# Patient Record
Sex: FEMALE | Race: WHITE | NOT HISPANIC OR LATINO | Employment: OTHER | URBAN - METROPOLITAN AREA
[De-identification: names, ages, dates, MRNs, and addresses within clinical notes are randomized per-mention and may not be internally consistent; named-entity substitution may affect disease eponyms.]

---

## 2017-09-28 ENCOUNTER — TELEPHONE (OUTPATIENT)
Dept: CARDIOLOGY | Facility: CLINIC | Age: 57
End: 2017-09-28

## 2017-11-02 ENCOUNTER — OFFICE VISIT (OUTPATIENT)
Dept: CARDIOLOGY | Facility: CLINIC | Age: 57
End: 2017-11-02

## 2017-11-02 VITALS
WEIGHT: 293 LBS | HEART RATE: 58 BPM | BODY MASS INDEX: 44.41 KG/M2 | DIASTOLIC BLOOD PRESSURE: 60 MMHG | SYSTOLIC BLOOD PRESSURE: 120 MMHG | HEIGHT: 68 IN

## 2017-11-02 DIAGNOSIS — I48.0 PAF (PAROXYSMAL ATRIAL FIBRILLATION) (HCC): Primary | ICD-10-CM

## 2017-11-02 PROCEDURE — 93000 ELECTROCARDIOGRAM COMPLETE: CPT | Performed by: INTERNAL MEDICINE

## 2017-11-02 PROCEDURE — 99214 OFFICE O/P EST MOD 30 MIN: CPT | Performed by: INTERNAL MEDICINE

## 2017-11-02 RX ORDER — ONDANSETRON 4 MG/1
4 TABLET, FILM COATED ORAL EVERY 6 HOURS PRN
COMMUNITY

## 2017-11-02 NOTE — PROGRESS NOTES
Subjective:     Encounter Date:11/02/2017      Patient ID: Jenelle Kasper is a 57 y.o. female.    Chief Complaint:  Shortness of Breath   This is a chronic problem. The current episode started more than 1 year ago. The problem has been unchanged. Associated symptoms include abdominal pain.   Atrial Fibrillation   Presents for follow-up visit. Symptoms include shortness of breath. The symptoms have been stable. Past medical history includes atrial fibrillation.       57-year-old female with a history of atrial fibrillation.  Patient presents today for a one-year reevaluation.  She was placed on Xarelto a year ago for a chads-vasc score of 3.  She was feeling really bad in August she went to her physician and was found to have a hemoglobin of 6.  Due to a disconnect this was found out by the patient and week later she went to emergency room and by that time her hemoglobin was down to 5.  This was back on or about September 21.  Patient was discontinued off the Xarelto and has been worked up by GI.  Her upper and lower scopes have been negative she's been unable to swallow the camera pill.  Her last hemoglobin was reportedly approximately 8.6 and she continues to have black tarry stools.  She was seen today for further evaluation    Review of Systems   Constitution: Positive for malaise/fatigue.   Respiratory: Positive for shortness of breath.    Gastrointestinal: Positive for abdominal pain, change in bowel habit, diarrhea and melena.   All other systems reviewed and are negative.        ECG 12 Lead  Date/Time: 11/2/2017 2:15 PM  Performed by: EFREN HARRIS  Authorized by: EFREN HARRIS   Comparison: compared with previous ECG from 8/31/2016  Similar to previous ECG  Rhythm: sinus rhythm  Clinical impression: normal ECG               Objective:     Physical Exam   Constitutional: She is oriented to person, place, and time. She appears well-developed.   HENT:   Head: Normocephalic.   Eyes: Conjunctivae  are normal.   Neck: Normal range of motion.   Cardiovascular: Normal rate and regular rhythm.    Murmur heard.   Harsh midsystolic murmur is present with a grade of 1/6  at the upper right sternal border  Pulmonary/Chest: Breath sounds normal.   Abdominal: Soft. Bowel sounds are normal.   obese   Musculoskeletal: Normal range of motion. She exhibits no edema.   Neurological: She is alert and oriented to person, place, and time.   Skin: Skin is warm and dry.   Psychiatric: She has a normal mood and affect. Her behavior is normal.   Vitals reviewed.      Lab Review:       Assessment:          Diagnosis Plan   1. PAF (paroxysmal atrial fibrillation)  ECG 12 Lead          Plan:       1.  Paroxysmal atrial fibrillation.  Clinically he's been doing well with no further symptoms.  Unfortunately now she has a GI bleed which required to come off anticoagulation.  I did explain the risk benefit ratio in medicine.  At this point clearly the risk of anticoagulation far outweighs the benefits.  At this point I would leave her off her anticoagulation.  I explained that she could have a stroke the incidence was very low at this point we did not have a choice.  She understood that and that any agent will promote bleeding especially since she has been off it for a month and continues to bleed.  2.  GI bleed  3.  Mild aortic stenosis.  4.  Obesity  5.  Follow-up in 6 months.        Atrial Fibrillation and Atrial Flutter  Assessment  • The patient's CHADS2-VASc score is 3  • A [id number]-[id number] score of 2 or more is considered a high risk for a thromboembolic event    Plan  • Attempt to maintain sinus rhythm  • Continue beta blocker for rhythm control

## 2018-05-03 ENCOUNTER — OFFICE VISIT (OUTPATIENT)
Dept: CARDIOLOGY | Facility: CLINIC | Age: 58
End: 2018-05-03

## 2018-05-03 VITALS
HEIGHT: 68 IN | HEART RATE: 86 BPM | DIASTOLIC BLOOD PRESSURE: 80 MMHG | WEIGHT: 293 LBS | BODY MASS INDEX: 44.41 KG/M2 | SYSTOLIC BLOOD PRESSURE: 138 MMHG

## 2018-05-03 DIAGNOSIS — I48.0 PAF (PAROXYSMAL ATRIAL FIBRILLATION) (HCC): ICD-10-CM

## 2018-05-03 DIAGNOSIS — T07.XXXA WOUNDS, MULTIPLE: Primary | ICD-10-CM

## 2018-05-03 PROCEDURE — 99213 OFFICE O/P EST LOW 20 MIN: CPT | Performed by: INTERNAL MEDICINE

## 2018-05-03 PROCEDURE — 93000 ELECTROCARDIOGRAM COMPLETE: CPT | Performed by: INTERNAL MEDICINE

## 2018-05-03 NOTE — PROGRESS NOTES
Subjective:     Encounter Date:11/02/2017      Patient ID: Jenelle Kasper is a 58 y.o. female.    Chief Complaint:  Atrial Fibrillation   Presents for follow-up visit. Symptoms include shortness of breath. The symptoms have been stable. Past medical history includes atrial fibrillation.   Shortness of Breath   This is a chronic problem. The current episode started more than 1 year ago. The problem has been unchanged.       57-year-old female with a history of atrial fibrillation. Patient resents today for reevaluation.  Unfortunately she says she doesn't feel well.  She knows that she is not active she also fighting weight which also says her legs hurt.  She's been having some wounds that she's been trying to heal for the past year but not succeeding very well.  From a heart standpoint she says not much change unfortunately she was struggling with other health issues that complicate her ability to do any type of activity.      Review of Systems   Constitution: Positive for malaise/fatigue.   Respiratory: Positive for shortness of breath.    Musculoskeletal: Positive for joint pain.   Gastrointestinal: Positive for melena.   All other systems reviewed and are negative.        ECG 12 Lead  Date/Time: 5/3/2018 4:15 PM  Performed by: EFREN HARRIS  Authorized by: EFREN HARRIS   Comparison: compared with previous ECG from 11/2/2017  Similar to previous ECG  Rhythm: sinus rhythm  Other findings: PRWP  Clinical impression: abnormal ECG               Objective:     Physical Exam   Constitutional: She is oriented to person, place, and time. She appears well-developed.   HENT:   Head: Normocephalic.   Eyes: Conjunctivae are normal.   Neck: Normal range of motion.   Cardiovascular: Normal rate and regular rhythm.    Murmur heard.   Harsh midsystolic murmur is present with a grade of 1/6  at the upper right sternal border  Pulmonary/Chest: Breath sounds normal.   Abdominal: Soft. Bowel sounds are normal.    obese   Musculoskeletal: Normal range of motion. She exhibits no edema.   Neurological: She is alert and oriented to person, place, and time.   Skin: Skin is warm and dry.   Psychiatric: She has a normal mood and affect. Her behavior is normal.   Vitals reviewed.      Lab Review:       Assessment:          Diagnosis Plan   1. Wounds, multiple  Ambulatory Referral to Wound Clinic   2. PAF (paroxysmal atrial fibrillation)            Plan:       1.  Paroxysmal atrial fibrillation.   2.  GI bleed   Patient continues to pina this.  Source is undetermined.  Unfortunate she's had no symptoms of atrial fibrillation.  At this point even aspirin has caused to have significant GI bleeding requiring transfusion.  3.  Mild aortic stenosis.  4.  Obesity  5.  Patient does say she has some chronic wounds.  She says it's been going on for about a year and she just can't get them healed up.  She has seen some physicians for this but has not improved.  I am and refer her to wound clinic to see if they can help.    6. Follow-up in 6 months.        Atrial Fibrillation and Atrial Flutter  Assessment  • The patient has paroxysmal atrial fibrillation  • The patient's CHADS2-VASc score is 3  • A [id number] score of 2 or more is considered a high risk for a thromboembolic event  • Warfarin not prescribed for medical reasons  • Rivaroxaban not prescribed for medical reasons  • Aspirin not prescribed for medical reasons    Plan  • Attempt to maintain sinus rhythm  • Continue beta blocker for rhythm control

## 2018-05-14 ENCOUNTER — OFFICE VISIT (OUTPATIENT)
Dept: WOUND CARE | Facility: HOSPITAL | Age: 58
End: 2018-05-14
Attending: SURGERY

## 2018-05-14 PROCEDURE — G0463 HOSPITAL OUTPT CLINIC VISIT: HCPCS

## 2018-05-16 ENCOUNTER — OFFICE VISIT (OUTPATIENT)
Dept: WOUND CARE | Facility: HOSPITAL | Age: 58
End: 2018-05-16
Attending: SURGERY

## 2018-05-21 ENCOUNTER — OFFICE VISIT (OUTPATIENT)
Dept: WOUND CARE | Facility: HOSPITAL | Age: 58
End: 2018-05-21
Attending: SURGERY

## 2018-05-25 ENCOUNTER — OFFICE VISIT (OUTPATIENT)
Dept: WOUND CARE | Facility: HOSPITAL | Age: 58
End: 2018-05-25
Attending: SURGERY

## 2018-05-31 ENCOUNTER — OFFICE VISIT (OUTPATIENT)
Dept: WOUND CARE | Facility: HOSPITAL | Age: 58
End: 2018-05-31
Attending: SURGERY

## 2018-06-04 ENCOUNTER — OFFICE VISIT (OUTPATIENT)
Dept: WOUND CARE | Facility: HOSPITAL | Age: 58
End: 2018-06-04
Attending: SURGERY

## 2018-06-07 ENCOUNTER — OFFICE VISIT (OUTPATIENT)
Dept: WOUND CARE | Facility: HOSPITAL | Age: 58
End: 2018-06-07
Attending: SURGERY

## 2018-06-07 PROCEDURE — G0463 HOSPITAL OUTPT CLINIC VISIT: HCPCS

## 2018-07-12 ENCOUNTER — OFFICE VISIT (OUTPATIENT)
Dept: WOUND CARE | Facility: HOSPITAL | Age: 58
End: 2018-07-12
Attending: SURGERY

## 2018-07-12 PROCEDURE — G0463 HOSPITAL OUTPT CLINIC VISIT: HCPCS

## 2018-07-26 ENCOUNTER — OFFICE VISIT (OUTPATIENT)
Dept: WOUND CARE | Facility: HOSPITAL | Age: 58
End: 2018-07-26
Attending: SURGERY

## 2018-08-02 ENCOUNTER — OFFICE VISIT (OUTPATIENT)
Dept: WOUND CARE | Facility: HOSPITAL | Age: 58
End: 2018-08-02
Attending: SURGERY

## 2018-08-16 ENCOUNTER — APPOINTMENT (OUTPATIENT)
Dept: WOUND CARE | Facility: HOSPITAL | Age: 58
End: 2018-08-16
Attending: SURGERY

## 2018-08-27 ENCOUNTER — OFFICE VISIT (OUTPATIENT)
Dept: WOUND CARE | Facility: HOSPITAL | Age: 58
End: 2018-08-27
Attending: SURGERY

## 2018-09-11 ENCOUNTER — OFFICE VISIT (OUTPATIENT)
Dept: CARDIOLOGY | Facility: CLINIC | Age: 58
End: 2018-09-11

## 2018-09-11 VITALS — DIASTOLIC BLOOD PRESSURE: 74 MMHG | HEIGHT: 67 IN | SYSTOLIC BLOOD PRESSURE: 138 MMHG | HEART RATE: 89 BPM

## 2018-09-11 DIAGNOSIS — I48.0 PAF (PAROXYSMAL ATRIAL FIBRILLATION) (HCC): Primary | ICD-10-CM

## 2018-09-11 DIAGNOSIS — Z87.19 HISTORY OF GI BLEED: ICD-10-CM

## 2018-09-11 DIAGNOSIS — E66.01 MORBID OBESITY WITH BMI OF 45.0-49.9, ADULT (HCC): ICD-10-CM

## 2018-09-11 PROCEDURE — 93000 ELECTROCARDIOGRAM COMPLETE: CPT | Performed by: NURSE PRACTITIONER

## 2018-09-11 PROCEDURE — 99214 OFFICE O/P EST MOD 30 MIN: CPT | Performed by: NURSE PRACTITIONER

## 2018-09-11 RX ORDER — DIGOXIN 250 MCG
250 TABLET ORAL
Qty: 30 TABLET | Refills: 11 | Status: SHIPPED | OUTPATIENT
Start: 2018-09-11 | End: 2019-08-31 | Stop reason: SDUPTHER

## 2018-09-11 RX ORDER — DIGOXIN 250 MCG
250 TABLET ORAL
COMMUNITY
End: 2018-09-11 | Stop reason: SDUPTHER

## 2018-09-11 RX ORDER — ATENOLOL 25 MG/1
TABLET ORAL
Start: 2018-09-11 | End: 2019-05-03 | Stop reason: SDUPTHER

## 2018-09-11 NOTE — PROGRESS NOTES
"Date of Office Visit: 2018  Encounter Provider: TRUDY Riley  Place of Service: [facility] CARDIOLOGY  Patient Name: Jenelle Kasper  :1960    Chief Complaint   Patient presents with   • Atrial Fibrillation   • Follow-up   :     HPI: Jenelle Kasper is a 58 y.o. female is a patient of Dr. Winter. I am seeing her today for the first time and have reviewed her record.     Her past medical history is significant of atrial fibrillation, sarcoidosis, diabetes mellitus, hypertension, hyperlipidemia, COPD, morbid obesity.    She has history of gastric bypass surgery.     In 2016 she complained of chest pain or breathing and had a PET myocardial perfusion study which revealed a medium to large size, moderately severe area of ischemia in the inferior wall.  She proceeded to have left heart catheterization which was performed on 2016 which showed normal coronary arteries and a right dominant fashion.  It was felt that the PET study was a false positive and treatment of atrial fibrillation was recommended.    At some point she had gastrointestinal bleeding and required transfusion.  She was followed by gastroenterology and reports having colonoscopy and ultimately a capsule study.  The capsule study was inconclusive as the capsule did not pass given her history of gastric bypass surgery and upon follow-up x-ray the capsule was no longer found.  She was ultimately diagnosed with \"micro-bleeding\".     She was admitted to the [facility] in 2018 with chest discomfort, shortness of breath, and was found to be in atrial fibrillation with rapid ventricular response.  Her medications were adjusted and she was started on digoxin and titrated up to 250 mcg.  The atenolol dosage was increased to 37.5 mg twice a day. Her losartan was stopped.  The watchman device was mentioned to her as an option to bypass long term anticoagulation  She also was diagnosed with C. " "Difficile and was treated with antibiotic for that.    Patient presents today for hospital follow-up.  She is tolerating addition of digoxin and remains in atrial fibrillation with rate control.  She continues to have occasional palpitations.  Her shortness of breath with exertion is overall unchanged but perhaps slightly worse over the past 3 months.  She has lower extremity edema which is unchanged and feels more fatigued due to continued diarrhea for approximately 5 weeks.  She denies chest pain, near syncope, syncope.  She reports that she had some blood in her stool while she was given Lovenox injections for DVT prophylaxis in the hospital.  She has not seen any more bleeding since she has been home.  She ambulates with a cane and has pretty extensive workup breathing with exertion.  Oxygen saturation was 98% in the office today after ambulating on room air. She reported \"fear of bleeding to death\" if she were to restart anticoagulation but that she is willing to try to prevent stroke.       Allergies   Allergen Reactions   • Codeine Sulfate Shortness Of Breath   • Contrast Dye Anaphylaxis   • Morphine Sulfate Hives and Shortness Of Breath   • Pradaxa [Dabigatran Etexilate Mesylate] GI Intolerance       Past Medical History:   Diagnosis Date   • Anxiety    • Arrhythmia    • Asthma    • Atrial fibrillation with RVR (CMS/[facility])    • C. difficile diarrhea    • COPD (chronic obstructive pulmonary disease) (CMS/[facility])    • Depression    • Diabetes mellitus (CMS/[facility])    • Diabetic peripheral neuropathy associated with type 2 diabetes mellitus (CMS/[facility])    • GERD (gastroesophageal reflux disease)    • Heart murmur    • History of fall     closed head injury after falling down steps; fx nose   • Hypercalcemia    • Hyperlipidemia    • Hypertension    • IBS (irritable bowel syndrome)    • Kidney stone    • Morbid obesity (CMS/[facility])    • PAF (paroxysmal atrial fibrillation) (CMS/[facility])    • PCOS (polycystic ovarian syndrome)  " "  • PONV (postoperative nausea and vomiting)    • Sarcoidosis    • Shortness of breath    • Thyroid cancer (CMS/HCC)     WITH RADIATION   • Wounds, multiple        Past Surgical History:   Procedure Laterality Date   • CARDIAC CATHETERIZATION N/A 7/27/2016    Procedure: Coronary angiography;  Surgeon: Chris Perez MD;  Location:  [facility] CATH INVASIVE LOCATION;  Service:    • CARDIAC CATHETERIZATION N/A 7/27/2016    Procedure: Left heart cath;  Surgeon: Chris Perez MD;  Location: [facility] CATH [facility];  Service:    • CARDIAC CATHETERIZATION N/A 7/27/2016    Procedure: Left ventriculography;  Surgeon: Chris Perez MD;  Location:  [facility] CATH INVASIVE LOCATION;  Service:    • CHOLECYSTECTOMY     • COLONOSCOPY     • GASTRIC BYPASS      gastric surgery for morbid obesity   • GASTROPLASTY  2008    GASTRIC REVISION FROM PREVIOUS GASTRIC BYPASS   • HERNIA REPAIR      h/o   • LUNG BIOPSY      to dx sarcoidosis   • LUNG SURGERY      Removal of granulomas   • OTHER SURGICAL HISTORY      Dilation and curettage of uterus   • TOTAL THYROIDECTOMY           Family and social history reviewed.     Review of Systems   Constitution: Positive for malaise/fatigue.   Cardiovascular: Positive for dyspnea on exertion, leg swelling and palpitations.   Musculoskeletal: Positive for joint pain.   Gastrointestinal: Positive for abdominal pain and diarrhea.     All other systems were reviewed and are negative          Objective:     Vitals:    09/11/18 1100   BP: 138/74   BP Location: Left arm   Patient Position: Sitting   Pulse: 89   Height: 170.2 cm (67\")     There is no height or weight on file to calculate BMI.    PHYSICAL EXAM:  Physical Exam   Constitutional: She is oriented to person, place, and time. She appears well-developed and well-nourished. No distress.   obese   HENT:   Head: Normocephalic.   Eyes: Conjunctivae are normal.   Neck: Normal range of motion. No JVD present.   Cardiovascular: Normal rate, regular " rhythm, normal heart sounds and intact distal pulses.    No murmur heard.  Pulses:       Carotid pulses are 2+ on the right side, and 2+ on the left side.       Radial pulses are 2+ on the right side, and 2+ on the left side.        Posterior tibial pulses are 2+ on the right side, and 2+ on the left side.   Pulmonary/Chest: Effort normal and breath sounds normal. No respiratory distress. She has no wheezes. She has no rhonchi. She has no rales. She exhibits no tenderness.   Abdominal: Soft. Bowel sounds are normal. She exhibits no distension.   Musculoskeletal: Normal range of motion. She exhibits edema.   Ambulating with cane   Neurological: She is alert and oriented to person, place, and time.   Skin: Skin is warm, dry and intact. No rash noted. She is not diaphoretic. There is erythema. No cyanosis.   LLE ace wrapped  RLE erythema   Psychiatric: She has a normal mood and affect. Her behavior is normal. Judgment and thought content normal.         ECG 12 Lead  Date/Time: 9/11/2018 11:18 AM  Performed by: RADHA ARREOLA  Authorized by: RADHA ARREOLA   Comparison: compared with previous ECG from 8/15/2018  Similar to previous ECG  Rhythm: atrial fibrillation  Rate: normal  BPM: 89  Clinical impression: abnormal ECG            Current Outpatient Prescriptions   Medication Sig Dispense Refill   • atenolol (TENORMIN) 25 MG tablet Taking 1.5 tablets every morning     • bisacodyl (DULCOLAX) 5 MG EC tablet Take 5 mg by mouth daily as needed for constipation (1-3 PILLS EVERY 6-12 HOURS PRN).     • cyclobenzaprine (FLEXERIL) 10 MG tablet Take 10 mg by mouth 3 (three) times a day as needed for muscle spasms.     • digoxin (LANOXIN) 250 MCG tablet Take 1 tablet by mouth Daily. 30 tablet 11   • Ergocalciferol (VITAMIN D2 PO) Take one capsule by mouth every Thursday and Sunday weekly     • fluticasone-salmeterol (ADVAIR DISKUS) 500-50 MCG/DOSE DISKUS Advair Diskus AEPB; Patient Sig: Advair Diskus AEPB ; 0; 05-Sep-2012; Active      • furosemide (LASIX) 20 MG tablet Take 20 mg by mouth 2 (two) times a day.     • gabapentin (NEURONTIN) 300 MG capsule Take 300 mg by mouth 3 (three) times a day. Take 3 tablets three times daily      • glipiZIDE (GLUCOTROL) 10 MG tablet Take 10 mg by mouth 2 (two) times a day before meals.     • hydrOXYzine (ATARAX) 25 MG tablet Take 25 mg by mouth every 8 (eight) hours as needed for itching.     • insulin NPH-insulin regular (Novolin 70/30) (70-30) 100 UNIT/ML injection Inject 60 Units under the skin into the appropriate area as directed 2 (Two) Times a Day Before Meals.     • levothyroxine (SYNTHROID) 200 MCG tablet Take  by mouth. Take 3 tablets by mouth every evening     • loratadine (CLARITIN) 10 MG tablet Take 1 tablet by mouth Daily.     • LORazepam (ATIVAN) 1 MG tablet Take 1 mg by mouth every 6 (six) hours as needed for anxiety.     • meclizine (ANTIVERT) 25 MG tablet Take 1 tablet by mouth Daily As Needed.     • meperidine (DEMEROL) 100 MG tablet Take 1 tablet by mouth 4 (Four) Times a Day As Needed.     • metFORMIN (GLUCOPHAGE) 500 MG tablet Take 1,000 mg by mouth 2 (two) times a day with meals.     • ondansetron (ZOFRAN) 8 MG tablet Take  by mouth Daily.     • potassium chloride (K-DUR) 10 MEQ CR tablet Take 1 tablet by mouth Daily.     • simvastatin (ZOCOR) 20 MG tablet Take 20 mg by mouth every night.     • vitamin D (ERGOCALCIFEROL) 27716 units capsule capsule Take 1 capsule by mouth 1 (One) Time Per Week.     • aspirin 81 MG tablet Take 1 tablet by mouth Daily. 30 tablet 11     No current facility-administered medications for this visit.      Assessment:       Diagnosis Plan   1. PAF (paroxysmal atrial fibrillation) (CMS/HCC)  ECG 12 Lead   2. Morbid obesity with BMI of 45.0-49.9, adult (CMS/HCC)     3. History of GI bleed          Orders Placed This Encounter   Procedures   • ECG 12 Lead     This order was created via procedure documentation         Plan:         1.Persistent  Atrial  fibrillation-  Atenolol 37.5 mg BID and digoxin 250 mcg daily for rate control. She has not been anticoagulated due to history of GI bleeding. She has agreed to try ASA 81 mg daily prior to attempting more aggressive systemic anticoagulation such as Eliquis. She has tried Pradaxa and Xarelto in the past. THe watchman device was discussed as an option however patient was not aware that she would need to tolerate systemic anticoagulation for that.  HAS-BLED score of 1 which is intermediate.   Atrial Fibrillation and Atrial Flutter  Assessment  • The patient has paroxysmal atrial fibrillation  • The patient's CHADS2-VASc score is 3  • A [id number]-KUVm score of 2 or more is considered a high risk for a thromboembolic event  • Warfarin not prescribed for medical reasons  • Rivaroxaban not prescribed for medical reasons  • Aspirin prescribed  • Would try Eliquis next if she tolerates ASA 81 mg without major bleeding.    Plan  • Continue in atrial fibrillation with rate control  • Continue beta blocker for rhythm control  • Continue digoxin for rate control      2. Hypertension- adequate control continue off losartan for now    3. Hyperlipidemia on simvastatin followed by PCP    4. Sarcoidosis    5. Diabetes on insulin    6. COPD    7. Morbid Obesity- history of gastric bypass    8. Hypothyroidism on replacement therapy    9. History of thyroid cancer status post radiation    10. History of GI bleed requiring transfusion. Followed by GI annually    11.  Recent C diff infection, completed antibiotic with continued report of odorous loose stool. She has an appointment with PCP in 2 weeks and plans to have repeat testing.     Plan of care reviewed with Dr. Winter  Follow up on 11/1/2018 with Dr. Winter    Patient was instructed to call the office if new symptoms develop or report to nearest ER if heart attack or stroke is suspected.        It has been a pleasure to participate in this patient's care.      Thank you,  Purnima  TRUDY Johnson      **Jose Antonio Disclaimer:**  Much of this encounter note is an electronic transcription/translation of spoken language to printed text. The electronic translation of spoken language may permit erroneous, or at times, nonsensical words or phrases to be inadvertently transcribed. Although I have reviewed the note for such errors, some may still exist.

## 2018-09-12 ENCOUNTER — OFFICE VISIT (OUTPATIENT)
Dept: WOUND CARE | Facility: HOSPITAL | Age: 58
End: 2018-09-12
Attending: SURGERY

## 2018-09-26 ENCOUNTER — OFFICE VISIT (OUTPATIENT)
Dept: WOUND CARE | Facility: HOSPITAL | Age: 58
End: 2018-09-26
Attending: SURGERY

## 2018-10-01 ENCOUNTER — OFFICE VISIT (OUTPATIENT)
Dept: WOUND CARE | Facility: HOSPITAL | Age: 58
End: 2018-10-01

## 2018-10-01 PROCEDURE — G0463 HOSPITAL OUTPT CLINIC VISIT: HCPCS

## 2018-10-10 ENCOUNTER — OFFICE VISIT (OUTPATIENT)
Dept: WOUND CARE | Facility: HOSPITAL | Age: 58
End: 2018-10-10
Attending: SURGERY

## 2018-10-10 PROCEDURE — G0463 HOSPITAL OUTPT CLINIC VISIT: HCPCS

## 2018-11-01 ENCOUNTER — OFFICE VISIT (OUTPATIENT)
Dept: WOUND CARE | Facility: HOSPITAL | Age: 58
End: 2018-11-01
Attending: SURGERY

## 2018-11-01 ENCOUNTER — LAB REQUISITION (OUTPATIENT)
Dept: LAB | Facility: HOSPITAL | Age: 58
End: 2018-11-01

## 2018-11-01 ENCOUNTER — OFFICE VISIT (OUTPATIENT)
Dept: CARDIOLOGY | Facility: CLINIC | Age: 58
End: 2018-11-01

## 2018-11-01 VITALS — SYSTOLIC BLOOD PRESSURE: 140 MMHG | DIASTOLIC BLOOD PRESSURE: 76 MMHG | HEART RATE: 54 BPM | HEIGHT: 67 IN

## 2018-11-01 DIAGNOSIS — E11.621 TYPE 2 DIABETES MELLITUS WITH FOOT ULCER (CODE) (HCC): ICD-10-CM

## 2018-11-01 DIAGNOSIS — I48.19 PERSISTENT ATRIAL FIBRILLATION (HCC): Primary | ICD-10-CM

## 2018-11-01 PROCEDURE — 87205 SMEAR GRAM STAIN: CPT | Performed by: SURGERY

## 2018-11-01 PROCEDURE — 93000 ELECTROCARDIOGRAM COMPLETE: CPT | Performed by: INTERNAL MEDICINE

## 2018-11-01 PROCEDURE — 87070 CULTURE OTHR SPECIMN AEROBIC: CPT | Performed by: SURGERY

## 2018-11-01 PROCEDURE — 87075 CULTR BACTERIA EXCEPT BLOOD: CPT | Performed by: SURGERY

## 2018-11-01 PROCEDURE — 99213 OFFICE O/P EST LOW 20 MIN: CPT | Performed by: INTERNAL MEDICINE

## 2018-11-01 NOTE — PROGRESS NOTES
Subjective:     Encounter Date:11/02/2017      Patient ID: Jenelle Kasper is a 58 y.o. female.    Chief Complaint:  Atrial Fibrillation   Presents for follow-up visit. Symptoms include shortness of breath. The symptoms have been stable. Past medical history includes atrial fibrillation.   Shortness of Breath   This is a chronic problem. The current episode started more than 1 year ago. The problem has been unchanged.       57-year-old female with a history of atrial fibrillation.  Subjective continues to be in atrial fibrillation.  She has tried exercise and build some of her stamina.  She does say when she exercises her heart rate does go up some.  I told this is normal and to continue to push herself.    Review of Systems   Constitution: Positive for malaise/fatigue.   Respiratory: Positive for shortness of breath.    Musculoskeletal: Positive for joint pain.   Gastrointestinal: Positive for melena.   All other systems reviewed and are negative.        ECG 12 Lead  Date/Time: 11/1/2018 12:08 PM  Performed by: EFREN HARRIS  Authorized by: EFREN HARRIS   Comparison: compared with previous ECG from 9/11/2018  Similar to previous ECG  Rhythm: atrial fibrillation  Clinical impression: abnormal ECG               Objective:     Physical Exam   Constitutional: She is oriented to person, place, and time. She appears well-developed.   HENT:   Head: Normocephalic.   Eyes: Conjunctivae are normal.   Neck: Normal range of motion.   Cardiovascular: Normal rate and regular rhythm.    Murmur heard.   Harsh midsystolic murmur is present with a grade of 1/6  at the upper right sternal border  Pulmonary/Chest: Breath sounds normal.   Abdominal: Soft. Bowel sounds are normal.   obese   Musculoskeletal: Normal range of motion. She exhibits no edema.   Neurological: She is alert and oriented to person, place, and time.   Skin: Skin is warm and dry.   Psychiatric: She has a normal mood and affect. Her behavior is  normal.   Vitals reviewed.      Lab Review:       Assessment:          Diagnosis Plan   1. Persistent atrial fibrillation (CMS/HCC)            Plan:       1.  Paroxysmal atrial fibrillation.  Patient remains in persistent atrial fibrillation.  Unfortunately due to problem #2 she remains chest on an aspirin.  2.  GI bleed   patient continues to slowly lose blood.  She is followed about every 3-4 months still having intermittent transfusions as well as iron infusions.  The bleeding is obviously not to the extent as it was on Xarelto but despite just being on aspirin she continues to lose a little bit of blood.  3.  Mild aortic stenosis.  4.  Obesity  5.  Patient does say she has some chronic wounds.  She says it's been going on for about a year and she just can't get them healed up.  She has seen some physicians for this but has not improved.  I am and refer her to wound clinic to see if they can help.    6. Follow-up in 6 months.        Atrial Fibrillation and Atrial Flutter  Assessment  • The patient has paroxysmal atrial fibrillation  • The patient's CHADS2-VASc score is 3  • A [id number] score of 2 or more is considered a high risk for a thromboembolic event  • Warfarin not prescribed for medical reasons  • Rivaroxaban not prescribed for medical reasons  • Aspirin not prescribed for medical reasons    Plan  • Continue in atrial fibrillation with rate control  • Continue aspirin for antithrombotic therapy, bleeding issues discussed  • Continue beta blocker for rhythm control

## 2018-11-03 LAB
BACTERIA SPEC AEROBE CULT: NORMAL
GRAM STN SPEC: NORMAL
GRAM STN SPEC: NORMAL

## 2018-11-06 LAB — BACTERIA SPEC ANAEROBE CULT: NORMAL

## 2018-11-09 ENCOUNTER — OFFICE VISIT (OUTPATIENT)
Dept: WOUND CARE | Facility: HOSPITAL | Age: 58
End: 2018-11-09
Attending: SURGERY

## 2018-11-30 ENCOUNTER — OFFICE VISIT (OUTPATIENT)
Dept: WOUND CARE | Facility: HOSPITAL | Age: 58
End: 2018-11-30
Attending: SURGERY

## 2018-11-30 PROCEDURE — G0463 HOSPITAL OUTPT CLINIC VISIT: HCPCS

## 2018-12-21 ENCOUNTER — OFFICE VISIT (OUTPATIENT)
Dept: WOUND CARE | Facility: HOSPITAL | Age: 58
End: 2018-12-21
Attending: SURGERY

## 2018-12-21 PROCEDURE — G0463 HOSPITAL OUTPT CLINIC VISIT: HCPCS

## 2019-05-03 RX ORDER — ATENOLOL 25 MG/1
TABLET ORAL
Qty: 90 TABLET | Refills: 3 | Status: SHIPPED | OUTPATIENT
Start: 2019-05-03 | End: 2019-12-18

## 2019-09-04 RX ORDER — DIGOXIN 250 UG/1
TABLET ORAL
Qty: 30 TABLET | Refills: 10 | Status: SHIPPED | OUTPATIENT
Start: 2019-09-04 | End: 2019-12-18

## 2019-09-09 RX ORDER — DIGOXIN 250 UG/1
TABLET ORAL
Qty: 30 TABLET | Refills: 10 | Status: SHIPPED | OUTPATIENT
Start: 2019-09-09 | End: 2020-09-22

## 2019-11-21 ENCOUNTER — OFFICE VISIT (OUTPATIENT)
Dept: CARDIOLOGY | Facility: CLINIC | Age: 59
End: 2019-11-21

## 2019-11-21 VITALS
BODY MASS INDEX: 44.1 KG/M2 | WEIGHT: 281 LBS | SYSTOLIC BLOOD PRESSURE: 160 MMHG | DIASTOLIC BLOOD PRESSURE: 90 MMHG | HEART RATE: 70 BPM | HEIGHT: 67 IN

## 2019-11-21 DIAGNOSIS — I48.19 PERSISTENT ATRIAL FIBRILLATION (HCC): Primary | ICD-10-CM

## 2019-11-21 PROBLEM — I48.0 PAF (PAROXYSMAL ATRIAL FIBRILLATION): Status: RESOLVED | Noted: 2018-05-03 | Resolved: 2019-11-21

## 2019-11-21 PROCEDURE — 93000 ELECTROCARDIOGRAM COMPLETE: CPT | Performed by: INTERNAL MEDICINE

## 2019-11-21 PROCEDURE — 99214 OFFICE O/P EST MOD 30 MIN: CPT | Performed by: INTERNAL MEDICINE

## 2019-11-21 NOTE — PROGRESS NOTES
Subjective:     Encounter Date:11/21/2019      Patient ID: Jenelle Kasper is a 59 y.o. female.    Chief Complaint:  Atrial Fibrillation   Presents for follow-up visit. The symptoms have been stable. Past medical history includes atrial fibrillation.       59-year-old female who presents today for evaluation.  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  Patient is to undergo a parathyroidectomy.  Patient has had chronic lower extremity edema with intermittent cellulitis.  I have referred her to lymphedema clinic but her insurance company will not cover any type of associated visits like that.  From a heart standpoint she is been doing relatively well.  She occasionally has a sharp pain on the left side of her chest.  About 3 years ago she underwent a nuclear perfusion study which was abnormal so she underwent a heart catheterization which showed no angiographic coronary artery disease.  She denies any types of cardiac type symptoms she was trying to exercise in the pool but with her cellulitis she was allowed to do that.      Review of Systems   Constitution: Positive for malaise/fatigue and weight loss.   Skin: Positive for color change, itching and rash.   Musculoskeletal: Positive for joint pain.   Psychiatric/Behavioral: Positive for depression. The patient is nervous/anxious.    All other systems reviewed and are negative.        ECG 12 Lead  Date/Time: 11/21/2019 4:57 PM  Performed by: Baldev Winter MD  Authorized by: Baldev Winter MD   Comparison: compared with previous ECG from 11/1/2018  Similar to previous ECG  Rhythm: atrial fibrillation    Clinical impression: abnormal EKG               Objective:     Physical Exam   Constitutional: She is oriented to person, place, and time. She appears well-developed.   HENT:   Head: Normocephalic.   Eyes: Conjunctivae are normal.   Neck: Normal range of motion.   Cardiovascular: Normal rate and normal heart sounds. An irregularly  irregular rhythm present.   Pulmonary/Chest: Breath sounds normal.   Abdominal: Soft. Bowel sounds are normal.   Musculoskeletal: Normal range of motion. She exhibits edema.   Neurological: She is alert and oriented to person, place, and time.   Skin: Skin is warm and dry. There is erythema.   Psychiatric: She has a normal mood and affect. Her behavior is normal.   Vitals reviewed.      Lab Review:       Assessment:          Diagnosis Plan   1. Persistent atrial fibrillation            Plan:       1.  Persistent atrial fibrillation.  Unfortunately we have tried several anticoagulants and she has had GI bleeds on all of them.  Therefore she is just on an aspirin.  She understands that this is suboptimal treatment but she has re-bled on all other anticoagulants without a source requiring multiple blood transfusions.  Heart rate remains controlled.  I told her hold her aspirin 1 week prior to surgery and resume it several days after surgery when the bleeding is under control.  2.  Upcoming surgery parathyroidectomy.  She had a stress test in the past that was a false positive she had a normal cardiac catheterization 3 years ago.  She denies any types of cardiac chest discomforts.  She remains at a low risk from that aspect so I would proceed as clinically indicated.  3.  Lower extremity edema.  She is also had recurrent cellulitis.  She truly would benefit from lymphedema clinic however her insurance does not cover it and she cannot afford it.  4.  Follow-up 1 year sooner if issues arise.

## 2019-12-09 ENCOUNTER — HOSPITAL ENCOUNTER (OUTPATIENT)
Dept: CARDIOLOGY | Facility: HOSPITAL | Age: 59
Discharge: HOME | End: 2019-12-09
Admitting: INTERNAL MEDICINE

## 2019-12-09 VITALS
HEART RATE: 74 BPM | BODY MASS INDEX: 42.38 KG/M2 | HEIGHT: 67 IN | WEIGHT: 270 LBS | DIASTOLIC BLOOD PRESSURE: 84 MMHG | RESPIRATION RATE: 18 BRPM | SYSTOLIC BLOOD PRESSURE: 168 MMHG | OXYGEN SATURATION: 97 %

## 2019-12-09 DIAGNOSIS — I10 ESSENTIAL HYPERTENSION: ICD-10-CM

## 2019-12-09 DIAGNOSIS — R06.02 SHORTNESS OF BREATH: ICD-10-CM

## 2019-12-09 PROCEDURE — 94760 N-INVAS EAR/PLS OXIMETRY 1: CPT

## 2019-12-09 PROCEDURE — 25010000002 HYDRALAZINE PER 20 MG: Performed by: INTERNAL MEDICINE

## 2019-12-09 PROCEDURE — 93010 ELECTROCARDIOGRAM REPORT: CPT | Performed by: INTERNAL MEDICINE

## 2019-12-09 PROCEDURE — 93005 ELECTROCARDIOGRAM TRACING: CPT | Performed by: INTERNAL MEDICINE

## 2019-12-09 PROCEDURE — 36415 COLL VENOUS BLD VENIPUNCTURE: CPT

## 2019-12-09 PROCEDURE — 99214 OFFICE O/P EST MOD 30 MIN: CPT | Performed by: INTERNAL MEDICINE

## 2019-12-09 PROCEDURE — 96374 THER/PROPH/DIAG INJ IV PUSH: CPT

## 2019-12-09 RX ORDER — HYDRALAZINE HYDROCHLORIDE 20 MG/ML
10 INJECTION INTRAMUSCULAR; INTRAVENOUS ONCE
Status: COMPLETED | OUTPATIENT
Start: 2019-12-09 | End: 2019-12-09

## 2019-12-09 RX ORDER — NITROGLYCERIN 0.4 MG/1
0.4 TABLET SUBLINGUAL
Status: DISCONTINUED | OUTPATIENT
Start: 2019-12-09 | End: 2020-04-22 | Stop reason: ALTCHOICE

## 2019-12-09 RX ORDER — SODIUM CHLORIDE 0.9 % (FLUSH) 0.9 %
10 SYRINGE (ML) INJECTION AS NEEDED
Status: DISCONTINUED | OUTPATIENT
Start: 2019-12-09 | End: 2022-02-06 | Stop reason: HOSPADM

## 2019-12-09 RX ADMIN — HYDRALAZINE HYDROCHLORIDE 10 MG: 20 INJECTION INTRAMUSCULAR; INTRAVENOUS at 10:19

## 2019-12-11 NOTE — PROGRESS NOTES
Subjective:     Encounter Date:12/09/2019      Patient ID: Jenelle Kasper is a 59 y.o. female.    Chief Complaint:  Hypertension   This is a recurrent problem. The problem is uncontrolled. Associated symptoms include blurred vision and headaches.       59-year-old female who presents to our cardiac evaluation center after having extreme hypertension.  Patient went for surgery today to have a parathyroidectomy and her pressure was noted in the 220/130 range.  She said she was sent to our cardiac evaluation center.  Here she actually felt pretty good.  She really did not have any specific complaints.    Review of Systems   Constitution: Positive for weight loss.   Eyes: Positive for blurred vision.   Respiratory: Positive for cough.    Skin: Positive for rash.   Musculoskeletal: Positive for joint pain and joint swelling.   Neurological: Positive for dizziness and headaches.         ECG 12 Lead  Date/Time: 12/9/2019 9:15 AM  Performed by: Baldev Winter MD  Authorized by: Baldev Winter MD   Comparison: compared with previous ECG from 11/21/2019  Similar to previous ECG  Rhythm: atrial fibrillation    Clinical impression: abnormal EKG               Objective:     Physical Exam   Constitutional: She is oriented to person, place, and time. She appears well-developed.   HENT:   Head: Normocephalic.   Eyes: Conjunctivae are normal.   Neck: Normal range of motion.   Cardiovascular: Normal rate, regular rhythm and normal heart sounds.   Pulmonary/Chest: Breath sounds normal.   Abdominal: Soft. Bowel sounds are normal.   Musculoskeletal: Normal range of motion. She exhibits no edema.   Neurological: She is alert and oriented to person, place, and time.   Skin: Skin is warm and dry.   Psychiatric: She has a normal mood and affect. Her behavior is normal.   Vitals reviewed.      Lab Review:       Assessment:          Diagnosis Plan   1. Shortness of breath  sodium chloride 0.9 % flush 10 mL    nitroglycerin  (NITROSTAT) SL tablet 0.4 mg    ECG 12 Lead   2. Essential hypertension  sodium chloride 0.9 % flush 10 mL    nitroglycerin (NITROSTAT) SL tablet 0.4 mg    ECG 12 Lead    hydrALAZINE (APRESOLINE) injection 10 mg          Plan:       1.  Hypertension blood pressure was better than it was at the surgical center however still remains significantly elevated.  I gave her 10 mg IV of hydralazine and she actually said she felt better after that as her blood pressure dropped.  She thinks maybe she was more symptomatic than she thought could not be specific she said she just felt better.  I therefore doubled her atenolol and have her follow-up with our nurse practitioner in 1 week to reassess.  2.  Chronic atrial fibrillation heart rate stable  3.  Parathyroidectomy.  Hopefully we can control her blood pressure so she can have her surgery.  Of note she did take her blood pressure medicine this morning which was not the etiology for wide spikes.  We also discussed salt intake.

## 2019-12-13 ENCOUNTER — TELEPHONE (OUTPATIENT)
Dept: CARDIOLOGY | Facility: CLINIC | Age: 59
End: 2019-12-13

## 2019-12-13 RX ORDER — LOSARTAN POTASSIUM 50 MG/1
50 TABLET ORAL DAILY
Qty: 30 TABLET | Refills: 2 | Status: SHIPPED | OUTPATIENT
Start: 2019-12-13 | End: 2019-12-31 | Stop reason: SDUPTHER

## 2019-12-13 NOTE — TELEPHONE ENCOUNTER
Patient called blood pressure has remained elevated ever since I saw her.  Reviewed she was changed because of rhythm issues losartan had worked in the past she had no side effects to it that she knew.  Letter that I sent her in losartan 50 mg a day told her contact me back next week if she is not better.

## 2019-12-13 NOTE — TELEPHONE ENCOUNTER
12/13/19  1:50 PM  Jenelle Kasper  1960  Home Phone [phone]   Mobile [phone]     Jenelle Garvin called back today. [place] [facility] [place]. She said since that visit, she has been taking Atenolol 50 mg BID. Her B/P is still elevated today at 210/128, HR 68 and she has a headache that she said she's had for two months.    She said she was on losartan and that was working better for her. She wanted to know why she can't be on that anymore.    She also takes furosemide 20 mg a day, but she ran our of that and needs a prescription. She is also almost out of her atenolol.    Should we reorder the furosemide? Does she need another B/P med? ER?    Thank you!    Adele Buchanan RN  Triage [facility]

## 2019-12-13 NOTE — TELEPHONE ENCOUNTER
Pt left msg saying she found paperwork from [facility] that says she had been on Losartan-HCTZ 100-25 Mg daily...prescibed by Dr. Olivares (PMD).  This was in 2015.    [phone] --> pt    Does this change anything regarding what you just sent in?  You sent in Losartan 50 MG day.    Thanks,  Kinjal

## 2019-12-18 ENCOUNTER — OFFICE VISIT (OUTPATIENT)
Dept: CARDIOLOGY | Facility: CLINIC | Age: 59
End: 2019-12-18

## 2019-12-18 VITALS
OXYGEN SATURATION: 98 % | HEART RATE: 90 BPM | HEIGHT: 67 IN | WEIGHT: 265 LBS | DIASTOLIC BLOOD PRESSURE: 96 MMHG | SYSTOLIC BLOOD PRESSURE: 208 MMHG | BODY MASS INDEX: 41.59 KG/M2

## 2019-12-18 DIAGNOSIS — I10 HYPERTENSION NOT AT GOAL: Primary | ICD-10-CM

## 2019-12-18 DIAGNOSIS — I48.19 PERSISTENT ATRIAL FIBRILLATION (HCC): ICD-10-CM

## 2019-12-18 DIAGNOSIS — Z91.14 NONCOMPLIANCE WITH MEDICATION TREATMENT DUE TO DIFFICULTY WITH DOSING: ICD-10-CM

## 2019-12-18 PROCEDURE — 99214 OFFICE O/P EST MOD 30 MIN: CPT | Performed by: NURSE PRACTITIONER

## 2019-12-18 RX ORDER — FUROSEMIDE 20 MG/1
20 TABLET ORAL DAILY
Qty: 30 TABLET | Refills: 11 | Status: ON HOLD | OUTPATIENT
Start: 2019-12-18 | End: 2020-02-26 | Stop reason: SDUPTHER

## 2019-12-18 RX ORDER — ATENOLOL 25 MG/1
75 TABLET ORAL 2 TIMES DAILY
Qty: 90 TABLET | Refills: 11 | Status: SHIPPED | OUTPATIENT
Start: 2019-12-18 | End: 2020-08-28 | Stop reason: SDUPTHER

## 2019-12-18 NOTE — PROGRESS NOTES
[facility] CARDIOLOGY  [address]  [facility] [address]  Phone: [phone]      Patient Name: Jenelle Kasper  :1960  Age: 59 y.o.  Primary Cardiologist: Baldev Winter MD  Encounter Provider:  TRUDY Robb      Chief Complaint:   Chief Complaint   Patient presents with   • Follow-up     1 week [facility]         HPI  Jenelle Kasper is a 59 y.o. female who presents today for [facility] reevaluation.     Pt has a  history significant for persistent atrial fibrillation, hypertension.    Patient was seen in the [facility] on 2019 by Dr. Winter.  At that time patient's blood pressure was extremely elevated.  Patient was given IV hydralazine and reports that she felt better with more controlled blood pressure readings.  Her atenolol was increased and she was recommended to follow-up with me.  Patient then called the office on 2019 and stated that her blood pressure was still markedly elevated.  She reported that she had been on losartan in the past and was wondering if she could restart this.  Losartan 50 mg was sent to her pharmacy.  Patient then called the office stating that her primary care physician had placed her on losartan hydrochlorothiazide 100/25.  Dr. Winter recommended staying with losartan 50 mg daily.    Patient reports today with her .  Upon review of her medications that she inadvertently stopped taking her atenolol when losartan was added.  Patient is very confused in regards to her medication.  She still has a bottle of losartan HCTZ 100/25 as well as losartan 50 mg tablets.  She is unsure which ones she should be taking and even though Dr. Winter recommended taking losartan 50 mg daily.  Patient currently complains of headaches associated to her elevated blood pressure readings.  Blood pressure readings at home have been averaging in the 190s-200s/90s-110s.  She brings with her her blood pressure cuff today.  Systolic blood  "pressure is consistent with manual readings but diastolic blood pressure is higher on her home cuff.  She currently denies any chest pain, shortness of breath.  Reports some generalized fatigue.      The following portions of the patient's history were reviewed and updated as appropriate: allergies, current medications, past family history, past medical history, past social history, past surgical history and problem list.      Review of Systems   Constitution: Positive for decreased appetite and malaise/fatigue.   HENT: Positive for ear pain and nosebleeds.    Eyes: Positive for pain.   Cardiovascular: Positive for claudication and leg swelling. Negative for chest pain.   Respiratory: Positive for shortness of breath.    Endocrine: Positive for polyuria.   Skin: Positive for color change, poor wound healing and rash.   Musculoskeletal: Positive for joint pain, joint swelling and myalgias.   Gastrointestinal: Positive for abdominal pain, diarrhea and nausea.   Neurological: Positive for excessive daytime sleepiness, dizziness, headaches, light-headedness, numbness, paresthesias and seizures.   Psychiatric/Behavioral: The patient is nervous/anxious.    All other systems reviewed and are negative.      OBJECTIVE:     Vitals:    12/18/19 1303   BP: (!) 208/96   BP Location: Right arm   Patient Position: Sitting   Pulse: 90   SpO2: 98%   Weight: 120 kg (265 lb)   Height: 170.2 cm (67\")     Body mass index is 41.5 kg/m².    Physical Exam   Constitutional: She is oriented to person, place, and time. Vital signs are normal. She appears well-developed and well-nourished. She is active.   Eyes: Conjunctivae are normal.   Neck: Carotid bruit is not present.   Cardiovascular: Normal rate, regular rhythm and normal heart sounds.   Pulmonary/Chest: Breath sounds normal.   Abdominal: Normal appearance.   Musculoskeletal:   No cyanosis, clubbing, edema  Normal ROM   Neurological: She is alert and oriented to person, place, and " time. GCS eye subscore is 4. GCS verbal subscore is 5. GCS motor subscore is 6.   Skin: Skin is warm, dry and intact.   Psychiatric: She has a normal mood and affect. Her speech is normal and behavior is normal. Judgment and thought content normal. Cognition and memory are normal.       Procedures    Cardiac Procedures:  1. Myocardial perfusion PET test 6/28/2016.  Medium to large sized, moderately severe area of ischemia located in the inferior wall.  EF 67%.  Impressions consistent with intermediate study.  2. Cardiac catheterization 7/27/2016.  Normal left heart catheterization.  Likely false positive stress test.        ASSESSMENT:      Diagnosis Plan   1. Hypertension not at goal  Basic Metabolic Panel    furosemide (LASIX) 20 MG tablet    atenolol (TENORMIN) 25 MG tablet    Ambulatory Referral to Home Health   2. Noncompliance with medication treatment due to difficulty with dosing           PLAN OF CARE:     1. Hypertension not at goal.  As noted above patient stopped her atenolol when losartan was added.  I have specifically reviewed her medications with her and her .  Patient should be taking atenolol 75 mg twice daily.  She should also be taking losartan 50 mg daily.  She also reveals that she should be taking Lasix, however she ran out of the prescription several months ago.  I have refilled her Lasix 20 mg tablets and hopefully this will assist in improving her blood pressure.  Patient will need a repeat BMP in 1 week to assess renal function with the addition of losartan as well as restarting her Lasix.  This has been ordered and she has been recommended to get this drawn in 1 week.  2. Medication noncompliance.  As noted above patient has been very confused in regards to her medication.  She inadvertently stopped the atenolol when losartan was added.  Patient has transportation difficulties.  I do think that she would benefit from a nurse coming to her home and reviewing her medications with her  to ensure that she is taking all medications properly.  I have also provided a printed medication list to the patient and her .  3. Persistent atrial fibrillation.  Rate currently controlled.  4. Follow-up with me in 1 month and Dr. Winter in 6 months.  Sooner for problems or complications.  Patient will continue to monitor blood pressure readings at home.             Discharge Medications           Accurate as of December 18, 2019  1:28 PM. If you have any questions, ask your nurse or doctor.               Continue These Medications      Instructions Start Date   ADVAIR DISKUS 500-50 MCG/DOSE DISKUS  Generic drug:  fluticasone-salmeterol   Advair Diskus AEPB; Patient Sig: Advair Diskus AEPB ; 0; 05-Sep-2012; Active      ANTIVERT 25 MG tablet  Generic drug:  meclizine   1 tablet, Oral, Daily PRN      aspirin 81 MG tablet   81 mg, Oral, Daily      bisacodyl 5 MG EC tablet  Commonly known as:  DULCOLAX   5 mg, Oral, Daily PRN      CLARITIN 10 MG tablet  Generic drug:  loratadine   1 tablet, Oral, Daily      cyclobenzaprine 10 MG tablet  Commonly known as:  FLEXERIL   10 mg, Oral, 3 Times Daily PRN      DIGOX 250 MCG tablet  Generic drug:  digoxin   TAKE ONE TABLET BY MOUTH DAILY      DIGOX 250 MCG tablet  Generic drug:  digoxin   TAKE ONE TABLET BY MOUTH DAILY      furosemide 20 MG tablet  Commonly known as:  LASIX   20 mg, Oral, 2 Times Daily      gabapentin 300 MG capsule  Commonly known as:  NEURONTIN   300 mg, Oral, 3 Times Daily, Take 3 tablets three times daily      glipizide 10 MG tablet  Commonly known as:  GLUCOTROL   10 mg, Oral, 2 Times Daily Before Meals      hydrOXYzine 25 MG tablet  Commonly known as:  ATARAX   25 mg, Oral, Every 8 Hours PRN      insulin NPH-insulin regular (70-30) 100 UNIT/ML injection  Commonly known as:  humuLIN 70/30,novoLIN 70/30   60 Units, Subcutaneous, 2 Times Daily Before Meals      LORazepam 1 MG tablet  Commonly known as:  ATIVAN   1 mg, Oral, Every 6 Hours PRN       losartan 50 MG tablet  Commonly known as:  COZAAR   50 mg, Oral, Daily      meperidine 100 MG tablet  Commonly known as:  DEMEROL   1 tablet, Oral, 4 Times Daily PRN      metFORMIN 500 MG tablet  Commonly known as:  GLUCOPHAGE   1,000 mg, Oral, 2 Times Daily With Meals      ondansetron 8 MG tablet  Commonly known as:  ZOFRAN   Oral, Daily      potassium chloride 10 MEQ CR tablet  Commonly known as:  K-DUR   1 tablet, Oral, Daily      simvastatin 20 MG tablet  Commonly known as:  ZOCOR   20 mg, Oral, Nightly      SYNTHROID 200 MCG tablet  Generic drug:  levothyroxine   Oral, Take 3 tablets by mouth every evening      VITAMIN D2 PO   Take one capsule by mouth every Thursday and Sunday weekly      vitamin D 1.25 MG (76863 UT) capsule capsule  Commonly known as:  ERGOCALCIFEROL   1 capsule, Oral, Weekly         Stop These Medications    atenolol 25 MG tablet  Commonly known as:  TENORMIN  Stopped by:  TRUDY Robb            Thank you for allowing me to participate in the care of your patient,         TRUDY Robb  [facility]  12/18/19  1:28 PM    **Jose Antonio Disclaimer:**  Much of this encounter note is an electronic transcription/translation of spoken language to printed text. The electronic translation of spoken language may permit erroneous, or at times, nonsensical words or phrases to be inadvertently transcribed. Although I have reviewed the note for such errors, some may still exist.

## 2019-12-20 ENCOUNTER — LAB (OUTPATIENT)
Dept: LAB | Facility: HOSPITAL | Age: 59
End: 2019-12-20

## 2019-12-20 DIAGNOSIS — I10 HYPERTENSION NOT AT GOAL: ICD-10-CM

## 2019-12-20 LAB
ANION GAP SERPL CALCULATED.3IONS-SCNC: 11.8 MMOL/L (ref 5–15)
BUN BLD-MCNC: 15 MG/DL (ref 6–20)
BUN/CREAT SERPL: 17.6 (ref 7–25)
CALCIUM SPEC-SCNC: 10.8 MG/DL (ref 8.6–10.5)
CHLORIDE SERPL-SCNC: 97 MMOL/L (ref 98–107)
CO2 SERPL-SCNC: 28.2 MMOL/L (ref 22–29)
CREAT BLD-MCNC: 0.85 MG/DL (ref 0.57–1)
GFR SERPL CREATININE-BSD FRML MDRD: 69 ML/MIN/1.73
GLUCOSE BLD-MCNC: 231 MG/DL (ref 65–99)
POTASSIUM BLD-SCNC: 4.3 MMOL/L (ref 3.5–5.2)
SODIUM BLD-SCNC: 137 MMOL/L (ref 136–145)

## 2019-12-20 PROCEDURE — 80048 BASIC METABOLIC PNL TOTAL CA: CPT

## 2019-12-20 PROCEDURE — 36415 COLL VENOUS BLD VENIPUNCTURE: CPT

## 2019-12-24 ENCOUNTER — TELEPHONE (OUTPATIENT)
Dept: CARDIOLOGY | Facility: CLINIC | Age: 59
End: 2019-12-24

## 2019-12-24 NOTE — PROGRESS NOTES
Can you please advise patient of normal lab results, potassium and renal function is normal.  Glucose is elevated and she should follow her family physician for this.

## 2019-12-24 NOTE — TELEPHONE ENCOUNTER
Called and left a VM. Will go over results when pt calls us back.    Adele Buchanan, RN  Triage [facility]

## 2019-12-24 NOTE — TELEPHONE ENCOUNTER
----- Message from TRUDY Robb sent at 12/24/2019  9:42 AM EST -----  Can you please advise patient of normal lab results, potassium and renal function is normal.  Glucose is elevated and she should follow her family physician for this.

## 2019-12-26 NOTE — TELEPHONE ENCOUNTER
Called and left a VM. Will go over results when pt calls us back.    Adele Buchanan, RN  Triage [facility]

## 2019-12-27 NOTE — TELEPHONE ENCOUNTER
Pt called back. Went over results. She verbalized understanding.    Thank you!    Adele Buchanan RN  Triage [facility]

## 2019-12-27 NOTE — TELEPHONE ENCOUNTER
Pt left msg returning your call.    Please call her @ [phone] or can leave a msg at [phone].    ThanksKinjal

## 2019-12-31 ENCOUNTER — TELEPHONE (OUTPATIENT)
Dept: CARDIOLOGY | Facility: CLINIC | Age: 59
End: 2019-12-31

## 2019-12-31 RX ORDER — LOSARTAN POTASSIUM 100 MG/1
100 TABLET ORAL DAILY
Qty: 30 TABLET | Refills: 3 | Status: SHIPPED | OUTPATIENT
Start: 2019-12-31 | End: 2020-05-04

## 2019-12-31 NOTE — TELEPHONE ENCOUNTER
12/31/19  11:00  Dr. Winter, please address the requests for the monitoring systems.  I will advise her of the dose increase/roz

## 2019-12-31 NOTE — TELEPHONE ENCOUNTER
12/31/19  Vmsg left 4:25 12/30  - vmsg from [facility], with Located within [facility].  States patient's bp today was 178/88 and HR 76 - currently taking atenolol 75bid and losartan 50mg qd.  [facility] also asked for an order for cardio-com or tele-house system to monitor patient.  Of note, patient has appt with Shelly on 1/6/20.      I spoke with [facility], explained to her that Dr. Winter and Shelly are out of the office until 1/6/19, at which time they can review and determine if this can be ordered.  [facility]'s ph [phone]/roz

## 2020-01-06 ENCOUNTER — OFFICE VISIT (OUTPATIENT)
Dept: CARDIOLOGY | Facility: CLINIC | Age: 60
End: 2020-01-06

## 2020-01-06 VITALS
HEIGHT: 67 IN | DIASTOLIC BLOOD PRESSURE: 86 MMHG | SYSTOLIC BLOOD PRESSURE: 158 MMHG | OXYGEN SATURATION: 97 % | BODY MASS INDEX: 40.81 KG/M2 | WEIGHT: 260 LBS | HEART RATE: 79 BPM

## 2020-01-06 DIAGNOSIS — R07.2 PRECORDIAL PAIN: Primary | ICD-10-CM

## 2020-01-06 DIAGNOSIS — E66.01 MORBID OBESITY WITH BMI OF 45.0-49.9, ADULT (HCC): ICD-10-CM

## 2020-01-06 DIAGNOSIS — I10 BENIGN ESSENTIAL HTN: ICD-10-CM

## 2020-01-06 DIAGNOSIS — I48.19 PERSISTENT ATRIAL FIBRILLATION (HCC): ICD-10-CM

## 2020-01-06 PROBLEM — E21.3 HYPERPARATHYROIDISM (HCC): Status: ACTIVE | Noted: 2019-12-31

## 2020-01-06 PROCEDURE — 99214 OFFICE O/P EST MOD 30 MIN: CPT | Performed by: NURSE PRACTITIONER

## 2020-01-06 NOTE — PROGRESS NOTES
[facility] CARDIOLOGY  [address]  [facility] [address]  Phone: [phone]      Patient Name: Jenelle Kasper  :1960  Age: 59 y.o.  Primary Cardiologist: Baldev Winter MD  Encounter Provider:  TRUDY Robb      Chief Complaint:   Chief Complaint   Patient presents with   • Follow-up     2 week          HPI  Jenelle Kasper is a 59 y.o. female who presents today for [facility] reevaluation.     Pt has a  history significant for persistent atrial fibrillation, hypertension.    Patient was seen in the [facility] on 2019 by Dr. Winter.  At that time patient's blood pressure was extremely elevated.  Patient was given IV hydralazine and reports that she felt better with more controlled blood pressure readings.  Her atenolol was increased and she was recommended to follow-up with me.  Patient then called the office on 2019 and stated that her blood pressure was still markedly elevated.  She reported that she had been on losartan in the past and was wondering if she could restart this.  Losartan 50 mg was sent to her pharmacy.  Patient then called the office stating that her primary care physician had placed her on losartan hydrochlorothiazide 100/25.  Dr. Winter recommended staying with losartan 50 mg daily.    Patient reports today and states that her blood pressure has been much improved.  She states that she feels better now that she has more control of her blood pressure.  She does continue to complain of intermittent episodes of chest pain in the mid back region.  She describes these episodes as a pressure with associated shortness of breath and nausea.  States that symptoms occur approximately daily.  She reports occasional episodes of lightheadedness as well as generalized fatigue.  Patient is sedentary and does not exercise and uses a cane to assist in ambulation.  Home health has requested a tele-house monitoring system to assist in  "monitoring of blood pressure and heart rate.    The following portions of the patient's history were reviewed and updated as appropriate: allergies, current medications, past family history, past medical history, past social history, past surgical history and problem list.      Review of Systems   Constitution: Positive for malaise/fatigue.   Cardiovascular: Positive for chest pain and dyspnea on exertion. Negative for leg swelling.   Respiratory: Positive for shortness of breath.    Musculoskeletal: Positive for joint pain.   Neurological: Positive for light-headedness.   All other systems reviewed and are negative.      OBJECTIVE:     Vitals:    01/06/20 1332   BP: 158/86   BP Location: Right arm   Patient Position: Sitting   Pulse: 79   SpO2: 97%   Weight: 118 kg (260 lb)   Height: 170.2 cm (67\")     Body mass index is 40.72 kg/m².    Physical Exam   Constitutional: She is oriented to person, place, and time. Vital signs are normal. She appears well-developed and well-nourished. She is active.   Eyes: Conjunctivae are normal.   Neck: Carotid bruit is not present.   Cardiovascular: Normal rate, regular rhythm and normal heart sounds.   Pulmonary/Chest: Breath sounds normal.   Abdominal: Normal appearance.   Musculoskeletal:   No cyanosis, clubbing, edema  Normal ROM   Neurological: She is alert and oriented to person, place, and time. GCS eye subscore is 4. GCS verbal subscore is 5. GCS motor subscore is 6.   Skin: Skin is warm, dry and intact.   Psychiatric: She has a normal mood and affect. Her speech is normal and behavior is normal. Judgment and thought content normal. Cognition and memory are normal.       Procedures    Cardiac Procedures:  1. Myocardial perfusion PET test 6/28/2016.  Medium to large sized, moderately severe area of ischemia located in the inferior wall.  EF 67%.  Impressions consistent with intermediate study.  2. Cardiac catheterization 7/27/2016.  Normal left heart catheterization.  Likely " false positive stress test.        ASSESSMENT:      Diagnosis Plan   1. Precordial pain  Adult Transthoracic Echo Complete W/ Cont if Necessary Per Protocol   2. Morbid obesity with BMI of 45.0-49.9, adult (CMS/[facility])     3. Benign essential HTN     4. Persistent atrial fibrillation           PLAN OF CARE:     1. Precordial chest pain.  Patient reports that she has daily intermittent episodes of chest pain that occur at rest.  She reports that pain is in the mid chest region and radiates to the back.  She reports that pain is described as a pressure.  She has associated shortness of breath and nausea.  Patient had cardiac catheterization which revealed normal angiographic in July 2016.  Will further evaluate with echocardiogram.  2. Morbid obesity.  3. Hypertension.  Patient's blood pressure is markedly improved compared to last visit now that she is on a more consistent regimen.  Blood pressure 158/86.  Home health has noted that patient's home blood pressure cuff is not accurate and they are requesting a telehealth monitoring system.  I do think that this is reasonable given patient's cardiac history.  4. Persistent atrial fibrillation.  Rate currently controlled.  5. Follow-up with Dr. Winter in 6 months.  Sooner for problems or complications.  Patient will continue to monitor blood pressure readings at home.             Discharge Medications           Accurate as of January 6, 2020  1:48 PM. If you have any questions, ask your nurse or doctor.               Continue These Medications      Instructions Start Date   ADVAIR DISKUS 500-50 MCG/DOSE DISKUS  Generic drug:  fluticasone-salmeterol   Advair Diskus AEPB; Patient Sig: Advair Diskus AEPB ; 0; 05-Sep-2012; Active      ANTIVERT 25 MG tablet  Generic drug:  meclizine   1 tablet, Oral, Daily PRN      aspirin 81 MG tablet   81 mg, Oral, Daily      atenolol 25 MG tablet  Commonly known as:  TENORMIN   75 mg, Oral, 2 Times Daily      bisacodyl 5 MG EC  tablet  Commonly known as:  DULCOLAX   5 mg, Oral, Daily PRN      CLARITIN 10 MG tablet  Generic drug:  loratadine   1 tablet, Oral, Daily      cyclobenzaprine 10 MG tablet  Commonly known as:  FLEXERIL   10 mg, Oral, 3 Times Daily PRN      DIGOX 250 MCG tablet  Generic drug:  digoxin   TAKE ONE TABLET BY MOUTH DAILY      furosemide 20 MG tablet  Commonly known as:  LASIX   20 mg, Oral, Daily      gabapentin 300 MG capsule  Commonly known as:  NEURONTIN   300 mg, Oral, 3 Times Daily, Take 3 tablets three times daily      glipizide 10 MG tablet  Commonly known as:  GLUCOTROL   10 mg, Oral, 2 Times Daily Before Meals      hydrOXYzine 25 MG tablet  Commonly known as:  ATARAX   25 mg, Oral, Every 8 Hours PRN      insulin NPH-insulin regular (70-30) 100 UNIT/ML injection  Commonly known as:  humuLIN 70/30,novoLIN 70/30   60 Units, Subcutaneous, 2 Times Daily Before Meals      LORazepam 1 MG tablet  Commonly known as:  ATIVAN   1 mg, Oral, Every 6 Hours PRN      losartan 100 MG tablet  Commonly known as:  COZAAR   100 mg, Oral, Daily      meperidine 100 MG tablet  Commonly known as:  DEMEROL   1 tablet, Oral, 4 Times Daily PRN      metFORMIN 500 MG tablet  Commonly known as:  GLUCOPHAGE   1,000 mg, Oral, 2 Times Daily With Meals      ondansetron 8 MG tablet  Commonly known as:  ZOFRAN   Oral, Daily      potassium chloride 10 MEQ CR tablet  Commonly known as:  K-DUR   1 tablet, Oral, Daily      simvastatin 20 MG tablet  Commonly known as:  ZOCOR   20 mg, Oral, Nightly      SYNTHROID 200 MCG tablet  Generic drug:  levothyroxine   Oral, Take 3 tablets by mouth every evening      VITAMIN D2 PO   Take one capsule by mouth every Thursday and Sunday weekly      vitamin D 1.25 MG (85037 UT) capsule capsule  Commonly known as:  ERGOCALCIFEROL   1 capsule, Oral, Weekly             Thank you for allowing me to participate in the care of your patient,         TRUDY Robb  [facility]  01/06/20  1:48  PM    **Jose Antonio Disclaimer:**  Much of this encounter note is an electronic transcription/translation of spoken language to printed text. The electronic translation of spoken language may permit erroneous, or at times, nonsensical words or phrases to be inadvertently transcribed. Although I have reviewed the note for such errors, some may still exist.

## 2020-01-09 ENCOUNTER — TELEPHONE (OUTPATIENT)
Dept: CARDIOLOGY | Facility: CLINIC | Age: 60
End: 2020-01-09

## 2020-01-09 NOTE — TELEPHONE ENCOUNTER
1/9/20  3:15 [facility] left - Claudia with [facility] left msg - first time to use the Tele-house monitoring system.  She reports bp of 187/97  And again 177/94.  Currently taking atenolol 75mg bid and losartan 100mg, which patient took at 10:00a.m,. With no symptoms or issues other than her back still hurts.  She has an echo scheduled for 1/15/20.  Claudia's ph [phone]/roz

## 2020-01-10 RX ORDER — AMLODIPINE BESYLATE 5 MG/1
5 TABLET ORAL DAILY
Qty: 30 TABLET | Refills: 11 | Status: ON HOLD | OUTPATIENT
Start: 2020-01-10 | End: 2020-02-26 | Stop reason: SDUPTHER

## 2020-01-10 NOTE — TELEPHONE ENCOUNTER
1/10/20  I spoke with Claudia and she confirmed that patient is taking the Furosemide 20 mg qd.  /roz

## 2020-01-10 NOTE — TELEPHONE ENCOUNTER
1/10/20  [place] [facility] informing her of the new Rx being sent in for her/roz

## 2020-01-15 ENCOUNTER — HOSPITAL ENCOUNTER (OUTPATIENT)
Dept: CARDIOLOGY | Facility: HOSPITAL | Age: 60
Discharge: HOME | End: 2020-01-15
Admitting: NURSE PRACTITIONER

## 2020-01-15 VITALS
DIASTOLIC BLOOD PRESSURE: 86 MMHG | HEIGHT: 67 IN | WEIGHT: 260 LBS | HEART RATE: 72 BPM | SYSTOLIC BLOOD PRESSURE: 158 MMHG | BODY MASS INDEX: 40.81 KG/M2

## 2020-01-15 DIAGNOSIS — R07.2 PRECORDIAL PAIN: ICD-10-CM

## 2020-01-15 LAB
AORTIC ARCH: 2.2 CM
AORTIC ROOT ANNULUS: 1.8 CM
ASCENDING AORTA: 3.6 CM
BH CV ECHO MEAS - ACS: 2.1 CM
BH CV ECHO MEAS - AO MAX PG (FULL): 3.6 MMHG
BH CV ECHO MEAS - AO MAX PG: 11.4 MMHG
BH CV ECHO MEAS - AO MEAN PG (FULL): 2.7 MMHG
BH CV ECHO MEAS - AO MEAN PG: 6.5 MMHG
BH CV ECHO MEAS - AO ROOT AREA (BSA CORRECTED): 1.5
BH CV ECHO MEAS - AO ROOT AREA: 8.8 CM^2
BH CV ECHO MEAS - AO ROOT DIAM: 3.3 CM
BH CV ECHO MEAS - AO V2 MAX: 168.9 CM/SEC
BH CV ECHO MEAS - AO V2 MEAN: 118.8 CM/SEC
BH CV ECHO MEAS - AO V2 VTI: 37.7 CM
BH CV ECHO MEAS - ASC AORTA: 3.6 CM
BH CV ECHO MEAS - AVA(I,A): 3 CM^2
BH CV ECHO MEAS - AVA(I,D): 3 CM^2
BH CV ECHO MEAS - AVA(V,A): 2.8 CM^2
BH CV ECHO MEAS - AVA(V,D): 2.8 CM^2
BH CV ECHO MEAS - BSA(HAYCOCK): 2.4 M^2
BH CV ECHO MEAS - BSA: 2.3 M^2
BH CV ECHO MEAS - BZI_BMI: 40.7 KILOGRAMS/M^2
BH CV ECHO MEAS - BZI_METRIC_HEIGHT: 170.2 CM
BH CV ECHO MEAS - BZI_METRIC_WEIGHT: 117.9 KG
BH CV ECHO MEAS - EDV(MOD-SP2): 56 ML
BH CV ECHO MEAS - EDV(MOD-SP4): 59 ML
BH CV ECHO MEAS - EDV(TEICH): 85.1 ML
BH CV ECHO MEAS - EF(CUBED): 92 %
BH CV ECHO MEAS - EF(MOD-BP): 68 %
BH CV ECHO MEAS - EF(MOD-SP2): 66.1 %
BH CV ECHO MEAS - EF(MOD-SP4): 69.5 %
BH CV ECHO MEAS - EF(TEICH): 87.4 %
BH CV ECHO MEAS - ESV(MOD-SP2): 19 ML
BH CV ECHO MEAS - ESV(MOD-SP4): 18 ML
BH CV ECHO MEAS - ESV(TEICH): 10.7 ML
BH CV ECHO MEAS - FS: 56.9 %
BH CV ECHO MEAS - IVS/LVPW: 1.1
BH CV ECHO MEAS - IVSD: 1.6 CM
BH CV ECHO MEAS - LAT PEAK E' VEL: 8 CM/SEC
BH CV ECHO MEAS - LV DIASTOLIC VOL/BSA (35-75): 26.1 ML/M^2
BH CV ECHO MEAS - LV MASS(C)D: 266.9 GRAMS
BH CV ECHO MEAS - LV MASS(C)DI: 118.1 GRAMS/M^2
BH CV ECHO MEAS - LV MAX PG: 7.8 MMHG
BH CV ECHO MEAS - LV MEAN PG: 3.8 MMHG
BH CV ECHO MEAS - LV SYSTOLIC VOL/BSA (12-30): 8 ML/M^2
BH CV ECHO MEAS - LV V1 MAX: 139.4 CM/SEC
BH CV ECHO MEAS - LV V1 MEAN: 88 CM/SEC
BH CV ECHO MEAS - LV V1 VTI: 33.2 CM
BH CV ECHO MEAS - LVIDD: 4.3 CM
BH CV ECHO MEAS - LVIDS: 1.9 CM
BH CV ECHO MEAS - LVLD AP2: 7.4 CM
BH CV ECHO MEAS - LVLD AP4: 7.4 CM
BH CV ECHO MEAS - LVLS AP2: 7.1 CM
BH CV ECHO MEAS - LVLS AP4: 7.3 CM
BH CV ECHO MEAS - LVOT AREA (M): 3.5 CM^2
BH CV ECHO MEAS - LVOT AREA: 3.4 CM^2
BH CV ECHO MEAS - LVOT DIAM: 2.1 CM
BH CV ECHO MEAS - LVPWD: 1.5 CM
BH CV ECHO MEAS - MED PEAK E' VEL: 8 CM/SEC
BH CV ECHO MEAS - MV DEC SLOPE: 649.4 CM/SEC^2
BH CV ECHO MEAS - MV DEC TIME: 0.18 SEC
BH CV ECHO MEAS - MV E MAX VEL: 131 CM/SEC
BH CV ECHO MEAS - MV MAX PG: 8.6 MMHG
BH CV ECHO MEAS - MV MEAN PG: 2.9 MMHG
BH CV ECHO MEAS - MV P1/2T MAX VEL: 119.1 CM/SEC
BH CV ECHO MEAS - MV P1/2T: 53.7 MSEC
BH CV ECHO MEAS - MV V2 MAX: 146.3 CM/SEC
BH CV ECHO MEAS - MV V2 MEAN: 72.9 CM/SEC
BH CV ECHO MEAS - MV V2 VTI: 20.8 CM
BH CV ECHO MEAS - MVA P1/2T LCG: 1.8 CM^2
BH CV ECHO MEAS - MVA(P1/2T): 4.1 CM^2
BH CV ECHO MEAS - MVA(VTI): 5.5 CM^2
BH CV ECHO MEAS - PA ACC TIME: 0.11 SEC
BH CV ECHO MEAS - PA MAX PG (FULL): 2.2 MMHG
BH CV ECHO MEAS - PA MAX PG: 3.6 MMHG
BH CV ECHO MEAS - PA PR(ACCEL): 31.5 MMHG
BH CV ECHO MEAS - PA V2 MAX: 95 CM/SEC
BH CV ECHO MEAS - PULM DIAS VEL: 67.6 CM/SEC
BH CV ECHO MEAS - PULM S/D: 0.68
BH CV ECHO MEAS - PULM SYS VEL: 45.7 CM/SEC
BH CV ECHO MEAS - PVA(V,A): 1.7 CM^2
BH CV ECHO MEAS - PVA(V,D): 1.7 CM^2
BH CV ECHO MEAS - QP/QS: 0.35
BH CV ECHO MEAS - RAP SYSTOLE: 15 MMHG
BH CV ECHO MEAS - RV MAX PG: 1.4 MMHG
BH CV ECHO MEAS - RV MEAN PG: 0.91 MMHG
BH CV ECHO MEAS - RV V1 MAX: 59.6 CM/SEC
BH CV ECHO MEAS - RV V1 MEAN: 45.8 CM/SEC
BH CV ECHO MEAS - RV V1 VTI: 14.8 CM
BH CV ECHO MEAS - RVOT AREA: 2.7 CM^2
BH CV ECHO MEAS - RVOT DIAM: 1.8 CM
BH CV ECHO MEAS - RVSP: 51 MMHG
BH CV ECHO MEAS - SI(AO): 146.1 ML/M^2
BH CV ECHO MEAS - SI(CUBED): 33.4 ML/M^2
BH CV ECHO MEAS - SI(LVOT): 50.5 ML/M^2
BH CV ECHO MEAS - SI(MOD-SP2): 16.4 ML/M^2
BH CV ECHO MEAS - SI(MOD-SP4): 18.1 ML/M^2
BH CV ECHO MEAS - SI(TEICH): 32.9 ML/M^2
BH CV ECHO MEAS - SUP REN AO DIAM: 1.5 CM
BH CV ECHO MEAS - SV(AO): 330.3 ML
BH CV ECHO MEAS - SV(CUBED): 75.5 ML
BH CV ECHO MEAS - SV(LVOT): 114.2 ML
BH CV ECHO MEAS - SV(MOD-SP2): 37 ML
BH CV ECHO MEAS - SV(MOD-SP4): 41 ML
BH CV ECHO MEAS - SV(RVOT): 39.6 ML
BH CV ECHO MEAS - SV(TEICH): 74.4 ML
BH CV ECHO MEAS - TAPSE (>1.6): 2.7 CM2
BH CV ECHO MEAS - TR MAX VEL: 312 CM/SEC
BH CV ECHO MEASUREMENTS AVERAGE E/E' RATIO: 16.38
BH CV XLRA - RV BASE: 3.5 CM
BH CV XLRA - RV LENGTH: 6.3 CM
BH CV XLRA - RV MID: 2.7 CM
BH CV XLRA - TDI S': 10 CM/SEC
LEFT ATRIUM VOLUME INDEX: 40 ML/M2
SINUS: 3.5 CM
STJ: 3 CM

## 2020-01-15 PROCEDURE — 93306 TTE W/DOPPLER COMPLETE: CPT | Performed by: INTERNAL MEDICINE

## 2020-01-15 PROCEDURE — 93356 MYOCRD STRAIN IMG SPCKL TRCK: CPT

## 2020-01-15 PROCEDURE — 93356 MYOCRD STRAIN IMG SPCKL TRCK: CPT | Performed by: INTERNAL MEDICINE

## 2020-01-15 PROCEDURE — 93306 TTE W/DOPPLER COMPLETE: CPT

## 2020-01-16 NOTE — PROGRESS NOTES
Dr. Winter, patient was recently seen by me for precordial chest pain as well as shortness of breath.  She had cardiac catheterization July 2016 which revealed normal coronary arteries.  Pulmonary hypertension appears to be a new finding on echocardiogram.  I called and spoke with patient and she is still having intermittent episodes of chest pain as well as increased shortness of breath.  Do you think she needs a right heart cath?    Dr. Steel, patient was made aware of findings of moderate pulmonary hypertension on echocardiogram.  She is having shortness of breath that she feels is worse.  Just wanted to let you know of these new findings.    TRUDY Coffey  [facility]

## 2020-01-20 ENCOUNTER — TELEPHONE (OUTPATIENT)
Dept: CARDIOLOGY | Facility: CLINIC | Age: 60
End: 2020-01-20

## 2020-01-20 DIAGNOSIS — R07.2 PRECORDIAL PAIN: ICD-10-CM

## 2020-01-20 DIAGNOSIS — R06.02 SHORTNESS OF BREATH: ICD-10-CM

## 2020-01-20 DIAGNOSIS — I27.20 PULMONARY HYPERTENSION (HCC): Primary | ICD-10-CM

## 2020-01-20 NOTE — TELEPHONE ENCOUNTER
The patient called and is asking for you to call her to discuss her care plan about her Blood Pressure.    Pt # [phone]    Thank you  Aruna

## 2020-01-20 NOTE — PROGRESS NOTES
Discussed with patient the need for right heart catheterization to evaluate pulmonary hypertension.  I advised the patient that if no evidence of pulmonary hypertension is found that she may also need a left diagnostic heart catheterization.  She states that the last time she had a left heart catheterization she did have problems with a radial approach.  We discussed the possibility of physician taking femoral approach, she was advised that this would be something the interventional list would discuss with her on date of procedure.  I have recommended the patient undergo right with possible left cardiac catheterization. I have explained the procedure to the patient and he verbalizes understanding. The risks of the procedure were explained to the patient including bleeding, hypotension, allergy/reaction to contrast dye, vascular site problems, kidney problems and 1 in a 1000 risk of heart attack, death, or stroke with procedure. Patient would like to proceed with procedure.

## 2020-01-24 PROBLEM — R06.02 SHORTNESS OF BREATH: Status: ACTIVE | Noted: 2020-01-24

## 2020-01-24 PROBLEM — I27.20 PULMONARY HYPERTENSION (HCC): Status: ACTIVE | Noted: 2020-01-24

## 2020-01-24 PROBLEM — R07.2 PRECORDIAL PAIN: Status: ACTIVE | Noted: 2020-01-24

## 2020-01-29 ENCOUNTER — LAB (OUTPATIENT)
Dept: LAB | Facility: HOSPITAL | Age: 60
End: 2020-01-29

## 2020-01-29 ENCOUNTER — TRANSCRIBE ORDERS (OUTPATIENT)
Dept: LAB | Facility: HOSPITAL | Age: 60
End: 2020-01-29

## 2020-01-29 DIAGNOSIS — Z13.6 SCREENING FOR ISCHEMIC HEART DISEASE: ICD-10-CM

## 2020-01-29 DIAGNOSIS — Z01.810 PRE-OPERATIVE CARDIOVASCULAR EXAMINATION: ICD-10-CM

## 2020-01-29 DIAGNOSIS — Z01.810 PRE-OPERATIVE CARDIOVASCULAR EXAMINATION: Primary | ICD-10-CM

## 2020-01-29 LAB
ANION GAP SERPL CALCULATED.3IONS-SCNC: 15.4 MMOL/L (ref 5–15)
BASOPHILS # BLD AUTO: 0.04 10*3/MM3 (ref 0–0.2)
BASOPHILS NFR BLD AUTO: 0.8 % (ref 0–1.5)
BUN BLD-MCNC: 17 MG/DL (ref 6–20)
BUN/CREAT SERPL: 21 (ref 7–25)
CALCIUM SPEC-SCNC: 11.1 MG/DL (ref 8.6–10.5)
CHLORIDE SERPL-SCNC: 92 MMOL/L (ref 98–107)
CO2 SERPL-SCNC: 25.6 MMOL/L (ref 22–29)
CREAT BLD-MCNC: 0.81 MG/DL (ref 0.57–1)
DEPRECATED RDW RBC AUTO: 41.1 FL (ref 37–54)
EOSINOPHIL # BLD AUTO: 0.19 10*3/MM3 (ref 0–0.4)
EOSINOPHIL NFR BLD AUTO: 3.6 % (ref 0.3–6.2)
ERYTHROCYTE [DISTWIDTH] IN BLOOD BY AUTOMATED COUNT: 13.3 % (ref 12.3–15.4)
GFR SERPL CREATININE-BSD FRML MDRD: 73 ML/MIN/1.73
GLUCOSE BLD-MCNC: 389 MG/DL (ref 65–99)
HCT VFR BLD AUTO: 40 % (ref 34–46.6)
HGB BLD-MCNC: 12.9 G/DL (ref 12–15.9)
IMM GRANULOCYTES # BLD AUTO: 0.02 10*3/MM3 (ref 0–0.05)
IMM GRANULOCYTES NFR BLD AUTO: 0.4 % (ref 0–0.5)
LYMPHOCYTES # BLD AUTO: 0.99 10*3/MM3 (ref 0.7–3.1)
LYMPHOCYTES NFR BLD AUTO: 19 % (ref 19.6–45.3)
MCH RBC QN AUTO: 27.5 PG (ref 26.6–33)
MCHC RBC AUTO-ENTMCNC: 32.3 G/DL (ref 31.5–35.7)
MCV RBC AUTO: 85.3 FL (ref 79–97)
MONOCYTES # BLD AUTO: 0.49 10*3/MM3 (ref 0.1–0.9)
MONOCYTES NFR BLD AUTO: 9.4 % (ref 5–12)
NEUTROPHILS # BLD AUTO: 3.49 10*3/MM3 (ref 1.7–7)
NEUTROPHILS NFR BLD AUTO: 66.8 % (ref 42.7–76)
NRBC BLD AUTO-RTO: 0 /100 WBC (ref 0–0.2)
PLATELET # BLD AUTO: 198 10*3/MM3 (ref 140–450)
PMV BLD AUTO: 10.6 FL (ref 6–12)
POTASSIUM BLD-SCNC: 4.6 MMOL/L (ref 3.5–5.2)
RBC # BLD AUTO: 4.69 10*6/MM3 (ref 3.77–5.28)
SODIUM BLD-SCNC: 133 MMOL/L (ref 136–145)
WBC NRBC COR # BLD: 5.22 10*3/MM3 (ref 3.4–10.8)

## 2020-01-29 PROCEDURE — 36415 COLL VENOUS BLD VENIPUNCTURE: CPT

## 2020-01-29 PROCEDURE — 85025 COMPLETE CBC W/AUTO DIFF WBC: CPT

## 2020-01-29 PROCEDURE — 80048 BASIC METABOLIC PNL TOTAL CA: CPT

## 2020-02-13 ENCOUNTER — TRANSCRIBE ORDERS (OUTPATIENT)
Dept: CARDIOLOGY | Facility: CLINIC | Age: 60
End: 2020-02-13

## 2020-02-13 DIAGNOSIS — Z01.810 PRE-OPERATIVE CARDIOVASCULAR EXAMINATION: Primary | ICD-10-CM

## 2020-02-13 DIAGNOSIS — Z13.6 SCREENING FOR ISCHEMIC HEART DISEASE: ICD-10-CM

## 2020-02-19 ENCOUNTER — LAB (OUTPATIENT)
Dept: LAB | Facility: HOSPITAL | Age: 60
End: 2020-02-19

## 2020-02-19 DIAGNOSIS — Z13.6 SCREENING FOR ISCHEMIC HEART DISEASE: ICD-10-CM

## 2020-02-19 DIAGNOSIS — Z01.810 PRE-OPERATIVE CARDIOVASCULAR EXAMINATION: ICD-10-CM

## 2020-02-19 LAB
ANION GAP SERPL CALCULATED.3IONS-SCNC: 11.9 MMOL/L (ref 5–15)
BASOPHILS # BLD AUTO: 0.04 10*3/MM3 (ref 0–0.2)
BASOPHILS NFR BLD AUTO: 0.7 % (ref 0–1.5)
BUN BLD-MCNC: 13 MG/DL (ref 6–20)
BUN/CREAT SERPL: 19.4 (ref 7–25)
CALCIUM SPEC-SCNC: 11 MG/DL (ref 8.6–10.5)
CHLORIDE SERPL-SCNC: 96 MMOL/L (ref 98–107)
CO2 SERPL-SCNC: 29.1 MMOL/L (ref 22–29)
CREAT BLD-MCNC: 0.67 MG/DL (ref 0.57–1)
DEPRECATED RDW RBC AUTO: 39.2 FL (ref 37–54)
EOSINOPHIL # BLD AUTO: 0.18 10*3/MM3 (ref 0–0.4)
EOSINOPHIL NFR BLD AUTO: 3.1 % (ref 0.3–6.2)
ERYTHROCYTE [DISTWIDTH] IN BLOOD BY AUTOMATED COUNT: 13.1 % (ref 12.3–15.4)
GFR SERPL CREATININE-BSD FRML MDRD: 90 ML/MIN/1.73
GLUCOSE BLD-MCNC: 270 MG/DL (ref 65–99)
HCT VFR BLD AUTO: 39 % (ref 34–46.6)
HGB BLD-MCNC: 12.5 G/DL (ref 12–15.9)
IMM GRANULOCYTES # BLD AUTO: 0.02 10*3/MM3 (ref 0–0.05)
IMM GRANULOCYTES NFR BLD AUTO: 0.3 % (ref 0–0.5)
LYMPHOCYTES # BLD AUTO: 0.97 10*3/MM3 (ref 0.7–3.1)
LYMPHOCYTES NFR BLD AUTO: 16.9 % (ref 19.6–45.3)
MCH RBC QN AUTO: 26.7 PG (ref 26.6–33)
MCHC RBC AUTO-ENTMCNC: 32.1 G/DL (ref 31.5–35.7)
MCV RBC AUTO: 83.3 FL (ref 79–97)
MONOCYTES # BLD AUTO: 0.51 10*3/MM3 (ref 0.1–0.9)
MONOCYTES NFR BLD AUTO: 8.9 % (ref 5–12)
NEUTROPHILS # BLD AUTO: 4.03 10*3/MM3 (ref 1.7–7)
NEUTROPHILS NFR BLD AUTO: 70.1 % (ref 42.7–76)
NRBC BLD AUTO-RTO: 0 /100 WBC (ref 0–0.2)
PLATELET # BLD AUTO: 222 10*3/MM3 (ref 140–450)
PMV BLD AUTO: 10.7 FL (ref 6–12)
POTASSIUM BLD-SCNC: 4.3 MMOL/L (ref 3.5–5.2)
RBC # BLD AUTO: 4.68 10*6/MM3 (ref 3.77–5.28)
SODIUM BLD-SCNC: 137 MMOL/L (ref 136–145)
WBC NRBC COR # BLD: 5.75 10*3/MM3 (ref 3.4–10.8)

## 2020-02-19 PROCEDURE — 36415 COLL VENOUS BLD VENIPUNCTURE: CPT

## 2020-02-19 PROCEDURE — 80048 BASIC METABOLIC PNL TOTAL CA: CPT

## 2020-02-19 PROCEDURE — 85025 COMPLETE CBC W/AUTO DIFF WBC: CPT

## 2020-02-20 ENCOUNTER — TELEPHONE (OUTPATIENT)
Dept: CARDIOLOGY | Facility: CLINIC | Age: 60
End: 2020-02-20

## 2020-02-20 NOTE — TELEPHONE ENCOUNTER
Claudia with home health left msg saying pt's BP has been better @ 140-150/70-80s most of the time.  Home health is discharging the pt.  Pt has a Rt heart cath on Wednesday.    Claudia # [phone]    Thanks,  Kinjal

## 2020-02-26 ENCOUNTER — HOSPITAL ENCOUNTER (OUTPATIENT)
Facility: HOSPITAL | Age: 60
Setting detail: HOSPITAL OUTPATIENT SURGERY
Discharge: HOME | End: 2020-02-26
Attending: INTERNAL MEDICINE | Admitting: INTERNAL MEDICINE

## 2020-02-26 VITALS
SYSTOLIC BLOOD PRESSURE: 152 MMHG | WEIGHT: 238.5 LBS | DIASTOLIC BLOOD PRESSURE: 92 MMHG | BODY MASS INDEX: 37.43 KG/M2 | RESPIRATION RATE: 16 BRPM | TEMPERATURE: 97.2 F | HEIGHT: 67 IN | HEART RATE: 78 BPM | OXYGEN SATURATION: 98 %

## 2020-02-26 DIAGNOSIS — R06.02 SHORTNESS OF BREATH: ICD-10-CM

## 2020-02-26 DIAGNOSIS — I27.20 PULMONARY HYPERTENSION (HCC): ICD-10-CM

## 2020-02-26 DIAGNOSIS — R07.2 PRECORDIAL PAIN: ICD-10-CM

## 2020-02-26 DIAGNOSIS — I10 HYPERTENSION NOT AT GOAL: ICD-10-CM

## 2020-02-26 LAB
GLUCOSE BLDC GLUCOMTR-MCNC: 396 MG/DL (ref 70–130)
GLUCOSE BLDC GLUCOMTR-MCNC: 414 MG/DL (ref 70–130)
GLUCOSE BLDC GLUCOMTR-MCNC: 436 MG/DL (ref 70–130)
GLUCOSE BLDC GLUCOMTR-MCNC: 465 MG/DL (ref 70–130)
GLUCOSE P FAST SERPL-MCNC: 465 MG/DL (ref 65–99)
HCT VFR BLDA CALC: 33 % (ref 38–51)
HCT VFR BLDA CALC: 35 % (ref 38–51)
HGB BLDA-MCNC: 11.2 G/DL (ref 12–17)
HGB BLDA-MCNC: 11.9 G/DL (ref 12–17)
SAO2 % BLDA: 65 % (ref 95–98)
SAO2 % BLDA: 89 % (ref 95–98)

## 2020-02-26 PROCEDURE — C1894 INTRO/SHEATH, NON-LASER: HCPCS | Performed by: INTERNAL MEDICINE

## 2020-02-26 PROCEDURE — S0260 H&P FOR SURGERY: HCPCS | Performed by: INTERNAL MEDICINE

## 2020-02-26 PROCEDURE — C1769 GUIDE WIRE: HCPCS | Performed by: INTERNAL MEDICINE

## 2020-02-26 PROCEDURE — 82962 GLUCOSE BLOOD TEST: CPT

## 2020-02-26 PROCEDURE — 25010000002 FENTANYL CITRATE (PF) 100 MCG/2ML SOLUTION: Performed by: INTERNAL MEDICINE

## 2020-02-26 PROCEDURE — 93451 RIGHT HEART CATH: CPT | Performed by: INTERNAL MEDICINE

## 2020-02-26 PROCEDURE — 82947 ASSAY GLUCOSE BLOOD QUANT: CPT | Performed by: INTERNAL MEDICINE

## 2020-02-26 PROCEDURE — 25010000002 MIDAZOLAM PER 1 MG: Performed by: INTERNAL MEDICINE

## 2020-02-26 PROCEDURE — 63710000001 INSULIN REGULAR HUMAN PER 5 UNITS: Performed by: INTERNAL MEDICINE

## 2020-02-26 PROCEDURE — 85014 HEMATOCRIT: CPT

## 2020-02-26 PROCEDURE — 85018 HEMOGLOBIN: CPT

## 2020-02-26 RX ORDER — AMLODIPINE BESYLATE 10 MG/1
10 TABLET ORAL DAILY
Qty: 90 TABLET | Refills: 3 | Status: SHIPPED | OUTPATIENT
Start: 2020-02-26 | End: 2020-11-23

## 2020-02-26 RX ORDER — SODIUM CHLORIDE 0.9 % (FLUSH) 0.9 %
10 SYRINGE (ML) INJECTION AS NEEDED
Status: DISCONTINUED | OUTPATIENT
Start: 2020-02-26 | End: 2020-02-26 | Stop reason: HOSPADM

## 2020-02-26 RX ORDER — ACETAMINOPHEN 325 MG/1
650 TABLET ORAL EVERY 4 HOURS PRN
Status: DISCONTINUED | OUTPATIENT
Start: 2020-02-26 | End: 2020-02-26 | Stop reason: HOSPADM

## 2020-02-26 RX ORDER — LIDOCAINE HYDROCHLORIDE 10 MG/ML
0.1 INJECTION, SOLUTION EPIDURAL; INFILTRATION; INTRACAUDAL; PERINEURAL ONCE AS NEEDED
Status: DISCONTINUED | OUTPATIENT
Start: 2020-02-26 | End: 2020-02-26 | Stop reason: HOSPADM

## 2020-02-26 RX ORDER — SODIUM CHLORIDE 0.9 % (FLUSH) 0.9 %
3 SYRINGE (ML) INJECTION EVERY 12 HOURS SCHEDULED
Status: DISCONTINUED | OUTPATIENT
Start: 2020-02-26 | End: 2020-02-26 | Stop reason: HOSPADM

## 2020-02-26 RX ORDER — LIDOCAINE HYDROCHLORIDE 20 MG/ML
INJECTION, SOLUTION INFILTRATION; PERINEURAL AS NEEDED
Status: DISCONTINUED | OUTPATIENT
Start: 2020-02-26 | End: 2020-02-26 | Stop reason: HOSPADM

## 2020-02-26 RX ORDER — FENTANYL CITRATE 50 UG/ML
INJECTION, SOLUTION INTRAMUSCULAR; INTRAVENOUS AS NEEDED
Status: DISCONTINUED | OUTPATIENT
Start: 2020-02-26 | End: 2020-02-26 | Stop reason: HOSPADM

## 2020-02-26 RX ORDER — MIDAZOLAM HYDROCHLORIDE 1 MG/ML
INJECTION INTRAMUSCULAR; INTRAVENOUS AS NEEDED
Status: DISCONTINUED | OUTPATIENT
Start: 2020-02-26 | End: 2020-02-26 | Stop reason: HOSPADM

## 2020-02-26 RX ORDER — FUROSEMIDE 40 MG/1
40 TABLET ORAL DAILY
Qty: 90 TABLET | Refills: 3 | Status: SHIPPED | OUTPATIENT
Start: 2020-02-26 | End: 2021-02-02

## 2020-02-26 RX ORDER — SODIUM CHLORIDE 9 MG/ML
75 INJECTION, SOLUTION INTRAVENOUS CONTINUOUS
Status: DISCONTINUED | OUTPATIENT
Start: 2020-02-26 | End: 2020-02-26 | Stop reason: HOSPADM

## 2020-02-26 RX ADMIN — SODIUM CHLORIDE 75 ML/HR: 9 INJECTION, SOLUTION INTRAVENOUS at 07:57

## 2020-02-26 RX ADMIN — INSULIN HUMAN 15 UNITS: 100 INJECTION, SOLUTION PARENTERAL at 08:37

## 2020-02-27 LAB — GLUCOSE BLDC GLUCOMTR-MCNC: 421 MG/DL (ref 70–130)

## 2020-03-03 ENCOUNTER — OFFICE VISIT (OUTPATIENT)
Dept: CARDIOLOGY | Facility: CLINIC | Age: 60
End: 2020-03-03

## 2020-03-03 ENCOUNTER — LAB (OUTPATIENT)
Dept: LAB | Facility: HOSPITAL | Age: 60
End: 2020-03-03

## 2020-03-03 VITALS
SYSTOLIC BLOOD PRESSURE: 162 MMHG | OXYGEN SATURATION: 90 % | RESPIRATION RATE: 20 BRPM | HEIGHT: 67 IN | DIASTOLIC BLOOD PRESSURE: 60 MMHG | WEIGHT: 232 LBS | BODY MASS INDEX: 36.41 KG/M2 | HEART RATE: 76 BPM

## 2020-03-03 DIAGNOSIS — I48.19 PERSISTENT ATRIAL FIBRILLATION (HCC): ICD-10-CM

## 2020-03-03 DIAGNOSIS — I27.20 PULMONARY HYPERTENSION (HCC): Primary | ICD-10-CM

## 2020-03-03 DIAGNOSIS — E66.01 MORBID OBESITY WITH BMI OF 45.0-49.9, ADULT (HCC): ICD-10-CM

## 2020-03-03 DIAGNOSIS — I10 BENIGN ESSENTIAL HTN: ICD-10-CM

## 2020-03-03 LAB
ANION GAP SERPL CALCULATED.3IONS-SCNC: 12.7 MMOL/L (ref 5–15)
BUN BLD-MCNC: 14 MG/DL (ref 6–20)
BUN/CREAT SERPL: 19.7 (ref 7–25)
CALCIUM SPEC-SCNC: 10.7 MG/DL (ref 8.6–10.5)
CHLORIDE SERPL-SCNC: 97 MMOL/L (ref 98–107)
CO2 SERPL-SCNC: 26.3 MMOL/L (ref 22–29)
CREAT BLD-MCNC: 0.71 MG/DL (ref 0.57–1)
GFR SERPL CREATININE-BSD FRML MDRD: 85 ML/MIN/1.73
GLUCOSE BLD-MCNC: 312 MG/DL (ref 65–99)
POTASSIUM BLD-SCNC: 4.2 MMOL/L (ref 3.5–5.2)
SODIUM BLD-SCNC: 136 MMOL/L (ref 136–145)

## 2020-03-03 PROCEDURE — 99214 OFFICE O/P EST MOD 30 MIN: CPT | Performed by: NURSE PRACTITIONER

## 2020-03-03 PROCEDURE — 36415 COLL VENOUS BLD VENIPUNCTURE: CPT

## 2020-03-03 PROCEDURE — 80048 BASIC METABOLIC PNL TOTAL CA: CPT

## 2020-03-03 RX ORDER — HYDRALAZINE HYDROCHLORIDE 50 MG/1
50 TABLET, FILM COATED ORAL 2 TIMES DAILY
Qty: 60 TABLET | Refills: 11 | Status: SHIPPED | OUTPATIENT
Start: 2020-03-03 | End: 2020-12-09 | Stop reason: SDUPTHER

## 2020-03-03 NOTE — PROGRESS NOTES
"      [facility] CARDIOLOGY  [address]  [facility] [address]  Phone: [phone]      Patient Name: Jenelle Kasper  :1960  Age: 60 y.o.  Primary Cardiologist: Baldev Winter MD  Encounter Provider:  TRUDY Robb      Chief Complaint:   Chief Complaint   Patient presents with   • Follow-up     cardiac cath         HPI  Jenelle Kasper is a 60 y.o. female who presents today for reevaluation after heart cath    Pt has a  history significant for persistent atrial fibrillation, hypertension, pulmonary hypertension.    Presents today for follow-up after cardiac catheterization.  Patient was seen by me on 2020 for precordial chest pain.  At that time patient was recommended for echocardiogram.  Echocardiogram demonstrated pulmonary hypertension.  Patient then underwent right diagnostic heart catheterization.  This revealed moderate pulmonary hypertension.  Patient's Lasix was increased and was recommended that patient have better blood pressure control.  Patient states that she is continuing to have episodes of chest tightness that are mainly associated with heart palpitations as well as shortness of breath.  She states that she is now completed home health therapy and does not have a way to check her blood pressure.  She reports that she does have a \"expensive automated blood pressure cuff\" but reports that it is very inaccurate when it is been checked in physician's offices so she does not use it.  She states that she was seen by a physician a few days ago where her blood pressure was found to be in the 180s systolic.  She has an appointment with her pulmonologist, Dr. Steel on 3/10/2020.    The following portions of the patient's history were reviewed and updated as appropriate: allergies, current medications, past family history, past medical history, past social history, past surgical history and problem list.      Review of Systems " "  Constitution: Positive for chills, decreased appetite, malaise/fatigue and weight loss.   Eyes: Positive for blurred vision.   Cardiovascular: Positive for chest pain, claudication and dyspnea on exertion. Negative for leg swelling.   Respiratory: Positive for shortness of breath.    Endocrine: Positive for polyuria.   Musculoskeletal: Positive for joint pain and myalgias.   Gastrointestinal: Positive for abdominal pain and melena.   Neurological: Positive for dizziness, headaches, light-headedness and numbness.   All other systems reviewed and are negative.      OBJECTIVE:     Vitals:    03/03/20 1403   BP: 162/60   BP Location: Right arm   Patient Position: Sitting   Pulse: 76   Resp: 20   SpO2: 90%   Weight: 105 kg (232 lb)   Height: 170.2 cm (67\")     Body mass index is 36.34 kg/m².    Physical Exam   Constitutional: She is oriented to person, place, and time. Vital signs are normal. She appears well-developed and well-nourished. She is active.   Eyes: Conjunctivae are normal.   Neck: Carotid bruit is not present.   Cardiovascular: Normal rate, regular rhythm and normal heart sounds.   Pulmonary/Chest: Breath sounds normal.   Abdominal: Normal appearance.   Musculoskeletal:   No cyanosis, clubbing, edema  Normal ROM   Neurological: She is alert and oriented to person, place, and time. GCS eye subscore is 4. GCS verbal subscore is 5. GCS motor subscore is 6.   Skin: Skin is warm, dry and intact.   Psychiatric: She has a normal mood and affect. Her speech is normal and behavior is normal. Judgment and thought content normal. Cognition and memory are normal.       Procedures    Cardiac Procedures:  1. Myocardial perfusion PET test 6/28/2016.  Medium to large sized, moderately severe area of ischemia located in the inferior wall.  EF 67%.  Impressions consistent with intermediate study.  2. Cardiac catheterization 7/27/2016.  Normal left heart catheterization.  Likely false positive stress test.  3. Echocardiogram " 1/15/2020.  Moderate to severe LVH.  Calcification of aortic valve.  EF 68%.  RA is moderately dilated.  Moderate pulmonary hypertension.  4. Right diagnostic cardiac catheterization 2/26/2020.  Severe pulmonary hypertension in the setting of markedly elevated pulmonary capillary wedge pressure.  Likely due to severe diastolic dysfunction or LVH.  Recommending increase diuretics as well as better blood pressure control.        ASSESSMENT:      Diagnosis Plan   1. Pulmonary hypertension (CMS/HCC)     2. Morbid obesity with BMI of 45.0-49.9, adult (CMS/HCC)     3. Benign essential HTN     4. Persistent atrial fibrillation           PLAN OF CARE:     1. Pulmonary hypertension.  Patient found to have near pulmonary hypertension in the setting of elevated pulmonary wedge pressure on right cath 2/26.  At that time patient's Lasix was increased to 40 mg daily.  Patient reports that she is diuresing well.  We will repeat BMP to ensure that renal function is tolerating higher dose of Lasix.  Was also recommended that patient had better blood pressure control.  Patient's blood pressure is still markedly elevated at 162/60.  Reports that earlier in the week it was 180 systolic at a physician's office appointment.  Patient will continue with amlodipine 10 mg daily, atenolol 75 mg twice daily, furosemide 40 mg daily, losartan 100 mg daily.  Recommend adding hydralazine 50 mg twice daily.    2. Morbid obesity.  3. Hypertension.  As above  4. Persistent atrial fibrillation.  Rate currently controlled.  Patient not anticoagulated secondary to history of GI bleed  5. Follow-up with Dr. Winter in 6 months.  Sooner for problems or complications.          Discharge Medications           Accurate as of March 3, 2020  2:14 PM. If you have any questions, ask your nurse or doctor.               Changes to Medications      Instructions Start Date   atenolol 25 MG tablet  Commonly known as:  TENORMIN  What changed:  how much to take   75  mg, Oral, 2 Times Daily      DIGOX 250 MCG tablet  Generic drug:  digoxin  What changed:    · how much to take  · when to take this   TAKE ONE TABLET BY MOUTH DAILY         Continue These Medications      Instructions Start Date   ADVAIR DISKUS 500-50 MCG/DOSE DISKUS  Generic drug:  fluticasone-salmeterol   Advair Diskus AEPB; Patient Sig: Advair Diskus AEPB ; 0; 05-Sep-2012; Active      amLODIPine 10 MG tablet  Commonly known as:  NORVASC   10 mg, Oral, Daily      ANTIVERT 25 MG tablet  Generic drug:  meclizine   1 tablet, Oral, Daily PRN      aspirin 81 MG tablet   81 mg, Oral, Daily      CLARITIN 10 MG tablet  Generic drug:  loratadine   1 tablet, Oral, Daily      cyclobenzaprine 10 MG tablet  Commonly known as:  FLEXERIL   10 mg, Oral, 3 Times Daily PRN      furosemide 40 MG tablet  Commonly known as:  LASIX   40 mg, Oral, Daily      gabapentin 300 MG capsule  Commonly known as:  NEURONTIN   300 mg, Oral, 3 Times Daily, Take 3 tablets three times daily      glipizide 10 MG tablet  Commonly known as:  GLUCOTROL   10 mg, Oral, 2 Times Daily Before Meals      hydrOXYzine 25 MG tablet  Commonly known as:  ATARAX   25 mg, Oral, Every 8 Hours PRN      insulin NPH-insulin regular (70-30) 100 UNIT/ML injection  Commonly known as:  humuLIN 70/30,novoLIN 70/30   60 Units, Subcutaneous, 2 Times Daily Before Meals      LORazepam 1 MG tablet  Commonly known as:  ATIVAN   1 mg, Oral, Every 6 Hours PRN      losartan 100 MG tablet  Commonly known as:  COZAAR   100 mg, Oral, Daily      meperidine 100 MG tablet  Commonly known as:  DEMEROL   1 tablet, Oral, 4 Times Daily PRN      metFORMIN 500 MG tablet  Commonly known as:  GLUCOPHAGE   500 mg, Oral, 2 Times Daily With Meals      ondansetron 8 MG tablet  Commonly known as:  ZOFRAN   8 mg, Oral, Daily      potassium chloride 10 MEQ CR tablet  Commonly known as:  K-DUR   1 tablet, Oral, Daily      simvastatin 20 MG tablet  Commonly known as:  ZOCOR   20 mg, Oral, Nightly       SYNTHROID 200 MCG tablet  Generic drug:  levothyroxine   200 mcg, Oral, Daily      vitamin D 1.25 MG (75440 UT) capsule capsule  Commonly known as:  ERGOCALCIFEROL   1 capsule, Oral, 2 Times Weekly, SUN, THURS             Thank you for allowing me to participate in the care of your patient,         TRUDY Robb  [facility]  03/03/20  2:14 PM    **Jose Antonio Disclaimer:**  Much of this encounter note is an electronic transcription/translation of spoken language to printed text. The electronic translation of spoken language may permit erroneous, or at times, nonsensical words or phrases to be inadvertently transcribed. Although I have reviewed the note for such errors, some may still exist.

## 2020-03-04 NOTE — PROGRESS NOTES
Can you please advise patient of lab results.  Her renal function is stable, she can continue her Lasix at current dose.  Her glucose is high and she can follow this with her PCP.

## 2020-04-22 ENCOUNTER — TELEPHONE (OUTPATIENT)
Dept: CARDIOLOGY | Facility: CLINIC | Age: 60
End: 2020-04-22

## 2020-04-22 ENCOUNTER — OFFICE VISIT (OUTPATIENT)
Dept: CARDIOLOGY | Facility: CLINIC | Age: 60
End: 2020-04-22

## 2020-04-22 VITALS — HEIGHT: 67 IN | BODY MASS INDEX: 37.04 KG/M2 | WEIGHT: 236 LBS

## 2020-04-22 DIAGNOSIS — I10 BENIGN ESSENTIAL HTN: Primary | ICD-10-CM

## 2020-04-22 DIAGNOSIS — I48.19 PERSISTENT ATRIAL FIBRILLATION (HCC): ICD-10-CM

## 2020-04-22 DIAGNOSIS — I27.20 PULMONARY HYPERTENSION (HCC): ICD-10-CM

## 2020-04-22 PROCEDURE — 99442 PR PHYS/QHP TELEPHONE EVALUATION 11-20 MIN: CPT | Performed by: INTERNAL MEDICINE

## 2020-04-22 NOTE — PROGRESS NOTES
Subjective:     Encounter Date:04/22/2020      Patient ID: Jenelle Kasper is a 60 y.o. female.    Chief Complaint:  Atrial Fibrillation   Presents for follow-up visit. The symptoms have been stable. Past medical history includes atrial fibrillation.   Hypertension   This is a chronic problem. The problem is controlled.     60-year-old female who is evaluated today via telephone.  Patient has a history of hypertension atrial fibrillation as well as pulmonary hypertension.  She underwent a right heart catheterization and was confirmed to have moderate pulmonary hypertension.  Patient was last seen/evaluated by my nurse practitioner.  She was placed on Lasix 40 mg a day.  Her weight however has dropped 24 pounds since January and she is now starting to feel pretty bad.  She says she does occasionally gets dizzy lightheaded and is very fatigued.      Review of Systems   All other systems reviewed and are negative.  See exceptions above in HPI    Procedures       Objective:     Physical Exam  Telephone visit  Lab Review:       Assessment:          Diagnosis Plan   1. Benign essential HTN     2. Persistent atrial fibrillation     3. Pulmonary hypertension (CMS/[facility])            Plan:       1.  Hypertension patient is unable to take her blood pressure at home.  She is to follow with if it is remaining about 140 or less that will be good and will continue same.  2.  Pulmonary hypertension patient was placed on Lasix but appears to be over diuresed at the current time I told her to decrease her Lasix down to 20 mg a day and follow her weight and see if she improves symptomatically.  3.  Chronic atrial fibrillation patient remained stable.  4.  Follow-up 6 months sooner if issues    This patient has consented to a telehealth visit via telephone. The visit was scheduled as a phone visit to comply with patient safety concerns in accordance with CDC recommendations.  All vitals recorded within this visit are reported by the  patient.  I spent  11 minutes in total with this patient.

## 2020-04-22 NOTE — TELEPHONE ENCOUNTER
Pt left msg saying she forgot to ask you if it's ok to see about getting her thyroid surgery rescheduled? She needs to have tumors removed from the parathyroid. She had to cx it before due to BP issues. She wants to know if her /60 is good enough for surgery?  She thought maybe it should be lower.    Please advise.    Thanks,  Kinjal

## 2020-04-27 NOTE — TELEPHONE ENCOUNTER
Spoke to p who verbalized understanding of the msg.  I also told her to let us know when she needs clearance for the surgery./prt

## 2020-05-04 RX ORDER — LOSARTAN POTASSIUM 100 MG/1
TABLET ORAL
Qty: 30 TABLET | Refills: 2 | Status: SHIPPED | OUTPATIENT
Start: 2020-05-04 | End: 2020-07-31

## 2020-07-31 RX ORDER — LOSARTAN POTASSIUM 100 MG/1
TABLET ORAL
Qty: 30 TABLET | Refills: 1 | Status: SHIPPED | OUTPATIENT
Start: 2020-07-31 | End: 2020-10-06

## 2020-08-19 ENCOUNTER — OFFICE VISIT (OUTPATIENT)
Dept: CARDIOLOGY | Facility: CLINIC | Age: 60
End: 2020-08-19

## 2020-08-19 VITALS
SYSTOLIC BLOOD PRESSURE: 146 MMHG | DIASTOLIC BLOOD PRESSURE: 88 MMHG | HEIGHT: 68 IN | BODY MASS INDEX: 40.16 KG/M2 | OXYGEN SATURATION: 98 % | HEART RATE: 69 BPM | WEIGHT: 265 LBS

## 2020-08-19 DIAGNOSIS — I48.19 PERSISTENT ATRIAL FIBRILLATION (HCC): ICD-10-CM

## 2020-08-19 DIAGNOSIS — I10 BENIGN ESSENTIAL HTN: Primary | ICD-10-CM

## 2020-08-19 PROCEDURE — 99214 OFFICE O/P EST MOD 30 MIN: CPT | Performed by: INTERNAL MEDICINE

## 2020-08-19 RX ORDER — DIPHENOXYLATE HYDROCHLORIDE AND ATROPINE SULFATE 2.5; .025 MG/1; MG/1
1 TABLET ORAL 4 TIMES DAILY PRN
COMMUNITY
End: 2022-02-06 | Stop reason: HOSPADM

## 2020-08-19 RX ORDER — FLUCONAZOLE 150 MG/1
150 TABLET ORAL ONCE
COMMUNITY
End: 2022-02-06 | Stop reason: HOSPADM

## 2020-08-19 NOTE — PROGRESS NOTES
Subjective:     Encounter Date:08/19/20        Patient ID: Jenelle Kasper is a 60 y.o. female.    Chief Complaint:  Atrial Fibrillation   Presents for follow-up visit. The symptoms have been stable. Past medical history includes atrial fibrillation.   Hypertension   This is a chronic problem. The problem is controlled.     60-year-old female who is evaluated today via telephone.  Patient has a history of hypertension atrial fibrillation as well as pulmonary hypertension.  She underwent a right heart catheterization and was confirmed to have moderate pulmonary hypertension.  Patient was last seen/evaluated by my nurse practitioner.  She was placed on Lasix 40 mg a day.  Her weight however has dropped 24 pounds since January and she is now starting to feel pretty bad.  She says she does occasionally gets dizzy lightheaded and is very fatigued.      Review of Systems   All other systems reviewed and are negative.  See exceptions above in HPI    Procedures         Objective:     Physical Exam   Constitutional: She is oriented to person, place, and time. She appears well-developed.   HENT:   Head: Normocephalic.   Eyes: Conjunctivae are normal.   Neck: Normal range of motion.   Cardiovascular: Normal rate and normal heart sounds. An irregularly irregular rhythm present.   Pulmonary/Chest: Breath sounds normal.   Abdominal: Soft. Bowel sounds are normal.   Musculoskeletal: Normal range of motion. She exhibits edema.   Neurological: She is alert and oriented to person, place, and time.   Skin: Skin is warm and dry.   Psychiatric: She has a normal mood and affect. Her behavior is normal.   Vitals reviewed.    Telephone visit  Lab Review:       Assessment:          Diagnosis Plan   1. Benign essential HTN     2. Persistent atrial fibrillation (CMS/HCC)            Plan:       1.  Hypertension blood pressure is stable.  Patient was taken off of Norvasc if she has profound lymphedema.  2.  Pulmonary hypertension   3.   Chronic atrial fibrillation patient remained stable.  4.  Profound lymphedema.  She is followed at the lymphedema clinic at [facility] she has used sequential compression as well as other devices but is still very impressive.  5.  Follow-up 6 to 12 months sooner if issues

## 2020-08-28 DIAGNOSIS — I10 HYPERTENSION NOT AT GOAL: ICD-10-CM

## 2020-08-28 RX ORDER — ATENOLOL 25 MG/1
75 TABLET ORAL 2 TIMES DAILY
Qty: 270 TABLET | Refills: 1 | Status: SHIPPED | OUTPATIENT
Start: 2020-08-28 | End: 2020-12-09

## 2020-09-21 NOTE — TELEPHONE ENCOUNTER
Dr. CAM,   Please advise if patient will need lab done prior to refilling Digoxin 250 mcg. / VALENTIN

## 2020-09-22 RX ORDER — DIGOXIN 250 MCG
TABLET ORAL
Qty: 30 TABLET | Refills: 9 | Status: SHIPPED | OUTPATIENT
Start: 2020-09-22 | End: 2021-07-12

## 2020-09-24 ENCOUNTER — OFFICE VISIT (OUTPATIENT)
Dept: WOUND CARE | Facility: HOSPITAL | Age: 60
End: 2020-09-24

## 2020-09-24 PROCEDURE — G0463 HOSPITAL OUTPT CLINIC VISIT: HCPCS

## 2020-10-01 ENCOUNTER — OFFICE VISIT (OUTPATIENT)
Dept: WOUND CARE | Facility: HOSPITAL | Age: 60
End: 2020-10-01

## 2020-10-01 PROCEDURE — G0463 HOSPITAL OUTPT CLINIC VISIT: HCPCS

## 2020-10-06 ENCOUNTER — TRANSCRIBE ORDERS (OUTPATIENT)
Dept: ADMINISTRATIVE | Facility: HOSPITAL | Age: 60
End: 2020-10-06

## 2020-10-06 ENCOUNTER — TELEPHONE (OUTPATIENT)
Dept: CARDIOLOGY | Facility: CLINIC | Age: 60
End: 2020-10-06

## 2020-10-06 DIAGNOSIS — D86.9 SARCOIDOSIS: Primary | ICD-10-CM

## 2020-10-06 RX ORDER — LOSARTAN POTASSIUM 100 MG/1
TABLET ORAL
Qty: 30 TABLET | Refills: 0 | Status: SHIPPED | OUTPATIENT
Start: 2020-10-06 | End: 2020-11-04

## 2020-10-06 NOTE — TELEPHONE ENCOUNTER
Patient called and left message stating she was waiting on a med refill for her Losartan. The refill request was approved this morning. I called patient to inform her that her med has been refilled.   Thanks!  HG

## 2020-10-15 ENCOUNTER — HOSPITAL ENCOUNTER (OUTPATIENT)
Dept: CARDIOLOGY | Facility: HOSPITAL | Age: 60
Discharge: HOME | End: 2020-10-15

## 2020-10-15 ENCOUNTER — TRANSCRIBE ORDERS (OUTPATIENT)
Dept: ADMINISTRATIVE | Facility: HOSPITAL | Age: 60
End: 2020-10-15

## 2020-10-15 ENCOUNTER — OFFICE VISIT (OUTPATIENT)
Dept: WOUND CARE | Facility: HOSPITAL | Age: 60
End: 2020-10-15

## 2020-10-15 DIAGNOSIS — L97.812 NON-PRESSURE CHRONIC ULCER OF OTHER PART OF RIGHT LOWER LEG WITH FAT LAYER EXPOSED (HCC): ICD-10-CM

## 2020-10-15 DIAGNOSIS — L97.812 NON-PRESSURE CHRONIC ULCER OF OTHER PART OF RIGHT LOWER LEG WITH FAT LAYER EXPOSED (HCC): Primary | ICD-10-CM

## 2020-10-15 PROCEDURE — G0463 HOSPITAL OUTPT CLINIC VISIT: HCPCS

## 2020-10-15 PROCEDURE — 93970 EXTREMITY STUDY: CPT

## 2020-10-16 LAB

## 2020-10-20 ENCOUNTER — TELEPHONE (OUTPATIENT)
Dept: CARDIOLOGY | Facility: CLINIC | Age: 60
End: 2020-10-20

## 2020-10-20 NOTE — TELEPHONE ENCOUNTER
Seble from  called. Pt complained of chest pain an 8 out of 10 today. She stated she thought it was a panic attack and took flexeril for the pain. Her pain came down to a 6 out of 10. Seble took pt's B/P. It was 150/80, HR irregular. He and I both recommended she go to the ER for further evaluation.    Adele Buchanan RN  [place] [facility]

## 2020-10-21 ENCOUNTER — TRANSCRIBE ORDERS (OUTPATIENT)
Dept: ADMINISTRATIVE | Facility: HOSPITAL | Age: 60
End: 2020-10-21

## 2020-10-21 DIAGNOSIS — L97.812 NON-PRESSURE CHRONIC ULCER OF OTHER PART OF RIGHT LOWER LEG WITH FAT LAYER EXPOSED (HCC): Primary | ICD-10-CM

## 2020-10-22 ENCOUNTER — OFFICE VISIT (OUTPATIENT)
Dept: WOUND CARE | Facility: HOSPITAL | Age: 60
End: 2020-10-22

## 2020-10-22 PROCEDURE — G0463 HOSPITAL OUTPT CLINIC VISIT: HCPCS

## 2020-10-27 ENCOUNTER — APPOINTMENT (OUTPATIENT)
Dept: CARDIOLOGY | Facility: HOSPITAL | Age: 60
End: 2020-10-27

## 2020-10-29 ENCOUNTER — OFFICE VISIT (OUTPATIENT)
Dept: WOUND CARE | Facility: HOSPITAL | Age: 60
End: 2020-10-29

## 2020-11-02 ENCOUNTER — HOSPITAL ENCOUNTER (OUTPATIENT)
Dept: CARDIOLOGY | Facility: HOSPITAL | Age: 60
Discharge: HOME | End: 2020-11-02
Admitting: NURSE PRACTITIONER

## 2020-11-02 DIAGNOSIS — L97.812 NON-PRESSURE CHRONIC ULCER OF OTHER PART OF RIGHT LOWER LEG WITH FAT LAYER EXPOSED (HCC): ICD-10-CM

## 2020-11-02 LAB
BH CV LOWER ARTERIAL LEFT ABI RATIO: 1.1
BH CV LOWER ARTERIAL LEFT DORSALIS PEDIS SYS MAX: 180 MMHG
BH CV LOWER ARTERIAL LEFT GREAT TOE SYS MAX: 138 MMHG
BH CV LOWER ARTERIAL LEFT POST TIBIAL SYS MAX: 196 MMHG
BH CV LOWER ARTERIAL LEFT TBI RATIO: 0.79
BH CV LOWER ARTERIAL RIGHT ABI RATIO: 1.03
BH CV LOWER ARTERIAL RIGHT DORSALIS PEDIS SYS MAX: 172 MMHG
BH CV LOWER ARTERIAL RIGHT GREAT TOE SYS MAX: 118 MMHG
BH CV LOWER ARTERIAL RIGHT POST TIBIAL SYS MAX: 181 MMHG
BH CV LOWER ARTERIAL RIGHT TBI RATIO: 0.67
UPPER ARTERIAL LEFT ARM BRACHIAL SYS MAX: 171 MMHG
UPPER ARTERIAL RIGHT ARM BRACHIAL SYS MAX: 175 MMHG

## 2020-11-02 PROCEDURE — 93922 UPR/L XTREMITY ART 2 LEVELS: CPT

## 2020-11-04 RX ORDER — LOSARTAN POTASSIUM 100 MG/1
TABLET ORAL
Qty: 90 TABLET | Refills: 0 | Status: SHIPPED | OUTPATIENT
Start: 2020-11-04 | End: 2021-08-25 | Stop reason: SDUPTHER

## 2020-11-05 ENCOUNTER — OFFICE VISIT (OUTPATIENT)
Dept: WOUND CARE | Facility: HOSPITAL | Age: 60
End: 2020-11-05

## 2020-11-12 ENCOUNTER — OFFICE VISIT (OUTPATIENT)
Dept: WOUND CARE | Facility: HOSPITAL | Age: 60
End: 2020-11-12

## 2020-11-12 PROCEDURE — G0463 HOSPITAL OUTPT CLINIC VISIT: HCPCS

## 2020-11-19 ENCOUNTER — OFFICE VISIT (OUTPATIENT)
Dept: WOUND CARE | Facility: HOSPITAL | Age: 60
End: 2020-11-19

## 2020-11-19 PROCEDURE — G0463 HOSPITAL OUTPT CLINIC VISIT: HCPCS

## 2020-11-23 ENCOUNTER — TELEPHONE (OUTPATIENT)
Dept: CARDIOLOGY | Facility: CLINIC | Age: 60
End: 2020-11-23

## 2020-11-23 NOTE — TELEPHONE ENCOUNTER
So Bravo with [facility] is calling to let us know that pts bp is 178/104.  Pt is c/o a slight headache. No chest pain, no blurry vision.  The nurse said this has been going on for the last 3 weeks    Cardiac meds  Losartan 100mg daily  Dig 250mcg daily  Atenolol 25mg 3 tabs BID  Hydralazine 50mg BID  Furosemide 40mg daily    Please advise on how to proceed  Thanks  Maia Argueta RN  Triage nurse
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
## 2020-11-25 NOTE — TELEPHONE ENCOUNTER
Claudia from [facility] called and stated that pt's BP was 198/102 and 184/98 today.  She felt there are some medication discrepancies    Pt is currently taking    Lasix 40 mg BID  Hydralazine 50 mg TID/ she doubled it to 100 mg TID on 11/23 after call to this office.      Please advise.

## 2020-12-03 ENCOUNTER — OFFICE VISIT (OUTPATIENT)
Dept: WOUND CARE | Facility: HOSPITAL | Age: 60
End: 2020-12-03

## 2020-12-03 PROCEDURE — G0463 HOSPITAL OUTPT CLINIC VISIT: HCPCS

## 2020-12-09 DIAGNOSIS — I10 HYPERTENSION NOT AT GOAL: ICD-10-CM

## 2020-12-09 DIAGNOSIS — I10 BENIGN ESSENTIAL HTN: ICD-10-CM

## 2020-12-10 RX ORDER — ATENOLOL 25 MG/1
TABLET ORAL
Qty: 540 TABLET | Refills: 3 | Status: SHIPPED | OUTPATIENT
Start: 2020-12-10 | End: 2021-08-25 | Stop reason: SDUPTHER

## 2020-12-10 RX ORDER — HYDRALAZINE HYDROCHLORIDE 50 MG/1
50 TABLET, FILM COATED ORAL 3 TIMES DAILY
Qty: 270 TABLET | Refills: 3 | Status: SHIPPED | OUTPATIENT
Start: 2020-12-10 | End: 2020-12-16 | Stop reason: SDUPTHER

## 2020-12-16 DIAGNOSIS — I10 BENIGN ESSENTIAL HTN: ICD-10-CM

## 2020-12-16 RX ORDER — HYDRALAZINE HYDROCHLORIDE 100 MG/1
100 TABLET, FILM COATED ORAL 2 TIMES DAILY
Qty: 60 TABLET | Refills: 0 | Status: SHIPPED | OUTPATIENT
Start: 2020-12-16 | End: 2020-12-18 | Stop reason: SDUPTHER

## 2020-12-17 ENCOUNTER — OFFICE VISIT (OUTPATIENT)
Dept: WOUND CARE | Facility: HOSPITAL | Age: 60
End: 2020-12-17

## 2020-12-17 PROCEDURE — G0463 HOSPITAL OUTPT CLINIC VISIT: HCPCS

## 2020-12-18 ENCOUNTER — TELEPHONE (OUTPATIENT)
Dept: CARDIOLOGY | Facility: CLINIC | Age: 60
End: 2020-12-18

## 2020-12-18 DIAGNOSIS — I10 BENIGN ESSENTIAL HTN: ICD-10-CM

## 2020-12-18 RX ORDER — HYDRALAZINE HYDROCHLORIDE 100 MG/1
100 TABLET, FILM COATED ORAL 2 TIMES DAILY
Qty: 180 TABLET | Refills: 3 | Status: SHIPPED | OUTPATIENT
Start: 2020-12-18 | End: 2021-08-25 | Stop reason: SDUPTHER

## 2020-12-28 ENCOUNTER — HOSPITAL ENCOUNTER (OUTPATIENT)
Dept: CT IMAGING | Facility: HOSPITAL | Age: 60
Discharge: HOME | End: 2020-12-28
Admitting: INTERNAL MEDICINE

## 2020-12-28 DIAGNOSIS — D86.9 SARCOIDOSIS: ICD-10-CM

## 2020-12-28 PROCEDURE — 71250 CT THORAX DX C-: CPT

## 2020-12-31 ENCOUNTER — OFFICE VISIT (OUTPATIENT)
Dept: WOUND CARE | Facility: HOSPITAL | Age: 60
End: 2020-12-31

## 2020-12-31 PROCEDURE — G0463 HOSPITAL OUTPT CLINIC VISIT: HCPCS

## 2021-01-14 ENCOUNTER — OFFICE VISIT (OUTPATIENT)
Dept: WOUND CARE | Facility: HOSPITAL | Age: 61
End: 2021-01-14

## 2021-01-14 PROCEDURE — G0463 HOSPITAL OUTPT CLINIC VISIT: HCPCS

## 2021-01-28 ENCOUNTER — OFFICE VISIT (OUTPATIENT)
Dept: WOUND CARE | Facility: HOSPITAL | Age: 61
End: 2021-01-28

## 2021-02-01 ENCOUNTER — TELEPHONE (OUTPATIENT)
Dept: CARDIOLOGY | Facility: HOSPITAL | Age: 61
End: 2021-02-01

## 2021-02-01 NOTE — TELEPHONE ENCOUNTER
"Claudia Mullen from [facility] called to report that the patient's BP today is 182/106 and 170/100, HR 68.  The patient is \"completely asymptomatic.\"    She has had a 30 pound weight gain in the last 2 weeks per the lymphedema doctor.  She has a history of sarcoidosis that is out of remission and over the weekend she was started on 20mg steroids.    O2 sats are 98%.  I instructed the nurse to have her go to the ED if she should develop any cardiac symptoms before she heard back from you.    She is taking meds as prescribed.          Will you please call her. She didn't call until 4:30.    Thank you,  Aimee Barron RN  [facility]  Triage    "

## 2021-02-01 NOTE — TELEPHONE ENCOUNTER
"I am the on-call provider.  Called the patient.  She has chronic shortness of breath but reported no worsening shortness of breath no chest pain no headaches, vision changes associated with the high blood pressure.  Apparently her blood pressure down trended to 160s/80s since the home health nurse had called. She had taken her evening doses of hydralazine and atenolol early and then she reported \"it came down another 30-40 points from earlier\". She has no way of checking her blood pressure again today as she does not have a working cuff at home.  I did review signs and symptoms for which she should call us back otherwise she will keep her appointment tomorrow with Shelly for reassessment.    Samia.  "

## 2021-02-02 ENCOUNTER — OFFICE VISIT (OUTPATIENT)
Dept: CARDIOLOGY | Facility: CLINIC | Age: 61
End: 2021-02-02

## 2021-02-02 VITALS
OXYGEN SATURATION: 98 % | SYSTOLIC BLOOD PRESSURE: 136 MMHG | BODY MASS INDEX: 44.41 KG/M2 | HEART RATE: 59 BPM | WEIGHT: 293 LBS | HEIGHT: 68 IN | DIASTOLIC BLOOD PRESSURE: 78 MMHG

## 2021-02-02 DIAGNOSIS — I48.19 PERSISTENT ATRIAL FIBRILLATION (HCC): ICD-10-CM

## 2021-02-02 DIAGNOSIS — R60.0 PEDAL EDEMA: ICD-10-CM

## 2021-02-02 DIAGNOSIS — I50.31 ACUTE DIASTOLIC CONGESTIVE HEART FAILURE (HCC): Primary | ICD-10-CM

## 2021-02-02 DIAGNOSIS — E66.01 MORBID OBESITY WITH BMI OF 45.0-49.9, ADULT (HCC): ICD-10-CM

## 2021-02-02 DIAGNOSIS — I27.20 PULMONARY HYPERTENSION (HCC): ICD-10-CM

## 2021-02-02 PROCEDURE — 99214 OFFICE O/P EST MOD 30 MIN: CPT | Performed by: NURSE PRACTITIONER

## 2021-02-02 RX ORDER — ALBUTEROL SULFATE 90 UG/1
2 AEROSOL, METERED RESPIRATORY (INHALATION)
COMMUNITY
Start: 2021-01-19 | End: 2022-03-09

## 2021-02-02 RX ORDER — MEPERIDINE HYDROCHLORIDE 50 MG/1
100 TABLET ORAL EVERY 6 HOURS PRN
COMMUNITY
Start: 2021-01-19 | End: 2022-02-06 | Stop reason: HOSPADM

## 2021-02-02 RX ORDER — TORSEMIDE 20 MG/1
40 TABLET ORAL DAILY
Qty: 60 TABLET | Refills: 11 | Status: SHIPPED | OUTPATIENT
Start: 2021-02-02 | End: 2021-08-25 | Stop reason: SDUPTHER

## 2021-02-02 NOTE — PROGRESS NOTES
"    CARDIOLOGY        Patient Name: Jenelle Kasper  :1960  Age: 60 y.o.  Primary Cardiologist: Baldev Winter MD  Encounter Provider:  TRUDY Robb    Date of Service: 21          CHIEF COMPLAINT / REASON FOR OFFICE VISIT     Hypertension and Weight Gain      HISTORY OF PRESENT ILLNESS       HPI  Jenelle Kasper is a 60 y.o. female who presents today for evaluation of high blood pressure and weight gain..     Pt has a  history significant for persistent A. fib, hypertension, pulmonary hypertension.    Patient presents today for complaints of volume overload.  She states that she has gained 20 to 30 pounds over the past 2 weeks.  She states that she is followed by wound care nurses who wrapped her legs routinely and they have noted a 2 inch increase in the size of her leg circumference.  She reports that she also has symptoms consistent with constant shortness of breath that worsens with exertion as well as generalized fatigue.  She notes that she has been evaluated by pulmonology and has been told that her sarcoidosis is active and no longer in remission.  She is wheelchair-bound.  She does have home health.  Home health noted an elevated blood pressure yesterday by manual blood pressure.  Her hydralazine dosing has been changed as a result.  She denies any chest discomfort or episodes of lightheadedness.      The following portions of the patient's history were reviewed and updated as appropriate: allergies, current medications, past family history, past medical history, past social history, past surgical history and problem list.      VITAL SIGNS     Visit Vitals  /78 (BP Location: Right arm, Patient Position: Sitting, Cuff Size: Adult) Comment (Cuff Size): forearm   Pulse 59   Ht 172.7 cm (68\")   Wt 134 kg (296 lb)   SpO2 98%   BMI 45.01 kg/m²         Wt Readings from Last 3 Encounters:   21 134 kg (296 lb)   20 120 kg (265 lb)   20 107 kg (236 lb)     Body " mass index is 45.01 kg/m².      REVIEW OF SYSTEMS   Review of Systems   Constitution: Positive for malaise/fatigue and weight gain.   Cardiovascular: Positive for dyspnea on exertion and leg swelling. Negative for chest pain.   Respiratory: Positive for shortness of breath.    Neurological: Negative for light-headedness.   All other systems reviewed and are negative.          PHYSICAL EXAMINATION     Vitals signs and nursing note reviewed.   Constitutional:       Appearance: Normal appearance. Well-developed. Morbidly obese.   Eyes:      Conjunctiva/sclera: Conjunctivae normal.   Neck:      Vascular: No carotid bruit.   Pulmonary:      Breath sounds: Normal breath sounds.   Cardiovascular:      Normal rate. Regular rhythm. Normal S1 with normal intensity. Normal S2 with normal intensity.      Murmurs: There is no murmur.      No gallop. No click. No rub.      Comments: Bilateral lower extremities and compression wraps  Edema:     Peripheral edema absent.      Ankle: bilateral 2+ edema of the ankle.     Feet: bilateral 2+ edema of the feet.  Musculoskeletal: Normal range of motion.   Skin:     General: Skin is warm and dry.   Neurological:      Mental Status: Alert and oriented to person, place, and time.      GCS: GCS eye subscore is 4. GCS verbal subscore is 5. GCS motor subscore is 6.   Psychiatric:         Speech: Speech normal.         Behavior: Behavior normal.         Thought Content: Thought content normal.         Judgment: Judgment normal.           REVIEWED DATA     Procedures    Cardiac Procedures:  1. Myocardial perfusion PET test 6/28/2016.  Medium to large sized, moderately severe area of ischemia located in the inferior wall.  EF 67%.  Impressions consistent with intermediate study.  2. Cardiac catheterization 7/27/2016.  Normal left heart catheterization.  Likely false positive stress test.  3. Echocardiogram 1/15/2020.  Moderate to severe LVH.  Calcification of aortic valve.  EF 68%.  RA is  moderately dilated.  Moderate pulmonary hypertension.  4. Right diagnostic cardiac catheterization 2/26/2020.  Severe pulmonary hypertension in the setting of markedly elevated pulmonary capillary wedge pressure.  Likely due to severe diastolic dysfunction or LVH.  Recommending increase diuretics as well as better blood pressure control.       Lipid Panel    Lipid Panel 8/13/20   Total Cholesterol 90   Triglycerides 84   HDL Cholesterol 46 (A)   VLDL Cholesterol 17   LDL Cholesterol  27   (A) Abnormal value                ASSESSMENT & PLAN      Diagnosis Plan   1. Acute diastolic congestive heart failure (CMS/HCC)  Basic Metabolic Panel   2. Pulmonary hypertension (CMS/[facility])     3. Persistent atrial fibrillation (CMS/[facility])     4. Morbid obesity with BMI of 45.0-49.9, adult (CMS/[facility])     5. Pedal edema           SUMMARY/DISCUSSION  1. Acute diastolic heart failure patient with signs consistent with volume overload.  She does have lower extremity edema.  She reports that when wound care came to change her compression wraps her leg circumference had increased by 2 inches.  She also notes a increase in her weight of 20 to 30 pounds over the past 2 weeks.  She states that she is currently taking furosemide 80 mg/day and is diuresing well, but is not enough to keep her fluid off.  She reports chronic shortness of breath at rest that worsens with exertion and notes that her pulmonologist informed her that her sarcoidosis is now active and no longer in remission.  At this point I have recommended that patient stop furosemide and transition to torsemide 40 mg/day.  She will need a repeat BMP in 1 week.  2. Pulmonary hypertension  3. Morbid obesity  4. Lower extremity edema.  As above.  5. Persistent atrial fibrillation.  Rate currently controlled in the upper 50s.  Not anticoagulated secondary to history of GI bleeding.  6. Follow-up with me in 1 to 2 weeks.  Continue current regimen with the exception of changing to  diuretics.        MEDICATIONS         Discharge Medications          Accurate as of February 2, 2021 12:20 PM. If you have any questions, ask your nurse or doctor.            Changes to Medications      Instructions Start Date   meperidine 50 MG tablet  Commonly known as: DEMEROL  What changed: Another medication with the same name was removed. Continue taking this medication, and follow the directions you see here.  Changed by: Darby Mack, APRN   100 mg, Oral      metFORMIN 500 MG tablet  Commonly known as: GLUCOPHAGE  What changed: how much to take   500 mg, Oral, 2 Times Daily With Meals         Continue These Medications      Instructions Start Date   Advair Diskus 500-50 MCG/DOSE DISKUS  Generic drug: fluticasone-salmeterol   1 puff, Inhalation, As Needed      albuterol sulfate  (90 Base) MCG/ACT inhaler  Commonly known as: PROVENTIL HFA;VENTOLIN HFA;PROAIR HFA   2 puffs, Inhalation      Antivert 25 MG tablet  Generic drug: meclizine   1 tablet, Oral, Daily PRN      aspirin 81 MG tablet   81 mg, Oral, Daily      atenolol 25 MG tablet  Commonly known as: TENORMIN   TAKE THREE TABLETS BY MOUTH TWICE A DAY      Claritin 10 MG tablet  Generic drug: loratadine   1 tablet, Oral, Daily      cyclobenzaprine 10 MG tablet  Commonly known as: FLEXERIL   10 mg, Oral, 3 Times Daily PRN      digoxin 250 MCG tablet  Commonly known as: LANOXIN   TAKE ONE TABLET BY MOUTH DAILY      diphenoxylate-atropine 2.5-0.025 MG per tablet  Commonly known as: LOMOTIL   1 tablet, Oral, 4 Times Daily PRN      fluconazole 150 MG tablet  Commonly known as: DIFLUCAN   150 mg, Oral, Once, On tab once weekly for two weeks       furosemide 40 MG tablet  Commonly known as: LASIX   40 mg, Oral, Daily      gabapentin 300 MG capsule  Commonly known as: NEURONTIN   300 mg, Oral, 3 Times Daily, Take 3 tablets three times daily      glipizide 10 MG tablet  Commonly known as: GLUCOTROL   10 mg, Oral, 2 Times Daily Before Meals       hydrALAZINE 100 MG tablet  Commonly known as: APRESOLINE   100 mg, Oral, 2 times daily      hydrOXYzine 25 MG tablet  Commonly known as: ATARAX   25 mg, Oral, Every 8 Hours PRN      insulin NPH-insulin regular (70-30) 100 UNIT/ML injection  Commonly known as: humuLIN 70/30,novoLIN 70/30   60 Units, Subcutaneous, 2 Times Daily Before Meals      LORazepam 1 MG tablet  Commonly known as: ATIVAN   1 mg, Oral, Every 6 Hours PRN      losartan 100 MG tablet  Commonly known as: COZAAR   TAKE ONE TABLET BY MOUTH DAILY      ondansetron 8 MG tablet  Commonly known as: ZOFRAN   8 mg, Oral, Daily      potassium chloride 10 MEQ CR tablet   1 tablet, Oral, Daily      simvastatin 20 MG tablet  Commonly known as: ZOCOR   20 mg, Oral, Nightly      Synthroid 200 MCG tablet  Generic drug: levothyroxine   600 mcg, Oral, Daily, Except on sat and sun take4 tabs      vitamin D 1.25 MG (58703 UT) capsule capsule  Commonly known as: ERGOCALCIFEROL   1 capsule, Oral, 2 Times Weekly, SUN, THSUHAS                 **Dragon Disclaimer:   Much of this encounter note is an electronic transcription/translation of spoken language to printed text. The electronic translation of spoken language may permit erroneous, or at times, nonsensical words or phrases to be inadvertently transcribed. Although I have reviewed the note for such errors, some may still exist.

## 2021-02-09 ENCOUNTER — HOSPITAL ENCOUNTER (OUTPATIENT)
Dept: CARDIOLOGY | Facility: HOSPITAL | Age: 61
Discharge: HOME | End: 2021-02-09
Admitting: NURSE PRACTITIONER

## 2021-02-09 ENCOUNTER — OFFICE VISIT (OUTPATIENT)
Dept: CARDIOLOGY | Facility: CLINIC | Age: 61
End: 2021-02-09

## 2021-02-09 VITALS
OXYGEN SATURATION: 99 % | SYSTOLIC BLOOD PRESSURE: 114 MMHG | BODY MASS INDEX: 44.41 KG/M2 | HEIGHT: 68 IN | DIASTOLIC BLOOD PRESSURE: 82 MMHG | HEART RATE: 83 BPM | WEIGHT: 293 LBS

## 2021-02-09 DIAGNOSIS — I50.31 ACUTE DIASTOLIC CONGESTIVE HEART FAILURE (HCC): ICD-10-CM

## 2021-02-09 DIAGNOSIS — E66.01 MORBID OBESITY WITH BMI OF 45.0-49.9, ADULT (HCC): ICD-10-CM

## 2021-02-09 DIAGNOSIS — I50.31 ACUTE DIASTOLIC CONGESTIVE HEART FAILURE (HCC): Primary | ICD-10-CM

## 2021-02-09 DIAGNOSIS — I27.20 PULMONARY HYPERTENSION (HCC): ICD-10-CM

## 2021-02-09 DIAGNOSIS — R60.0 PEDAL EDEMA: ICD-10-CM

## 2021-02-09 DIAGNOSIS — I48.19 PERSISTENT ATRIAL FIBRILLATION (HCC): ICD-10-CM

## 2021-02-09 LAB
ANION GAP SERPL CALCULATED.3IONS-SCNC: 7.7 MMOL/L (ref 5–15)
BUN SERPL-MCNC: 12 MG/DL (ref 6–20)
BUN/CREAT SERPL: 17.4 (ref 7–25)
CALCIUM SPEC-SCNC: 10.5 MG/DL (ref 8.6–10.5)
CHLORIDE SERPL-SCNC: 102 MMOL/L (ref 98–107)
CO2 SERPL-SCNC: 26.3 MMOL/L (ref 22–29)
CREAT SERPL-MCNC: 0.69 MG/DL (ref 0.57–1)
GFR SERPL CREATININE-BSD FRML MDRD: 87 ML/MIN/1.73
GLUCOSE SERPL-MCNC: 213 MG/DL (ref 65–99)
POTASSIUM SERPL-SCNC: 4.4 MMOL/L (ref 3.5–5.2)
SODIUM SERPL-SCNC: 136 MMOL/L (ref 136–145)

## 2021-02-09 PROCEDURE — 99214 OFFICE O/P EST MOD 30 MIN: CPT | Performed by: NURSE PRACTITIONER

## 2021-02-09 PROCEDURE — 36415 COLL VENOUS BLD VENIPUNCTURE: CPT

## 2021-02-09 PROCEDURE — 80048 BASIC METABOLIC PNL TOTAL CA: CPT | Performed by: NURSE PRACTITIONER

## 2021-02-09 NOTE — PROGRESS NOTES
CARDIOLOGY        Patient Name: Jenelle Kasper  :1960  Age: 60 y.o.  Primary Cardiologist: Baldev Winter MD  Encounter Provider:  TRUDY Robb    Date of Service: 21          CHIEF COMPLAINT / REASON FOR OFFICE VISIT     Congestive Heart Failure (6 month f/u)      HISTORY OF PRESENT ILLNESS       Congestive Heart Failure  Associated symptoms include shortness of breath. Pertinent negatives include no chest pain.     Jenelle Kasper is a 60 y.o. female who presents today for re-evaluation of high blood pressure and weight gain..     Pt has a  history significant for persistent A. fib, hypertension, pulmonary hypertension.    Patient was evaluated by me 2 weeks ago where she was experiencing signs of acute diastolic heart failure.  At that time her furosemide was discontinued and torsemide was started.  Patient is in need of a repeat BMP.    Patient reports that she has noticed a difference in increased urine output since transitioning to torsemide.  She states that she feels that her lower extremity edema has slightly improved but she continues to have very swollen bilateral lower extremities.  Patient does have history of lymphedema and was following the lymphedema clinic prior to needing to go to the wound care clinic.  She reports that when she is finished with the wound care clinic she will be returning to the lymphedema clinic.  She reports that she continues to have shortness of breath that worsens with exertion as well as controlled heart palpitations.  Patient reports that she has been unable to exercise for 18 months during treatment of her lower extremity wounds.  She reports that prior to this she was swimming on a regular basis for cardiovascular exercise.    The following portions of the patient's history were reviewed and updated as appropriate: allergies, current medications, past family history, past medical history, past social history, past surgical history and  "problem list.      VITAL SIGNS     Visit Vitals  /82 (BP Location: Left arm, Patient Position: Sitting, Cuff Size: Adult) Comment (Cuff Size): forearm   Pulse 83   Ht 172.7 cm (68\")   Wt 134 kg (296 lb)   SpO2 99%   BMI 45.01 kg/m²         Wt Readings from Last 3 Encounters:   02/09/21 134 kg (296 lb)   02/02/21 134 kg (296 lb)   08/19/20 120 kg (265 lb)     Body mass index is 45.01 kg/m².      REVIEW OF SYSTEMS   Review of Systems   Constitution: Positive for malaise/fatigue and weight gain.   Cardiovascular: Positive for dyspnea on exertion and leg swelling. Negative for chest pain.   Respiratory: Positive for shortness of breath.    Neurological: Negative for light-headedness.   All other systems reviewed and are negative.          PHYSICAL EXAMINATION     Vitals signs and nursing note reviewed.   Constitutional:       Appearance: Normal appearance. Well-developed. Morbidly obese.   Eyes:      Conjunctiva/sclera: Conjunctivae normal.   Neck:      Vascular: No carotid bruit.   Pulmonary:      Breath sounds: Normal breath sounds.   Cardiovascular:      Normal rate. Regular rhythm. Normal S1 with normal intensity. Normal S2 with normal intensity.      Murmurs: There is no murmur.      No gallop. No click. No rub.      Comments: Bilateral lower extremities in compression wraps  Edema:     Peripheral edema absent.      Ankle: bilateral 2+ edema of the ankle.     Feet: bilateral 2+ edema of the feet.  Musculoskeletal: Normal range of motion.   Skin:     General: Skin is warm and dry.   Neurological:      Mental Status: Alert and oriented to person, place, and time.      GCS: GCS eye subscore is 4. GCS verbal subscore is 5. GCS motor subscore is 6.   Psychiatric:         Speech: Speech normal.         Behavior: Behavior normal.         Thought Content: Thought content normal.         Judgment: Judgment normal.           REVIEWED DATA     Procedures      Cardiac Procedures:  1. Myocardial perfusion PET test " 6/28/2016.  Medium to large sized, moderately severe area of ischemia located in the inferior wall.  EF 67%.  Impressions consistent with intermediate study.  2. Cardiac catheterization 7/27/2016.  Normal left heart catheterization.  Likely false positive stress test.  3. Echocardiogram 1/15/2020.  Moderate to severe LVH.  Calcification of aortic valve.  EF 68%.  RA is moderately dilated.  Moderate pulmonary hypertension.  4. Right diagnostic cardiac catheterization 2/26/2020.  Severe pulmonary hypertension in the setting of markedly elevated pulmonary capillary wedge pressure.  Likely due to severe diastolic dysfunction or LVH.  Recommending increase diuretics as well as better blood pressure control.       Lipid Panel    Lipid Panel 8/13/20   Total Cholesterol 90   Triglycerides 84   HDL Cholesterol 46 (A)   VLDL Cholesterol 17   LDL Cholesterol  27   (A) Abnormal value                ASSESSMENT & PLAN     No diagnosis found.      SUMMARY/DISCUSSION  1. Acute diastolic heart failure: Since transitioning to torsemide patient's volume status has improved.  She states that she is noticing increased urine output.  She continues to have bilateral lower extremity edema as well as shortness of breath at rest and with exertion.  I suspect that this is multifactorial given patient's morbid obesity, no way to do cardiovascular exercise as well as lymphedema.  Patient is aware of the complex situation.  I think that she has best to continue with torsemide 20 mg daily.  We will also continue losartan 100 mg/day.  Patient will get a repeat BMP drawn today to ensure that her kidneys are excreting torsemide appropriately.  2. Pulmonary hypertension  3. Morbid obesity  4. Lower extremity edema.  As above.  5. Persistent atrial fibrillation.  Rate currently controlled in the upper 50s.  Not anticoagulated secondary to history of GI bleeding.  6. Follow-up  to be arranged after BMP results.        MEDICATIONS         Discharge  Medications          Accurate as of February 9, 2021  1:09 PM. If you have any questions, ask your nurse or doctor.            Changes to Medications      Instructions Start Date   metFORMIN 500 MG tablet  Commonly known as: GLUCOPHAGE  What changed: how much to take   500 mg, Oral, 2 Times Daily With Meals         Continue These Medications      Instructions Start Date   albuterol sulfate  (90 Base) MCG/ACT inhaler  Commonly known as: PROVENTIL HFA;VENTOLIN HFA;PROAIR HFA   2 puffs, Inhalation      Antivert 25 MG tablet  Generic drug: meclizine   1 tablet, Oral, Daily PRN      aspirin 81 MG tablet   81 mg, Oral, Daily      atenolol 25 MG tablet  Commonly known as: TENORMIN   TAKE THREE TABLETS BY MOUTH TWICE A DAY      Claritin 10 MG tablet  Generic drug: loratadine   1 tablet, Oral, Daily      cyclobenzaprine 10 MG tablet  Commonly known as: FLEXERIL   10 mg, Oral, 3 Times Daily PRN      digoxin 250 MCG tablet  Commonly known as: LANOXIN   TAKE ONE TABLET BY MOUTH DAILY      diphenoxylate-atropine 2.5-0.025 MG per tablet  Commonly known as: LOMOTIL   1 tablet, Oral, 4 Times Daily PRN      fluconazole 150 MG tablet  Commonly known as: DIFLUCAN   150 mg, Oral, Once, On tab once weekly for two weeks       gabapentin 300 MG capsule  Commonly known as: NEURONTIN   300 mg, Oral, 3 Times Daily, Take 3 tablets three times daily      glipizide 10 MG tablet  Commonly known as: GLUCOTROL   10 mg, Oral, 2 Times Daily Before Meals      hydrALAZINE 100 MG tablet  Commonly known as: APRESOLINE   100 mg, Oral, 2 times daily      hydrOXYzine 25 MG tablet  Commonly known as: ATARAX   25 mg, Oral, Every 8 Hours PRN      insulin NPH-insulin regular (70-30) 100 UNIT/ML injection  Commonly known as: humuLIN 70/30,novoLIN 70/30   60 Units, Subcutaneous, 2 Times Daily Before Meals      LORazepam 1 MG tablet  Commonly known as: ATIVAN   1 mg, Oral, Every 6 Hours PRN      losartan 100 MG tablet  Commonly known as: COZAAR   TAKE ONE  TABLET BY MOUTH DAILY      meperidine 50 MG tablet  Commonly known as: DEMEROL   100 mg, Oral      ondansetron 8 MG tablet  Commonly known as: ZOFRAN   8 mg, Oral, Daily      potassium chloride 10 MEQ CR tablet   1 tablet, Oral, Daily      simvastatin 20 MG tablet  Commonly known as: ZOCOR   20 mg, Oral, Nightly      Synthroid 200 MCG tablet  Generic drug: levothyroxine   600 mcg, Oral, Daily, Except on sat and sun take4 tabs      torsemide 20 MG tablet  Commonly known as: Demadex   40 mg, Oral, Daily      vitamin D 1.25 MG (46995 UT) capsule capsule  Commonly known as: ERGOCALCIFEROL   1 capsule, Oral, 2 Times Weekly, SUN, THURS                 **Dragon Disclaimer:   Much of this encounter note is an electronic transcription/translation of spoken language to printed text. The electronic translation of spoken language may permit erroneous, or at times, nonsensical words or phrases to be inadvertently transcribed. Although I have reviewed the note for such errors, some may still exist.

## 2021-02-09 NOTE — PROGRESS NOTES
Can you please advise patient of normal lab results.  Continue torsemide.  Please arrange a follow-up with Dr. Wilson in 2 months.  If he does not have an opening she can be put on my schedule.

## 2021-02-11 ENCOUNTER — APPOINTMENT (OUTPATIENT)
Dept: WOUND CARE | Facility: HOSPITAL | Age: 61
End: 2021-02-11

## 2021-02-18 ENCOUNTER — APPOINTMENT (OUTPATIENT)
Dept: WOUND CARE | Facility: HOSPITAL | Age: 61
End: 2021-02-18

## 2021-02-24 ENCOUNTER — OFFICE VISIT (OUTPATIENT)
Dept: WOUND CARE | Facility: HOSPITAL | Age: 61
End: 2021-02-24

## 2021-02-26 ENCOUNTER — LAB (OUTPATIENT)
Dept: LAB | Facility: HOSPITAL | Age: 61
End: 2021-02-26

## 2021-02-26 ENCOUNTER — TRANSCRIBE ORDERS (OUTPATIENT)
Dept: ADMINISTRATIVE | Facility: HOSPITAL | Age: 61
End: 2021-02-26

## 2021-02-26 DIAGNOSIS — I83.012 VARICOSE VEINS OF LOWER EXTREMITY WITH ULCER OF CALF, RIGHT (HCC): ICD-10-CM

## 2021-02-26 DIAGNOSIS — L97.812 ULCER OF RIGHT PRETIBIAL REGION, WITH FAT LAYER EXPOSED (HCC): ICD-10-CM

## 2021-02-26 DIAGNOSIS — E11.621 DIABETIC FOOT ULCER WITH OSTEOMYELITIS (HCC): ICD-10-CM

## 2021-02-26 DIAGNOSIS — L97.509 DIABETIC FOOT ULCER WITH OSTEOMYELITIS (HCC): ICD-10-CM

## 2021-02-26 DIAGNOSIS — L97.219 VARICOSE VEINS OF LOWER EXTREMITY WITH ULCER OF CALF, RIGHT (HCC): Primary | ICD-10-CM

## 2021-02-26 DIAGNOSIS — E11.69 DIABETIC FOOT ULCER WITH OSTEOMYELITIS (HCC): ICD-10-CM

## 2021-02-26 DIAGNOSIS — M86.9 DIABETIC FOOT ULCER WITH OSTEOMYELITIS (HCC): ICD-10-CM

## 2021-02-26 DIAGNOSIS — L97.219 VARICOSE VEINS OF LOWER EXTREMITY WITH ULCER OF CALF, RIGHT (HCC): ICD-10-CM

## 2021-02-26 DIAGNOSIS — I83.012 VARICOSE VEINS OF LOWER EXTREMITY WITH ULCER OF CALF, RIGHT (HCC): Primary | ICD-10-CM

## 2021-02-26 LAB
ALBUMIN SERPL-MCNC: 3.9 G/DL (ref 3.5–5.2)
ALBUMIN/GLOB SERPL: 1.3 G/DL
ALP SERPL-CCNC: 168 U/L (ref 39–117)
ALT SERPL W P-5'-P-CCNC: 12 U/L (ref 1–33)
ANION GAP SERPL CALCULATED.3IONS-SCNC: 8.2 MMOL/L (ref 5–15)
AST SERPL-CCNC: 15 U/L (ref 1–32)
BASOPHILS # BLD AUTO: 0.06 10*3/MM3 (ref 0–0.2)
BASOPHILS NFR BLD AUTO: 1.1 % (ref 0–1.5)
BILIRUB SERPL-MCNC: 0.9 MG/DL (ref 0–1.2)
BUN SERPL-MCNC: 14 MG/DL (ref 6–20)
BUN/CREAT SERPL: 17.9 (ref 7–25)
CALCIUM SPEC-SCNC: 10.3 MG/DL (ref 8.6–10.5)
CHLORIDE SERPL-SCNC: 101 MMOL/L (ref 98–107)
CO2 SERPL-SCNC: 26.8 MMOL/L (ref 22–29)
CREAT SERPL-MCNC: 0.78 MG/DL (ref 0.57–1)
DEPRECATED RDW RBC AUTO: 44.1 FL (ref 37–54)
EOSINOPHIL # BLD AUTO: 0.22 10*3/MM3 (ref 0–0.4)
EOSINOPHIL NFR BLD AUTO: 4.1 % (ref 0.3–6.2)
ERYTHROCYTE [DISTWIDTH] IN BLOOD BY AUTOMATED COUNT: 13.7 % (ref 12.3–15.4)
GFR SERPL CREATININE-BSD FRML MDRD: 76 ML/MIN/1.73
GLOBULIN UR ELPH-MCNC: 3.1 GM/DL
GLUCOSE SERPL-MCNC: 268 MG/DL (ref 65–99)
HBA1C MFR BLD: 10.93 % (ref 4.8–5.6)
HCT VFR BLD AUTO: 41 % (ref 34–46.6)
HGB BLD-MCNC: 12.8 G/DL (ref 12–15.9)
IMM GRANULOCYTES # BLD AUTO: 0.03 10*3/MM3 (ref 0–0.05)
IMM GRANULOCYTES NFR BLD AUTO: 0.6 % (ref 0–0.5)
LYMPHOCYTES # BLD AUTO: 1.07 10*3/MM3 (ref 0.7–3.1)
LYMPHOCYTES NFR BLD AUTO: 20.2 % (ref 19.6–45.3)
MCH RBC QN AUTO: 27.6 PG (ref 26.6–33)
MCHC RBC AUTO-ENTMCNC: 31.2 G/DL (ref 31.5–35.7)
MCV RBC AUTO: 88.4 FL (ref 79–97)
MONOCYTES # BLD AUTO: 0.37 10*3/MM3 (ref 0.1–0.9)
MONOCYTES NFR BLD AUTO: 7 % (ref 5–12)
NEUTROPHILS NFR BLD AUTO: 3.56 10*3/MM3 (ref 1.7–7)
NEUTROPHILS NFR BLD AUTO: 67 % (ref 42.7–76)
NRBC BLD AUTO-RTO: 0 /100 WBC (ref 0–0.2)
PLATELET # BLD AUTO: 165 10*3/MM3 (ref 140–450)
PMV BLD AUTO: 11.2 FL (ref 6–12)
POTASSIUM SERPL-SCNC: 4.5 MMOL/L (ref 3.5–5.2)
PREALB SERPL-MCNC: 21.1 MG/DL (ref 20–40)
PROT SERPL-MCNC: 7 G/DL (ref 6–8.5)
RBC # BLD AUTO: 4.64 10*6/MM3 (ref 3.77–5.28)
SODIUM SERPL-SCNC: 136 MMOL/L (ref 136–145)
WBC # BLD AUTO: 5.31 10*3/MM3 (ref 3.4–10.8)

## 2021-02-26 PROCEDURE — 84134 ASSAY OF PREALBUMIN: CPT

## 2021-02-26 PROCEDURE — 36415 COLL VENOUS BLD VENIPUNCTURE: CPT

## 2021-02-26 PROCEDURE — 85025 COMPLETE CBC W/AUTO DIFF WBC: CPT

## 2021-02-26 PROCEDURE — 83036 HEMOGLOBIN GLYCOSYLATED A1C: CPT

## 2021-02-26 PROCEDURE — 80053 COMPREHEN METABOLIC PANEL: CPT

## 2021-03-04 ENCOUNTER — OFFICE VISIT (OUTPATIENT)
Dept: WOUND CARE | Facility: HOSPITAL | Age: 61
End: 2021-03-04

## 2021-03-04 PROCEDURE — G0463 HOSPITAL OUTPT CLINIC VISIT: HCPCS

## 2021-03-11 ENCOUNTER — OFFICE VISIT (OUTPATIENT)
Dept: WOUND CARE | Facility: HOSPITAL | Age: 61
End: 2021-03-11

## 2021-03-11 PROCEDURE — 97602 WOUND(S) CARE NON-SELECTIVE: CPT

## 2021-03-18 ENCOUNTER — OFFICE VISIT (OUTPATIENT)
Dept: WOUND CARE | Facility: HOSPITAL | Age: 61
End: 2021-03-18

## 2021-03-18 PROCEDURE — G0463 HOSPITAL OUTPT CLINIC VISIT: HCPCS

## 2021-03-24 ENCOUNTER — BULK ORDERING (OUTPATIENT)
Dept: CASE MANAGEMENT | Facility: OTHER | Age: 61
End: 2021-03-24

## 2021-03-24 DIAGNOSIS — Z23 IMMUNIZATION DUE: ICD-10-CM

## 2021-03-25 ENCOUNTER — OFFICE VISIT (OUTPATIENT)
Dept: WOUND CARE | Facility: HOSPITAL | Age: 61
End: 2021-03-25

## 2021-03-25 PROCEDURE — G0463 HOSPITAL OUTPT CLINIC VISIT: HCPCS

## 2021-04-02 ENCOUNTER — TRANSCRIBE ORDERS (OUTPATIENT)
Dept: PHYSICAL THERAPY | Facility: HOSPITAL | Age: 61
End: 2021-04-02

## 2021-04-02 DIAGNOSIS — I89.0 LYMPHEDEMA: Primary | ICD-10-CM

## 2021-04-09 ENCOUNTER — HOSPITAL ENCOUNTER (OUTPATIENT)
Dept: PHYSICAL THERAPY | Facility: HOSPITAL | Age: 61
Setting detail: THERAPIES SERIES
Discharge: HOME | End: 2021-04-09

## 2021-04-09 DIAGNOSIS — I89.0 LYMPHEDEMA: Primary | ICD-10-CM

## 2021-04-09 PROCEDURE — 97535 SELF CARE MNGMENT TRAINING: CPT

## 2021-04-09 PROCEDURE — 97162 PT EVAL MOD COMPLEX 30 MIN: CPT

## 2021-04-09 NOTE — THERAPY EVALUATION
Physical Therapy Lymphedema Initial Evaluation  [facility]     Patient Name: Jenelle Kasper  : 1960  MRN: [id number]  Today's Date: 2021      Visit Date: 2021    Visit Dx:    ICD-10-CM ICD-9-CM   1. Lymphedema  I89.0 457.1       Patient Active Problem List   Diagnosis   • Persistent atrial fibrillation (CMS/HCC)   • Benign essential HTN   • Hyperparathyroidism (CMS/HCC)   • Morbid obesity with BMI of 45.0-49.9, adult (CMS/[facility])   • Pulmonary hypertension (CMS/HCC)   • Precordial pain   • Shortness of breath        Past Medical History:   Diagnosis Date   • Anxiety    • Arrhythmia    • Asthma    • Atrial fibrillation with RVR (CMS/[facility])    • C. difficile diarrhea    • COPD (chronic obstructive pulmonary disease) (CMS/[facility])    • Depression    • Diabetes mellitus (CMS/[facility])    • Diabetic peripheral neuropathy associated with type 2 diabetes mellitus (CMS/[facility])    • GERD (gastroesophageal reflux disease)    • Heart murmur    • History of fall     closed head injury after falling down steps; fx nose   • Hypercalcemia    • Hyperlipidemia    • Hypertension    • IBS (irritable bowel syndrome)    • Kidney stone    • Morbid obesity (CMS/[facility])    • PAF (paroxysmal atrial fibrillation) (CMS/[facility])    • PCOS (polycystic ovarian syndrome)    • PONV (postoperative nausea and vomiting)    • Sarcoidosis    • Shortness of breath    • Thyroid cancer (CMS/[facility])     WITH RADIATION   • Wounds, multiple         Past Surgical History:   Procedure Laterality Date   • CARDIAC CATHETERIZATION N/A 2016    Procedure: Coronary angiography;  Surgeon: Chris Perez MD;  Location: [facility] INVASIVE LOCATION;  Service:    • CARDIAC CATHETERIZATION N/A 2016    Procedure: Left heart cath;  Surgeon: Chris Perez MD;  Location: [facility] CATH INVASIVE LOCATION;  Service:    • CARDIAC CATHETERIZATION N/A 2016    Procedure: Left ventriculography;  Surgeon: Chris Perez MD;  Location: [facility] INVASIVE LOCATION;   Service:    • CARDIAC CATHETERIZATION N/A 2/26/2020    Procedure: Right Heart Cath;  Surgeon: Chris Perez MD;  Location:  [facility] CATH INVASIVE LOCATION;  Service: Cardiovascular;  Laterality: N/A;  If there is no evidence of pulmonary hypertension please add a left heart cath to evaluate coronary arteries as patient is having chest pain.   • CHOLECYSTECTOMY     • COLONOSCOPY     • DILATATION AND CURETTAGE     • ENDOSCOPY     • GASTRIC BYPASS      gastric surgery for morbid obesity   • GASTROPLASTY  2008    GASTRIC REVISION FROM PREVIOUS GASTRIC BYPASS   • HIATAL HERNIA REPAIR     • INGUINAL HERNIA REPAIR Right    • LUNG BIOPSY      to dx sarcoidosis   • LUNG SURGERY      Removal of granulomas   • TOTAL THYROIDECTOMY         Visit Dx:    ICD-10-CM ICD-9-CM   1. Lymphedema  I89.0 457.1       Patient History     Row Name 04/09/21 1500 04/08/21 1232          History    Chief Complaint  Swelling  -PC  Difficulty Walking;Difficulty with daily activities;Fatigue/poor endurance;Headache;Joint stiffness;Pain;Problems breathing/shortness of breath;Swelling;Tightness;Ulcer, wound or other skin conditions  (Pended)   (Patient-Rptd)   -patient     Type of Pain  Lower Extremity / Leg;Foot pain  -PC  Foot pain;Lower Extremity / Leg  (Pended)   (Patient-Rptd)   -patient     Date Current Problem(s) Began  -- Sept 2020  -PC  04/08/15  (Pended)   (Patient-Rptd)   -patient     Brief Description of Current Complaint  States her legs started swelling in 2015 after cutting her leg on a polo bedframe.  She developed cellulitis and B legs started swelling.  Over the yrs the swelling has gone up and down. In Sept 2020 she again developed cellulitis and had an open wound on the back of her right leg. She has been treated 5 different times at the wound clinic, the most recent being from Sept 2020 to March 2021. States they release her and then her legs get worse again. She tries to wrap herself but can't reach her feet. States she lives  with her ex-, but he won't help her. States she doesn't really have anyone who can help her at home.  -PC  Lymphedema, skin rash, bumpiness, tightness, swelling  (Pended)   (Patient-Rptd)   -patient     Previous treatment for THIS PROBLEM  -- wound center  -PC  --     Patient/Caregiver Goals  Relieve pain;Improve mobility;Improve strength;Decrease swelling  -PC  Relieve pain;Improve mobility;Improve strength;Decrease swelling  (Pended)   (Patient-Rptd)   -patient     Hand Dominance  --  ambidextrous  (Pended)   (Patient-Rptd)   -patient     Occupation/sports/leisure activities  --  Reading  (Pended)   (Patient-Rptd)   -patient     Patient seeing anyone else for problem(s)?  --  No  (Pended)   (Patient-Rptd)   -patient     How has patient tried to help current problem?  tries to wrap legs, but can't reach feet. Takes her 4 hrs to wrap above ankles to knees  -PC  --     Are you or can you be pregnant  --  No  (Pended)   (Patient-Rptd)   -patient        Pain     Pain Location  Foot;Leg  -PC  --     Pain at Present  9;8 feet 9, legs 8  -PC  --     Pain at Best  5 only if elevated with ice  -PC  --     Pain at Worst  9  -PC  --     Pain Frequency  Constant/continuous  -PC  --     Pain Description  Heaviness;Aching;Burning;Itching;Pressure;Sore;Tender;Tightness  -PC  --     What Performance Factors Make the Current Problem(s) WORSE?  walking, being unwrapped  -PC  --     What Performance Factors Make the Current Problem(s) BETTER?  wrapping, elevation, ice  -PC  --     What position do you sleep in?  -- usu in a recliner  -PC  --     Difficulties with ADL's?  diff getting in/out of car, in/out of bed, walking, standing, bathing and dressing  -PC  --        Fall Risk Assessment    Any falls in the past year:  No  -PC  No  (Pended)   (Patient-Rptd)   -patient        Services    Prior Rehab/Home Health Experiences  Yes  -PC  Yes  (Pended)   (Patient-Rptd)   -patient     Are you currently receiving Home Health services   No  -PC  No  (Pended)   (Patient-Rptd)   -patient     Do you plan to receive Home Health services in the near future  No  -PC  No  (Pended)   (Patient-Rptd)   -patient        Daily Activities    Primary Language  English  -PC  English  (Pended)   (Patient-Rptd)   -patient     Are you able to read  Yes  -PC  Yes  (Pended)   (Patient-Rptd)   -patient     Are you able to write  Yes  -PC  Yes  (Pended)   (Patient-Rptd)   -patient     How does patient learn best?  Reading  -PC  Reading  (Pended)   (Patient-Rptd)   -patient     Barriers to learning  None  -PC  --     Pt Participated in POC and Goals  Yes  -PC  --        Safety    Are you being hurt, hit, or frightened by anyone at home or in your life?  No  -PC  No  (Pended)   (Patient-Rptd)   -patient     Are you being neglected by a caregiver  No  -PC  No  (Pended)   (Patient-Rptd)   -patient     Have you had any of the following issues with  N/A  -PC  N/A  (Pended)   (Patient-Rptd)   -patient       User Key  (r) = Recorded By, (t) = Taken By, (c) = Cosigned By    Initials Name Provider Type    PC Kinjal Ovalles, PT Physical Therapist    patient Jenelle Kasper --          Lymphedema     Row Name 04/09/21 1600             Subjective Pain    Able to rate subjective pain?  yes  -PC      Pre-Treatment Pain Level  9  -PC      Post-Treatment Pain Level  9  -PC         Subjective Comments    Subjective Comments  States she would not be able to come in 3-5 times a week for treatment.  -PC         Lymphedema Assessment    Lymphedema Classification  RLE:;LLE:;secondary;stage 2 (Spontaneously Irreversible)  -PC      Cancer Comments  Pt had thyroid cancer 2008  -PC      Chemo Received  yes  -PC      Radiation Therapy Received  yes  -PC      Infections or Cellulitis?  yes  -PC      Infection/Cellulitis Treatment  Antibiotics, wound care  -PC         Physical Concerns    The amount of pain associated with my lymphedema is:  3  -PC      The amount of limb heaviness associated with  "my lymphedema is:  4  -PC      The amount of skin tightness associated with my lymphedema is:  4  -PC      The size of my swollen limb(s) seems:  4  -PC      Lymphedema affects the movement of my swollen limb(s):  3  -PC      The strength in my swollen limb(s) is:  4  -PC         Psychosocial Concerns    Lymphedema affects my body image (i.e., \"how I think I look\").  2  -PC      Lymphedema affects my socializing with others.  2  -PC      Lymphedema affects my intimate relations with spouse or partner (rate 0 if not applicable  0  -PC      Lymphedema \"gets me down\" (i.e., depression, frustration, or anger)  3  -PC      I must rely on others for help due to my lymphedema.  3  -PC      I know what to do to manage my lymphedema  4  -PC         Functional Concerns    Lymphedema affects my ability to perform self-care activities (i.e. eating, dressing, hygiene)  4  -PC      Lymphedema affects my ability to perform routine home or work-related activities.  4  -PC      Lymphedema affects my performance of preferred leisure activities.  4  -PC      Lymphedema affects proper fit of clothing/shoes  3  -PC      Lymphedema affects my sleep  3  -PC         Posture/Observations    Posture/Observations Comments  Pt ambulates very slowly iwth quad cane. Has difficulty getting up and down from chair.  -PC         General ROM    GENERAL ROM COMMENTS  Knees very limited due to edema  -PC         Lymphedema Edema Assessment    Ptting Edema Category  By grade out of 4  -PC      Pitting Edema  + 3/4  -PC      Stemmer Sign  bilateral:;positive  -PC      Luce Hump  bilateral:;positive  -PC      Edema Assessment Comment  Mod+ edema B toes to groin  -PC         Skin Changes/Observations    Skin Observations Comment  Skin is bright red toes to knees. Skin is well hydrated, but  has rough texture with small bumps  -PC         Lymphedema Sensation    Lymphedema Sensation Reports  numbness  -PC      Lymphedema Sensation Tests  light touch  -PC "      Lymphedema Light Touch  mild impairment  -PC         Lymphedema Measurements    Measurement Type(s)  Circumferential  -PC      Circumferential Areas  Lower extremities  -PC         BLE Circumferential (cm)    Measurement Location 1  10cm above knee  -PC      Left 1  78 cm  -PC      Right 1  83 cm  -PC      Measurement Location 2  Knee  -PC      Left 2  56 cm  -PC      Right 2  55 cm  -PC      Measurement Location 3  10cm below knee  -PC      Left 3  59.3 cm  -PC      Right 3  60.5 cm  -PC      Measurement Location 4  20cm below knee  -PC      Left 4  46 cm  -PC      Right 4  46.5 cm  -PC      Measurement Location 5  30cm below knee  -PC      Left 5  30 cm  -PC      Right 5  32 cm  -PC      Measurement Location 6  Ankle  -PC      Left 6  28.5 cm  -PC      Right 6  30 cm  -PC      Measurement Location 7  Midfoot  -PC      Left 7  23.6 cm  -PC      Right 7  25.2 cm  -PC      LLE Circumferential Total  321.4 cm  -PC      RLE Circumferential Total  332.2 cm  -PC         Lymphedema Life Impact Scale Totals    A.  Total Q1 - Q17 (Do not include Q18)  54  -PC      B.  Total number of questions answered (Q1-Q17)  16  -PC      C. Divide A by B  3.38  -PC      D. Multiple C by 25  84.5  -PC        User Key  (r) = Recorded By, (t) = Taken By, (c) = Cosigned By    Initials Name Provider Type    Kinjal Pool PT Physical Therapist                          Therapy Education  Education Details: Reviewed lymphedema treatment program. Instructed pt in elevation, exercise, skin care. Discussed velcro compression wraps and compression pumps.  Given: Edema management, Symptoms/condition management  Program: New  How Provided: Verbal  Provided to: Patient  Level of Understanding: Verbalized  38648 - PT Self Care/Mgmt Minutes: 15      OP Exercises     Row Name 04/09/21 1600             Subjective Comments    Subjective Comments  States she would not be able to come in 3-5 times a week for treatment.  -PC         Subjective Pain     Able to rate subjective pain?  yes  -PC      Pre-Treatment Pain Level  9  -PC      Post-Treatment Pain Level  9  -PC        User Key  (r) = Recorded By, (t) = Taken By, (c) = Cosigned By    Initials Name Provider Type    PC Kinjal Ovalles, PT Physical Therapist                      PT OP Goals     Row Name 04/09/21 1700          PT Short Term Goals    STG Date to Achieve  04/09/21  -PC     STG 1  Pt demo understanding of self-care techniques for home.  -PC     STG 1 Progress  Met  -PC       User Key  (r) = Recorded By, (t) = Taken By, (c) = Cosigned By    Initials Name Provider Type    PC Kinjal Ovalles, PT Physical Therapist          PT Assessment/Plan     Row Name 04/09/21 1718          PT Assessment    Functional Limitations  Impaired gait;Limitation in home management;Limitations in community activities;Performance in leisure activities;Performance in self-care ADL  -PC     Impairments  Edema;Gait;Impaired lymphatic circulation;Pain;Range of motion  -PC     Assessment Comments  Pt is a 61 yr old female who appears older than age. She has mod to severe edema B toes to groin with 3+ pitting.  Her legs are red and tender to touch. She has very limited mobility, difficulty getting in/out of car, getting up/down from chair, getting in/out of bed, difficulty with bathing and dressing (states it took her 4 hours to get ready to come here today). She has had edema since 2015, but it has gotten much worse over the years.  Pt is not able to come in 3-5 times a week for lymphedema therapy, and voice concerned about the cost of bandages and long-term compression devices.  Her best option at this time may be velcro wraps that would be easier for her to manage at home.  -PC     Please refer to paper survey for additional self-reported information  Yes  -PC     Rehab Potential  Fair  -PC     Patient/caregiver participated in establishment of treatment plan and goals  Yes  -PC     Patient would benefit from skilled therapy  intervention  Yes  -PC        PT Plan    PT Frequency  One time visit  -PC     Planned CPT's?  PT EVAL MOD COMPLELITY: 37259;PT SELF CARE/HOME MGMT/TRAIN EA 15: 47093  -PC     Physical Therapy Interventions (Optional Details)  patient/family education  -PC     PT Plan Comments  Pt is unable to come in frequently for lymphedema treatment. May be interested in ordering velcro compression devices for home.  -PC       User Key  (r) = Recorded By, (t) = Taken By, (c) = Cosigned By    Initials Name Provider Type    PC Kinjal Ovalles, PT Physical Therapist                       Time Calculation:   Start Time: 1545  Stop Time: 1700  Time Calculation (min): 75 min   Therapy Charges for Today     Code Description Service Date Service Provider Modifiers Qty    [id number]  PT EVAL MOD COMPLEXITY 4 4/9/2021 Kinjal Ovalles, PT GP 1    [id number]  PT SELF CARE/MGMT/TRAIN EA 15 MIN 4/9/2021 Kinjal Ovalles, PT GP 1                    Kinjal Ovalles PT  4/9/2021

## 2021-04-13 ENCOUNTER — OFFICE VISIT (OUTPATIENT)
Dept: CARDIOLOGY | Facility: CLINIC | Age: 61
End: 2021-04-13

## 2021-04-13 VITALS
HEART RATE: 75 BPM | BODY MASS INDEX: 46.36 KG/M2 | SYSTOLIC BLOOD PRESSURE: 158 MMHG | HEIGHT: 67 IN | DIASTOLIC BLOOD PRESSURE: 84 MMHG | OXYGEN SATURATION: 99 %

## 2021-04-13 DIAGNOSIS — R60.0 PEDAL EDEMA: Primary | ICD-10-CM

## 2021-04-13 DIAGNOSIS — I48.19 PERSISTENT ATRIAL FIBRILLATION (HCC): ICD-10-CM

## 2021-04-13 DIAGNOSIS — E66.01 MORBID OBESITY WITH BMI OF 45.0-49.9, ADULT (HCC): ICD-10-CM

## 2021-04-13 DIAGNOSIS — I27.20 PULMONARY HYPERTENSION (HCC): ICD-10-CM

## 2021-04-13 PROCEDURE — 99214 OFFICE O/P EST MOD 30 MIN: CPT | Performed by: NURSE PRACTITIONER

## 2021-04-13 RX ORDER — LEVOTHYROXINE SODIUM 300 UG/1
TABLET ORAL
COMMUNITY
Start: 2021-03-04 | End: 2021-08-25 | Stop reason: ALTCHOICE

## 2021-05-26 ENCOUNTER — OFFICE VISIT (OUTPATIENT)
Dept: WOUND CARE | Facility: HOSPITAL | Age: 61
End: 2021-05-26

## 2021-05-26 PROCEDURE — G0463 HOSPITAL OUTPT CLINIC VISIT: HCPCS

## 2021-05-27 ENCOUNTER — TRANSCRIBE ORDERS (OUTPATIENT)
Dept: WOUND CARE | Facility: HOSPITAL | Age: 61
End: 2021-05-27

## 2021-05-27 DIAGNOSIS — M79.662 PAIN OF LEFT LOWER LEG: Primary | ICD-10-CM

## 2021-06-08 ENCOUNTER — APPOINTMENT (OUTPATIENT)
Dept: CARDIOLOGY | Facility: HOSPITAL | Age: 61
End: 2021-06-08

## 2021-07-12 RX ORDER — DIGOXIN 250 MCG
TABLET ORAL
Qty: 30 TABLET | Refills: 8 | Status: SHIPPED | OUTPATIENT
Start: 2021-07-12 | End: 2021-08-25 | Stop reason: SDUPTHER

## 2021-07-23 ENCOUNTER — TELEPHONE (OUTPATIENT)
Dept: CARDIOLOGY | Facility: CLINIC | Age: 61
End: 2021-07-23

## 2021-07-23 RX ORDER — POTASSIUM CHLORIDE 750 MG/1
10 TABLET, FILM COATED, EXTENDED RELEASE ORAL DAILY
Qty: 90 TABLET | Refills: 3 | Status: SHIPPED | OUTPATIENT
Start: 2021-07-23 | End: 2021-08-25 | Stop reason: SDUPTHER

## 2021-07-30 ENCOUNTER — DOCUMENTATION (OUTPATIENT)
Dept: PHYSICAL THERAPY | Facility: HOSPITAL | Age: 61
End: 2021-07-30

## 2021-07-30 DIAGNOSIS — I89.0 LYMPHEDEMA: Primary | ICD-10-CM

## 2021-08-25 ENCOUNTER — OFFICE VISIT (OUTPATIENT)
Dept: CARDIOLOGY | Facility: CLINIC | Age: 61
End: 2021-08-25

## 2021-08-25 VITALS
HEART RATE: 60 BPM | HEIGHT: 67 IN | BODY MASS INDEX: 43.16 KG/M2 | DIASTOLIC BLOOD PRESSURE: 70 MMHG | WEIGHT: 275 LBS | SYSTOLIC BLOOD PRESSURE: 124 MMHG

## 2021-08-25 DIAGNOSIS — I48.19 PERSISTENT ATRIAL FIBRILLATION (HCC): Primary | ICD-10-CM

## 2021-08-25 DIAGNOSIS — I10 HYPERTENSION NOT AT GOAL: ICD-10-CM

## 2021-08-25 DIAGNOSIS — I10 BENIGN ESSENTIAL HTN: ICD-10-CM

## 2021-08-25 PROCEDURE — 93000 ELECTROCARDIOGRAM COMPLETE: CPT | Performed by: INTERNAL MEDICINE

## 2021-08-25 PROCEDURE — 99214 OFFICE O/P EST MOD 30 MIN: CPT | Performed by: INTERNAL MEDICINE

## 2021-08-25 RX ORDER — TORSEMIDE 20 MG/1
40 TABLET ORAL DAILY
Qty: 60 TABLET | Refills: 11 | Status: SHIPPED | OUTPATIENT
Start: 2021-08-25 | End: 2022-03-16 | Stop reason: HOSPADM

## 2021-08-25 RX ORDER — HYDRALAZINE HYDROCHLORIDE 100 MG/1
100 TABLET, FILM COATED ORAL 2 TIMES DAILY
Qty: 60 TABLET | Refills: 11 | Status: SHIPPED | OUTPATIENT
Start: 2021-08-25 | End: 2022-03-09

## 2021-08-25 RX ORDER — ATENOLOL 25 MG/1
75 TABLET ORAL 2 TIMES DAILY
Qty: 180 TABLET | Refills: 11 | Status: SHIPPED | OUTPATIENT
Start: 2021-08-25 | End: 2022-03-16 | Stop reason: HOSPADM

## 2021-08-25 RX ORDER — LOSARTAN POTASSIUM 100 MG/1
100 TABLET ORAL DAILY
Qty: 30 TABLET | Refills: 11 | Status: SHIPPED | OUTPATIENT
Start: 2021-08-25 | End: 2022-03-09

## 2021-08-25 RX ORDER — DIGOXIN 250 MCG
250 TABLET ORAL DAILY
Qty: 30 TABLET | Refills: 11 | Status: SHIPPED | OUTPATIENT
Start: 2021-08-25 | End: 2022-03-16 | Stop reason: HOSPADM

## 2021-08-25 RX ORDER — POTASSIUM CHLORIDE 750 MG/1
10 TABLET, FILM COATED, EXTENDED RELEASE ORAL DAILY
Qty: 90 TABLET | Refills: 3 | Status: SHIPPED | OUTPATIENT
Start: 2021-08-25 | End: 2022-02-06 | Stop reason: HOSPADM

## 2021-08-25 NOTE — PROGRESS NOTES
"      CARDIOLOGY    Baldev Winter MD    ENCOUNTER DATE:  08/25/2021    Jenelle Kasper / 61 y.o. / female        CHIEF COMPLAINT / REASON FOR OFFICE VISIT     Pedal edema (04/13/2021 Follow up)  Chronic atrial fibrillation  Hypertension    HISTORY OF PRESENT ILLNESS       HPI  Jenelle Kasper is a 61 y.o. female who presents today for reevaluation.  Patient is lost 20 pounds since I saw her last.  Unfortunately she continues to have lymphedema.  Her legs were appropriately wrapped.  She actually looks the best I have seen her many many years.  Her blood pressure was excellent again her swelling is about the same but clearly she is lost a lot of weight.      The following portions of the patient's history were reviewed and updated as appropriate: allergies, current medications, past family history, past medical history, past social history, past surgical history and problem list.      VITAL SIGNS     Visit Vitals  /70 (BP Location: Left arm)   Pulse 60   Ht 170.2 cm (67\")   Wt 125 kg (275 lb)   BMI 43.07 kg/m²         Wt Readings from Last 3 Encounters:   08/25/21 125 kg (275 lb)   02/09/21 134 kg (296 lb)   02/02/21 134 kg (296 lb)     Body mass index is 43.07 kg/m².      REVIEW OF SYSTEMS   Review of Systems   Constitutional: Positive for weight loss.   Cardiovascular: Positive for leg swelling.   Musculoskeletal: Positive for joint pain.   All other systems reviewed and are negative.          PHYSICAL EXAMINATION     Vitals reviewed.   Constitutional:       Appearance: Not in distress.   Pulmonary:      Breath sounds: Normal breath sounds.   Cardiovascular:      Normal rate. Irregularly irregular rhythm. Normal S1. Normal S2.      Murmurs: There is no murmur.      No gallop. No click. No rub.   Pulses:     Intact distal pulses.   Edema:     Peripheral edema present.     Thigh: bilateral 2+ edema of the thigh.     Pretibial: bilateral 2+ edema of the pretibial area.     Ankle: 2+ edema of the left " ankle.     Feet: bilateral 2+ edema of the feet.  Neurological:      Mental Status: Alert and oriented to person, place and time.           REVIEWED DATA       ECG 12 Lead    Date/Time: 8/25/2021 4:48 PM  Performed by: Baldev Winter MD  Authorized by: Baldev Winter MD   Comparison: compared with previous ECG from 12/9/2019  Similar to previous ECG  Rhythm: atrial fibrillation  Other findings: non-specific ST-T wave changes and poor R wave progression    Clinical impression: abnormal EKG            Cardiac Procedures:  1.           ASSESSMENT & PLAN      Diagnosis Plan   1. Benign essential HTN  hydrALAZINE (APRESOLINE) 100 MG tablet   2. Hypertension not at goal  atenolol (TENORMIN) 25 MG tablet         SUMMARY/DISCUSSION  1. Chronic atrial fibrillation.  Clinically patient is doing well.  Continue current medical regimen.  2. Hypertension blood pressures good I did refill all her medications today.  3. Lymphedema her legs were wrapped she continues to do everything she can.  4. Morbid obesity.  She lost about 120 pounds here recently lost 20 pounds in the last few months.  5. Pulmonary hypertension  6. Follow-up 6 to 12 months sooner if issues        MEDICATIONS         Discharge Medications          Accurate as of August 25, 2021  4:45 PM. If you have any questions, ask your nurse or doctor.            Changes to Medications      Instructions Start Date   metFORMIN 500 MG tablet  Commonly known as: GLUCOPHAGE  What changed: how much to take   500 mg, Oral, 2 Times Daily With Meals         Continue These Medications      Instructions Start Date   albuterol sulfate  (90 Base) MCG/ACT inhaler  Commonly known as: PROVENTIL HFA;VENTOLIN HFA;PROAIR HFA   2 puffs, Inhalation      Antivert 25 MG tablet  Generic drug: meclizine   1 tablet, Oral, Daily PRN      aspirin 81 MG tablet   81 mg, Oral, Daily      atenolol 25 MG tablet  Commonly known as: TENORMIN   75 mg, Oral, 2 Times Daily      Claritin 10 MG  tablet  Generic drug: loratadine   1 tablet, Oral, Daily      cyclobenzaprine 10 MG tablet  Commonly known as: FLEXERIL   10 mg, Oral, 3 Times Daily PRN      digoxin 250 MCG tablet  Commonly known as: LANOXIN   250 mcg, Oral, Daily      diphenoxylate-atropine 2.5-0.025 MG per tablet  Commonly known as: LOMOTIL   1 tablet, Oral, 4 Times Daily PRN      fluconazole 150 MG tablet  Commonly known as: DIFLUCAN   150 mg, Oral, Once, On tab once weekly for two weeks       gabapentin 300 MG capsule  Commonly known as: NEURONTIN   300 mg, Oral, 3 Times Daily, Take 3 tablets three times daily      glipizide 10 MG tablet  Commonly known as: GLUCOTROL   10 mg, Oral, 2 Times Daily Before Meals      hydrALAZINE 100 MG tablet  Commonly known as: APRESOLINE   100 mg, Oral, 2 times daily      hydrOXYzine 25 MG tablet  Commonly known as: ATARAX   25 mg, Oral, Every 8 Hours PRN      insulin NPH-insulin regular (70-30) 100 UNIT/ML injection  Commonly known as: humuLIN 70/30,novoLIN 70/30   80 Units, Subcutaneous, 2 Times Daily Before Meals      LORazepam 1 MG tablet  Commonly known as: ATIVAN   1 mg, Oral, Every 6 Hours PRN      losartan 100 MG tablet  Commonly known as: COZAAR   100 mg, Oral, Daily      meperidine 50 MG tablet  Commonly known as: DEMEROL   100 mg, Oral      ondansetron 8 MG tablet  Commonly known as: ZOFRAN   8 mg, Oral, Daily      potassium chloride 10 MEQ CR tablet   10 mEq, Oral, Daily      simvastatin 20 MG tablet  Commonly known as: ZOCOR   20 mg, Oral, Nightly      Synthroid 200 MCG tablet  Generic drug: levothyroxine   200 mcg, Oral, Daily      torsemide 20 MG tablet  Commonly known as: Demadex   40 mg, Oral, Daily      vitamin D 1.25 MG (64404 UT) capsule capsule  Commonly known as: ERGOCALCIFEROL   1 capsule, Oral, Every 7 Days                 **Dragon Disclaimer:   Much of this encounter note is an electronic transcription/translation of spoken language to printed text. The electronic translation of spoken  language may permit erroneous, or at times, nonsensical words or phrases to be inadvertently transcribed. Although I have reviewed the note for such errors, some may still exist.

## 2022-01-28 ENCOUNTER — HOSPITAL ENCOUNTER (INPATIENT)
Facility: HOSPITAL | Age: 62
LOS: 7 days | Discharge: NURSING, REHAB OR LONG-TERM CARE | End: 2022-02-06
Attending: EMERGENCY MEDICINE | Admitting: HOSPITALIST

## 2022-01-28 ENCOUNTER — APPOINTMENT (OUTPATIENT)
Dept: GENERAL RADIOLOGY | Facility: HOSPITAL | Age: 62
End: 2022-01-28

## 2022-01-28 ENCOUNTER — APPOINTMENT (OUTPATIENT)
Dept: CT IMAGING | Facility: HOSPITAL | Age: 62
End: 2022-01-28

## 2022-01-28 DIAGNOSIS — R47.81 SLURRED SPEECH: ICD-10-CM

## 2022-01-28 DIAGNOSIS — G45.9 TIA (TRANSIENT ISCHEMIC ATTACK): Primary | ICD-10-CM

## 2022-01-28 DIAGNOSIS — R53.1 LEFT-SIDED WEAKNESS: ICD-10-CM

## 2022-01-28 DIAGNOSIS — R29.810 FACIAL DROOP: ICD-10-CM

## 2022-01-28 LAB
ALBUMIN SERPL-MCNC: 3.6 G/DL (ref 3.5–5.2)
ALBUMIN/GLOB SERPL: 1 G/DL
ALP SERPL-CCNC: 144 U/L (ref 39–117)
ALT SERPL W P-5'-P-CCNC: 12 U/L (ref 1–33)
ANION GAP SERPL CALCULATED.3IONS-SCNC: 13.2 MMOL/L (ref 5–15)
APTT PPP: 32.8 SECONDS (ref 22.7–35.4)
AST SERPL-CCNC: 14 U/L (ref 1–32)
BASOPHILS # BLD AUTO: 0.07 10*3/MM3 (ref 0–0.2)
BASOPHILS NFR BLD AUTO: 0.8 % (ref 0–1.5)
BILIRUB SERPL-MCNC: 0.8 MG/DL (ref 0–1.2)
BUN SERPL-MCNC: 18 MG/DL (ref 6–20)
BUN/CREAT SERPL: 20.5 (ref 7–25)
CALCIUM SPEC-SCNC: 10.8 MG/DL (ref 8.6–10.5)
CHLORIDE SERPL-SCNC: 94 MMOL/L (ref 98–107)
CO2 SERPL-SCNC: 24.8 MMOL/L (ref 22–29)
CREAT SERPL-MCNC: 0.88 MG/DL (ref 0.57–1)
DEPRECATED RDW RBC AUTO: 45.7 FL (ref 37–54)
DIGOXIN SERPL-MCNC: 0.5 NG/ML (ref 0.6–1.2)
EOSINOPHIL # BLD AUTO: 0.1 10*3/MM3 (ref 0–0.4)
EOSINOPHIL NFR BLD AUTO: 1.2 % (ref 0.3–6.2)
ERYTHROCYTE [DISTWIDTH] IN BLOOD BY AUTOMATED COUNT: 14 % (ref 12.3–15.4)
GFR SERPL CREATININE-BSD FRML MDRD: 66 ML/MIN/1.73
GLOBULIN UR ELPH-MCNC: 3.7 GM/DL
GLUCOSE BLDC GLUCOMTR-MCNC: 350 MG/DL (ref 70–130)
GLUCOSE SERPL-MCNC: 361 MG/DL (ref 65–99)
HCT VFR BLD AUTO: 41 % (ref 34–46.6)
HGB BLD-MCNC: 12.7 G/DL (ref 12–15.9)
HOLD SPECIMEN: NORMAL
HOLD SPECIMEN: NORMAL
IMM GRANULOCYTES # BLD AUTO: 0.06 10*3/MM3 (ref 0–0.05)
IMM GRANULOCYTES NFR BLD AUTO: 0.7 % (ref 0–0.5)
INR PPP: 1.2 (ref 0.9–1.1)
LYMPHOCYTES # BLD AUTO: 0.73 10*3/MM3 (ref 0.7–3.1)
LYMPHOCYTES NFR BLD AUTO: 8.5 % (ref 19.6–45.3)
MCH RBC QN AUTO: 27.3 PG (ref 26.6–33)
MCHC RBC AUTO-ENTMCNC: 31 G/DL (ref 31.5–35.7)
MCV RBC AUTO: 88 FL (ref 79–97)
MONOCYTES # BLD AUTO: 0.71 10*3/MM3 (ref 0.1–0.9)
MONOCYTES NFR BLD AUTO: 8.3 % (ref 5–12)
NEUTROPHILS NFR BLD AUTO: 6.91 10*3/MM3 (ref 1.7–7)
NEUTROPHILS NFR BLD AUTO: 80.5 % (ref 42.7–76)
NRBC BLD AUTO-RTO: 0 /100 WBC (ref 0–0.2)
PLATELET # BLD AUTO: 262 10*3/MM3 (ref 140–450)
PMV BLD AUTO: 11.7 FL (ref 6–12)
POTASSIUM SERPL-SCNC: 4.1 MMOL/L (ref 3.5–5.2)
PROT SERPL-MCNC: 7.3 G/DL (ref 6–8.5)
PROTHROMBIN TIME: 15 SECONDS (ref 11.7–14.2)
RBC # BLD AUTO: 4.66 10*6/MM3 (ref 3.77–5.28)
SODIUM SERPL-SCNC: 132 MMOL/L (ref 136–145)
TROPONIN T SERPL-MCNC: <0.01 NG/ML (ref 0–0.03)
WBC NRBC COR # BLD: 8.58 10*3/MM3 (ref 3.4–10.8)
WHOLE BLOOD HOLD SPECIMEN: NORMAL
WHOLE BLOOD HOLD SPECIMEN: NORMAL

## 2022-01-28 PROCEDURE — 86901 BLOOD TYPING SEROLOGIC RH(D): CPT | Performed by: EMERGENCY MEDICINE

## 2022-01-28 PROCEDURE — 80162 ASSAY OF DIGOXIN TOTAL: CPT | Performed by: EMERGENCY MEDICINE

## 2022-01-28 PROCEDURE — 86900 BLOOD TYPING SEROLOGIC ABO: CPT | Performed by: EMERGENCY MEDICINE

## 2022-01-28 PROCEDURE — 93005 ELECTROCARDIOGRAM TRACING: CPT | Performed by: EMERGENCY MEDICINE

## 2022-01-28 PROCEDURE — 80053 COMPREHEN METABOLIC PANEL: CPT | Performed by: EMERGENCY MEDICINE

## 2022-01-28 PROCEDURE — 85025 COMPLETE CBC W/AUTO DIFF WBC: CPT | Performed by: EMERGENCY MEDICINE

## 2022-01-28 PROCEDURE — 82962 GLUCOSE BLOOD TEST: CPT

## 2022-01-28 PROCEDURE — 99285 EMERGENCY DEPT VISIT HI MDM: CPT

## 2022-01-28 PROCEDURE — 70450 CT HEAD/BRAIN W/O DYE: CPT

## 2022-01-28 PROCEDURE — 84484 ASSAY OF TROPONIN QUANT: CPT | Performed by: EMERGENCY MEDICINE

## 2022-01-28 PROCEDURE — 71045 X-RAY EXAM CHEST 1 VIEW: CPT

## 2022-01-28 PROCEDURE — 93010 ELECTROCARDIOGRAM REPORT: CPT | Performed by: INTERNAL MEDICINE

## 2022-01-28 PROCEDURE — 85730 THROMBOPLASTIN TIME PARTIAL: CPT | Performed by: EMERGENCY MEDICINE

## 2022-01-28 PROCEDURE — 85610 PROTHROMBIN TIME: CPT | Performed by: EMERGENCY MEDICINE

## 2022-01-28 PROCEDURE — 87635 SARS-COV-2 COVID-19 AMP PRB: CPT | Performed by: EMERGENCY MEDICINE

## 2022-01-28 PROCEDURE — 86850 RBC ANTIBODY SCREEN: CPT | Performed by: EMERGENCY MEDICINE

## 2022-01-28 RX ORDER — SODIUM CHLORIDE 0.9 % (FLUSH) 0.9 %
10 SYRINGE (ML) INJECTION AS NEEDED
Status: DISCONTINUED | OUTPATIENT
Start: 2022-01-28 | End: 2022-01-31

## 2022-01-29 ENCOUNTER — APPOINTMENT (OUTPATIENT)
Dept: CARDIOLOGY | Facility: HOSPITAL | Age: 62
End: 2022-01-29

## 2022-01-29 ENCOUNTER — APPOINTMENT (OUTPATIENT)
Dept: MRI IMAGING | Facility: HOSPITAL | Age: 62
End: 2022-01-29

## 2022-01-29 PROBLEM — G45.9 TIA (TRANSIENT ISCHEMIC ATTACK): Status: ACTIVE | Noted: 2022-01-29

## 2022-01-29 LAB
ABO GROUP BLD: NORMAL
ALBUMIN SERPL-MCNC: 2.8 G/DL (ref 3.5–5.2)
ALBUMIN/GLOB SERPL: 0.8 G/DL
ALP SERPL-CCNC: 98 U/L (ref 39–117)
ALT SERPL W P-5'-P-CCNC: 8 U/L (ref 1–33)
ANION GAP SERPL CALCULATED.3IONS-SCNC: 9 MMOL/L (ref 5–15)
AST SERPL-CCNC: 8 U/L (ref 1–32)
BACTERIA UR QL AUTO: ABNORMAL /HPF
BH CV XLRA MEAS LEFT CCA RATIO VEL: -73.2 CM/SEC
BH CV XLRA MEAS LEFT DIST CCA EDV: -11.8 CM/SEC
BH CV XLRA MEAS LEFT DIST CCA PSV: -73.2 CM/SEC
BH CV XLRA MEAS LEFT DIST ICA EDV: 23.8 CM/SEC
BH CV XLRA MEAS LEFT DIST ICA PSV: 103.7 CM/SEC
BH CV XLRA MEAS LEFT ICA RATIO VEL: 104 CM/SEC
BH CV XLRA MEAS LEFT ICA/CCA RATIO: -1.4
BH CV XLRA MEAS LEFT MID ICA EDV: -22.1 CM/SEC
BH CV XLRA MEAS LEFT MID ICA PSV: -73.2 CM/SEC
BH CV XLRA MEAS LEFT PROX CCA EDV: -16.2 CM/SEC
BH CV XLRA MEAS LEFT PROX CCA PSV: -75.2 CM/SEC
BH CV XLRA MEAS LEFT PROX ECA EDV: -9.8 CM/SEC
BH CV XLRA MEAS LEFT PROX ECA PSV: -98.1 CM/SEC
BH CV XLRA MEAS LEFT PROX ICA EDV: 13.3 CM/SEC
BH CV XLRA MEAS LEFT PROX ICA PSV: 52.1 CM/SEC
BH CV XLRA MEAS LEFT PROX SCLA PSV: 94.1 CM/SEC
BH CV XLRA MEAS LEFT VERTEBRAL A EDV: -9.8 CM/SEC
BH CV XLRA MEAS LEFT VERTEBRAL A PSV: -51.3 CM/SEC
BH CV XLRA MEAS RIGHT CCA RATIO VEL: -70.3 CM/SEC
BH CV XLRA MEAS RIGHT DIST CCA EDV: -11.3 CM/SEC
BH CV XLRA MEAS RIGHT DIST CCA PSV: -70.3 CM/SEC
BH CV XLRA MEAS RIGHT DIST ICA EDV: -18.2 CM/SEC
BH CV XLRA MEAS RIGHT DIST ICA PSV: -87.5 CM/SEC
BH CV XLRA MEAS RIGHT ICA RATIO VEL: -87.5 CM/SEC
BH CV XLRA MEAS RIGHT ICA/CCA RATIO: 1.2
BH CV XLRA MEAS RIGHT MID ICA EDV: -17.6 CM/SEC
BH CV XLRA MEAS RIGHT MID ICA PSV: -70.9 CM/SEC
BH CV XLRA MEAS RIGHT PROX CCA EDV: 11.2 CM/SEC
BH CV XLRA MEAS RIGHT PROX CCA PSV: 95.1 CM/SEC
BH CV XLRA MEAS RIGHT PROX ECA EDV: -8.7 CM/SEC
BH CV XLRA MEAS RIGHT PROX ECA PSV: -106.6 CM/SEC
BH CV XLRA MEAS RIGHT PROX ICA EDV: -12.9 CM/SEC
BH CV XLRA MEAS RIGHT PROX ICA PSV: -56.4 CM/SEC
BH CV XLRA MEAS RIGHT PROX SCLA PSV: 120.1 CM/SEC
BH CV XLRA MEAS RIGHT VERTEBRAL A EDV: -10.8 CM/SEC
BH CV XLRA MEAS RIGHT VERTEBRAL A PSV: -50.6 CM/SEC
BILIRUB SERPL-MCNC: 0.5 MG/DL (ref 0–1.2)
BILIRUB UR QL STRIP: NEGATIVE
BLD GP AB SCN SERPL QL: NEGATIVE
BUN SERPL-MCNC: 14 MG/DL (ref 6–20)
BUN/CREAT SERPL: 18.9 (ref 7–25)
CALCIUM SPEC-SCNC: 9.7 MG/DL (ref 8.6–10.5)
CHLORIDE SERPL-SCNC: 99 MMOL/L (ref 98–107)
CLARITY UR: ABNORMAL
CO2 SERPL-SCNC: 25 MMOL/L (ref 22–29)
COLOR UR: YELLOW
CREAT SERPL-MCNC: 0.74 MG/DL (ref 0.57–1)
DEPRECATED RDW RBC AUTO: 46.5 FL (ref 37–54)
ERYTHROCYTE [DISTWIDTH] IN BLOOD BY AUTOMATED COUNT: 14.2 % (ref 12.3–15.4)
GFR SERPL CREATININE-BSD FRML MDRD: 80 ML/MIN/1.73
GLOBULIN UR ELPH-MCNC: 3.3 GM/DL
GLUCOSE BLDC GLUCOMTR-MCNC: 264 MG/DL (ref 70–130)
GLUCOSE BLDC GLUCOMTR-MCNC: 297 MG/DL (ref 70–130)
GLUCOSE BLDC GLUCOMTR-MCNC: 302 MG/DL (ref 70–130)
GLUCOSE SERPL-MCNC: 351 MG/DL (ref 65–99)
GLUCOSE UR STRIP-MCNC: ABNORMAL MG/DL
HCT VFR BLD AUTO: 33.8 % (ref 34–46.6)
HGB BLD-MCNC: 10.5 G/DL (ref 12–15.9)
HGB UR QL STRIP.AUTO: ABNORMAL
HYALINE CASTS UR QL AUTO: ABNORMAL /LPF
KETONES UR QL STRIP: ABNORMAL
LEUKOCYTE ESTERASE UR QL STRIP.AUTO: ABNORMAL
MAXIMAL PREDICTED HEART RATE: 161 BPM
MCH RBC QN AUTO: 27.6 PG (ref 26.6–33)
MCHC RBC AUTO-ENTMCNC: 31.1 G/DL (ref 31.5–35.7)
MCV RBC AUTO: 88.9 FL (ref 79–97)
NITRITE UR QL STRIP: NEGATIVE
PH UR STRIP.AUTO: 5.5 [PH] (ref 5–8)
PLATELET # BLD AUTO: 239 10*3/MM3 (ref 140–450)
PMV BLD AUTO: 10.8 FL (ref 6–12)
POTASSIUM SERPL-SCNC: 3.4 MMOL/L (ref 3.5–5.2)
PROT SERPL-MCNC: 6.1 G/DL (ref 6–8.5)
PROT UR QL STRIP: ABNORMAL
RBC # BLD AUTO: 3.8 10*6/MM3 (ref 3.77–5.28)
RBC # UR STRIP: ABNORMAL /HPF
REF LAB TEST METHOD: ABNORMAL
RH BLD: POSITIVE
SARS-COV-2 RNA PNL SPEC NAA+PROBE: NOT DETECTED
SODIUM SERPL-SCNC: 133 MMOL/L (ref 136–145)
SP GR UR STRIP: 1.02 (ref 1–1.03)
SQUAMOUS #/AREA URNS HPF: ABNORMAL /HPF
STRESS TARGET HR: 137 BPM
T&S EXPIRATION DATE: NORMAL
UROBILINOGEN UR QL STRIP: ABNORMAL
WBC # UR STRIP: ABNORMAL /HPF
WBC NRBC COR # BLD: 6.28 10*3/MM3 (ref 3.4–10.8)

## 2022-01-29 PROCEDURE — 85027 COMPLETE CBC AUTOMATED: CPT | Performed by: HOSPITALIST

## 2022-01-29 PROCEDURE — 99222 1ST HOSP IP/OBS MODERATE 55: CPT | Performed by: PSYCHIATRY & NEUROLOGY

## 2022-01-29 PROCEDURE — 82962 GLUCOSE BLOOD TEST: CPT

## 2022-01-29 PROCEDURE — 70544 MR ANGIOGRAPHY HEAD W/O DYE: CPT

## 2022-01-29 PROCEDURE — G0378 HOSPITAL OBSERVATION PER HR: HCPCS

## 2022-01-29 PROCEDURE — 80053 COMPREHEN METABOLIC PANEL: CPT | Performed by: HOSPITALIST

## 2022-01-29 PROCEDURE — 92610 EVALUATE SWALLOWING FUNCTION: CPT

## 2022-01-29 PROCEDURE — 81001 URINALYSIS AUTO W/SCOPE: CPT | Performed by: HOSPITALIST

## 2022-01-29 PROCEDURE — 36415 COLL VENOUS BLD VENIPUNCTURE: CPT | Performed by: HOSPITALIST

## 2022-01-29 PROCEDURE — 93880 EXTRACRANIAL BILAT STUDY: CPT

## 2022-01-29 PROCEDURE — 70551 MRI BRAIN STEM W/O DYE: CPT

## 2022-01-29 PROCEDURE — 63710000001 ONDANSETRON PER 8 MG: Performed by: INTERNAL MEDICINE

## 2022-01-29 RX ORDER — CEPHALEXIN 250 MG/1
250 CAPSULE ORAL EVERY 6 HOURS SCHEDULED
Status: DISCONTINUED | OUTPATIENT
Start: 2022-01-29 | End: 2022-01-31

## 2022-01-29 RX ORDER — DIGOXIN 250 MCG
250 TABLET ORAL DAILY
Status: DISCONTINUED | OUTPATIENT
Start: 2022-01-29 | End: 2022-02-06 | Stop reason: HOSPADM

## 2022-01-29 RX ORDER — CEPHALEXIN 500 MG/1
500 CAPSULE ORAL 3 TIMES DAILY
COMMUNITY
End: 2022-02-06 | Stop reason: HOSPADM

## 2022-01-29 RX ORDER — ATORVASTATIN CALCIUM 20 MG/1
10 TABLET, FILM COATED ORAL DAILY
Status: DISCONTINUED | OUTPATIENT
Start: 2022-01-29 | End: 2022-01-29

## 2022-01-29 RX ORDER — SODIUM CHLORIDE 9 MG/ML
75 INJECTION, SOLUTION INTRAVENOUS CONTINUOUS
Status: DISCONTINUED | OUTPATIENT
Start: 2022-01-29 | End: 2022-01-30

## 2022-01-29 RX ORDER — POTASSIUM CHLORIDE 750 MG/1
10 TABLET, FILM COATED, EXTENDED RELEASE ORAL DAILY
Refills: 3 | Status: DISCONTINUED | OUTPATIENT
Start: 2022-01-29 | End: 2022-01-31

## 2022-01-29 RX ORDER — ONDANSETRON HYDROCHLORIDE 8 MG/1
8 TABLET, FILM COATED ORAL DAILY
Status: DISCONTINUED | OUTPATIENT
Start: 2022-01-29 | End: 2022-01-31

## 2022-01-29 RX ORDER — ACETAMINOPHEN 500 MG
1000 TABLET ORAL ONCE
Status: COMPLETED | OUTPATIENT
Start: 2022-01-29 | End: 2022-01-29

## 2022-01-29 RX ORDER — LEVOTHYROXINE SODIUM 0.15 MG/1
300 TABLET ORAL DAILY
Status: DISCONTINUED | OUTPATIENT
Start: 2022-01-30 | End: 2022-02-05

## 2022-01-29 RX ORDER — SODIUM CHLORIDE 0.9 % (FLUSH) 0.9 %
10 SYRINGE (ML) INJECTION AS NEEDED
Status: DISCONTINUED | OUTPATIENT
Start: 2022-01-29 | End: 2022-01-31

## 2022-01-29 RX ORDER — TORSEMIDE 20 MG/1
40 TABLET ORAL DAILY
Status: DISCONTINUED | OUTPATIENT
Start: 2022-01-29 | End: 2022-02-01

## 2022-01-29 RX ORDER — MEPERIDINE HYDROCHLORIDE 50 MG/1
50 TABLET ORAL EVERY 6 HOURS PRN
Status: DISCONTINUED | OUTPATIENT
Start: 2022-01-29 | End: 2022-01-31

## 2022-01-29 RX ORDER — LOSARTAN POTASSIUM 100 MG/1
100 TABLET ORAL DAILY
Status: DISCONTINUED | OUTPATIENT
Start: 2022-01-29 | End: 2022-02-06 | Stop reason: HOSPADM

## 2022-01-29 RX ORDER — ACETAMINOPHEN 325 MG/1
650 TABLET ORAL EVERY 6 HOURS PRN
Status: DISCONTINUED | OUTPATIENT
Start: 2022-01-29 | End: 2022-02-06 | Stop reason: HOSPADM

## 2022-01-29 RX ORDER — ALBUTEROL SULFATE 2.5 MG/3ML
2.5 SOLUTION RESPIRATORY (INHALATION) EVERY 6 HOURS PRN
Status: DISCONTINUED | OUTPATIENT
Start: 2022-01-29 | End: 2022-02-06 | Stop reason: HOSPADM

## 2022-01-29 RX ORDER — GABAPENTIN 300 MG/1
300 CAPSULE ORAL 3 TIMES DAILY
Status: DISCONTINUED | OUTPATIENT
Start: 2022-01-29 | End: 2022-02-06 | Stop reason: HOSPADM

## 2022-01-29 RX ORDER — SODIUM CHLORIDE 0.9 % (FLUSH) 0.9 %
10 SYRINGE (ML) INJECTION EVERY 12 HOURS SCHEDULED
Status: DISCONTINUED | OUTPATIENT
Start: 2022-01-29 | End: 2022-01-31

## 2022-01-29 RX ORDER — ASPIRIN 81 MG/1
81 TABLET, CHEWABLE ORAL ONCE
Status: COMPLETED | OUTPATIENT
Start: 2022-01-29 | End: 2022-01-29

## 2022-01-29 RX ORDER — ATORVASTATIN CALCIUM 20 MG/1
40 TABLET, FILM COATED ORAL NIGHTLY
Status: DISCONTINUED | OUTPATIENT
Start: 2022-01-29 | End: 2022-01-31

## 2022-01-29 RX ORDER — HYDRALAZINE HYDROCHLORIDE 50 MG/1
100 TABLET, FILM COATED ORAL EVERY 12 HOURS SCHEDULED
Status: DISCONTINUED | OUTPATIENT
Start: 2022-01-29 | End: 2022-02-06 | Stop reason: HOSPADM

## 2022-01-29 RX ORDER — NICOTINE POLACRILEX 4 MG
15 LOZENGE BUCCAL
Status: DISCONTINUED | OUTPATIENT
Start: 2022-01-29 | End: 2022-01-31

## 2022-01-29 RX ORDER — CYCLOBENZAPRINE HCL 10 MG
10 TABLET ORAL 3 TIMES DAILY PRN
Status: DISCONTINUED | OUTPATIENT
Start: 2022-01-29 | End: 2022-01-31

## 2022-01-29 RX ORDER — CETIRIZINE HYDROCHLORIDE 10 MG/1
10 TABLET ORAL DAILY
Status: DISCONTINUED | OUTPATIENT
Start: 2022-01-29 | End: 2022-01-31

## 2022-01-29 RX ORDER — PANTOPRAZOLE SODIUM 40 MG/1
40 TABLET, DELAYED RELEASE ORAL
Status: DISCONTINUED | OUTPATIENT
Start: 2022-01-29 | End: 2022-02-06 | Stop reason: HOSPADM

## 2022-01-29 RX ORDER — GLIPIZIDE 10 MG/1
10 TABLET ORAL
Status: DISCONTINUED | OUTPATIENT
Start: 2022-01-29 | End: 2022-01-31

## 2022-01-29 RX ORDER — DIPHENOXYLATE HYDROCHLORIDE AND ATROPINE SULFATE 2.5; .025 MG/1; MG/1
1 TABLET ORAL 4 TIMES DAILY PRN
Status: DISCONTINUED | OUTPATIENT
Start: 2022-01-29 | End: 2022-02-05

## 2022-01-29 RX ORDER — ASPIRIN 81 MG/1
81 TABLET, CHEWABLE ORAL ONCE
Status: DISCONTINUED | OUTPATIENT
Start: 2022-01-29 | End: 2022-01-31

## 2022-01-29 RX ORDER — DEXTROSE MONOHYDRATE 25 G/50ML
25 INJECTION, SOLUTION INTRAVENOUS
Status: DISCONTINUED | OUTPATIENT
Start: 2022-01-29 | End: 2022-01-31

## 2022-01-29 RX ORDER — CEPHALEXIN 500 MG/1
500 CAPSULE ORAL 3 TIMES DAILY
Status: DISCONTINUED | OUTPATIENT
Start: 2022-01-29 | End: 2022-01-29

## 2022-01-29 RX ADMIN — CYCLOBENZAPRINE 10 MG: 10 TABLET, FILM COATED ORAL at 21:27

## 2022-01-29 RX ADMIN — ASPIRIN 81 MG: 81 TABLET, CHEWABLE ORAL at 08:06

## 2022-01-29 RX ADMIN — ONDANSETRON HYDROCHLORIDE 8 MG: 8 TABLET, FILM COATED ORAL at 21:28

## 2022-01-29 RX ADMIN — ATENOLOL 75 MG: 25 TABLET ORAL at 21:27

## 2022-01-29 RX ADMIN — LOSARTAN POTASSIUM 100 MG: 100 TABLET, FILM COATED ORAL at 21:28

## 2022-01-29 RX ADMIN — ACETAMINOPHEN 650 MG: 325 TABLET, FILM COATED ORAL at 18:09

## 2022-01-29 RX ADMIN — ACETAMINOPHEN 1000 MG: 500 TABLET ORAL at 00:39

## 2022-01-29 RX ADMIN — METFORMIN HYDROCHLORIDE 500 MG: 500 TABLET ORAL at 21:30

## 2022-01-29 RX ADMIN — CEPHALEXIN 250 MG: 250 CAPSULE ORAL at 21:27

## 2022-01-29 RX ADMIN — CETIRIZINE HYDROCHLORIDE 10 MG: 10 TABLET ORAL at 21:28

## 2022-01-29 RX ADMIN — GLIPIZIDE 10 MG: 10 TABLET ORAL at 21:28

## 2022-01-29 RX ADMIN — PANTOPRAZOLE SODIUM 40 MG: 40 TABLET, DELAYED RELEASE ORAL at 08:06

## 2022-01-29 RX ADMIN — ATORVASTATIN CALCIUM 40 MG: 20 TABLET, FILM COATED ORAL at 21:27

## 2022-01-29 RX ADMIN — SODIUM CHLORIDE 75 ML/HR: 9 INJECTION, SOLUTION INTRAVENOUS at 08:12

## 2022-01-29 RX ADMIN — GABAPENTIN 300 MG: 300 CAPSULE ORAL at 21:27

## 2022-01-29 RX ADMIN — DIGOXIN 250 MCG: 250 TABLET ORAL at 21:28

## 2022-01-29 RX ADMIN — POTASSIUM CHLORIDE 10 MEQ: 750 TABLET, EXTENDED RELEASE ORAL at 18:09

## 2022-01-29 RX ADMIN — HYDRALAZINE HYDROCHLORIDE 100 MG: 50 TABLET, FILM COATED ORAL at 21:28

## 2022-01-30 ENCOUNTER — APPOINTMENT (OUTPATIENT)
Dept: CARDIOLOGY | Facility: HOSPITAL | Age: 62
End: 2022-01-30

## 2022-01-30 LAB
ANION GAP SERPL CALCULATED.3IONS-SCNC: 11.2 MMOL/L (ref 5–15)
AORTIC DIMENSIONLESS INDEX: 0.9 (DI)
BH CV ECHO MEAS - AO MAX PG (FULL): 3.5 MMHG
BH CV ECHO MEAS - AO MAX PG: 13 MMHG
BH CV ECHO MEAS - AO MEAN PG (FULL): 2.4 MMHG
BH CV ECHO MEAS - AO MEAN PG: 7.3 MMHG
BH CV ECHO MEAS - AO ROOT AREA (BSA CORRECTED): 1.3
BH CV ECHO MEAS - AO ROOT AREA: 6.8 CM^2
BH CV ECHO MEAS - AO ROOT DIAM: 2.9 CM
BH CV ECHO MEAS - AO V2 MAX: 180.5 CM/SEC
BH CV ECHO MEAS - AO V2 MEAN: 127.3 CM/SEC
BH CV ECHO MEAS - AO V2 VTI: 34 CM
BH CV ECHO MEAS - AVA(I,A): 1.8 CM^2
BH CV ECHO MEAS - AVA(I,D): 1.8 CM^2
BH CV ECHO MEAS - AVA(V,A): 1.8 CM^2
BH CV ECHO MEAS - AVA(V,D): 1.8 CM^2
BH CV ECHO MEAS - BSA(HAYCOCK): 2.4 M^2
BH CV ECHO MEAS - BSA: 2.3 M^2
BH CV ECHO MEAS - BZI_BMI: 41.7 KILOGRAMS/M^2
BH CV ECHO MEAS - BZI_METRIC_HEIGHT: 170.2 CM
BH CV ECHO MEAS - BZI_METRIC_WEIGHT: 120.7 KG
BH CV ECHO MEAS - EDV(CUBED): 90.7 ML
BH CV ECHO MEAS - EDV(TEICH): 92.1 ML
BH CV ECHO MEAS - EF(CUBED): 74.9 %
BH CV ECHO MEAS - EF(TEICH): 67 %
BH CV ECHO MEAS - ESV(CUBED): 22.8 ML
BH CV ECHO MEAS - ESV(TEICH): 30.4 ML
BH CV ECHO MEAS - FS: 36.9 %
BH CV ECHO MEAS - IVS/LVPW: 1
BH CV ECHO MEAS - IVSD: 1.2 CM
BH CV ECHO MEAS - LAT PEAK E' VEL: 10.6 CM/SEC
BH CV ECHO MEAS - LV MASS(C)D: 192.8 GRAMS
BH CV ECHO MEAS - LV MASS(C)DI: 84.5 GRAMS/M^2
BH CV ECHO MEAS - LV MAX PG: 9.5 MMHG
BH CV ECHO MEAS - LV MEAN PG: 4.9 MMHG
BH CV ECHO MEAS - LV V1 MAX: 154.2 CM/SEC
BH CV ECHO MEAS - LV V1 MEAN: 100.8 CM/SEC
BH CV ECHO MEAS - LV V1 VTI: 29.8 CM
BH CV ECHO MEAS - LVIDD: 4.5 CM
BH CV ECHO MEAS - LVIDS: 2.8 CM
BH CV ECHO MEAS - LVOT AREA (M): 2 CM^2
BH CV ECHO MEAS - LVOT AREA: 2.1 CM^2
BH CV ECHO MEAS - LVOT DIAM: 1.6 CM
BH CV ECHO MEAS - LVPWD: 1.2 CM
BH CV ECHO MEAS - MED PEAK E' VEL: 5.8 CM/SEC
BH CV ECHO MEAS - MV DEC SLOPE: 1090 CM/SEC^2
BH CV ECHO MEAS - MV DEC TIME: 142 SEC
BH CV ECHO MEAS - MV E MAX VEL: 165 CM/SEC
BH CV ECHO MEAS - MV MAX PG: 12.1 MMHG
BH CV ECHO MEAS - MV MEAN PG: 2.5 MMHG
BH CV ECHO MEAS - MV P1/2T MAX VEL: 167.1 CM/SEC
BH CV ECHO MEAS - MV P1/2T: 44.9 MSEC
BH CV ECHO MEAS - MV V2 MAX: 173.7 CM/SEC
BH CV ECHO MEAS - MV V2 MEAN: 69.3 CM/SEC
BH CV ECHO MEAS - MV V2 VTI: 33.5 CM
BH CV ECHO MEAS - MVA P1/2T LCG: 1.3 CM^2
BH CV ECHO MEAS - MVA(P1/2T): 4.9 CM^2
BH CV ECHO MEAS - MVA(VTI): 1.9 CM^2
BH CV ECHO MEAS - PA ACC TIME: 0.09 SEC
BH CV ECHO MEAS - PA MAX PG (FULL): 3.3 MMHG
BH CV ECHO MEAS - PA MAX PG: 4.4 MMHG
BH CV ECHO MEAS - PA PR(ACCEL): 39.8 MMHG
BH CV ECHO MEAS - PA V2 MAX: 104.5 CM/SEC
BH CV ECHO MEAS - RAP SYSTOLE: 3 MMHG
BH CV ECHO MEAS - RV MAX PG: 1 MMHG
BH CV ECHO MEAS - RV MEAN PG: 0.59 MMHG
BH CV ECHO MEAS - RV V1 MAX: 50.6 CM/SEC
BH CV ECHO MEAS - RV V1 MEAN: 36.2 CM/SEC
BH CV ECHO MEAS - RV V1 VTI: 10.5 CM
BH CV ECHO MEAS - RVSP: 34 MMHG
BH CV ECHO MEAS - SI(AO): 100.9 ML/M^2
BH CV ECHO MEAS - SI(CUBED): 29.8 ML/M^2
BH CV ECHO MEAS - SI(LVOT): 27.5 ML/M^2
BH CV ECHO MEAS - SI(TEICH): 27 ML/M^2
BH CV ECHO MEAS - SV(AO): 230.4 ML
BH CV ECHO MEAS - SV(CUBED): 68 ML
BH CV ECHO MEAS - SV(LVOT): 62.7 ML
BH CV ECHO MEAS - SV(TEICH): 61.7 ML
BH CV ECHO MEAS - TAPSE (>1.6): 1.3 CM
BH CV ECHO MEAS - TR MAX VEL: 281 CM/SEC
BH CV ECHO MEASUREMENTS AVERAGE E/E' RATIO: 20.12
BH CV XLRA - RV BASE: 3.8 CM
BH CV XLRA - RV LENGTH: 8.3 CM
BH CV XLRA - RV MID: 4 CM
BH CV XLRA - TDI S': 9.3 CM/SEC
BUN SERPL-MCNC: 8 MG/DL (ref 6–20)
BUN/CREAT SERPL: 10 (ref 7–25)
CALCIUM SPEC-SCNC: 9.8 MG/DL (ref 8.6–10.5)
CHLORIDE SERPL-SCNC: 100 MMOL/L (ref 98–107)
CHOLEST SERPL-MCNC: 79 MG/DL (ref 0–200)
CO2 SERPL-SCNC: 23.8 MMOL/L (ref 22–29)
CREAT SERPL-MCNC: 0.8 MG/DL (ref 0.57–1)
DEPRECATED RDW RBC AUTO: 43.7 FL (ref 37–54)
ERYTHROCYTE [DISTWIDTH] IN BLOOD BY AUTOMATED COUNT: 13.9 % (ref 12.3–15.4)
GFR SERPL CREATININE-BSD FRML MDRD: 73 ML/MIN/1.73
GLUCOSE BLDC GLUCOMTR-MCNC: 273 MG/DL (ref 70–130)
GLUCOSE BLDC GLUCOMTR-MCNC: 293 MG/DL (ref 70–130)
GLUCOSE BLDC GLUCOMTR-MCNC: 341 MG/DL (ref 70–130)
GLUCOSE SERPL-MCNC: 319 MG/DL (ref 65–99)
HBA1C MFR BLD: 11.71 % (ref 4.8–5.6)
HCT VFR BLD AUTO: 34.2 % (ref 34–46.6)
HDLC SERPL-MCNC: 18 MG/DL (ref 40–60)
HEMOCCULT STL QL: NEGATIVE
HGB BLD-MCNC: 11 G/DL (ref 12–15.9)
LDLC SERPL CALC-MCNC: 38 MG/DL (ref 0–100)
LDLC/HDLC SERPL: 2.01 {RATIO}
LEFT ATRIUM VOLUME INDEX: 37.9 ML/M2
MAGNESIUM SERPL-MCNC: 1.6 MG/DL (ref 1.6–2.6)
MAXIMAL PREDICTED HEART RATE: 161 BPM
MCH RBC QN AUTO: 27.5 PG (ref 26.6–33)
MCHC RBC AUTO-ENTMCNC: 32.2 G/DL (ref 31.5–35.7)
MCV RBC AUTO: 85.5 FL (ref 79–97)
PLATELET # BLD AUTO: 259 10*3/MM3 (ref 140–450)
PMV BLD AUTO: 10.6 FL (ref 6–12)
POTASSIUM SERPL-SCNC: 3.4 MMOL/L (ref 3.5–5.2)
POTASSIUM SERPL-SCNC: 4 MMOL/L (ref 3.5–5.2)
QT INTERVAL: 360 MS
RBC # BLD AUTO: 4 10*6/MM3 (ref 3.77–5.28)
SODIUM SERPL-SCNC: 135 MMOL/L (ref 136–145)
STRESS TARGET HR: 137 BPM
TRIGL SERPL-MCNC: 124 MG/DL (ref 0–150)
TSH SERPL DL<=0.05 MIU/L-ACNC: 1.21 UIU/ML (ref 0.27–4.2)
VLDLC SERPL-MCNC: 23 MG/DL (ref 5–40)
WBC NRBC COR # BLD: 7.61 10*3/MM3 (ref 3.4–10.8)

## 2022-01-30 PROCEDURE — 84132 ASSAY OF SERUM POTASSIUM: CPT | Performed by: INTERNAL MEDICINE

## 2022-01-30 PROCEDURE — 84443 ASSAY THYROID STIM HORMONE: CPT | Performed by: INTERNAL MEDICINE

## 2022-01-30 PROCEDURE — 25010000002 ENOXAPARIN PER 10 MG: Performed by: INTERNAL MEDICINE

## 2022-01-30 PROCEDURE — 63710000001 ONDANSETRON PER 8 MG: Performed by: INTERNAL MEDICINE

## 2022-01-30 PROCEDURE — 36415 COLL VENOUS BLD VENIPUNCTURE: CPT | Performed by: INTERNAL MEDICINE

## 2022-01-30 PROCEDURE — 93306 TTE W/DOPPLER COMPLETE: CPT | Performed by: INTERNAL MEDICINE

## 2022-01-30 PROCEDURE — C1751 CATH, INF, PER/CENT/MIDLINE: HCPCS

## 2022-01-30 PROCEDURE — 99221 1ST HOSP IP/OBS SF/LOW 40: CPT | Performed by: INTERNAL MEDICINE

## 2022-01-30 PROCEDURE — 83036 HEMOGLOBIN GLYCOSYLATED A1C: CPT | Performed by: INTERNAL MEDICINE

## 2022-01-30 PROCEDURE — 63710000001 INSULIN LISPRO (HUMAN) PER 5 UNITS: Performed by: INTERNAL MEDICINE

## 2022-01-30 PROCEDURE — 85027 COMPLETE CBC AUTOMATED: CPT | Performed by: INTERNAL MEDICINE

## 2022-01-30 PROCEDURE — 97530 THERAPEUTIC ACTIVITIES: CPT

## 2022-01-30 PROCEDURE — 36410 VNPNXR 3YR/> PHY/QHP DX/THER: CPT

## 2022-01-30 PROCEDURE — 93306 TTE W/DOPPLER COMPLETE: CPT

## 2022-01-30 PROCEDURE — 99999 PR OFFICE/OUTPT VISIT,PROCEDURE ONLY: CPT | Performed by: INTERNAL MEDICINE

## 2022-01-30 PROCEDURE — 97110 THERAPEUTIC EXERCISES: CPT

## 2022-01-30 PROCEDURE — 99233 SBSQ HOSP IP/OBS HIGH 50: CPT | Performed by: NURSE PRACTITIONER

## 2022-01-30 PROCEDURE — 82962 GLUCOSE BLOOD TEST: CPT

## 2022-01-30 PROCEDURE — 82272 OCCULT BLD FECES 1-3 TESTS: CPT | Performed by: INTERNAL MEDICINE

## 2022-01-30 PROCEDURE — 80061 LIPID PANEL: CPT | Performed by: INTERNAL MEDICINE

## 2022-01-30 PROCEDURE — 97162 PT EVAL MOD COMPLEX 30 MIN: CPT

## 2022-01-30 PROCEDURE — 83735 ASSAY OF MAGNESIUM: CPT | Performed by: INTERNAL MEDICINE

## 2022-01-30 PROCEDURE — 80048 BASIC METABOLIC PNL TOTAL CA: CPT | Performed by: INTERNAL MEDICINE

## 2022-01-30 PROCEDURE — 99215 OFFICE O/P EST HI 40 MIN: CPT | Performed by: INTERNAL MEDICINE

## 2022-01-30 RX ORDER — SODIUM CHLORIDE 0.9 % (FLUSH) 0.9 %
10 SYRINGE (ML) INJECTION AS NEEDED
Status: DISCONTINUED | OUTPATIENT
Start: 2022-01-30 | End: 2022-01-31

## 2022-01-30 RX ORDER — POTASSIUM CHLORIDE 1.5 G/1.77G
40 POWDER, FOR SOLUTION ORAL AS NEEDED
Status: DISCONTINUED | OUTPATIENT
Start: 2022-01-30 | End: 2022-01-31

## 2022-01-30 RX ORDER — DEXTROSE MONOHYDRATE 25 G/50ML
25 INJECTION, SOLUTION INTRAVENOUS
Status: DISCONTINUED | OUTPATIENT
Start: 2022-01-30 | End: 2022-01-31

## 2022-01-30 RX ORDER — SODIUM CHLORIDE 0.9 % (FLUSH) 0.9 %
10 SYRINGE (ML) INJECTION EVERY 12 HOURS SCHEDULED
Status: DISCONTINUED | OUTPATIENT
Start: 2022-01-30 | End: 2022-01-31

## 2022-01-30 RX ORDER — INSULIN LISPRO 100 [IU]/ML
0-9 INJECTION, SOLUTION INTRAVENOUS; SUBCUTANEOUS
Status: DISCONTINUED | OUTPATIENT
Start: 2022-01-30 | End: 2022-02-06 | Stop reason: HOSPADM

## 2022-01-30 RX ORDER — SPIRONOLACTONE 25 MG/1
25 TABLET ORAL DAILY
Status: DISCONTINUED | OUTPATIENT
Start: 2022-01-30 | End: 2022-02-06 | Stop reason: HOSPADM

## 2022-01-30 RX ORDER — POTASSIUM CHLORIDE 750 MG/1
40 TABLET, FILM COATED, EXTENDED RELEASE ORAL AS NEEDED
Status: DISCONTINUED | OUTPATIENT
Start: 2022-01-30 | End: 2022-01-31

## 2022-01-30 RX ORDER — POTASSIUM CHLORIDE 7.45 MG/ML
10 INJECTION INTRAVENOUS
Status: DISCONTINUED | OUTPATIENT
Start: 2022-01-30 | End: 2022-01-31

## 2022-01-30 RX ORDER — SODIUM CHLORIDE 0.9 % (FLUSH) 0.9 %
20 SYRINGE (ML) INJECTION AS NEEDED
Status: DISCONTINUED | OUTPATIENT
Start: 2022-01-30 | End: 2022-02-05

## 2022-01-30 RX ORDER — NICOTINE POLACRILEX 4 MG
15 LOZENGE BUCCAL
Status: DISCONTINUED | OUTPATIENT
Start: 2022-01-30 | End: 2022-01-31

## 2022-01-30 RX ORDER — AMLODIPINE BESYLATE 5 MG/1
5 TABLET ORAL
Status: DISCONTINUED | OUTPATIENT
Start: 2022-01-30 | End: 2022-01-31

## 2022-01-30 RX ADMIN — GLIPIZIDE 10 MG: 10 TABLET ORAL at 17:13

## 2022-01-30 RX ADMIN — CETIRIZINE HYDROCHLORIDE 10 MG: 10 TABLET ORAL at 10:03

## 2022-01-30 RX ADMIN — ACETAMINOPHEN 650 MG: 325 TABLET, FILM COATED ORAL at 21:30

## 2022-01-30 RX ADMIN — GABAPENTIN 300 MG: 300 CAPSULE ORAL at 10:02

## 2022-01-30 RX ADMIN — ONDANSETRON HYDROCHLORIDE 8 MG: 8 TABLET, FILM COATED ORAL at 10:03

## 2022-01-30 RX ADMIN — POTASSIUM CHLORIDE 40 MEQ: 750 TABLET, EXTENDED RELEASE ORAL at 17:13

## 2022-01-30 RX ADMIN — INSULIN LISPRO 7 UNITS: 100 INJECTION, SOLUTION INTRAVENOUS; SUBCUTANEOUS at 17:13

## 2022-01-30 RX ADMIN — ATORVASTATIN CALCIUM 40 MG: 20 TABLET, FILM COATED ORAL at 21:30

## 2022-01-30 RX ADMIN — ATENOLOL 75 MG: 25 TABLET ORAL at 21:30

## 2022-01-30 RX ADMIN — LOSARTAN POTASSIUM 100 MG: 100 TABLET, FILM COATED ORAL at 10:03

## 2022-01-30 RX ADMIN — LEVOTHYROXINE SODIUM 300 MCG: 0.15 TABLET ORAL at 13:32

## 2022-01-30 RX ADMIN — PANTOPRAZOLE SODIUM 40 MG: 40 TABLET, DELAYED RELEASE ORAL at 10:02

## 2022-01-30 RX ADMIN — ACETAMINOPHEN 650 MG: 325 TABLET, FILM COATED ORAL at 10:09

## 2022-01-30 RX ADMIN — HYDRALAZINE HYDROCHLORIDE 100 MG: 50 TABLET, FILM COATED ORAL at 21:30

## 2022-01-30 RX ADMIN — POTASSIUM CHLORIDE 40 MEQ: 750 TABLET, EXTENDED RELEASE ORAL at 13:32

## 2022-01-30 RX ADMIN — CEPHALEXIN 250 MG: 250 CAPSULE ORAL at 21:30

## 2022-01-30 RX ADMIN — AMLODIPINE BESYLATE 5 MG: 5 TABLET ORAL at 17:13

## 2022-01-30 RX ADMIN — HYDRALAZINE HYDROCHLORIDE 100 MG: 50 TABLET, FILM COATED ORAL at 10:02

## 2022-01-30 RX ADMIN — SPIRONOLACTONE 25 MG: 25 TABLET, FILM COATED ORAL at 13:32

## 2022-01-30 RX ADMIN — CYCLOBENZAPRINE 10 MG: 10 TABLET, FILM COATED ORAL at 21:30

## 2022-01-30 RX ADMIN — GABAPENTIN 300 MG: 300 CAPSULE ORAL at 17:13

## 2022-01-30 RX ADMIN — METFORMIN HYDROCHLORIDE 500 MG: 500 TABLET ORAL at 17:13

## 2022-01-30 RX ADMIN — METFORMIN HYDROCHLORIDE 500 MG: 500 TABLET ORAL at 10:03

## 2022-01-30 RX ADMIN — CEPHALEXIN 250 MG: 250 CAPSULE ORAL at 03:27

## 2022-01-30 RX ADMIN — POTASSIUM CHLORIDE 10 MEQ: 750 TABLET, EXTENDED RELEASE ORAL at 10:02

## 2022-01-30 RX ADMIN — ATENOLOL 75 MG: 25 TABLET ORAL at 10:02

## 2022-01-30 RX ADMIN — ENOXAPARIN SODIUM 120 MG: 120 INJECTION SUBCUTANEOUS at 13:32

## 2022-01-30 RX ADMIN — GLIPIZIDE 10 MG: 10 TABLET ORAL at 10:02

## 2022-01-30 RX ADMIN — CEPHALEXIN 250 MG: 250 CAPSULE ORAL at 17:13

## 2022-01-30 RX ADMIN — DIGOXIN 250 MCG: 250 TABLET ORAL at 10:03

## 2022-01-30 RX ADMIN — CEPHALEXIN 250 MG: 250 CAPSULE ORAL at 10:03

## 2022-01-30 RX ADMIN — GABAPENTIN 300 MG: 300 CAPSULE ORAL at 21:30

## 2022-01-30 RX ADMIN — TORSEMIDE 40 MG: 20 TABLET ORAL at 10:03

## 2022-01-31 ENCOUNTER — APPOINTMENT (OUTPATIENT)
Dept: CT IMAGING | Facility: HOSPITAL | Age: 62
End: 2022-01-31

## 2022-01-31 LAB
ANION GAP SERPL CALCULATED.3IONS-SCNC: 10.3 MMOL/L (ref 5–15)
BUN SERPL-MCNC: 6 MG/DL (ref 6–20)
BUN/CREAT SERPL: 6.4 (ref 7–25)
CALCIUM SPEC-SCNC: 9.9 MG/DL (ref 8.6–10.5)
CHLORIDE SERPL-SCNC: 100 MMOL/L (ref 98–107)
CO2 SERPL-SCNC: 26.7 MMOL/L (ref 22–29)
CREAT SERPL-MCNC: 0.94 MG/DL (ref 0.57–1)
DEPRECATED RDW RBC AUTO: 41.7 FL (ref 37–54)
ERYTHROCYTE [DISTWIDTH] IN BLOOD BY AUTOMATED COUNT: 13.8 % (ref 12.3–15.4)
GFR SERPL CREATININE-BSD FRML MDRD: 61 ML/MIN/1.73
GLUCOSE BLDC GLUCOMTR-MCNC: 129 MG/DL (ref 70–130)
GLUCOSE BLDC GLUCOMTR-MCNC: 164 MG/DL (ref 70–130)
GLUCOSE BLDC GLUCOMTR-MCNC: 211 MG/DL (ref 70–130)
GLUCOSE BLDC GLUCOMTR-MCNC: 222 MG/DL (ref 70–130)
GLUCOSE SERPL-MCNC: 223 MG/DL (ref 65–99)
HCT VFR BLD AUTO: 33.5 % (ref 34–46.6)
HGB BLD-MCNC: 10.8 G/DL (ref 12–15.9)
MCH RBC QN AUTO: 26.9 PG (ref 26.6–33)
MCHC RBC AUTO-ENTMCNC: 32.2 G/DL (ref 31.5–35.7)
MCV RBC AUTO: 83.3 FL (ref 79–97)
PLATELET # BLD AUTO: 281 10*3/MM3 (ref 140–450)
PMV BLD AUTO: 10.8 FL (ref 6–12)
POTASSIUM SERPL-SCNC: 3.5 MMOL/L (ref 3.5–5.2)
RBC # BLD AUTO: 4.02 10*6/MM3 (ref 3.77–5.28)
SARS-COV-2 RNA PNL SPEC NAA+PROBE: NOT DETECTED
SODIUM SERPL-SCNC: 137 MMOL/L (ref 136–145)
WBC NRBC COR # BLD: 6.87 10*3/MM3 (ref 3.4–10.8)

## 2022-01-31 PROCEDURE — 97535 SELF CARE MNGMENT TRAINING: CPT

## 2022-01-31 PROCEDURE — 99222 1ST HOSP IP/OBS MODERATE 55: CPT | Performed by: INTERNAL MEDICINE

## 2022-01-31 PROCEDURE — 63710000001 INSULIN LISPRO (HUMAN) PER 5 UNITS: Performed by: HOSPITALIST

## 2022-01-31 PROCEDURE — 70450 CT HEAD/BRAIN W/O DYE: CPT

## 2022-01-31 PROCEDURE — 87635 SARS-COV-2 COVID-19 AMP PRB: CPT | Performed by: INTERNAL MEDICINE

## 2022-01-31 PROCEDURE — 80048 BASIC METABOLIC PNL TOTAL CA: CPT | Performed by: INTERNAL MEDICINE

## 2022-01-31 PROCEDURE — 97166 OT EVAL MOD COMPLEX 45 MIN: CPT

## 2022-01-31 PROCEDURE — 97530 THERAPEUTIC ACTIVITIES: CPT

## 2022-01-31 PROCEDURE — 82962 GLUCOSE BLOOD TEST: CPT

## 2022-01-31 PROCEDURE — 63710000001 ONDANSETRON PER 8 MG: Performed by: INTERNAL MEDICINE

## 2022-01-31 PROCEDURE — 85027 COMPLETE CBC AUTOMATED: CPT | Performed by: INTERNAL MEDICINE

## 2022-01-31 PROCEDURE — 25010000002 CEFTRIAXONE PER 250 MG: Performed by: HOSPITALIST

## 2022-01-31 PROCEDURE — 63710000001 INSULIN LISPRO (HUMAN) PER 5 UNITS: Performed by: INTERNAL MEDICINE

## 2022-01-31 PROCEDURE — 99233 SBSQ HOSP IP/OBS HIGH 50: CPT | Performed by: NURSE PRACTITIONER

## 2022-01-31 PROCEDURE — 25010000002 ENOXAPARIN PER 10 MG: Performed by: INTERNAL MEDICINE

## 2022-01-31 RX ORDER — ATORVASTATIN CALCIUM 80 MG/1
80 TABLET, FILM COATED ORAL NIGHTLY
Status: DISCONTINUED | OUTPATIENT
Start: 2022-01-31 | End: 2022-02-02

## 2022-01-31 RX ORDER — POTASSIUM CHLORIDE 750 MG/1
20 TABLET, FILM COATED, EXTENDED RELEASE ORAL 2 TIMES DAILY WITH MEALS
Status: DISCONTINUED | OUTPATIENT
Start: 2022-01-31 | End: 2022-02-01

## 2022-01-31 RX ORDER — ONDANSETRON 4 MG/1
4 TABLET, FILM COATED ORAL EVERY 6 HOURS PRN
Status: DISCONTINUED | OUTPATIENT
Start: 2022-01-31 | End: 2022-02-06 | Stop reason: HOSPADM

## 2022-01-31 RX ORDER — AMLODIPINE BESYLATE 10 MG/1
10 TABLET ORAL
Status: DISCONTINUED | OUTPATIENT
Start: 2022-02-01 | End: 2022-02-05

## 2022-01-31 RX ORDER — NITROGLYCERIN 0.4 MG/1
0.4 TABLET SUBLINGUAL
Status: DISCONTINUED | OUTPATIENT
Start: 2022-01-31 | End: 2022-02-05

## 2022-01-31 RX ORDER — INSULIN LISPRO 100 [IU]/ML
5 INJECTION, SOLUTION INTRAVENOUS; SUBCUTANEOUS
Status: DISCONTINUED | OUTPATIENT
Start: 2022-01-31 | End: 2022-02-02

## 2022-01-31 RX ADMIN — AMLODIPINE BESYLATE 5 MG: 5 TABLET ORAL at 08:36

## 2022-01-31 RX ADMIN — INSULIN LISPRO 5 UNITS: 100 INJECTION, SOLUTION INTRAVENOUS; SUBCUTANEOUS at 18:39

## 2022-01-31 RX ADMIN — INSULIN LISPRO 4 UNITS: 100 INJECTION, SOLUTION INTRAVENOUS; SUBCUTANEOUS at 08:41

## 2022-01-31 RX ADMIN — GABAPENTIN 300 MG: 300 CAPSULE ORAL at 20:53

## 2022-01-31 RX ADMIN — ENOXAPARIN SODIUM 120 MG: 120 INJECTION SUBCUTANEOUS at 02:31

## 2022-01-31 RX ADMIN — SODIUM CHLORIDE, PRESERVATIVE FREE 10 ML: 5 INJECTION INTRAVENOUS at 14:41

## 2022-01-31 RX ADMIN — SODIUM CHLORIDE, PRESERVATIVE FREE 10 ML: 5 INJECTION INTRAVENOUS at 12:29

## 2022-01-31 RX ADMIN — HYDRALAZINE HYDROCHLORIDE 100 MG: 50 TABLET, FILM COATED ORAL at 08:40

## 2022-01-31 RX ADMIN — POTASSIUM CHLORIDE 20 MEQ: 750 TABLET, EXTENDED RELEASE ORAL at 18:39

## 2022-01-31 RX ADMIN — HYDRALAZINE HYDROCHLORIDE 100 MG: 50 TABLET, FILM COATED ORAL at 20:53

## 2022-01-31 RX ADMIN — SPIRONOLACTONE 25 MG: 25 TABLET, FILM COATED ORAL at 08:39

## 2022-01-31 RX ADMIN — TORSEMIDE 40 MG: 20 TABLET ORAL at 08:39

## 2022-01-31 RX ADMIN — DIGOXIN 250 MCG: 250 TABLET ORAL at 08:40

## 2022-01-31 RX ADMIN — LOSARTAN POTASSIUM 100 MG: 100 TABLET, FILM COATED ORAL at 08:39

## 2022-01-31 RX ADMIN — PANTOPRAZOLE SODIUM 40 MG: 40 TABLET, DELAYED RELEASE ORAL at 08:43

## 2022-01-31 RX ADMIN — LEVOTHYROXINE SODIUM 300 MCG: 0.15 TABLET ORAL at 08:40

## 2022-01-31 RX ADMIN — ATENOLOL 75 MG: 25 TABLET ORAL at 20:53

## 2022-01-31 RX ADMIN — ATENOLOL 75 MG: 25 TABLET ORAL at 08:41

## 2022-01-31 RX ADMIN — APIXABAN 5 MG: 5 TABLET, FILM COATED ORAL at 20:53

## 2022-01-31 RX ADMIN — INSULIN LISPRO 2 UNITS: 100 INJECTION, SOLUTION INTRAVENOUS; SUBCUTANEOUS at 18:39

## 2022-01-31 RX ADMIN — ATORVASTATIN CALCIUM 80 MG: 80 TABLET, FILM COATED ORAL at 20:53

## 2022-01-31 RX ADMIN — ONDANSETRON HYDROCHLORIDE 8 MG: 8 TABLET, FILM COATED ORAL at 08:39

## 2022-01-31 RX ADMIN — CEPHALEXIN 250 MG: 250 CAPSULE ORAL at 02:31

## 2022-01-31 RX ADMIN — INSULIN LISPRO 4 UNITS: 100 INJECTION, SOLUTION INTRAVENOUS; SUBCUTANEOUS at 14:37

## 2022-01-31 RX ADMIN — GABAPENTIN 300 MG: 300 CAPSULE ORAL at 08:37

## 2022-01-31 RX ADMIN — CEFTRIAXONE 1 G: 1 INJECTION, POWDER, FOR SOLUTION INTRAMUSCULAR; INTRAVENOUS at 14:36

## 2022-01-31 RX ADMIN — INSULIN GLARGINE-YFGN 15 UNITS: 100 INJECTION, SOLUTION SUBCUTANEOUS at 20:53

## 2022-01-31 RX ADMIN — POTASSIUM CHLORIDE 10 MEQ: 750 TABLET, EXTENDED RELEASE ORAL at 08:37

## 2022-01-31 RX ADMIN — GABAPENTIN 300 MG: 300 CAPSULE ORAL at 18:39

## 2022-01-31 RX ADMIN — GLIPIZIDE 10 MG: 10 TABLET ORAL at 08:41

## 2022-01-31 RX ADMIN — CETIRIZINE HYDROCHLORIDE 10 MG: 10 TABLET ORAL at 08:39

## 2022-01-31 RX ADMIN — CEPHALEXIN 250 MG: 250 CAPSULE ORAL at 08:37

## 2022-01-31 RX ADMIN — METFORMIN HYDROCHLORIDE 500 MG: 500 TABLET ORAL at 08:38

## 2022-01-31 RX ADMIN — APIXABAN 5 MG: 5 TABLET, FILM COATED ORAL at 14:36

## 2022-02-01 LAB
ALBUMIN SERPL-MCNC: 2.5 G/DL (ref 3.5–5.2)
ALBUMIN/GLOB SERPL: 0.7 G/DL
ALP SERPL-CCNC: 80 U/L (ref 39–117)
ALT SERPL W P-5'-P-CCNC: 5 U/L (ref 1–33)
ANION GAP SERPL CALCULATED.3IONS-SCNC: 10 MMOL/L (ref 5–15)
AST SERPL-CCNC: 7 U/L (ref 1–32)
BASOPHILS # BLD AUTO: 0.02 10*3/MM3 (ref 0–0.2)
BASOPHILS NFR BLD AUTO: 0.4 % (ref 0–1.5)
BILIRUB SERPL-MCNC: 0.6 MG/DL (ref 0–1.2)
BUN SERPL-MCNC: 7 MG/DL (ref 6–20)
BUN/CREAT SERPL: 8 (ref 7–25)
CALCIUM SPEC-SCNC: 9.5 MG/DL (ref 8.6–10.5)
CHLORIDE SERPL-SCNC: 98 MMOL/L (ref 98–107)
CHOLEST SERPL-MCNC: 71 MG/DL (ref 0–200)
CO2 SERPL-SCNC: 29 MMOL/L (ref 22–29)
CREAT SERPL-MCNC: 0.88 MG/DL (ref 0.57–1)
DEPRECATED RDW RBC AUTO: 45.2 FL (ref 37–54)
DIGOXIN SERPL-MCNC: 1 NG/ML (ref 0.6–1.2)
EOSINOPHIL # BLD AUTO: 0.1 10*3/MM3 (ref 0–0.4)
EOSINOPHIL NFR BLD AUTO: 2 % (ref 0.3–6.2)
ERYTHROCYTE [DISTWIDTH] IN BLOOD BY AUTOMATED COUNT: 14.1 % (ref 12.3–15.4)
GFR SERPL CREATININE-BSD FRML MDRD: 66 ML/MIN/1.73
GLOBULIN UR ELPH-MCNC: 3.5 GM/DL
GLUCOSE BLDC GLUCOMTR-MCNC: 113 MG/DL (ref 70–130)
GLUCOSE BLDC GLUCOMTR-MCNC: 152 MG/DL (ref 70–130)
GLUCOSE BLDC GLUCOMTR-MCNC: 154 MG/DL (ref 70–130)
GLUCOSE BLDC GLUCOMTR-MCNC: 157 MG/DL (ref 70–130)
GLUCOSE BLDC GLUCOMTR-MCNC: 164 MG/DL (ref 70–130)
GLUCOSE SERPL-MCNC: 121 MG/DL (ref 65–99)
HCT VFR BLD AUTO: 33.8 % (ref 34–46.6)
HDLC SERPL-MCNC: 17 MG/DL (ref 40–60)
HGB BLD-MCNC: 10.3 G/DL (ref 12–15.9)
IMM GRANULOCYTES # BLD AUTO: 0.04 10*3/MM3 (ref 0–0.05)
IMM GRANULOCYTES NFR BLD AUTO: 0.8 % (ref 0–0.5)
LDLC SERPL CALC-MCNC: 33 MG/DL (ref 0–100)
LDLC/HDLC SERPL: 1.86 {RATIO}
LYMPHOCYTES # BLD AUTO: 0.72 10*3/MM3 (ref 0.7–3.1)
LYMPHOCYTES NFR BLD AUTO: 14.2 % (ref 19.6–45.3)
MCH RBC QN AUTO: 27 PG (ref 26.6–33)
MCHC RBC AUTO-ENTMCNC: 30.5 G/DL (ref 31.5–35.7)
MCV RBC AUTO: 88.5 FL (ref 79–97)
MONOCYTES # BLD AUTO: 0.33 10*3/MM3 (ref 0.1–0.9)
MONOCYTES NFR BLD AUTO: 6.5 % (ref 5–12)
NEUTROPHILS NFR BLD AUTO: 3.87 10*3/MM3 (ref 1.7–7)
NEUTROPHILS NFR BLD AUTO: 76.1 % (ref 42.7–76)
NRBC BLD AUTO-RTO: 0 /100 WBC (ref 0–0.2)
NT-PROBNP SERPL-MCNC: 6153 PG/ML (ref 0–900)
PLATELET # BLD AUTO: 255 10*3/MM3 (ref 140–450)
PMV BLD AUTO: 10.9 FL (ref 6–12)
POTASSIUM SERPL-SCNC: 3.2 MMOL/L (ref 3.5–5.2)
PROT SERPL-MCNC: 6 G/DL (ref 6–8.5)
RBC # BLD AUTO: 3.82 10*6/MM3 (ref 3.77–5.28)
SODIUM SERPL-SCNC: 137 MMOL/L (ref 136–145)
TRIGL SERPL-MCNC: 112 MG/DL (ref 0–150)
TSH SERPL DL<=0.05 MIU/L-ACNC: 1.08 UIU/ML (ref 0.27–4.2)
VLDLC SERPL-MCNC: 21 MG/DL (ref 5–40)
WBC NRBC COR # BLD: 5.08 10*3/MM3 (ref 3.4–10.8)

## 2022-02-01 PROCEDURE — 84443 ASSAY THYROID STIM HORMONE: CPT | Performed by: HOSPITALIST

## 2022-02-01 PROCEDURE — 25010000002 CEFTRIAXONE PER 250 MG: Performed by: HOSPITALIST

## 2022-02-01 PROCEDURE — 63710000001 INSULIN LISPRO (HUMAN) PER 5 UNITS: Performed by: HOSPITALIST

## 2022-02-01 PROCEDURE — 97535 SELF CARE MNGMENT TRAINING: CPT

## 2022-02-01 PROCEDURE — 99232 SBSQ HOSP IP/OBS MODERATE 35: CPT | Performed by: INTERNAL MEDICINE

## 2022-02-01 PROCEDURE — 97530 THERAPEUTIC ACTIVITIES: CPT

## 2022-02-01 PROCEDURE — 99232 SBSQ HOSP IP/OBS MODERATE 35: CPT | Performed by: PSYCHIATRY & NEUROLOGY

## 2022-02-01 PROCEDURE — 82962 GLUCOSE BLOOD TEST: CPT

## 2022-02-01 PROCEDURE — 83880 ASSAY OF NATRIURETIC PEPTIDE: CPT | Performed by: HOSPITALIST

## 2022-02-01 PROCEDURE — 80061 LIPID PANEL: CPT | Performed by: HOSPITALIST

## 2022-02-01 PROCEDURE — 80162 ASSAY OF DIGOXIN TOTAL: CPT | Performed by: NURSE PRACTITIONER

## 2022-02-01 PROCEDURE — 80053 COMPREHEN METABOLIC PANEL: CPT | Performed by: HOSPITALIST

## 2022-02-01 PROCEDURE — 85025 COMPLETE CBC W/AUTO DIFF WBC: CPT | Performed by: HOSPITALIST

## 2022-02-01 PROCEDURE — 63710000001 INSULIN LISPRO (HUMAN) PER 5 UNITS: Performed by: INTERNAL MEDICINE

## 2022-02-01 RX ORDER — TORSEMIDE 20 MG/1
40 TABLET ORAL
Status: DISCONTINUED | OUTPATIENT
Start: 2022-02-01 | End: 2022-02-03

## 2022-02-01 RX ORDER — POTASSIUM CHLORIDE 750 MG/1
20 TABLET, FILM COATED, EXTENDED RELEASE ORAL
Status: DISCONTINUED | OUTPATIENT
Start: 2022-02-01 | End: 2022-02-06 | Stop reason: HOSPADM

## 2022-02-01 RX ADMIN — DIGOXIN 250 MCG: 250 TABLET ORAL at 09:03

## 2022-02-01 RX ADMIN — INSULIN LISPRO 5 UNITS: 100 INJECTION, SOLUTION INTRAVENOUS; SUBCUTANEOUS at 17:09

## 2022-02-01 RX ADMIN — POTASSIUM CHLORIDE 20 MEQ: 750 TABLET, EXTENDED RELEASE ORAL at 09:04

## 2022-02-01 RX ADMIN — LEVOTHYROXINE SODIUM 300 MCG: 0.15 TABLET ORAL at 09:03

## 2022-02-01 RX ADMIN — INSULIN LISPRO 5 UNITS: 100 INJECTION, SOLUTION INTRAVENOUS; SUBCUTANEOUS at 09:04

## 2022-02-01 RX ADMIN — ACETAMINOPHEN 650 MG: 325 TABLET, FILM COATED ORAL at 21:00

## 2022-02-01 RX ADMIN — LOSARTAN POTASSIUM 100 MG: 100 TABLET, FILM COATED ORAL at 11:44

## 2022-02-01 RX ADMIN — GABAPENTIN 300 MG: 300 CAPSULE ORAL at 15:11

## 2022-02-01 RX ADMIN — ATENOLOL 75 MG: 25 TABLET ORAL at 21:00

## 2022-02-01 RX ADMIN — TORSEMIDE 40 MG: 20 TABLET ORAL at 09:03

## 2022-02-01 RX ADMIN — SPIRONOLACTONE 25 MG: 25 TABLET, FILM COATED ORAL at 09:04

## 2022-02-01 RX ADMIN — TORSEMIDE 40 MG: 20 TABLET ORAL at 17:09

## 2022-02-01 RX ADMIN — INSULIN LISPRO 5 UNITS: 100 INJECTION, SOLUTION INTRAVENOUS; SUBCUTANEOUS at 11:44

## 2022-02-01 RX ADMIN — APIXABAN 5 MG: 5 TABLET, FILM COATED ORAL at 09:03

## 2022-02-01 RX ADMIN — INSULIN LISPRO 2 UNITS: 100 INJECTION, SOLUTION INTRAVENOUS; SUBCUTANEOUS at 17:10

## 2022-02-01 RX ADMIN — AMLODIPINE BESYLATE 10 MG: 10 TABLET ORAL at 09:03

## 2022-02-01 RX ADMIN — POTASSIUM CHLORIDE 20 MEQ: 750 TABLET, EXTENDED RELEASE ORAL at 17:09

## 2022-02-01 RX ADMIN — INSULIN LISPRO 2 UNITS: 100 INJECTION, SOLUTION INTRAVENOUS; SUBCUTANEOUS at 11:44

## 2022-02-01 RX ADMIN — GABAPENTIN 300 MG: 300 CAPSULE ORAL at 09:03

## 2022-02-01 RX ADMIN — HYDRALAZINE HYDROCHLORIDE 100 MG: 50 TABLET, FILM COATED ORAL at 20:59

## 2022-02-01 RX ADMIN — HYDRALAZINE HYDROCHLORIDE 100 MG: 50 TABLET, FILM COATED ORAL at 09:03

## 2022-02-01 RX ADMIN — APIXABAN 5 MG: 5 TABLET, FILM COATED ORAL at 21:00

## 2022-02-01 RX ADMIN — CEFTRIAXONE 1 G: 1 INJECTION, POWDER, FOR SOLUTION INTRAMUSCULAR; INTRAVENOUS at 15:11

## 2022-02-01 RX ADMIN — ATORVASTATIN CALCIUM 80 MG: 80 TABLET, FILM COATED ORAL at 21:00

## 2022-02-01 RX ADMIN — ATENOLOL 75 MG: 25 TABLET ORAL at 09:03

## 2022-02-01 RX ADMIN — INSULIN GLARGINE-YFGN 15 UNITS: 100 INJECTION, SOLUTION SUBCUTANEOUS at 21:00

## 2022-02-01 RX ADMIN — GABAPENTIN 300 MG: 300 CAPSULE ORAL at 21:00

## 2022-02-01 RX ADMIN — ACETAMINOPHEN 650 MG: 325 TABLET, FILM COATED ORAL at 09:03

## 2022-02-01 NOTE — NURSING NOTE
Called to Neurology to alert of NIH from 3-7. And the changes. Andree had reviewed Stat CT results. Called Cards to make aware of Bradycardia 48 and 2.3 sec pause. Also made Neurology and Medicine aware.

## 2022-02-01 NOTE — PLAN OF CARE
Goal Outcome Evaluation:              Outcome Summary: Pt had increase in lethargy and slurred speech. Neurology NP ordered stat CT head that showed new and worsening infarcts. Pt has slight left droop that is hard to detect uless moving face a certain way. She has sensation difference right arm is decreased. Slurred speech and an increased loss in fluency. Middle visual feild cut noticed to both eyes. Medicine, Neurology and Cards all aware of change in status as well as bradycardia to 48. 's/80. Pt is still alert and oriented x 4 with loose associations, she is tearful about her diagnosis.   at bedside.

## 2022-02-01 NOTE — PLAN OF CARE
Goal Outcome Evaluation:  Plan of Care Reviewed With: patient        Progress: no change  Outcome Summary: VSS. NIH 8 overnight. Patient has been NPO for possible MARIZA today. Consent in chart. Patient lethargic but does arouse to vocal stimuli easily. Labs for the morning.      Problem: Fall Injury Risk  Goal: Absence of Fall and Fall-Related Injury  Outcome: Ongoing, Progressing  Intervention: Identify and Manage Contributors to Fall Injury Risk  Recent Flowsheet Documentation  Taken 1/31/2022 2152 by Jackie Joel RN  Medication Review/Management: medications reviewed  Intervention: Promote Injury-Free Environment  Recent Flowsheet Documentation  Taken 2/1/2022 0614 by Jackie Joel RN  Safety Promotion/Fall Prevention:   activity supervised   assistive device/personal items within reach   clutter free environment maintained   fall prevention program maintained   nonskid shoes/slippers when out of bed   safety round/check completed   room organization consistent  Taken 2/1/2022 0458 by Jackie Joel RN  Safety Promotion/Fall Prevention:   activity supervised   assistive device/personal items within reach   clutter free environment maintained   fall prevention program maintained   nonskid shoes/slippers when out of bed   room organization consistent   safety round/check completed  Taken 2/1/2022 0204 by Jackie Joel RN  Safety Promotion/Fall Prevention:   activity supervised   assistive device/personal items within reach   clutter free environment maintained   fall prevention program maintained   nonskid shoes/slippers when out of bed   room organization consistent   safety round/check completed  Taken 2/1/2022 0012 by Jackie Joel RN  Safety Promotion/Fall Prevention:   activity supervised   assistive device/personal items within reach   clutter free environment maintained   fall prevention program maintained   nonskid shoes/slippers when out of bed   room organization consistent   safety round/check completed  Taken  1/31/2022 2206 by Jackie Joel RN  Safety Promotion/Fall Prevention:   activity supervised   assistive device/personal items within reach   clutter free environment maintained   fall prevention program maintained   nonskid shoes/slippers when out of bed   room organization consistent   safety round/check completed  Taken 1/31/2022 2152 by Jackie Joel RN  Safety Promotion/Fall Prevention:   activity supervised   assistive device/personal items within reach   clutter free environment maintained   fall prevention program maintained   nonskid shoes/slippers when out of bed   room organization consistent   safety round/check completed     Problem: Skin Injury Risk Increased  Goal: Skin Health and Integrity  Outcome: Ongoing, Progressing  Intervention: Optimize Skin Protection  Recent Flowsheet Documentation  Taken 2/1/2022 0614 by Jackie Joel RN  Head of Bed (HOB): HOB at 20-30 degrees  Taken 2/1/2022 0458 by Jackie Joel RN  Head of Bed (HOB): HOB at 20-30 degrees  Taken 2/1/2022 0305 by Jackie Joel RN  Head of Bed (HOB): HOB at 20-30 degrees  Taken 2/1/2022 0204 by Jackie Joel RN  Head of Bed (HOB): HOB at 20-30 degrees  Taken 2/1/2022 0012 by Jackie Joel RN  Pressure Reduction Techniques: frequent weight shift encouraged  Head of Bed (HOB): HOB at 20-30 degrees  Skin Protection:   adhesive use limited   incontinence pads utilized  Taken 1/31/2022 2206 by Jackie Joel RN  Head of Bed (HOB): HOB at 45 degrees  Taken 1/31/2022 2152 by Jackie Joel RN  Pressure Reduction Techniques: frequent weight shift encouraged  Head of Bed (HOB): HOB at 30-45 degrees  Skin Protection:   adhesive use limited   incontinence pads utilized     Problem: Diabetes Comorbidity  Goal: Blood Glucose Level Within Desired Range  Outcome: Ongoing, Progressing  Intervention: Maintain Glycemic Control  Recent Flowsheet Documentation  Taken 2/1/2022 0012 by Jackie Joel RN  Glycemic Management: blood glucose monitoring  Taken 1/31/2022 2152 by  Hill, April, RN  Glycemic Management: blood glucose monitoring     Problem: Hypertension Comorbidity  Goal: Blood Pressure in Desired Range  Outcome: Ongoing, Progressing  Intervention: Maintain Hypertension-Management Strategies  Recent Flowsheet Documentation  Taken 1/31/2022 2152 by Jackie Joel RN  Medication Review/Management: medications reviewed     Problem: Adjustment to Illness (Stroke, Ischemic/Transient Ischemic Attack)  Goal: Optimal Coping  Outcome: Ongoing, Progressing  Intervention: Support Patient/Family Psychosocial Response to Stroke  Recent Flowsheet Documentation  Taken 2/1/2022 0012 by Jackie Joel RN  Family/Support System Care: self-care encouraged  Taken 1/31/2022 2152 by Jackie Joel RN  Family/Support System Care: self-care encouraged     Problem: Communication Impairment (Stroke, Ischemic/Transient Ischemic Attack)  Goal: Improved Communication Skills  Outcome: Ongoing, Progressing     Problem: Functional Ability Impaired (Stroke, Ischemic/Transient Ischemic Attack)  Goal: Optimal Functional Ability  Outcome: Ongoing, Progressing  Intervention: Optimize Functional Ability  Recent Flowsheet Documentation  Taken 2/1/2022 0305 by Jackie Joel RN  Activity Management: activity adjusted per tolerance  Taken 2/1/2022 0012 by Jackie Joel RN  Activity Management: activity adjusted per tolerance  Taken 1/31/2022 2152 by Jackie Joel RN  Activity Management: activity adjusted per tolerance

## 2022-02-01 NOTE — PROGRESS NOTES
"DOS: 2022  NAME: Jenelle Kasper   : 1960  PCP: Zay Olivares MD  Chief Complaint   Patient presents with   • Extremity Weakness       Chief complaint: Patient more awake and alert today and responsive to diabetic nurse educator.  Subjective: Speech is clear and she is able to follow commands well.  She still has weakness of the left upper and lower extremities.    Objective:  Vital signs: /75 (BP Location: Right arm, Patient Position: Lying)   Pulse 60   Temp 97.8 °F (36.6 °C) (Infrared)   Resp 18   Ht 170.2 cm (67\")   Wt 121 kg (266 lb)   SpO2 95%   BMI 41.66 kg/m²    Gen: NAD, vitals reviewed  MS: Back to baseline.  CN: The pupils are equal reacting to light and accommodation and extraocular muscles are intact.  She has a left-sided hemianopia.  No facial droop noted and she is on modified texture diet.  The tongue is midline with full range of motion.  Corneal responses are well-maintained gag reflexes are maintained and the shoulder shrug on the left side is weak compared to the right.  Motor: The left upper and lower extremities show weakness compared to the right.  The left lower extremity cannot be lifted off the bed at this point.  Sensory: Good sensations bilaterally at this point.  DTRs: 2+ on the right side and 1+ on the left with upgoing toes on the left and downgoing on the right.  Coordination: Normal finger-to-nose coordination of the right but difficult to perform on the left because of the weakness.  Gait: Patient in semirecumbent position and has been helped up by physical therapy along with gait belt and a walker and she will benefit from inpatient rehab.    ROS:  General: Pleasant lady currently recovering from her stroke.  Neurological: The NIH stroke scale is as follows:    NIHSS:    5 days following the acute stroke:  0-->Alert: keenly responsive  0-->Answers both questions correctly  0-->Performs both tasks correctly  0=normal  2=Complete hemianopia  0=Normal " symmetric movement  1-->Drift: limb holds 90 (or 45) degrees, but drifts down before full 10 seconds: does not hit bed or other support  0-->No drift: limb holds 90 (or 45) degrees for full 10 secs  3-->No effort against gravity: limb falls  0-->No drift: limb holds 90 (or 45) degrees for full 10 secs  0=Absent  0=Normal; no sensory loss  0=No aphasia, normal  1=Mild to moderate, patient slurs at least some words and at worst, can be understood with some difficulty  0=No abnormality    Total score: 7    Laboratory results:  Lab Results   Component Value Date    GLUCOSE 121 (H) 02/01/2022    CALCIUM 9.5 02/01/2022     02/01/2022    K 3.2 (L) 02/01/2022    CO2 29.0 02/01/2022    CL 98 02/01/2022    BUN 7 02/01/2022    CREATININE 0.88 02/01/2022    EGFRIFNONA 66 02/01/2022    BCR 8.0 02/01/2022    ANIONGAP 10.0 02/01/2022     Lab Results   Component Value Date    WBC 5.08 02/01/2022    HGB 10.3 (L) 02/01/2022    HCT 33.8 (L) 02/01/2022    MCV 88.5 02/01/2022     02/01/2022     Lab Results   Component Value Date    LDL 33 02/01/2022    LDL 38 01/30/2022    LDL 27 08/13/2020         Lab 01/30/22  0631   HEMOGLOBIN A1C 11.71*        Review of labs: The hemoglobin A1c is elevated at almost 12 and the hemoglobin is low at 10.3 and hematocrit is low at 33.8.    Review and interpretation of imaging: CT of the head with stroke protocol performed yesterday shows the following:    IMPRESSION:  Multiple acute infarcts are identified involving the right  parietal and occipital lobes with each of the infarcts larger than the  MRI examination of 01/29/2022. The findings are consistent with  extension of the infarcts. There is no evidence of hemorrhagic  transformation, hydrocephalus or of abnormal extra-axial fluid.    Workup to date: The MR angiogram of the head along with MRI of the brain without contrast as per stroke protocol performed on January 29, 2022 was reviewed on the PACS as follows:     FINDINGS: The brain  and ventricular system appear structurally within  normal limits there is ill-defined mild T2 hyperintensity and restricted  diffusion involving cortical gray matter and white matter within the  inferior aspect of the right parietal lobe in the region of the  posterior central gyrus that is consistent with an area of acute  ischemic infarction. This measures approximately 4.0 x 2.3 cm in  diameter. There is no significant associated mass effect. Slightly  inferior and posterior to this lesion there is a second smaller area of  patchy ischemic infarction involving the posterior aspect of the right  temporal lobe that extends into the periventricular white matter of the  occipital horn of the right lateral ventricle. A couple punctate foci of  ischemic infarction are also present in the right occipital lobe. Again,  there is no associated mass effect or hemorrhage. These infarcts are all  in the vascular territory of the right middle cerebral artery. Since  they are  by normal appearing brain parenchyma, they are likely  embolic in etiology. No other foci of restricted diffusion are  identified in the brain or brainstem. No other foci of T2 hyperintensity  are evident in the brain or brainstem. There is no evidence of small  vessel chronic ischemic change. There are no masses. The paranasal  sinuses are well aerated.     The MR angiography images demonstrate no focal intracranial stenoses or  occlusions. In particular, there is no evidence of vasculitis. There are  no aneurysms or vascular malformations.     IMPRESSION:  Multiple areas of acute ischemic infarction within the right  parietal and temporal and occipital lobes as described in more detail  above. The infarcts are all in the vascular territory of the right  middle cerebral artery, and are suspected to be embolic in etiology.  There is no significant associated mass effect and there is no  intracranial hemorrhage.    Diagnoses: Acute cerebrovascular  event that occurred originally on Jerry 28 2022 with waxing and waning of stroke symptoms.  Currently denies stroke scale of 7.      Comment: Had a lengthy discussion with the patient and her  that there will be some fluctuations in the neurological status over the next few weeks.    Plan:  1.  She will be a good candidate for inpatient rehabilitation.  2.  Continue antiplatelet agents as well as lipid-lowering agents as before.  3.  Strict regulation of blood sugar is most important as it tends to put people at high risk of small vessel disease.  4.  Continue physical therapy, Occupational Therapy and speech pathology to prepare her for inpatient rehabilitation.  5.  Agree with dietary consultation as well as diabetic nurse education consultation.    Discussed with nursing staff and the patient and her  and spent half an hour in face-to-face evaluation and management of the patient and will be followed up by our inpatient neurology team.    Samm Candelario MD

## 2022-02-01 NOTE — CONSULTS
"Diabetes Education  Assessment/Teaching    Patient Name:  Jenelle Kasper  YOB: 1960  MRN: [id number]  Admit Date:  1/28/2022      Assessment Date:  2/1/2022    Most Recent Value   General Information     Referral From: MD quintero   Height 170.2 cm (67\")   Height Method Stated   Weight 121 kg (266 lb)   Weight Method Bed scale   Diabetes History    What type of diabetes do you have? Type 2   Do you test your blood sugar at home? yes   Frequency of checks Fasting, after lunch, HS   Meter type unknown but states about 2 years old   Who performs the test? self   Typical readings 200s fasting, 170s after lunch, 180s at HS   Education Preferences    Barriers to Learning other (comment)   Assessment Topics    Taking Medication - Assessment Needs education   Reducing Risk - Assessment Needs education   Monitoring - Assessment Needs education   DM Goals    Reducing Risk - Goal 30-90 days from discharge   Monitoring - Goal 0-7 days from discharge   Contact Plan Follow-up medical care            Most Recent Value   DM Education Needs    Meter Has own   Frequency of Testing AC meals   Blood Glucose Target Range  mg/dL premeal.  Reviewed times of monitoring.   Reducing Risks A1C testing,  Cardiovascular  [A1c 11.7%  Pt states is compliant with taking insulin but is having a difficult time with vial/syringe with her arthritis.  Discussed CVA risk factors.]   Healthy Coping Appropriate   Discharge Plan Follow-up with endocrinolgoist  [Pt states she is being followed by Dr. Zimmer and has an upcoming appointment in Feb/March.]   Motivation Engaged   Teaching Method Discussion,  Explanation   Patient Response Verbalized understanding        Other Comments:  Met with pt and significant other.  Pt states her Endocrinologist was looking at changing her insulin from Novolin 70/30 to something else and is scheduled to follow up in Feb/Mar.      Discussed Lantus and Humalog pt is receiving here and her glucose " responses to current doses.  Significant other states he in interested in pricing and use of pens so he could assist pt in giving insulin.      Per [facility] pharmacist, Lantus for a 3 month supply would be $156 and Humalog for a 3 month supply would be $180.  Total would be $336, $112 per month.  Pt currently pays $100 for 4 vials of Novolin 70/30 at [facility].  Pt and spouse to discuss between themselves on affordability. Encouraged patient assistance programs with insulin .  Pt states she has already tried that route and was denied.      Above discussed w/ nurse.      Electronically signed by:  Hallie Weber RN, [facility]  02/01/22 14:42 EST

## 2022-02-01 NOTE — PROGRESS NOTES
"Daily progress note      2022    Patient Identification:    Name: Jenelle Kasper  Age: 61 y.o.  Sex: female  :  1960  MRN: [id number]                       Primary Care Physician: Zay Olivares MD    Chief Complaint:    Doing same  Still with left-sided weakness  No new complaints  Family at bedside    History of Present Illness:   Patient is a 61-year-old female, with history of atrial fibrillation, pulmonary hypertension, COPD, diabetes mellitus, morbid obesity, sarcoidosis.  Patient was brought to the emergency room because she was found by her  with left-sided facial droop, weakness of her left arm and slurred speech.  Apparently, symptoms resolved with 10 minutes of EMS arrival, by the time she arrived to the ER, patient was back to baseline.  Apparently, patient is noted anticoagulation due to history of GI bleeding.  In the ER, patient was found to have: Hypertension, her NIH score was two, blood sugar was 361, creatinine 0.8, sodium 132, troponin negative, digoxin level 0.5, COVID-19 came back negative.  Urinalysis show WBC 6-12.  Chest ray showed no infiltrates.  CT scan of the head was normal.  Patient was placed in observation for further management.     Review of Systems  Unremarkable except left-sided weakness     Exam:  Blood pressure 129/68, pulse 58, temperature 97.7 °F (36.5 °C), temperature source Infrared, resp. rate 18, height 170.2 cm (67\"), weight 121 kg (266 lb), SpO2 96 %.    General: Alert, oriented x 3. Cooperative, no distress, appears stated age.  Morbid obesity  HEENT:    Head: Normocephalic, without obvious abnormality, atraumatic  Eyes: EOM are normal. Pupils are equal  Oropharynx: Mucosa and tongue normal  Neck: Supple, symmetrical, trachea midline, no adenopathy;              thyroid:  no enlargement/tenderness/nodules;              no carotid bruit or JVD  Cardiovascular: Irregular rate, irregular rhythm.  Normal distal pulses.              Exam reveals " no gallop and no friction rub. No murmur heard  Chest wall: No tenderness or deformity  Pulmonary: Clear to auscultation bilaterally, respirations unlabored.               No rhonchi, wheezing or rales.   Abdominal: Soft, nontender, bowel sounds active all four quadrants,     no masses, no hepatomegaly, no splenomegaly.   Extremities: Normal, atraumatic, no cyanosis or edema  Pulses: 2 + symmetric all extremities  Neurological: Patient is alert and oriented to person, place, and time.  No neurological deficit at this time.  Skin: Skin color, texture, normal. Turgor is decreased. No rashes or lesions      Data Review:  Lab Results (last 24 hours)     Procedure Component Value Units Date/Time    POC Glucose Once [[id number]]  (Abnormal) Collected: 02/01/22 1120    Specimen: Blood Updated: 02/01/22 1122     Glucose 157 mg/dL      Comment: Meter: [id number] : [id number] Robert DIAS       BNP [[id number]]  (Abnormal) Collected: 02/01/22 0506    Specimen: Blood Updated: 02/01/22 0722     proBNP 6,153.0 pg/mL     Narrative:      Among patients with dyspnea, NT-proBNP is highly sensitive for the detection of acute congestive heart failure. In addition NT-proBNP of <300 pg/ml effectively rules out acute congestive heart failure with 99% negative predictive value.    Results may be falsely decreased if patient taking Biotin.      TSH [[id number]]  (Normal) Collected: 02/01/22 0506    Specimen: Blood Updated: 02/01/22 0722     TSH 1.080 uIU/mL     Comprehensive Metabolic Panel [[id number]]  (Abnormal) Collected: 02/01/22 0506    Specimen: Blood Updated: 02/01/22 0718     Glucose 121 mg/dL      BUN 7 mg/dL      Creatinine 0.88 mg/dL      Sodium 137 mmol/L      Potassium 3.2 mmol/L      Chloride 98 mmol/L      CO2 29.0 mmol/L      Calcium 9.5 mg/dL      Total Protein 6.0 g/dL      Albumin 2.50 g/dL      ALT (SGPT) 5 U/L      AST (SGOT) 7 U/L      Alkaline Phosphatase 80 U/L      Total Bilirubin 0.6 mg/dL      eGFR Non   Amer 66 mL/min/1.73      Globulin 3.5 gm/dL      A/G Ratio 0.7 g/dL      BUN/Creatinine Ratio 8.0     Anion Gap 10.0 mmol/L     Narrative:      GFR Normal >60  Chronic Kidney Disease <60  Kidney Failure <15      Digoxin Level [[id number]]  (Normal) Collected: 02/01/22 0506    Specimen: Blood Updated: 02/01/22 0718     Digoxin 1.00 ng/mL     Lipid Panel [[id number]]  (Abnormal) Collected: 02/01/22 0506    Specimen: Blood Updated: 02/01/22 0718     Total Cholesterol 71 mg/dL      Triglycerides 112 mg/dL      HDL Cholesterol 17 mg/dL      LDL Cholesterol  33 mg/dL      VLDL Cholesterol 21 mg/dL      LDL/HDL Ratio 1.86    Narrative:      Cholesterol Reference Ranges  ([place] Department of Health and Human Services ATP III Classifications)    Desirable          <200 mg/dL  Borderline High    200-239 mg/dL  High Risk          >240 mg/dL      Triglyceride Reference Ranges  (U.S. Department of Health and Human Services ATP III Classifications)    Normal           <150 mg/dL  Borderline High  150-199 mg/dL  High             200-499 mg/dL  Very High        >500 mg/dL    HDL Reference Ranges  (U.S. Department of Health and Human Services ATP III Classifications)    Low     <40 mg/dl (major risk factor for CHD)  High    >60 mg/dl ('negative' risk factor for CHD)        LDL Reference Ranges  (U.S. Department of Health and Human Services ATP III Classifications)    Optimal          <100 mg/dL  Near Optimal     100-129 mg/dL  Borderline High  130-159 mg/dL  High             160-189 mg/dL  Very High        >189 mg/dL    CBC & Differential [[id number]]  (Abnormal) Collected: 02/01/22 0506    Specimen: Blood Updated: 02/01/22 0645    Narrative:      The following orders were created for panel order CBC & Differential.  Procedure                               Abnormality         Status                     ---------                               -----------         ------                     CBC Auto Differential[[id number]]         Abnormal            Final result                 Please view results for these tests on the individual orders.    CBC Auto Differential [[id number]]  (Abnormal) Collected: 02/01/22 0506    Specimen: Blood Updated: 02/01/22 0645     WBC 5.08 10*3/mm3      RBC 3.82 10*6/mm3      Hemoglobin 10.3 g/dL      Hematocrit 33.8 %      MCV 88.5 fL      MCH 27.0 pg      MCHC 30.5 g/dL      RDW 14.1 %      RDW-SD 45.2 fl      MPV 10.9 fL      Platelets 255 10*3/mm3      Neutrophil % 76.1 %      Lymphocyte % 14.2 %      Monocyte % 6.5 %      Eosinophil % 2.0 %      Basophil % 0.4 %      Immature Grans % 0.8 %      Neutrophils, Absolute 3.87 10*3/mm3      Lymphocytes, Absolute 0.72 10*3/mm3      Monocytes, Absolute 0.33 10*3/mm3      Eosinophils, Absolute 0.10 10*3/mm3      Basophils, Absolute 0.02 10*3/mm3      Immature Grans, Absolute 0.04 10*3/mm3      nRBC 0.0 /100 WBC     POC Glucose Once [[id number]]  (Normal) Collected: 02/01/22 0627    Specimen: Blood Updated: 02/01/22 0628     Glucose 113 mg/dL      Comment: Meter: [id number] : [id number] Alyson Cervantes NA       POC Glucose Once [[id number]]  (Normal) Collected: 01/31/22 2122    Specimen: Blood Updated: 01/31/22 2123     Glucose 129 mg/dL      Comment: Meter: [id number] : [id number] Katt Lara NA       POC Glucose Once [[id number]]  (Abnormal) Collected: 01/31/22 1641    Specimen: Blood Updated: 01/31/22 1643     Glucose 164 mg/dL      Comment: Meter: [id number] : [id number] Robert Hancock NA             Imaging Results (All)     Procedure Component Value Units Date/Time    MRI Brain Without Contrast [[id number]] Collected: 01/29/22 1154     Updated: 01/29/22 1215    Narrative:      MRI BRAIN WO CONTRAST-, MRI ANGIOGRAM HEAD WO CONTRAST-     CLINICAL HISTORY: Slurred speech and left arm weakness and left facial  drooping. TIA versus CVA.     TECHNIQUE: Multiple axial T1, T2, gradient echo and diffusion images  were acquired. Sagittal T1-weighted images were  also obtained. MR  angiography images of the brain were acquired using  time-of-flight-of-flight vascular enhancement techniques.      COMPARISON: CT scan of the head dated 01/28/2022.     FINDINGS: The brain and ventricular system appear structurally within  normal limits there is ill-defined mild T2 hyperintensity and restricted  diffusion involving cortical gray matter and white matter within the  inferior aspect of the right parietal lobe in the region of the  posterior central gyrus that is consistent with an area of acute  ischemic infarction. This measures approximately 4.0 x 2.3 cm in  diameter. There is no significant associated mass effect. Slightly  inferior and posterior to this lesion there is a second smaller area of  patchy ischemic infarction involving the posterior aspect of the right  temporal lobe that extends into the periventricular white matter of the  occipital horn of the right lateral ventricle. A couple punctate foci of  ischemic infarction are also present in the right occipital lobe. Again,  there is no associated mass effect or hemorrhage. These infarcts are all  in the vascular territory of the right middle cerebral artery. Since  they are  by normal appearing brain parenchyma, they are likely  embolic in etiology. No other foci of restricted diffusion are  identified in the brain or brainstem. No other foci of T2 hyperintensity  are evident in the brain or brainstem. There is no evidence of small  vessel chronic ischemic change. There are no masses. The paranasal  sinuses are well aerated.     The MR angiography images demonstrate no focal intracranial stenoses or  occlusions. In particular, there is no evidence of vasculitis. There are  no aneurysms or vascular malformations.       Impression:      Multiple areas of acute ischemic infarction within the right  parietal and temporal and occipital lobes as described in more detail  above. The infarcts are all in the vascular  territory of the right  middle cerebral artery, and are suspected to be embolic in etiology.  There is no significant associated mass effect and there is no  intracranial hemorrhage.     This report was finalized on 1/29/2022 12:12 PM by Dr. Gabino Perez M.D.       MRI Angiogram Head Without Contrast [[id number]] Collected: 01/29/22 1154     Updated: 01/29/22 1215    Narrative:      MRI BRAIN WO CONTRAST-, MRI ANGIOGRAM HEAD WO CONTRAST-     CLINICAL HISTORY: Slurred speech and left arm weakness and left facial  drooping. TIA versus CVA.     TECHNIQUE: Multiple axial T1, T2, gradient echo and diffusion images  were acquired. Sagittal T1-weighted images were also obtained. MR  angiography images of the brain were acquired using  time-of-flight-of-flight vascular enhancement techniques.      COMPARISON: CT scan of the head dated 01/28/2022.     FINDINGS: The brain and ventricular system appear structurally within  normal limits there is ill-defined mild T2 hyperintensity and restricted  diffusion involving cortical gray matter and white matter within the  inferior aspect of the right parietal lobe in the region of the  posterior central gyrus that is consistent with an area of acute  ischemic infarction. This measures approximately 4.0 x 2.3 cm in  diameter. There is no significant associated mass effect. Slightly  inferior and posterior to this lesion there is a second smaller area of  patchy ischemic infarction involving the posterior aspect of the right  temporal lobe that extends into the periventricular white matter of the  occipital horn of the right lateral ventricle. A couple punctate foci of  ischemic infarction are also present in the right occipital lobe. Again,  there is no associated mass effect or hemorrhage. These infarcts are all  in the vascular territory of the right middle cerebral artery. Since  they are  by normal appearing brain parenchyma, they are likely  embolic in etiology. No  other foci of restricted diffusion are  identified in the brain or brainstem. No other foci of T2 hyperintensity  are evident in the brain or brainstem. There is no evidence of small  vessel chronic ischemic change. There are no masses. The paranasal  sinuses are well aerated.     The MR angiography images demonstrate no focal intracranial stenoses or  occlusions. In particular, there is no evidence of vasculitis. There are  no aneurysms or vascular malformations.       Impression:      Multiple areas of acute ischemic infarction within the right  parietal and temporal and occipital lobes as described in more detail  above. The infarcts are all in the vascular territory of the right  middle cerebral artery, and are suspected to be embolic in etiology.  There is no significant associated mass effect and there is no  intracranial hemorrhage.     This report was finalized on 1/29/2022 12:12 PM by Dr. Gabino Perez M.D.       CT Head Without Contrast Stroke Protocol [[id number]] Collected: 01/28/22 2247     Updated: 01/28/22 2253    Narrative:      CT HEAD WO CONTRAST STROKE PROTOCOL-     CLINICAL HISTORY: Left-sided facial drooping and left arm weakness.  Slurred speech.     TECHNIQUE: Transverse 3 mm thick images were obtained from the base of  the skull to the vertex without IV contrast.     Radiation dose reduction techniques were utilized, including automated  exposure control and exposure modulation based on body size.     COMPARISON: None     FINDINGS: The brain and ventricular system appear structurally normal.  There is no evidence of recent or old intracranial hemorrhage or  infarction. There are no masses. The paranasal sinuses and mastoid air  cells and middle ear cavities are well aerated.       Impression:      Normal CT scan of the head without contrast.     This report was finalized on 1/28/2022 10:50 PM by Dr. Gabino Perez M.D.       XR Chest 1 View [[id number]] Collected: 01/28/22 2233     Updated:  01/28/22 2237    Narrative:      PORTABLE CHEST 01/28/2022 AT 10:08 PM     CLINICAL HISTORY: Possible acute CVA. Rule out aspiration.     The lungs are fairly well-expanded and appear free of focal infiltrates.  The heart is moderately enlarged and there is mild pulmonary vascular  engorgement but no anitra edema. There are no pleural effusions.     This report was finalized on 1/28/2022 10:33 PM by Dr. Gabino Perez M.D.             Current Facility-Administered Medications:   •  acetaminophen (TYLENOL) tablet 650 mg, 650 mg, Oral, Q6H PRN, Sánchez Maxwell MD, 650 mg at 02/01/22 0903  •  albuterol (PROVENTIL) nebulizer solution 0.083% 2.5 mg/3mL, 2.5 mg, Nebulization, Q6H PRN, Sánchez Maxwell MD  •  amLODIPine (NORVASC) tablet 10 mg, 10 mg, Oral, Q24H, Michele Subramanian MD, 10 mg at 02/01/22 0903  •  apixaban (ELIQUIS) tablet 5 mg, 5 mg, Oral, Q12H, Esvin Watkins, APRN, 5 mg at 02/01/22 0903  •  atenolol (TENORMIN) tablet 75 mg, 75 mg, Oral, BID, Sánchez Maxwell MD, 75 mg at 02/01/22 0903  •  atorvastatin (LIPITOR) tablet 80 mg, 80 mg, Oral, Nightly, Michele Subramanian MD, 80 mg at 01/31/22 2053  •  cefTRIAXone (ROCEPHIN) 1 g in sodium chloride 0.9 % 100 mL IVPB-VTB, 1 g, Intravenous, Q24H, Michele Subramanian MD, Last Rate: 200 mL/hr at 01/31/22 1436, 1 g at 01/31/22 1436  •  digoxin (LANOXIN) tablet 250 mcg, 250 mcg, Oral, Daily, Sánchez Maxwell MD, 250 mcg at 02/01/22 0903  •  diphenoxylate-atropine (LOMOTIL) 2.5-0.025 MG per tablet 1 tablet, 1 tablet, Oral, 4x Daily PRN, Sánchez Maxwell MD  •  gabapentin (NEURONTIN) capsule 300 mg, 300 mg, Oral, TID, Sánchez Maxwell MD, 300 mg at 02/01/22 0903  •  hydrALAZINE (APRESOLINE) tablet 100 mg, 100 mg, Oral, Q12H, Sánchez Maxwell MD, 100 mg at 02/01/22 0903  •  insulin glargine (LANTUS, SEMGLEE) injection 15 Units, 15 Units, Subcutaneous, Nightly, Michele Subramanian MD, 15 Units at 01/31/22 2053  •  insulin lispro (ADMELOG) injection 0-9 Units, 0-9 Units, Subcutaneous, TID  AC, Sánchez Maxwell MD, 2 Units at 02/01/22 1144  •  insulin lispro (ADMELOG) injection 5 Units, 5 Units, Subcutaneous, TID With Meals, Michele Subramanian MD, 5 Units at 02/01/22 1144  •  levothyroxine (SYNTHROID, LEVOTHROID) tablet 300 mcg, 300 mcg, Oral, Daily, Sánchez Maxwell MD, 300 mcg at 02/01/22 0903  •  losartan (COZAAR) tablet 100 mg, 100 mg, Oral, Daily, Sánchez Maxwell MD, 100 mg at 02/01/22 1144  •  nitroglycerin (NITROSTAT) SL tablet 0.4 mg, 0.4 mg, Sublingual, Q5 Min PRN, Michele Subramanian MD  •  ondansetron (ZOFRAN) tablet 4 mg, 4 mg, Oral, Q6H PRN, Michele Subramanian MD  •  pantoprazole (PROTONIX) EC tablet 40 mg, 40 mg, Oral, Q AM, Sánchez Maxwell MD, 40 mg at 01/31/22 0843  •  potassium chloride (K-DUR,KLOR-CON) ER tablet 20 mEq, 20 mEq, Oral, BID With Meals, Michele Subramanian MD, 20 mEq at 02/01/22 0904  •  sodium chloride 0.9 % flush 20 mL, 20 mL, Intravenous, PRN, Sánchez Maxwell MD  •  spironolactone (ALDACTONE) tablet 25 mg, 25 mg, Oral, Daily, Maria Teresa Judge MD, 25 mg at 02/01/22 0904  •  torsemide (DEMADEX) tablet 40 mg, 40 mg, Oral, Daily, Sánchez Maxwell MD, 40 mg at 02/01/22 0903     ASSESSMENT  Acute multiple right MCA ischemic infarction involving parietal and occipital lobes  Acute UTI  Chronic atrial fibrillation  History of GI bleed  Diabetes mellitus  Hypertension  Hyperlipidemia  Morbidly obese  Pulmonary hypertension  Gastroesophageal reflux disease    PLAN  CPM  Eliquis Lipitor  IV antibiotics for 5 days  No need for MARIZA  Continue home medications  Stress ulcer DVT prophylaxis  Cardiology input appreciated  Neurology to follow patient  Supportive care  PT OT ST  Discussed with nursing staff and family  Acute versus subacute rehab once bed available    Michele Subramanian MD  2/1/2022  14:10 EST    I wore protective equipment throughout this patient encounter including a face mask, gloves, and protective eyewear.  Hand hygiene was performed before donning protective equipment and after  removal when leaving the room.

## 2022-02-01 NOTE — PLAN OF CARE
Goal Outcome Evaluation:  Plan of Care Reviewed With: (P) patient        Progress: (P) improving  Outcome Summary: (P) Pt agreeable to OT session, stated she was tired and did not sleep well last night. Pt with improved sitting balance from last encounter. Pt completed UE exercises to increase UE strength for self-care and functional mobility sitting EOB. Pt completed grooming sitting EOB. Pt completed dynamic reaching and visual scanning with L hand gross grasp and release x 4 sitting EOB. Pt completed fine and gross motor L hand and UE activity x5 sitting EOB. Gross and fine motor activities to address visual, gross and fine motor deficits. Recommend continued skilled OT services to increase independence in self-care, functional transfers, and to increase UE strength. Recommend IRF vs subacute rehab at d/c.    OTS wore a mask, gloves, eye protection, and completed hand hygiene prior to/after session. Pt wore a mask intermittently.

## 2022-02-01 NOTE — THERAPY TREATMENT NOTE
Patient Name: Jenelle Kasper  : 1960    MRN: [id number]                              Today's Date: 2022       Admit Date: 2022    Visit Dx:     ICD-10-CM ICD-9-CM   1. TIA (transient ischemic attack)  G45.9 435.9   2. Left-sided weakness  R53.1 728.87   3. Facial droop  R29.810 781.94   4. Slurred speech  R47.81 784.59     Patient Active Problem List   Diagnosis   • Persistent atrial fibrillation (HCC)   • Benign essential HTN   • Hyperparathyroidism (HCC)   • Morbid obesity with BMI of 45.0-49.9, adult (HCC)   • Pulmonary hypertension (HCC)   • Precordial pain   • Shortness of breath   • TIA (transient ischemic attack)     Past Medical History:   Diagnosis Date   • Anxiety    • Arrhythmia    • Asthma    • Atrial fibrillation with RVR (HCC)    • C. difficile diarrhea    • COPD (chronic obstructive pulmonary disease) (HCC)    • Depression    • Diabetes mellitus (HCC)    • Diabetic peripheral neuropathy associated with type 2 diabetes mellitus (HCC)    • GERD (gastroesophageal reflux disease)    • Heart murmur    • History of fall     closed head injury after falling down steps; fx nose   • Hypercalcemia    • Hyperlipidemia    • Hypertension    • IBS (irritable bowel syndrome)    • Kidney stone    • Morbid obesity (HCC)    • PAF (paroxysmal atrial fibrillation) (HCC)    • PCOS (polycystic ovarian syndrome)    • PONV (postoperative nausea and vomiting)    • Sarcoidosis    • Shortness of breath    • Thyroid cancer (HCC)     WITH RADIATION   • Wounds, multiple      Past Surgical History:   Procedure Laterality Date   • CARDIAC CATHETERIZATION N/A 2016    Procedure: Coronary angiography;  Surgeon: Chris Perez MD;  Location: [facility] CATH INVASIVE LOCATION;  Service:    • CARDIAC CATHETERIZATION N/A 2016    Procedure: Left heart cath;  Surgeon: Chris Perez MD;  Location: [facility] CATH INVASIVE LOCATION;  Service:    • CARDIAC CATHETERIZATION N/A 2016    Procedure: Left  ventriculography;  Surgeon: Chris Perez MD;  Location:  [facility] CATH INVASIVE LOCATION;  Service:    • CARDIAC CATHETERIZATION N/A 2/26/2020    Procedure: Right Heart Cath;  Surgeon: Chris Perez MD;  Location:  [facility] CATH INVASIVE LOCATION;  Service: Cardiovascular;  Laterality: N/A;  If there is no evidence of pulmonary hypertension please add a left heart cath to evaluate coronary arteries as patient is having chest pain.   • CHOLECYSTECTOMY     • COLONOSCOPY     • DILATATION AND CURETTAGE     • ENDOSCOPY     • GASTRIC BYPASS      gastric surgery for morbid obesity   • GASTROPLASTY  2008    GASTRIC REVISION FROM PREVIOUS GASTRIC BYPASS   • HIATAL HERNIA REPAIR     • INGUINAL HERNIA REPAIR Right    • LUNG BIOPSY      to dx sarcoidosis   • LUNG SURGERY      Removal of granulomas   • TOTAL THYROIDECTOMY        General Information     Row Name 02/01/22 1459          OT Time and Intention    Document Type therapy note (daily note)  -SM (r) LR (t) SM (c)     Mode of Treatment occupational therapy; individual therapy  -SM (r) LR (t) SM (c)     Row Name 02/01/22 1459          General Information    Patient Profile Reviewed yes  -SM (r) LR (t) SM (c)     Existing Precautions/Restrictions fall  -SM (r) LR (t) SM (c)     Barriers to Rehab medically complex  -SM (r) LR (t) SM (c)     Row Name 02/01/22 1452          Cognition    Orientation Status (Cognition) oriented x 3  -SM (r) LR (t) SM (c)     Row Name 02/01/22 1459          Safety Issues, Functional Mobility    Safety Issues Affecting Function (Mobility) awareness of need for assistance; insight into deficits/self-awareness  -SM (r) LR (t) SM (c)     Impairments Affecting Function (Mobility) cognition; endurance/activity tolerance; grasp; visual/perceptual; strength; range of motion (ROM); coordination; pain  -SM (r) LR (t) SM (c)     Cognitive Impairments, Mobility Safety/Performance awareness, need for assistance; insight into deficits/self-awareness  -SM (r) LR  (t) SM (c)           User Key  (r) = Recorded By, (t) = Taken By, (c) = Cosigned By    Initials Name Provider Type    Lexy Blue, OT Occupational Therapist    LR Yanet Santiago, OT Student OT Student                 Mobility/ADL's     Row Name 02/01/22 1500          Bed Mobility    Bed Mobility supine-sit; sit-supine  -SM (r) LR (t) SM (c)     Supine-Sit [place] (Bed Mobility) maximum assist (25% patient effort); 1 person assist  -SM (r) LR (t) SM (c)     Sit-Supine [place] (Bed Mobility) maximum assist (25% patient effort); 2 person assist  -SM (r) LR (t) SM (c)     Bed Mobility, Safety Issues decreased use of arms for pushing/pulling; decreased use of legs for bridging/pushing  -SM (r) LR (t) SM (c)     Assistive Device (Bed Mobility) bed rails; draw sheet; head of bed elevated  -SM (r) LR (t) SM (c)     Comment (Bed Mobility) Pt required extra time for bed mobility, pt able to use B UE to active assist L leg in progressing towards the edge of bed to transfer from supine to sitting EOB. Pt unable to complete moving L LE with active assist and OTS moved L LE off bed. Max A x2 for transfer from sitting EOB to supine, 1 person guiding UB, 1 person lifting B LE into bed.  -SM (r) LR (t) SM (c)     Row Name 02/01/22 1500          Transfers    Transfers sit-stand transfer  -SM (r) LR (t) SM (c)     Comment (Transfers) Pt requried max A x2 to transfer from sitting EOB to standing to straighten bedding before end of encounter. Pt required B LE blocking during standing. Pt required verbal cues to look up and lock knees during standing, pt demo'd understanding and ability.  -SM (r) LR (t) SM (c)     Sit-Stand [place] (Transfers) maximum assist (25% patient effort); 2 person assist; verbal cues  -SM (r) LR (t) SM (c)     Row Name 02/01/22 1500          Activities of Daily Living    BADL Assessment/Intervention grooming  -SM (r) LR (t) SM (c)     Row Name 02/01/22 1500          Grooming  Assessment/Training    [place] Level (Grooming) grooming skills; hair care, combing/brushing; oral care regimen; moderate assist (50% patient effort); verbal cues; set up; dependent (less than 25% patient effort)  -SM (r) LR (t) SM (c)     Position (Grooming) edge of bed sitting  -SM (r) LR (t) SM (c)     Comment (Grooming) Pt required set-up to complete oral hygiene sitting EOB. Pt required mod A to complete hair washing with shampoo cap. Pt c/o fatigue and stated was unable to comb out her hair, OTS combed pt's hair.  -SM (r) LR (t) SM (c)           User Key  (r) = Recorded By, (t) = Taken By, (c) = Cosigned By    Initials Name Provider Type    Lexy Blue, OT Occupational Therapist    LR Yanet Santiago OT Student OT Student               Obj/Interventions     Row Name 02/01/22 1506          Shoulder (Therapeutic Exercise)    Shoulder (Therapeutic Exercise) AAROM (active assistive range of motion)  -SM (r) LR (t) SM (c)     Shoulder AAROM (Therapeutic Exercise) bilateral; scapular retraction; scapular protraction; other (see comments)  forward presses x10, overhead presses x 10  -SM (r) LR (t) SM (c)     Row Name 02/01/22 1506          Elbow/Forearm (Therapeutic Exercise)    Elbow/Forearm (Therapeutic Exercise) AROM (active range of motion)  -SM (r) LR (t) SM (c)     Elbow/Forearm AROM (Therapeutic Exercise) bilateral; flexion; extension; 10 repetitions  -SM (r) LR (t) SM (c)     Row Name 02/01/22 1506          Hand (Therapeutic Exercise)    Hand (Therapeutic Exercise) AROM (active range of motion)  -SM (r) LR (t) SM (c)     Hand AROM/AAROM (Therapeutic Exercise) bilateral; finger flexion; finger extension; 10 repetitions  -SM (r) LR (t) SM (c)     Row Name 02/01/22 1506          Motor Skills    Motor Skills coordination; motor control/coordination interventions  -SM (r) LR (t) SM (c)     Coordination fine motor deficit; left; gross motor deficit  -SM (r) LR (t) SM (c)     Motor  Control/Coordination Interventions gross motor coordination activities  -SM (r) LR (t) SM (c)     Gross Motor Skill, Impairments Detail Grasping and releasing cups with L hand; grasping paper towel, wadding into ball, and throwing with L UE  -SM (r) LR (t) SM (c)     Row Name 02/01/22 1506          Balance    Balance Assessment sitting static balance; sitting dynamic balance  -SM (r) LR (t) SM (c)     Static Sitting Balance mild impairment; sitting, edge of bed; WFL  -SM (r) LR (t) SM (c)     Dynamic Sitting Balance WFL  -SM (r) LR (t) SM (c)     Balance Interventions sitting; dynamic reaching; dynamic; minimal challenge; occupation based/functional task; static  -SM (r) LR (t) SM (c)     Comment, Balance Pt intially required min A to maintain static sitting balance EOB, sitting balance improved during encounter, requiring supervision for dynamic sitting balance by end of encounter.  -SM (r) LR (t) SM (c)     Row Name 02/01/22 1506          Therapeutic Exercise    Therapeutic Exercise shoulder; elbow/forearm; hand  -SM (r) LR (t) SM (c)           User Key  (r) = Recorded By, (t) = Taken By, (c) = Cosigned By    Initials Name Provider Type    Lexy Blue, OT Occupational Therapist    LR Yanet Santiago, OT Student OT Student               Goals/Plan    No documentation.                Clinical Impression     Row Name 02/01/22 1511          Pain Assessment    Additional Documentation Pain Scale: Numbers Pre/Post-Treatment (Group)  -SM (r) LR (t) SM (c)     Row Name 02/01/22 1511          Pain Scale: Numbers Pre/Post-Treatment    Pretreatment Pain Rating 9/10  -SM (r) LR (t) SM (c)     Pain Location - Side Left  -SM (r) LR (t) SM (c)     Pain Location knee  -SM (r) LR (t) SM (c)     Pain Intervention(s) Ambulation/increased activity; Repositioned; Elevated  -SM (r) LR (t) SM (c)     Row Name 02/01/22 1511          Plan of Care Review    Plan of Care Reviewed With patient  -SM (r) LR (t) SM (c)     Progress  improving  -SM (r) LR (t) SM (c)     Outcome Summary Pt agreeable to OT session, stated she was tired and did not sleep well last night. Pt with improved sitting balance from last encounter. Pt completed UE exercises to increase UE strength for self-care and functional mobility sitting EOB. Pt completed grooming sitting EOB. Pt completed dynamic reaching and visual scanning with L hand gross grasp and release x 4 sitting EOB. Pt completed fine and gross motor L hand and UE activity x5 sitting EOB. Gross and fine motor activities to address visual, gross and fine motor deficits. Recommend continued skilled OT services to increase independence in self-care, functional transfers, and to increase UE strength. Recommend IRF vs subacute rehab at d/c.  -SM (r) LR (t) SM (c)     Row Name 02/01/22 1511          Therapy Assessment/Plan (OT)    Rehab Potential (OT) good, to achieve stated therapy goals  -SM (r) LR (t) SM (c)     Criteria for Skilled Therapeutic Interventions Met (OT) yes; skilled treatment is necessary  -SM (r) LR (t) SM (c)     Row Name 02/01/22 1511          Therapy Plan Review/Discharge Plan (OT)    Equipment Needs Upon Discharge (OT) shower chair  -SM (r) LR (t) SM (c)     Anticipated Discharge Disposition (OT) sub acute care setting; inpatient rehabilitation facility  -SM (r) LR (t) SM (c)     Row Name 02/01/22 1511          Positioning and Restraints    Pre-Treatment Position in bed  -SM (r) LR (t) SM (c)     Post Treatment Position bed  -SM (r) LR (t) SM (c)     In Bed fowlers; exit alarm on; encouraged to call for assist; call light within reach  -SM (r) LR (t) SM (c)           User Key  (r) = Recorded By, (t) = Taken By, (c) = Cosigned By    Initials Name Provider Type    Lexy Blue, OT Occupational Therapist    Yanet Bhandari, OT Student OT Student               Outcome Measures     Row Name 02/01/22 1516          How much help from another is currently needed...    Putting on and  taking off regular lower body clothing? 2  -SM (r) LR (t) SM (c)     Bathing (including washing, rinsing, and drying) 2  -SM (r) LR (t) SM (c)     Toileting (which includes using toilet bed pan or urinal) 1  -SM (r) LR (t) SM (c)     Putting on and taking off regular upper body clothing 2  -SM (r) LR (t) SM (c)     Taking care of personal grooming (such as brushing teeth) 3  -SM (r) LR (t) SM (c)     Eating meals 3  -SM (r) LR (t) SM (c)     AM-PAC 6 Clicks Score (OT) 13  -SM (r) LR (t)     Row Name 02/01/22 1516          Functional Assessment    Outcome Measure Options AM-PAC 6 Clicks Daily Activity (OT)  -SM (r) LR (t) SM (c)           User Key  (r) = Recorded By, (t) = Taken By, (c) = Cosigned By    Initials Name Provider Type    Lexy Blue OT Occupational Therapist    LR Yanet Santiago OT Student OT Student                Occupational Therapy Education                 Title: PT OT SLP Therapies (In Progress)     Topic: Occupational Therapy (In Progress)     Point: ADL training (Done)     Description:   Instruct learner(s) on proper safety adaptation and remediation techniques during self care or transfers.   Instruct in proper use of assistive devices.              Learning Progress Summary           Patient Acceptance, E, VU,NR by LR at 1/31/2022 1201    Comment: Pt ed on role of OT, POC goals, d/c planning. Pt ed on LE placement prior to transfer from sitting EOB to standing, pt verbalized understanding, requires tactile cues place L LE in proper placement. Pt requires reinforcement for safety in transfers.                   Point: Home exercise program (Not Started)     Description:   Instruct learner(s) on appropriate technique for monitoring, assisting and/or progressing therapeutic exercises/activities.              Learner Progress:  Not documented in this visit.          Point: Precautions (Not Started)     Description:   Instruct learner(s) on prescribed precautions during self-care and  functional transfers.              Learner Progress:  Not documented in this visit.          Point: Body mechanics (Not Started)     Description:   Instruct learner(s) on proper positioning and spine alignment during self-care, functional mobility activities and/or exercises.              Learner Progress:  Not documented in this visit.                      User Key     Initials Effective Dates Name Provider Type Discipline    LR 01/18/22 -  Yanet Santiago OT Student OT Student OT              OT Recommendation and Plan  Planned Therapy Interventions (OT): activity tolerance training, BADL retraining, functional balance retraining, neuromuscular control/coordination retraining, occupation/activity based interventions, patient/caregiver education/training, transfer/mobility retraining, strengthening exercise, ROM/therapeutic exercise  Therapy Frequency (OT): 5 times/wk  Plan of Care Review  Plan of Care Reviewed With: patient  Progress: improving  Outcome Summary: Pt agreeable to OT session, stated she was tired and did not sleep well last night. Pt with improved sitting balance from last encounter. Pt completed UE exercises to increase UE strength for self-care and functional mobility sitting EOB. Pt completed grooming sitting EOB. Pt completed dynamic reaching and visual scanning with L hand gross grasp and release x 4 sitting EOB. Pt completed fine and gross motor L hand and UE activity x5 sitting EOB. Gross and fine motor activities to address visual, gross and fine motor deficits. Recommend continued skilled OT services to increase independence in self-care, functional transfers, and to increase UE strength. Recommend IRF vs subacute rehab at d/c.     Time Calculation:    Time Calculation- OT     Row Name 02/01/22 1517             Time Calculation- OT    OT Start Time 1306  -SM (r) LR (t) SM (c)      OT Stop Time 1349  -SM (r) LR (t) SM (c)      OT Time Calculation (min) 43 min  -SM (r) LR (t)      Total Timed  Code Minutes- OT 43 minute(s)  -SM (r) LR (t) SM (c)      OT Received On 02/01/22  -SM (r) LR (t) SM (c)      OT - Next Appointment 02/02/22  -SM (r) LR (t) SM (c)              Timed Charges    80785 - OT Therapeutic Activity Minutes 28  -SM (r) LR (t) SM (c)      57241 - OT Self Care/Mgmt Minutes 15  -SM (r) LR (t) SM (c)              Total Minutes    Timed Charges Total Minutes 43  -SM (r) LR (t)       Total Minutes 43  -SM (r) LR (t)            User Key  (r) = Recorded By, (t) = Taken By, (c) = Cosigned By    Initials Name Provider Type    Lexy Blue, OT Occupational Therapist    LR Yanet Santiago, OT Student OT Student              Therapy Charges for Today     Code Description Service Date Service Provider Modifiers Qty    59751715121 HC OT SELF CARE/MGMT/TRAIN EA 15 MIN 1/31/2022 Yanet Santiago, OT Student GO 1    14325408787 HC OT THERAPEUTIC ACT EA 15 MIN 1/31/2022 Yanet Santiago OT Student GO 1    47347851872 HC OT EVAL MOD COMPLEXITY 2 1/31/2022 Yanet Santiago OT Student GO 1    14814109710 HC OT THERAPEUTIC ACT EA 15 MIN 2/1/2022 Yanet Santiago OT Student GO 2    41043668604 HC OT SELF CARE/MGMT/TRAIN EA 15 MIN 2/1/2022 Yanet Santiago, OT Student GO 1               YANET SANTIAGO OT Student  2/1/2022

## 2022-02-01 NOTE — DISCHARGE PLACEMENT REQUEST
"Jenelle Kasper (61 y.o. Female)             Date of Birth Social Security Number Address Home Phone MRN    1960  [address] [place] [address] [phone] [id number]    [place] Marital Status             None Single       Admission Date Admission Type Admitting Provider Attending Provider Department, Room/Bed    1/28/22 Emergency Michele Subramanian MD Ahmed, Aftab, MD [address], [address]    Discharge Date Discharge Disposition Discharge Destination                         Attending Provider: Michele Subramanian MD    Allergies: Clindamycin/lincomycin, Codeine Sulfate, Contrast Dye, Iodinated Diagnostic Agents, Morphine Sulfate, Other, Pradaxa [Dabigatran Etexilate Mesylate], Latex, Natural Rubber, Bee Venom, Hydrocodone, Propoxyphene, Sulfa Antibiotics    Isolation: None   Infection: None   Code Status: CPR   Advance Care Planning Activity    Ht: 170.2 cm (67\")   Wt: 121 kg (266 lb)    Admission Cmt: None   Principal Problem: None                Active Insurance as of 1/28/2022     Primary Coverage     Payor Plan Insurance Group Employer/Plan Group    [facility] MEDICARE REPLACEMENT [facility] MEDICARE REPLACEMENT [id number]     Payor Plan Address Payor Plan Phone Number Payor Plan Fax Number Effective Dates    [address] [address]   4/1/2019 - None Entered    [facility] [id number]       Subscriber Name Subscriber Birth Date Member ID       Jenelle Kasper 1960 [id number]                 Emergency Contacts      (Rel.) Home Phone Work Phone Mobile Phone    Gabino Owen (Significant Other) [phone] -- [phone]    Mervat Hardin (Sister) -- -- --    Cristobal Chapman -- -- --              "

## 2022-02-01 NOTE — PROGRESS NOTES
Hospital Follow Up    LOS:  LOS: 2 days   Patient Name: Jenelle Kasper  Age/Sex: 61 y.o. female  : 1960  MRN: [id number]    Day of Service: 22   Length of Stay: 2  Encounter Provider: Baldev Winter MD  Place of Service: [facility] CARDIOLOGY  Patient Care Team:  Zay Olivares MD as PCP - General (General Practice)    Subjective:     Chief Complaint: Stroke/atrial fibrillation    Interval History: Patient still weak says she just feels poorly today    Objective:     Objective:  Temp:  [97.8 °F (36.6 °C)-99.2 °F (37.3 °C)] 97.8 °F (36.6 °C)  Heart Rate:  [55-60] 60  Resp:  [18] 18  BP: (139-163)/(60-85) 157/75     Intake/Output Summary (Last 24 hours) at 2022 1128  Last data filed at 2022 0905  Gross per 24 hour   Intake 460 ml   Output 800 ml   Net -340 ml     Body mass index is 41.66 kg/m².      22  2205 22  1525   Weight: 121 kg (266 lb 12.1 oz) 121 kg (266 lb)     Weight change:       Physical Exam:   General : Alert, cooperative, in no acute distress.  Neuro: Alert,cooperative and oriented.  Lungs: CTAB. Normal respiratory effort and rate.  CV: irregular rate and rhythm, normal S1 and S2, no murmurs, gallops or rubs.  ABD: Soft, nontender, nondistended. Positive bowel sounds.  Extr: No edema or cyanosis, moves all extremities.    Lab Review:   Results from last 7 days   Lab Units 22  0506 22  0614 22  0631 22  0956   SODIUM mmol/L 137 137   < > 133*   POTASSIUM mmol/L 3.2* 3.5   < > 3.4*   CHLORIDE mmol/L 98 100   < > 99   CO2 mmol/L 29.0 26.7   < > 25.0   BUN mg/dL 7 6   < > 14   CREATININE mg/dL 0.88 0.94   < > 0.74   GLUCOSE mg/dL 121* 223*   < > 351*   CALCIUM mg/dL 9.5 9.9   < > 9.7   AST (SGOT) U/L 7  --   --  8   ALT (SGPT) U/L 5  --   --  8    < > = values in this interval not displayed.     Results from last 7 days   Lab Units 22  2203   TROPONIN T ng/mL <0.010     Results from last 7 days    Lab Units 02/01/22  0506 01/31/22  0614   WBC 10*3/mm3 5.08 6.87   HEMOGLOBIN g/dL 10.3* 10.8*   HEMATOCRIT % 33.8* 33.5*   PLATELETS 10*3/mm3 255 281     Results from last 7 days   Lab Units 01/28/22  2203   INR  1.20*   APTT seconds 32.8     Results from last 7 days   Lab Units 01/30/22  0631   MAGNESIUM mg/dL 1.6     Results from last 7 days   Lab Units 02/01/22  0506 01/30/22  0631   CHOLESTEROL mg/dL 71 79   TRIGLYCERIDES mg/dL 112 124   HDL CHOL mg/dL 17* 18*     Results from last 7 days   Lab Units 02/01/22  0506   PROBNP pg/mL 6,153.0*     Results from last 7 days   Lab Units 02/01/22  0506 01/30/22  0631   TSH uIU/mL 1.080 1.210       Current Medications:   Scheduled Meds:amLODIPine, 10 mg, Oral, Q24H  apixaban, 5 mg, Oral, Q12H  atenolol, 75 mg, Oral, BID  atorvastatin, 80 mg, Oral, Nightly  cefTRIAXone, 1 g, Intravenous, Q24H  digoxin, 250 mcg, Oral, Daily  gabapentin, 300 mg, Oral, TID  hydrALAZINE, 100 mg, Oral, Q12H  insulin glargine, 15 Units, Subcutaneous, Nightly  insulin lispro, 0-9 Units, Subcutaneous, TID AC  insulin lispro, 5 Units, Subcutaneous, TID With Meals  levothyroxine, 300 mcg, Oral, Daily  losartan, 100 mg, Oral, Daily  pantoprazole, 40 mg, Oral, Q AM  potassium chloride, 20 mEq, Oral, BID With Meals  spironolactone, 25 mg, Oral, Daily  torsemide, 40 mg, Oral, Daily      Continuous Infusions:     Allergies:  Allergies   Allergen Reactions   • Clindamycin/Lincomycin Shortness Of Breath, Rash and GI Intolerance   • Codeine Sulfate Shortness Of Breath   • Contrast Dye Anaphylaxis   • Iodinated Diagnostic Agents Anaphylaxis     SOA, THROAT SWELLED, RASH   • Morphine Sulfate Hives and Shortness Of Breath   • Other Other (See Comments)     Blood thinners contraindicated due to micro perforations in the small intestine.   • Pradaxa [Dabigatran Etexilate Mesylate] GI Intolerance   • Latex, Natural Rubber Rash   • Bee Venom Other (See Comments)     SWELLING   • Hydrocodone Nausea And Vomiting    • Propoxyphene Other (See Comments)     N/V,  ITCHING, RASH   • Sulfa Antibiotics Hives       Assessment:       TIA (transient ischemic attack)        Plan:     1. Chronic atrial fibrillation with recurrent bleeds. Appreciate Dr. Melendez's input again a MARIZA probably would not change overall. Going to start Eliquis and see if she bleeds.  2. Severe LVH  3. Hypertension. Overall blood pressures okay in the current clinical scenario.  4. Gastric bypass in the past with decreased mobility of the GI tract. MARIZA is not a benign procedure in this patient.  5. Continue supportive care we will see how she clinically evolves.      Baldev Winter MD  02/01/22  11:28 EST

## 2022-02-01 NOTE — CASE MANAGEMENT/SOCIAL WORK
Discharge Planning Assessment  [facility]     Patient Name: Jenelle Kasper  MRN: [id number]  Today's Date: 2/1/2022    Admit Date: 1/28/2022     Discharge Needs Assessment     Row Name 02/01/22 1513       Living Environment    Lives With significant other    Name(s) of Who Lives With Patient s/o Gabino    Current Living Arrangements home/apartment/condo    Primary Care Provided by self       Transition Planning    Patient/Family Anticipates Transition to inpatient rehabilitation facility    Patient/Family Anticipated Services at Transition rehabilitation services       Discharge Needs Assessment    Equipment Currently Used at Home wheelchair; glucometer; cane, straight; walker, rolling; power chair,(recliner lift)    Concerns to be Addressed adjustment to diagnosis/illness; basic needs    Provided Post Acute Provider List? Yes    Post Acute Provider List Nursing Home; Inpatient Rehab    Delivered To Patient; Support Person    Support Person Gabino    Method of Delivery In person               Discharge Plan     Row Name 02/01/22 8493       Plan    Plan Plans are rehab.  referral to [facility] Acute rehab.  referrals for skilled rehab as well.    Patient/Family in Agreement with Plan yes    Plan Comments CCP spoke with pt and s/o Gabino @ bedside.  Pt was independent with ADL's.  She used cane most of the time, was able to drive self, etc.  Pt lives in  home with 3 steps to enter.  Discussed dc plans with pt and Gabino.  Explained acute rehab, which they would like [facility] to [facility], and subacute rehab.  They would like subacute to near [facility] or the [facility] area.  Referrals made pending [facility] decision. Virginia Olivarez RN              Continued Care and Services - Admitted Since 1/28/2022     Destination     Service Provider Request Status Selected Services Address Phone Fax Patient Preferred    [facility]  Pending - Request Sent N/A [address] [facility] [address] [phone] --    [facility]AN[facility]  [facility]  Pending - Request Sent N/A [address] [facility] [address] [phone] --    [facility]  Pending - Request Sent N/A [address] [facility] [address] [phone] --    [facility]  Pending - Request Sent N/A [address] [facility] [address] [phone] --    [facility]  Pending - No Request Sent N/A [address] [facility] [address] [phone] -- --    [facility]  Declined  unable to accept due to no covid vaccine N/A [address] [facility] [address] [phone] --              Expected Discharge Date and Time     Expected Discharge Date Expected Discharge Time    Feb 2, 2022          Demographic Summary    No documentation.                Functional Status    No documentation.                Psychosocial    No documentation.                Abuse/Neglect    No documentation.                Legal     Row Name 02/01/22 1513       Financial/Legal    Who Manages Finances if Patient Unable Pt has living will on file, Gabino Owen is [facility]               Substance Abuse    No documentation.                Patient Forms    No documentation.                   Virginia Olivarez RN

## 2022-02-02 LAB
ANION GAP SERPL CALCULATED.3IONS-SCNC: 13.5 MMOL/L (ref 5–15)
BASOPHILS # BLD AUTO: 0.05 10*3/MM3 (ref 0–0.2)
BASOPHILS NFR BLD AUTO: 0.7 % (ref 0–1.5)
BUN SERPL-MCNC: 9 MG/DL (ref 6–20)
BUN/CREAT SERPL: 9.9 (ref 7–25)
CALCIUM SPEC-SCNC: 9.9 MG/DL (ref 8.6–10.5)
CHLORIDE SERPL-SCNC: 93 MMOL/L (ref 98–107)
CO2 SERPL-SCNC: 30.5 MMOL/L (ref 22–29)
CREAT SERPL-MCNC: 0.91 MG/DL (ref 0.57–1)
DEPRECATED RDW RBC AUTO: 45.9 FL (ref 37–54)
EOSINOPHIL # BLD AUTO: 0.1 10*3/MM3 (ref 0–0.4)
EOSINOPHIL NFR BLD AUTO: 1.4 % (ref 0.3–6.2)
ERYTHROCYTE [DISTWIDTH] IN BLOOD BY AUTOMATED COUNT: 14.3 % (ref 12.3–15.4)
GFR SERPL CREATININE-BSD FRML MDRD: 63 ML/MIN/1.73
GLUCOSE BLDC GLUCOMTR-MCNC: 114 MG/DL (ref 70–130)
GLUCOSE BLDC GLUCOMTR-MCNC: 165 MG/DL (ref 70–130)
GLUCOSE BLDC GLUCOMTR-MCNC: 179 MG/DL (ref 70–130)
GLUCOSE BLDC GLUCOMTR-MCNC: 187 MG/DL (ref 70–130)
GLUCOSE SERPL-MCNC: 180 MG/DL (ref 65–99)
HCT VFR BLD AUTO: 37.1 % (ref 34–46.6)
HGB BLD-MCNC: 11.5 G/DL (ref 12–15.9)
IMM GRANULOCYTES # BLD AUTO: 0.08 10*3/MM3 (ref 0–0.05)
IMM GRANULOCYTES NFR BLD AUTO: 1.1 % (ref 0–0.5)
LYMPHOCYTES # BLD AUTO: 0.7 10*3/MM3 (ref 0.7–3.1)
LYMPHOCYTES NFR BLD AUTO: 9.7 % (ref 19.6–45.3)
MAGNESIUM SERPL-MCNC: 1.3 MG/DL (ref 1.6–2.6)
MCH RBC QN AUTO: 27 PG (ref 26.6–33)
MCHC RBC AUTO-ENTMCNC: 31 G/DL (ref 31.5–35.7)
MCV RBC AUTO: 87.1 FL (ref 79–97)
MONOCYTES # BLD AUTO: 0.42 10*3/MM3 (ref 0.1–0.9)
MONOCYTES NFR BLD AUTO: 5.8 % (ref 5–12)
NEUTROPHILS NFR BLD AUTO: 5.85 10*3/MM3 (ref 1.7–7)
NEUTROPHILS NFR BLD AUTO: 81.3 % (ref 42.7–76)
NRBC BLD AUTO-RTO: 0 /100 WBC (ref 0–0.2)
PLATELET # BLD AUTO: 309 10*3/MM3 (ref 140–450)
PMV BLD AUTO: 11.4 FL (ref 6–12)
POTASSIUM SERPL-SCNC: 3.7 MMOL/L (ref 3.5–5.2)
RBC # BLD AUTO: 4.26 10*6/MM3 (ref 3.77–5.28)
SODIUM SERPL-SCNC: 137 MMOL/L (ref 136–145)
WBC NRBC COR # BLD: 7.2 10*3/MM3 (ref 3.4–10.8)

## 2022-02-02 PROCEDURE — 83735 ASSAY OF MAGNESIUM: CPT | Performed by: HOSPITALIST

## 2022-02-02 PROCEDURE — 99233 SBSQ HOSP IP/OBS HIGH 50: CPT | Performed by: NURSE PRACTITIONER

## 2022-02-02 PROCEDURE — 85025 COMPLETE CBC W/AUTO DIFF WBC: CPT | Performed by: HOSPITALIST

## 2022-02-02 PROCEDURE — 97535 SELF CARE MNGMENT TRAINING: CPT

## 2022-02-02 PROCEDURE — 80048 BASIC METABOLIC PNL TOTAL CA: CPT | Performed by: HOSPITALIST

## 2022-02-02 PROCEDURE — 82962 GLUCOSE BLOOD TEST: CPT

## 2022-02-02 PROCEDURE — 97110 THERAPEUTIC EXERCISES: CPT

## 2022-02-02 PROCEDURE — 97530 THERAPEUTIC ACTIVITIES: CPT

## 2022-02-02 PROCEDURE — 25010000002 MAGNESIUM SULFATE 2 GM/50ML SOLUTION: Performed by: HOSPITALIST

## 2022-02-02 PROCEDURE — 25010000002 CEFTRIAXONE PER 250 MG: Performed by: HOSPITALIST

## 2022-02-02 PROCEDURE — 63710000001 INSULIN LISPRO (HUMAN) PER 5 UNITS: Performed by: HOSPITALIST

## 2022-02-02 PROCEDURE — 63710000001 ONDANSETRON PER 8 MG: Performed by: HOSPITALIST

## 2022-02-02 PROCEDURE — 63710000001 INSULIN LISPRO (HUMAN) PER 5 UNITS: Performed by: INTERNAL MEDICINE

## 2022-02-02 PROCEDURE — 99232 SBSQ HOSP IP/OBS MODERATE 35: CPT | Performed by: NURSE PRACTITIONER

## 2022-02-02 RX ORDER — MAGNESIUM SULFATE HEPTAHYDRATE 40 MG/ML
2 INJECTION, SOLUTION INTRAVENOUS ONCE
Status: COMPLETED | OUTPATIENT
Start: 2022-02-02 | End: 2022-02-02

## 2022-02-02 RX ORDER — ATORVASTATIN CALCIUM 20 MG/1
40 TABLET, FILM COATED ORAL NIGHTLY
Status: DISCONTINUED | OUTPATIENT
Start: 2022-02-02 | End: 2022-02-06 | Stop reason: HOSPADM

## 2022-02-02 RX ORDER — INSULIN LISPRO 100 [IU]/ML
7 INJECTION, SOLUTION INTRAVENOUS; SUBCUTANEOUS
Status: DISCONTINUED | OUTPATIENT
Start: 2022-02-02 | End: 2022-02-04

## 2022-02-02 RX ADMIN — POTASSIUM CHLORIDE 20 MEQ: 750 TABLET, EXTENDED RELEASE ORAL at 17:55

## 2022-02-02 RX ADMIN — ATORVASTATIN CALCIUM 40 MG: 20 TABLET, FILM COATED ORAL at 21:46

## 2022-02-02 RX ADMIN — PANTOPRAZOLE SODIUM 40 MG: 40 TABLET, DELAYED RELEASE ORAL at 03:54

## 2022-02-02 RX ADMIN — ACETAMINOPHEN 650 MG: 325 TABLET, FILM COATED ORAL at 16:59

## 2022-02-02 RX ADMIN — INSULIN LISPRO 5 UNITS: 100 INJECTION, SOLUTION INTRAVENOUS; SUBCUTANEOUS at 11:55

## 2022-02-02 RX ADMIN — INSULIN LISPRO 2 UNITS: 100 INJECTION, SOLUTION INTRAVENOUS; SUBCUTANEOUS at 17:55

## 2022-02-02 RX ADMIN — INSULIN LISPRO 5 UNITS: 100 INJECTION, SOLUTION INTRAVENOUS; SUBCUTANEOUS at 08:11

## 2022-02-02 RX ADMIN — CEFTRIAXONE 1 G: 1 INJECTION, POWDER, FOR SOLUTION INTRAMUSCULAR; INTRAVENOUS at 16:59

## 2022-02-02 RX ADMIN — LOSARTAN POTASSIUM 100 MG: 100 TABLET, FILM COATED ORAL at 08:10

## 2022-02-02 RX ADMIN — ONDANSETRON HYDROCHLORIDE 4 MG: 4 TABLET, FILM COATED ORAL at 02:01

## 2022-02-02 RX ADMIN — AMLODIPINE BESYLATE 10 MG: 10 TABLET ORAL at 08:10

## 2022-02-02 RX ADMIN — GABAPENTIN 300 MG: 300 CAPSULE ORAL at 21:46

## 2022-02-02 RX ADMIN — INSULIN LISPRO 2 UNITS: 100 INJECTION, SOLUTION INTRAVENOUS; SUBCUTANEOUS at 11:56

## 2022-02-02 RX ADMIN — APIXABAN 5 MG: 5 TABLET, FILM COATED ORAL at 08:10

## 2022-02-02 RX ADMIN — HYDRALAZINE HYDROCHLORIDE 100 MG: 50 TABLET, FILM COATED ORAL at 08:10

## 2022-02-02 RX ADMIN — LEVOTHYROXINE SODIUM 300 MCG: 0.15 TABLET ORAL at 08:10

## 2022-02-02 RX ADMIN — INSULIN LISPRO 7 UNITS: 100 INJECTION, SOLUTION INTRAVENOUS; SUBCUTANEOUS at 17:55

## 2022-02-02 RX ADMIN — POTASSIUM CHLORIDE 20 MEQ: 750 TABLET, EXTENDED RELEASE ORAL at 11:56

## 2022-02-02 RX ADMIN — TORSEMIDE 40 MG: 20 TABLET ORAL at 17:55

## 2022-02-02 RX ADMIN — HYDRALAZINE HYDROCHLORIDE 100 MG: 50 TABLET, FILM COATED ORAL at 21:46

## 2022-02-02 RX ADMIN — APIXABAN 5 MG: 5 TABLET, FILM COATED ORAL at 21:46

## 2022-02-02 RX ADMIN — ACETAMINOPHEN 650 MG: 325 TABLET, FILM COATED ORAL at 03:53

## 2022-02-02 RX ADMIN — ONDANSETRON HYDROCHLORIDE 4 MG: 4 TABLET, FILM COATED ORAL at 17:57

## 2022-02-02 RX ADMIN — TORSEMIDE 40 MG: 20 TABLET ORAL at 08:11

## 2022-02-02 RX ADMIN — GABAPENTIN 300 MG: 300 CAPSULE ORAL at 08:11

## 2022-02-02 RX ADMIN — GABAPENTIN 300 MG: 300 CAPSULE ORAL at 16:59

## 2022-02-02 RX ADMIN — ATENOLOL 75 MG: 25 TABLET ORAL at 21:46

## 2022-02-02 RX ADMIN — MAGNESIUM SULFATE HEPTAHYDRATE 2 G: 2 INJECTION, SOLUTION INTRAVENOUS at 17:00

## 2022-02-02 RX ADMIN — POTASSIUM CHLORIDE 20 MEQ: 750 TABLET, EXTENDED RELEASE ORAL at 08:10

## 2022-02-02 RX ADMIN — DIGOXIN 250 MCG: 250 TABLET ORAL at 08:10

## 2022-02-02 RX ADMIN — ACETAMINOPHEN 650 MG: 325 TABLET, FILM COATED ORAL at 10:16

## 2022-02-02 RX ADMIN — INSULIN LISPRO 2 UNITS: 100 INJECTION, SOLUTION INTRAVENOUS; SUBCUTANEOUS at 08:11

## 2022-02-02 RX ADMIN — SPIRONOLACTONE 25 MG: 25 TABLET, FILM COATED ORAL at 08:10

## 2022-02-02 RX ADMIN — INSULIN GLARGINE-YFGN 20 UNITS: 100 INJECTION, SOLUTION SUBCUTANEOUS at 21:47

## 2022-02-02 RX ADMIN — ATENOLOL 75 MG: 25 TABLET ORAL at 08:10

## 2022-02-02 NOTE — THERAPY TREATMENT NOTE
Patient Name: Jenelle Kasper  : 1960    MRN: [id number]                              Today's Date: 2022       Admit Date: 2022    Visit Dx:     ICD-10-CM ICD-9-CM   1. TIA (transient ischemic attack)  G45.9 435.9   2. Left-sided weakness  R53.1 728.87   3. Facial droop  R29.810 781.94   4. Slurred speech  R47.81 784.59     Patient Active Problem List   Diagnosis   • Persistent atrial fibrillation (HCC)   • Benign essential HTN   • Hyperparathyroidism (HCC)   • Morbid obesity with BMI of 45.0-49.9, adult (HCC)   • Pulmonary hypertension (HCC)   • Precordial pain   • Shortness of breath   • TIA (transient ischemic attack)     Past Medical History:   Diagnosis Date   • Anxiety    • Arrhythmia    • Asthma    • Atrial fibrillation with RVR (HCC)    • C. difficile diarrhea    • COPD (chronic obstructive pulmonary disease) (HCC)    • Depression    • Diabetes mellitus (HCC)    • Diabetic peripheral neuropathy associated with type 2 diabetes mellitus (HCC)    • GERD (gastroesophageal reflux disease)    • Heart murmur    • History of fall     closed head injury after falling down steps; fx nose   • Hypercalcemia    • Hyperlipidemia    • Hypertension    • IBS (irritable bowel syndrome)    • Kidney stone    • Morbid obesity (HCC)    • PAF (paroxysmal atrial fibrillation) (HCC)    • PCOS (polycystic ovarian syndrome)    • PONV (postoperative nausea and vomiting)    • Sarcoidosis    • Shortness of breath    • Thyroid cancer (HCC)     WITH RADIATION   • Wounds, multiple      Past Surgical History:   Procedure Laterality Date   • CARDIAC CATHETERIZATION N/A 2016    Procedure: Coronary angiography;  Surgeon: Chris Perez MD;  Location: [facility] CATH INVASIVE LOCATION;  Service:    • CARDIAC CATHETERIZATION N/A 2016    Procedure: Left heart cath;  Surgeon: Chris Perez MD;  Location: [facility] CATH INVASIVE LOCATION;  Service:    • CARDIAC CATHETERIZATION N/A 2016    Procedure: Left  ventriculography;  Surgeon: Chris Perez MD;  Location:  [facility] CATH INVASIVE LOCATION;  Service:    • CARDIAC CATHETERIZATION N/A 2/26/2020    Procedure: Right Heart Cath;  Surgeon: Chris Perez MD;  Location:  [facility] CATH INVASIVE LOCATION;  Service: Cardiovascular;  Laterality: N/A;  If there is no evidence of pulmonary hypertension please add a left heart cath to evaluate coronary arteries as patient is having chest pain.   • CHOLECYSTECTOMY     • COLONOSCOPY     • DILATATION AND CURETTAGE     • ENDOSCOPY     • GASTRIC BYPASS      gastric surgery for morbid obesity   • GASTROPLASTY  2008    GASTRIC REVISION FROM PREVIOUS GASTRIC BYPASS   • HIATAL HERNIA REPAIR     • INGUINAL HERNIA REPAIR Right    • LUNG BIOPSY      to dx sarcoidosis   • LUNG SURGERY      Removal of granulomas   • TOTAL THYROIDECTOMY        General Information     [facility] Name 02/02/22 1631          Physical Therapy Time and Intention    Document Type therapy note (daily note)  -     Mode of Treatment individual therapy; physical therapy  -     Row Name 02/02/22 1631          General Information    Patient Profile Reviewed yes  -     Existing Precautions/Restrictions fall; oxygen therapy device and L/min  -     Row Name 02/02/22 1631          Cognition    Orientation Status (Cognition) oriented x 3  -     Row Name 02/02/22 1631          Safety Issues, Functional Mobility    Impairments Affecting Function (Mobility) cognition; endurance/activity tolerance; grasp; visual/perceptual; strength; range of motion (ROM); coordination; pain; balance  -           User Key  (r) = Recorded By, (t) = Taken By, (c) = Cosigned By    Initials Name Provider Type     Roselia Galindo Physical Therapist               Mobility     Row Name 02/02/22 1631          Bed Mobility    Bed Mobility supine-sit; sit-supine  -     Rolling Left [place] (Bed Mobility) verbal cues; maximum assist (25% patient effort); 2 person assist  -      Scooting/Bridging [place] (Bed Mobility) 2 person assist; dependent (less than 25% patient effort)  -     Supine-Sit [place] (Bed Mobility) maximum assist (25% patient effort); 1 person assist; verbal cues  -     Sit-Supine [place] (Bed Mobility) maximum assist (25% patient effort); 2 person assist; verbal cues  -     Assistive Device (Bed Mobility) bed rails; draw sheet; head of bed elevated  -     Comment (Bed Mobility) Increased time required for bed mobility, cues to use bedrails and assist needed especially for BLE  -[facility] Name 02/02/22 1631          Transfers    Comment (Transfers) 3x STS and able to clear bottom from bed on last attempt to stand for ~10 seconds - fatigued quickly and unable to maintain standing for any longer  -BH     Row Name 02/02/22 1631          Sit-Stand Transfer    Sit-Stand [place] (Transfers) maximum assist (25% patient effort); 2 person assist; verbal cues  -     Assistive Device (Sit-Stand Transfers) other (see comments)  HHA  -BH     Row Name 02/02/22 1631          Gait/Stairs (Locomotion)    [place] Level (Gait) unable to assess  -           User Key  (r) = Recorded By, (t) = Taken By, (c) = Cosigned By    Initials Name Provider Type     Roselia Galindo Physical Therapist               Obj/Interventions     Row Name 02/02/22 1635          Motor Skills    Therapeutic Exercise --  10 reps B AP/LAQ/seated marches  -           User Key  (r) = Recorded By, (t) = Taken By, (c) = Cosigned By    Initials Name Provider Type     Roselia Galindo Physical Therapist               Goals/Plan    No documentation.                Clinical Impression     Row Name 02/02/22 1635          Pain    Additional Documentation Pain Scale: FACES Pre/Post-Treatment (Group)  -[facility] Name 02/02/22 1635          Pain Scale: Numbers Pre/Post-Treatment    Pain Intervention(s) Repositioned; Ambulation/increased activity  -BH     Row Name 02/02/22 1635          Pain  Scale: FACES Pre/Post-Treatment    Pain: FACES Scale, Pretreatment 6-->hurts even more  -     Posttreatment Pain Rating 6-->hurts even more  -     Pain Location - Side Left  -     Pain Location abdomen; shoulder; knee  -     Pre/Posttreatment Pain Comment Pt c/o B knee, L shoulder, upper back pain throughout session  -     Row Name 02/02/22 4972          Plan of Care Review    Plan of Care Reviewed With patient; spouse  -     Progress improving  -     Outcome Summary Pt agreeable to PT this PM and demonstrated improved overall mobility. Pt cued for use of bedrails and able to complete bed mobility with min-mod A x2 requiring increased time to complete. Used draw sheet to scoot hips out to EOB and get feet on floor. Pt attempted 3 STS and was able to stand all the way up on 3rd attempt with max A x2 - tolerated standing for ~10 seconds before fatiguing and c/o B knee pain and requesting to return to bed. Pt assisted back to supine. Pt will continue to benefit from skilled PT to address functional deficits and improve overall independent mobility. PT recommending D/C to SNF.  -     Row Name 02/02/22 1630          Positioning and Restraints    Pre-Treatment Position in bed  -     Post Treatment Position bed  -     In Bed supine; call light within reach; encouraged to call for assist; exit alarm on; with family/caregiver  -           User Key  (r) = Recorded By, (t) = Taken By, (c) = Cosigned By    Initials Name Provider Type     Roselia Galindo Physical Therapist               Outcome Measures     Row Name 02/02/22 7004          How much help from another person do you currently need...    Turning from your back to your side while in flat bed without using bedrails? 2  -BH     Moving from lying on back to sitting on the side of a flat bed without bedrails? 2  -BH     Moving to and from a bed to a chair (including a wheelchair)? 1  -BH     Standing up from a chair using your arms (e.g.,  wheelchair, bedside chair)? 2  -     Climbing 3-5 steps with a railing? 1  -     To walk in hospital room? 1  -     AM-PAC 6 Clicks Score (PT) 9  -     Row Name 02/02/22 1639 02/02/22 1256       Functional Assessment    Outcome Measure Options AM-PAC 6 Clicks Basic Mobility (PT)  - AM-PAC 6 Clicks Daily Activity (OT)  -SM (r) LR (t) SM (c)          User Key  (r) = Recorded By, (t) = Taken By, (c) = Cosigned By    Initials Name Provider Type    SM Lexy Tarango, OT Occupational Therapist     Roselia Galindo Physical Therapist    LR Yanet Santiago, OT Student OT Student                             Physical Therapy Education                 Title: PT OT SLP Therapies (In Progress)     Topic: Physical Therapy (Done)     Point: Mobility training (Done)     Learning Progress Summary           Patient Acceptance, E,TB,D, VU,NR by  at 2/2/2022 1639    Acceptance, E, VU by  at 1/30/2022 1107   Family Acceptance, E, VU by  at 1/30/2022 1107                   Point: Home exercise program (Done)     Learning Progress Summary           Patient Acceptance, E,TB,D, VU,NR by  at 2/2/2022 1639    Acceptance, E, VU by  at 1/30/2022 1107   Family Acceptance, E, VU by  at 1/30/2022 1107                   Point: Body mechanics (Done)     Learning Progress Summary           Patient Acceptance, E,TB,D, VU,NR by  at 2/2/2022 1639    Acceptance, E, VU by  at 1/30/2022 1107   Family Acceptance, E, VU by  at 1/30/2022 1107                   Point: Precautions (Done)     Learning Progress Summary           Patient Acceptance, E,TB,D, VU,NR by  at 2/2/2022 1639    Acceptance, E, VU by  at 1/30/2022 1107   Family Acceptance, E, VU by  at 1/30/2022 1107                               User Key     Initials Effective Dates Name Provider Type Discipline     02/08/21 -  Lexy Brothers Physical Therapist PT     05/10/21 -  Roselia Galindo Physical Therapist PT              PT Recommendation and Plan     Plan of  Care Reviewed With: patient, spouse  Progress: improving  Outcome Summary: Pt agreeable to PT this PM and demonstrated improved overall mobility. Pt cued for use of bedrails and able to complete bed mobility with min-mod A x2 requiring increased time to complete. Used draw sheet to scoot hips out to EOB and get feet on floor. Pt attempted 3 STS and was able to stand all the way up on 3rd attempt with max A x2 - tolerated standing for ~10 seconds before fatiguing and c/o B knee pain and requesting to return to bed. Pt assisted back to supine. Pt will continue to benefit from skilled PT to address functional deficits and improve overall independent mobility. PT recommending D/C to SNF.     Time Calculation:    PT Charges     Row Name 02/02/22 1640             Time Calculation    Start Time 1519  -      Stop Time 1544  -      Time Calculation (min) 25 min  -      PT Received On 02/02/22  -      PT - Next Appointment 02/03/22  -              Time Calculation- PT    Total Timed Code Minutes- PT 25 minute(s)  -              Timed Charges    00436 - PT Therapeutic Exercise Minutes 10  -      83589 - PT Therapeutic Activity Minutes 15  -              Total Minutes    Timed Charges Total Minutes 25  -       Total Minutes 25  -            User Key  (r) = Recorded By, (t) = Taken By, (c) = Cosigned By    Initials Name Provider Type     Roselia Galindo Physical Therapist              Therapy Charges for Today     Code Description Service Date Service Provider Modifiers Qty    73274961451 HC PT THER PROC EA 15 MIN 2/2/2022 Roselia Galindo GP 1    53850415135 HC PT THERAPEUTIC ACT EA 15 MIN 2/2/2022 Roselia Galindo GP 1    10611788002 HC PT THER SUPP EA 15 MIN 2/2/2022 Roselia Galindo GP 1          PT G-Codes  Outcome Measure Options: AM-PAC 6 Clicks Basic Mobility (PT)  AM-PAC 6 Clicks Score (PT): 9  AM-PAC 6 Clicks Score (OT): 12  Modified Kay Scale: 5 - Severe disability.  Bedridden, incontinent, and  requiring constant nursing care and attention.    ANTONIO LOPEZ  2/2/2022

## 2022-02-02 NOTE — PROGRESS NOTES
"    Patient Name: Jenelle Kasper  :1960  61 y.o.      Patient Care Team:  Zay Olivares MD as PCP - General (General Practice)    Chief Complaint: follow up stroke, atrial fibrillation    Interval History: she is resting in bed. She just attempted to stand with occupational therapy. She was unable to get to the chair. She complains of pain (mostly spine but also all over).        Objective   Vital Signs  Temp:  [97.3 °F (36.3 °C)-98.9 °F (37.2 °C)] 98.9 °F (37.2 °C)  Heart Rate:  [50-72] 50  Resp:  [16-18] 16  BP: (120-145)/(53-75) 120/71    Intake/Output Summary (Last 24 hours) at 2022 1520  Last data filed at 2022 0817  Gross per 24 hour   Intake 480 ml   Output 2900 ml   Net -2420 ml     Flowsheet Rows      First Filed Value   Admission Height 171.5 cm (67.5\") Documented at 2022 2151   Admission Weight 121 kg (266 lb 12.1 oz) Documented at 2022 2205          Physical Exam:   General Appearance:    Alert, cooperative, in no acute distress   Lungs:     Clear to auscultation.  Normal respiratory effort and rate.      Heart:    Regular rhythm and normal rate, normal S1 and S2, no murmurs, gallops or rubs.     Chest Wall:    No abnormalities observed   Abdomen:     Soft, nontender, positive bowel sounds.     Extremities:   no cyanosis, clubbing or edema.  No marked joint deformities.  Adequate musculoskeletal strength.       Results Review:    Results from last 7 days   Lab Units 22  0534   SODIUM mmol/L 137   POTASSIUM mmol/L 3.7   CHLORIDE mmol/L 93*   CO2 mmol/L 30.5*   BUN mg/dL 9   CREATININE mg/dL 0.91   GLUCOSE mg/dL 180*   CALCIUM mg/dL 9.9     Results from last 7 days   Lab Units 22   TROPONIN T ng/mL <0.010     Results from last 7 days   Lab Units 22  0534   WBC 10*3/mm3 7.20   HEMOGLOBIN g/dL 11.5*   HEMATOCRIT % 37.1   PLATELETS 10*3/mm3 309     Results from last 7 days   Lab Units 22   INR  1.20*   APTT seconds 32.8     Results from " last 7 days   Lab Units 02/02/22  0534   MAGNESIUM mg/dL 1.3*     Results from last 7 days   Lab Units 02/01/22  0506   CHOLESTEROL mg/dL 71   TRIGLYCERIDES mg/dL 112   HDL CHOL mg/dL 17*   LDL CHOL mg/dL 33               Medication Review:   amLODIPine, 10 mg, Oral, Q24H  apixaban, 5 mg, Oral, Q12H  atenolol, 75 mg, Oral, BID  atorvastatin, 80 mg, Oral, Nightly  cefTRIAXone, 1 g, Intravenous, Q24H  digoxin, 250 mcg, Oral, Daily  gabapentin, 300 mg, Oral, TID  hydrALAZINE, 100 mg, Oral, Q12H  insulin glargine, 15 Units, Subcutaneous, Nightly  insulin lispro, 0-9 Units, Subcutaneous, TID AC  insulin lispro, 5 Units, Subcutaneous, TID With Meals  levothyroxine, 300 mcg, Oral, Daily  losartan, 100 mg, Oral, Daily  pantoprazole, 40 mg, Oral, Q AM  potassium chloride, 20 mEq, Oral, TID With Meals  spironolactone, 25 mg, Oral, Daily  torsemide, 40 mg, Oral, BID              Assessment/Plan   1. acute cerebrovascular event , embolic secondary to atrial fibrillation  2. Chronic atrial fibrillation - has had GIB on rivaroxaban. Now trialing apixaban. If she tolerates this, could consider left atrial appendage occluder. For now, careful observation for bleeding complication. Rate controlled.   3. Severe LVH  4. Prior gastric bypass  5. Acute urinary tract infection - on antibiotics  6. Diabetes mellitus type II  7. Morbid obesity    H/h stable. No s/s of bleeding. Continue apixaban. HR well controlled. No indication for MARIZA at this time.    TRUDY Faria  [facility]  02/02/22  15:20 EST

## 2022-02-02 NOTE — PROGRESS NOTES
"Daily progress note      2022    Patient Identification:    Name: Jenelle Kasper  Age: 61 y.o.  Sex: female  :  1960  MRN: [id number]                       Primary Care Physician: Zay Olivares MD    Chief Complaint:    Doing same  No new complaints  Working with PT OT  Family at bedside    History of Present Illness:   Patient is a 61-year-old female, with history of atrial fibrillation, pulmonary hypertension, COPD, diabetes mellitus, morbid obesity, sarcoidosis.  Patient was brought to the emergency room because she was found by her  with left-sided facial droop, weakness of her left arm and slurred speech.  Apparently, symptoms resolved with 10 minutes of EMS arrival, by the time she arrived to the ER, patient was back to baseline.  Apparently, patient is noted anticoagulation due to history of GI bleeding.  In the ER, patient was found to have: Hypertension, her NIH score was two, blood sugar was 361, creatinine 0.8, sodium 132, troponin negative, digoxin level 0.5, COVID-19 came back negative.  Urinalysis show WBC 6-12.  Chest ray showed no infiltrates.  CT scan of the head was normal.  Patient was placed in observation for further management.     Review of Systems  Unremarkable except left-sided weakness     Exam:  Blood pressure 120/71, pulse 50, temperature 98.9 °F (37.2 °C), temperature source Oral, resp. rate 16, height 170.2 cm (67.01\"), weight 121 kg (266 lb), SpO2 96 %.    General: Alert, oriented x 3. Cooperative, no distress, appears stated age.  Morbid obesity  HEENT:    Head: Normocephalic, without obvious abnormality, atraumatic  Eyes: EOM are normal. Pupils are equal  Oropharynx: Mucosa and tongue normal  Neck: Supple, symmetrical, trachea midline, no adenopathy;              thyroid:  no enlargement/tenderness/nodules;              no carotid bruit or JVD  Cardiovascular: Irregular rate, irregular rhythm.  Normal distal pulses.              Exam reveals no gallop and " no friction rub. No murmur heard  Chest wall: No tenderness or deformity  Pulmonary: Clear to auscultation bilaterally, respirations unlabored.               No rhonchi, wheezing or rales.   Abdominal: Soft, nontender, bowel sounds active all four quadrants,     no masses, no hepatomegaly, no splenomegaly.   Extremities: Normal, atraumatic, no cyanosis or edema  Pulses: 2 + symmetric all extremities  Neurological: Patient is alert and oriented to person, place, and time.  No neurological deficit at this time.  Skin: Skin color, texture, normal. Turgor is decreased. No rashes or lesions      Data Review:  Lab Results (last 24 hours)     Procedure Component Value Units Date/Time    Basic Metabolic Panel [[id number]]  (Abnormal) Collected: 02/02/22 0534    Specimen: Blood Updated: 02/02/22 1022     Glucose 180 mg/dL      BUN 9 mg/dL      Creatinine 0.91 mg/dL      Sodium 137 mmol/L      Potassium 3.7 mmol/L      Comment: Slight hemolysis detected by analyzer. Results may be affected.        Chloride 93 mmol/L      CO2 30.5 mmol/L      Calcium 9.9 mg/dL      eGFR Non African Amer 63 mL/min/1.73      BUN/Creatinine Ratio 9.9     Anion Gap 13.5 mmol/L     Narrative:      GFR Normal >60  Chronic Kidney Disease <60  Kidney Failure <15      Magnesium [[id number]]  (Abnormal) Collected: 02/02/22 0534    Specimen: Blood Updated: 02/02/22 1021     Magnesium 1.3 mg/dL     POC Glucose Once [[id number]]  (Abnormal) Collected: 02/02/22 1007    Specimen: Blood Updated: 02/02/22 1008     Glucose 179 mg/dL      Comment: Meter: [id number] : [id number] [facility] NA       CBC & Differential [[id number]]  (Abnormal) Collected: 02/02/22 0534    Specimen: Blood Updated: 02/02/22 0719    Narrative:      The following orders were created for panel order CBC & Differential.  Procedure                               Abnormality         Status                     ---------                               -----------         ------                      CBC Auto Differential[[id number]]        Abnormal            Final result                 Please view results for these tests on the individual orders.    CBC Auto Differential [[id number]]  (Abnormal) Collected: 02/02/22 0534    Specimen: Blood Updated: 02/02/22 0719     WBC 7.20 10*3/mm3      RBC 4.26 10*6/mm3      Hemoglobin 11.5 g/dL      Hematocrit 37.1 %      MCV 87.1 fL      MCH 27.0 pg      MCHC 31.0 g/dL      RDW 14.3 %      RDW-SD 45.9 fl      MPV 11.4 fL      Platelets 309 10*3/mm3      Neutrophil % 81.3 %      Lymphocyte % 9.7 %      Monocyte % 5.8 %      Eosinophil % 1.4 %      Basophil % 0.7 %      Immature Grans % 1.1 %      Neutrophils, Absolute 5.85 10*3/mm3      Lymphocytes, Absolute 0.70 10*3/mm3      Monocytes, Absolute 0.42 10*3/mm3      Eosinophils, Absolute 0.10 10*3/mm3      Basophils, Absolute 0.05 10*3/mm3      Immature Grans, Absolute 0.08 10*3/mm3      nRBC 0.0 /100 WBC     POC Glucose Once [[id number]]  (Abnormal) Collected: 02/02/22 0608    Specimen: Blood Updated: 02/02/22 0609     Glucose 187 mg/dL      Comment: Meter: [id number] : [id number] [facility] NA       POC Glucose Once [[id number]]  (Abnormal) Collected: 02/01/22 2232    Specimen: Blood Updated: 02/01/22 2233     Glucose 164 mg/dL      Comment: Meter: [id number] : [id number] [facility] NA       POC Glucose Once [[id number]]  (Abnormal) Collected: 02/01/22 2100    Specimen: Blood Updated: 02/01/22 2102     Glucose 152 mg/dL      Comment: Meter: [id number] : [id number] [facility] NA       POC Glucose Once [[id number]]  (Abnormal) Collected: 02/01/22 1644    Specimen: Blood Updated: 02/01/22 1645     Glucose 154 mg/dL      Comment: Meter: [id number] : [id number] Robert DIAS             Imaging Results (All)     Procedure Component Value Units Date/Time    MRI Brain Without Contrast [[id number]] Collected: 01/29/22 1154     Updated: 01/29/22 1215    Narrative:      MRI BRAIN WO CONTRAST-, MRI  ANGIOGRAM HEAD WO CONTRAST-     CLINICAL HISTORY: Slurred speech and left arm weakness and left facial  drooping. TIA versus CVA.     TECHNIQUE: Multiple axial T1, T2, gradient echo and diffusion images  were acquired. Sagittal T1-weighted images were also obtained. MR  angiography images of the brain were acquired using  time-of-flight-of-flight vascular enhancement techniques.      COMPARISON: CT scan of the head dated 01/28/2022.     FINDINGS: The brain and ventricular system appear structurally within  normal limits there is ill-defined mild T2 hyperintensity and restricted  diffusion involving cortical gray matter and white matter within the  inferior aspect of the right parietal lobe in the region of the  posterior central gyrus that is consistent with an area of acute  ischemic infarction. This measures approximately 4.0 x 2.3 cm in  diameter. There is no significant associated mass effect. Slightly  inferior and posterior to this lesion there is a second smaller area of  patchy ischemic infarction involving the posterior aspect of the right  temporal lobe that extends into the periventricular white matter of the  occipital horn of the right lateral ventricle. A couple punctate foci of  ischemic infarction are also present in the right occipital lobe. Again,  there is no associated mass effect or hemorrhage. These infarcts are all  in the vascular territory of the right middle cerebral artery. Since  they are  by normal appearing brain parenchyma, they are likely  embolic in etiology. No other foci of restricted diffusion are  identified in the brain or brainstem. No other foci of T2 hyperintensity  are evident in the brain or brainstem. There is no evidence of small  vessel chronic ischemic change. There are no masses. The paranasal  sinuses are well aerated.     The MR angiography images demonstrate no focal intracranial stenoses or  occlusions. In particular, there is no evidence of vasculitis.  There are  no aneurysms or vascular malformations.       Impression:      Multiple areas of acute ischemic infarction within the right  parietal and temporal and occipital lobes as described in more detail  above. The infarcts are all in the vascular territory of the right  middle cerebral artery, and are suspected to be embolic in etiology.  There is no significant associated mass effect and there is no  intracranial hemorrhage.     This report was finalized on 1/29/2022 12:12 PM by Dr. Gabino Perez M.D.       MRI Angiogram Head Without Contrast [[id number]] Collected: 01/29/22 1154     Updated: 01/29/22 1215    Narrative:      MRI BRAIN WO CONTRAST-, MRI ANGIOGRAM HEAD WO CONTRAST-     CLINICAL HISTORY: Slurred speech and left arm weakness and left facial  drooping. TIA versus CVA.     TECHNIQUE: Multiple axial T1, T2, gradient echo and diffusion images  were acquired. Sagittal T1-weighted images were also obtained. MR  angiography images of the brain were acquired using  time-of-flight-of-flight vascular enhancement techniques.      COMPARISON: CT scan of the head dated 01/28/2022.     FINDINGS: The brain and ventricular system appear structurally within  normal limits there is ill-defined mild T2 hyperintensity and restricted  diffusion involving cortical gray matter and white matter within the  inferior aspect of the right parietal lobe in the region of the  posterior central gyrus that is consistent with an area of acute  ischemic infarction. This measures approximately 4.0 x 2.3 cm in  diameter. There is no significant associated mass effect. Slightly  inferior and posterior to this lesion there is a second smaller area of  patchy ischemic infarction involving the posterior aspect of the right  temporal lobe that extends into the periventricular white matter of the  occipital horn of the right lateral ventricle. A couple punctate foci of  ischemic infarction are also present in the right occipital lobe.  Again,  there is no associated mass effect or hemorrhage. These infarcts are all  in the vascular territory of the right middle cerebral artery. Since  they are  by normal appearing brain parenchyma, they are likely  embolic in etiology. No other foci of restricted diffusion are  identified in the brain or brainstem. No other foci of T2 hyperintensity  are evident in the brain or brainstem. There is no evidence of small  vessel chronic ischemic change. There are no masses. The paranasal  sinuses are well aerated.     The MR angiography images demonstrate no focal intracranial stenoses or  occlusions. In particular, there is no evidence of vasculitis. There are  no aneurysms or vascular malformations.       Impression:      Multiple areas of acute ischemic infarction within the right  parietal and temporal and occipital lobes as described in more detail  above. The infarcts are all in the vascular territory of the right  middle cerebral artery, and are suspected to be embolic in etiology.  There is no significant associated mass effect and there is no  intracranial hemorrhage.     This report was finalized on 1/29/2022 12:12 PM by Dr. Gabino Perez M.D.       CT Head Without Contrast Stroke Protocol [[id number]] Collected: 01/28/22 2247     Updated: 01/28/22 2253    Narrative:      CT HEAD WO CONTRAST STROKE PROTOCOL-     CLINICAL HISTORY: Left-sided facial drooping and left arm weakness.  Slurred speech.     TECHNIQUE: Transverse 3 mm thick images were obtained from the base of  the skull to the vertex without IV contrast.     Radiation dose reduction techniques were utilized, including automated  exposure control and exposure modulation based on body size.     COMPARISON: None     FINDINGS: The brain and ventricular system appear structurally normal.  There is no evidence of recent or old intracranial hemorrhage or  infarction. There are no masses. The paranasal sinuses and mastoid air  cells and middle  ear cavities are well aerated.       Impression:      Normal CT scan of the head without contrast.     This report was finalized on 1/28/2022 10:50 PM by Dr. Gabino Perez M.D.       XR Chest 1 View [[id number]] Collected: 01/28/22 2233     Updated: 01/28/22 2237    Narrative:      PORTABLE CHEST 01/28/2022 AT 10:08 PM     CLINICAL HISTORY: Possible acute CVA. Rule out aspiration.     The lungs are fairly well-expanded and appear free of focal infiltrates.  The heart is moderately enlarged and there is mild pulmonary vascular  engorgement but no anitra edema. There are no pleural effusions.     This report was finalized on 1/28/2022 10:33 PM by Dr. Gabino Perez M.D.             Current Facility-Administered Medications:   •  acetaminophen (TYLENOL) tablet 650 mg, 650 mg, Oral, Q6H PRN, Sánchez Maxwell MD, 650 mg at 02/02/22 1016  •  albuterol (PROVENTIL) nebulizer solution 0.083% 2.5 mg/3mL, 2.5 mg, Nebulization, Q6H PRN, Sánchez Maxwell MD  •  amLODIPine (NORVASC) tablet 10 mg, 10 mg, Oral, Q24H, Michele Subramanian MD, 10 mg at 02/02/22 0810  •  apixaban (ELIQUIS) tablet 5 mg, 5 mg, Oral, Q12H, Esvin Watkins, APRN, 5 mg at 02/02/22 0810  •  atenolol (TENORMIN) tablet 75 mg, 75 mg, Oral, BID, Sánchez Maxwell MD, 75 mg at 02/02/22 0810  •  atorvastatin (LIPITOR) tablet 80 mg, 80 mg, Oral, Nightly, Michele Subramanian MD, 80 mg at 02/01/22 2100  •  cefTRIAXone (ROCEPHIN) 1 g in sodium chloride 0.9 % 100 mL IVPB-VTB, 1 g, Intravenous, Q24H, Michele Subramanian MD, Last Rate: 200 mL/hr at 02/01/22 1511, 1 g at 02/01/22 1511  •  digoxin (LANOXIN) tablet 250 mcg, 250 mcg, Oral, Daily, Sánchez Maxwell MD, 250 mcg at 02/02/22 0810  •  diphenoxylate-atropine (LOMOTIL) 2.5-0.025 MG per tablet 1 tablet, 1 tablet, Oral, 4x Daily PRN, Sánchez Maxwell MD  •  gabapentin (NEURONTIN) capsule 300 mg, 300 mg, Oral, TID, Sánchez Maxwell MD, 300 mg at 02/02/22 0811  •  hydrALAZINE (APRESOLINE) tablet 100 mg, 100 mg, Oral, Q12H, Hans  Sánchez MARTINES MD, 100 mg at 02/02/22 0810  •  insulin glargine (LANTUS, SEMGLEE) injection 15 Units, 15 Units, Subcutaneous, Nightly, Michele Subramanian MD, 15 Units at 02/01/22 2100  •  insulin lispro (ADMELOG) injection 0-9 Units, 0-9 Units, Subcutaneous, TID AC, Sánchez Maxwell MD, 2 Units at 02/02/22 1156  •  insulin lispro (ADMELOG) injection 5 Units, 5 Units, Subcutaneous, TID With Meals, Michele Subramanian MD, 5 Units at 02/02/22 1155  •  levothyroxine (SYNTHROID, LEVOTHROID) tablet 300 mcg, 300 mcg, Oral, Daily, Sánchez Maxwell MD, 300 mcg at 02/02/22 0810  •  losartan (COZAAR) tablet 100 mg, 100 mg, Oral, Daily, Sánchez Maxwell MD, 100 mg at 02/02/22 0810  •  nitroglycerin (NITROSTAT) SL tablet 0.4 mg, 0.4 mg, Sublingual, Q5 Min PRN, Michele Subramanian MD  •  ondansetron (ZOFRAN) tablet 4 mg, 4 mg, Oral, Q6H PRN, Michele Subramanian MD, 4 mg at 02/02/22 0201  •  pantoprazole (PROTONIX) EC tablet 40 mg, 40 mg, Oral, Q AM, Sánchez Maxwell MD, 40 mg at 02/02/22 0354  •  potassium chloride (K-DUR,KLOR-CON) ER tablet 20 mEq, 20 mEq, Oral, TID With Meals, Michele Subramanian MD, 20 mEq at 02/02/22 1156  •  sodium chloride 0.9 % flush 20 mL, 20 mL, Intravenous, PRN, Sánchez Maxwell MD  •  spironolactone (ALDACTONE) tablet 25 mg, 25 mg, Oral, Daily, Maria Teresa Judge MD, 25 mg at 02/02/22 0810  •  torsemide (DEMADEX) tablet 40 mg, 40 mg, Oral, BID, Michele Subramanian MD, 40 mg at 02/02/22 0811     ASSESSMENT  Acute multiple right MCA ischemic infarction involving parietal and occipital lobes  Acute UTI  Chronic atrial fibrillation  History of GI bleed  Diabetes mellitus  Hypertension  Hyperlipidemia  Morbidly obese  Pulmonary hypertension  Gastroesophageal reflux disease    PLAN  CPM  Eliquis Lipitor  IV antibiotics for 5 days  No need for MARIZA  Continue home medications  Stress ulcer DVT prophylaxis  Cardiology input appreciated  Neurology to follow patient  Supportive care  PT OT ST  Discussed with nursing staff and family  Acute  versus subacute rehab once bed available    Michele Subramanian MD  2/2/2022  15:22 EST    I wore protective equipment throughout this patient encounter including a face mask, gloves, and protective eyewear.  Hand hygiene was performed before donning protective equipment and after removal when leaving the room.

## 2022-02-02 NOTE — PLAN OF CARE
Goal Outcome Evaluation:  Plan of Care Reviewed With: (P) patient        Progress: (P) improving  Outcome Summary: (P) Pt's spouse in room with pt upon arrival for session. They stated pt had not had breakfast yet, pt's tray was brought over to her for self-feeding. Pt required min A and verbal cues to include L UE in self-feeding. Pt required max A x2 for most bed mobility today. Pt required min A for UB dressing sitting EOB. Pt sat EOB ~25 min addressing sitting balance. Pt required max A x 2 for sit to stand x2. Pt with decreased standing tolerance this encounter compared to yesterday. Pt c/o nausea during second stand. Recommend continued skilled OT services to increase independence in self-care and functional transfers. Pt is requesting [facility].    OTS wore PPE including mask, gloves, eye protection, and completed hand hygiene prior to/after session. Pt did not wear a mask.

## 2022-02-02 NOTE — PROGRESS NOTES
"DOS: 2022  NAME: Jenelle Kasper   : 1960  PCP: Zay Olivares MD    Chief Complaint   Patient presents with   • Extremity Weakness        Stroke    Subjective: Pt seen in follow up, however the problem is new to me.  Attempting to sit up on the side of the bed with Occupational Therapy as I walked in the room.  Complaining of right shoulder and right knee pain as well as back pain.   at bedside.  Feels her left-sided weakness, and speech have improved.  Denies any new weakness, numbness, speech or visual disturbances, or headaches.    Objective:  Vital signs:      Vitals:    22 2233 22 0818 22 1326 22 1453   BP: 145/53 138/75  120/71   BP Location: Right arm Right arm  Right arm   Patient Position: Lying Lying  Lying   Pulse: 72 64  50   Resp: 18 16  16   Temp: 97.3 °F (36.3 °C) 98.2 °F (36.8 °C)  98.9 °F (37.2 °C)   TempSrc: Infrared Oral  Oral   SpO2: 95% 94%  96%   Weight:       Height:   170.2 cm (67.01\")        Current Facility-Administered Medications:   •  acetaminophen (TYLENOL) tablet 650 mg, 650 mg, Oral, Q6H PRN, Sánchez Maxwell MD, 650 mg at 22 1016  •  albuterol (PROVENTIL) nebulizer solution 0.083% 2.5 mg/3mL, 2.5 mg, Nebulization, Q6H PRN, Sánchez Maxwell MD  •  amLODIPine (NORVASC) tablet 10 mg, 10 mg, Oral, Q24H, Michele Subramanian MD, 10 mg at 22 0810  •  apixaban (ELIQUIS) tablet 5 mg, 5 mg, Oral, Q12H, Esvin Watkins APRN, 5 mg at 22 0810  •  atenolol (TENORMIN) tablet 75 mg, 75 mg, Oral, BID, Sánchez Maxwell MD, 75 mg at 22 0810  •  atorvastatin (LIPITOR) tablet 80 mg, 80 mg, Oral, Nightly, Michele Subramanian MD, 80 mg at 22 2100  •  cefTRIAXone (ROCEPHIN) 1 g in sodium chloride 0.9 % 100 mL IVPB-VTB, 1 g, Intravenous, Q24H, Michele Subramanian MD, Last Rate: 200 mL/hr at 22 1511, 1 g at 22 1511  •  digoxin (LANOXIN) tablet 250 mcg, 250 mcg, Oral, Daily, Sánchez Maxwell MD, 250 mcg at 22 0810  •  " diphenoxylate-atropine (LOMOTIL) 2.5-0.025 MG per tablet 1 tablet, 1 tablet, Oral, 4x Daily PRN, Sánchez Maxwell MD  •  gabapentin (NEURONTIN) capsule 300 mg, 300 mg, Oral, TID, Sánchez Maxwell MD, 300 mg at 02/02/22 0811  •  hydrALAZINE (APRESOLINE) tablet 100 mg, 100 mg, Oral, Q12H, Sánchez Maxwell MD, 100 mg at 02/02/22 0810  •  insulin glargine (LANTUS, SEMGLEE) injection 20 Units, 20 Units, Subcutaneous, Nightly, Michele Subramanian MD  •  insulin lispro (ADMELOG) injection 0-9 Units, 0-9 Units, Subcutaneous, TID AC, Sánchez Maxwell MD, 2 Units at 02/02/22 1156  •  insulin lispro (ADMELOG) injection 7 Units, 7 Units, Subcutaneous, TID With Meals, Michele Subramanian MD  •  levothyroxine (SYNTHROID, LEVOTHROID) tablet 300 mcg, 300 mcg, Oral, Daily, Sánchez Maxwell MD, 300 mcg at 02/02/22 0810  •  losartan (COZAAR) tablet 100 mg, 100 mg, Oral, Daily, Sánchez Maxwell MD, 100 mg at 02/02/22 0810  •  magnesium sulfate 2g/50 mL (PREMIX) infusion, 2 g, Intravenous, Once, Michele Subramanian MD  •  nitroglycerin (NITROSTAT) SL tablet 0.4 mg, 0.4 mg, Sublingual, Q5 Min PRN, Michele Subramanian MD  •  ondansetron (ZOFRAN) tablet 4 mg, 4 mg, Oral, Q6H PRN, Michele Subramanian MD, 4 mg at 02/02/22 0201  •  pantoprazole (PROTONIX) EC tablet 40 mg, 40 mg, Oral, Q AM, Sánchez Maxwell MD, 40 mg at 02/02/22 0354  •  potassium chloride (K-DUR,KLOR-CON) ER tablet 20 mEq, 20 mEq, Oral, TID With Meals, Michele Subramanian MD, 20 mEq at 02/02/22 1156  •  sodium chloride 0.9 % flush 20 mL, 20 mL, Intravenous, PRN, Sánchez Maxwell MD  •  spironolactone (ALDACTONE) tablet 25 mg, 25 mg, Oral, Daily, Maria Teresa Judge MD, 25 mg at 02/02/22 0810  •  torsemide (DEMADEX) tablet 40 mg, 40 mg, Oral, BID, Michele Subramanian MD, 40 mg at 02/02/22 0811    PRN meds  •  acetaminophen  •  albuterol  •  diphenoxylate-atropine  •  nitroglycerin  •  ondansetron  •  sodium chloride    No current facility-administered medications on file prior to encounter.     Current  Outpatient Medications on File Prior to Encounter   Medication Sig   • albuterol sulfate  (90 Base) MCG/ACT inhaler Inhale 2 puffs.   • aspirin 81 MG tablet Take 1 tablet by mouth Daily.   • atenolol (TENORMIN) 25 MG tablet Take 3 tablets by mouth 2 (Two) Times a Day.   • cephalexin (KEFLEX) 500 MG capsule Take 500 mg by mouth 3 (Three) Times a Day. FOR 10 DAYS, PT HAS NOT TAKEN ANY YET   • cyclobenzaprine (FLEXERIL) 10 MG tablet Take 10 mg by mouth 3 (three) times a day as needed for muscle spasms.   • digoxin (LANOXIN) 250 MCG tablet Take 1 tablet by mouth Daily.   • diphenoxylate-atropine (LOMOTIL) 2.5-0.025 MG per tablet Take 1 tablet by mouth 4 (Four) Times a Day As Needed for Diarrhea.   • gabapentin (NEURONTIN) 300 MG capsule Take 300 mg by mouth 3 (three) times a day. Take 3 tablets three times daily    • glipiZIDE (GLUCOTROL) 10 MG tablet Take 10 mg by mouth 2 (two) times a day before meals.   • hydrALAZINE (APRESOLINE) 100 MG tablet Take 1 tablet by mouth 2 (two) times a day.   • hydrOXYzine (ATARAX) 25 MG tablet Take 25 mg by mouth every 8 (eight) hours as needed for itching.   • insulin NPH-insulin regular (Novolin 70/30) (70-30) 100 UNIT/ML injection Inject 80 Units under the skin into the appropriate area as directed 2 (Two) Times a Day Before Meals.   • levothyroxine (SYNTHROID) 200 MCG tablet Take 300 mcg by mouth Daily.   • loratadine (CLARITIN) 10 MG tablet Take 1 tablet by mouth Daily.   • losartan (COZAAR) 100 MG tablet Take 1 tablet by mouth Daily.   • meclizine (ANTIVERT) 25 MG tablet Take 1 tablet by mouth Daily As Needed.   • meperidine (DEMEROL) 50 MG tablet Take 100 mg by mouth Every 6 (Six) Hours As Needed.   • metFORMIN (GLUCOPHAGE) 500 MG tablet Take 1 tablet by mouth 2 (Two) Times a Day With Meals.   • ondansetron (ZOFRAN) 8 MG tablet Take 8 mg by mouth Daily.   • potassium chloride 10 MEQ CR tablet Take 1 tablet by mouth Daily.   • simvastatin (ZOCOR) 20 MG tablet Take 20 mg by  "mouth every night.   • torsemide (Demadex) 20 MG tablet Take 2 tablets by mouth Daily.   • fluconazole (DIFLUCAN) 150 MG tablet Take 150 mg by mouth 1 (One) Time. On tab once weekly for two weeks   • LORazepam (ATIVAN) 1 MG tablet Take 1 mg by mouth every 6 (six) hours as needed for anxiety.   • vitamin D (ERGOCALCIFEROL) 94062 units capsule capsule Take 1 capsule by mouth Every 7 (Seven) Days.       General appearance: Obese female, NAD, alert and cooperative  HEENT: Normocephalic, atraumatic, PERRL, no masses or tenderness  Resp: Even and unlabored  Skin: warm, dry    Neurological:   MS: oriented x3, correctly identified wall clock time, recent/remote memory intact, normal attention/concentration, language intact, no neglect, normal fund of knowledge  CN: visual acuity grossly normal, visual fields full, PERRL, EOMI, facial sensation equal, no facial droop, hearing symmetric, palate elevates symmetrically, shoulder shrug equal, tongue midline  Motor: 5/5 RUE/LUE/LLE, 4/5 LLE  Sensory: light touch sensation intact in all 4 ext.  Coordination: Normal finger to nose test on the right, mild dysmetria on the left  Gait and station: deferred, OT helping pt to the bed pt felt \"warm\" so sat on side of bed during my exam.  Rapid alternating movements: abnormal finger to thumb tap on the left    Laboratory results:  Lab Results   Component Value Date    TSH 1.080 02/01/2022     Lab Results   Component Value Date    HGBA1C 11.71 (H) 01/30/2022     Lab Results   Component Value Date    KHCTVQUC26 395 12/21/2020     Lab Results   Component Value Date    CHOL 71 02/01/2022    CHOL 79 01/30/2022    CHLPL 90 08/13/2020    CHLPL 104 11/14/2019    CHLPL 110 06/17/2019     Lab Results   Component Value Date    TRIG 112 02/01/2022    TRIG 124 01/30/2022    TRIG 84 08/13/2020     Lab Results   Component Value Date    HDL 17 (L) 02/01/2022    HDL 18 (L) 01/30/2022    HDL 46 (L) 08/13/2020     Lab Results   Component Value Date    LDL " 33 02/01/2022    LDL 38 01/30/2022    LDL 27 08/13/2020     Lab Results   Component Value Date    WBC 7.20 02/02/2022    HGB 11.5 (L) 02/02/2022    HCT 37.1 02/02/2022    MCV 87.1 02/02/2022     02/02/2022     Lab Results   Component Value Date    GLUCOSE 180 (H) 02/02/2022    BUN 9 02/02/2022    CREATININE 0.91 02/02/2022    EGFRIFNONA 63 02/02/2022    BCR 9.9 02/02/2022    K 3.7 02/02/2022    CO2 30.5 (H) 02/02/2022    CALCIUM 9.9 02/02/2022    ALBUMIN 2.50 (L) 02/01/2022    LABIL2 1.2 01/11/2021    AST 7 02/01/2022    ALT 5 02/01/2022     Lab Results   Component Value Date    PTT 32.8 01/28/2022     Lab Results   Component Value Date    INR 1.20 (H) 01/28/2022    INR 1.1 08/15/2018    PROTIME 15.0 (H) 01/28/2022    PROTIME 12.2 08/15/2018     Brief Urine Lab Results  (Last result in the past 365 days)      Color   Clarity   Blood   Leuk Est   Nitrite   Protein   CREAT   Urine HCG        01/29/22 0943 Yellow   Cloudy   Small (1+)   Trace   Negative   100 mg/dL (2+)                 Review and interpretation of imaging:  CT Head Without Contrast    Result Date: 1/31/2022  Multiple acute infarcts are identified involving the right parietal and occipital lobes with each of the infarcts larger than the MRI examination of 01/29/2022. The findings are consistent with extension of the infarcts. There is no evidence of hemorrhagic transformation, hydrocephalus or of abnormal extra-axial fluid.  The above information was called to and discussed with TRUDY Medrano.    Radiation dose reduction techniques were utilized, including automated exposure control and exposure modulation based on body size.  This report was finalized on 1/31/2022 4:17 PM by Dr. Yusuf Dave M.D.      MRI Angiogram Head Without Contrast    Result Date: 1/29/2022  Multiple areas of acute ischemic infarction within the right parietal and temporal and occipital lobes as described in more detail above. The infarcts are all in the  vascular territory of the right middle cerebral artery, and are suspected to be embolic in etiology. There is no significant associated mass effect and there is no intracranial hemorrhage.  This report was finalized on 1/29/2022 12:12 PM by Dr. Gabino Perez M.D.      MRI Brain Without Contrast    Result Date: 1/29/2022  Multiple areas of acute ischemic infarction within the right parietal and temporal and occipital lobes as described in more detail above. The infarcts are all in the vascular territory of the right middle cerebral artery, and are suspected to be embolic in etiology. There is no significant associated mass effect and there is no intracranial hemorrhage.  This report was finalized on 1/29/2022 12:12 PM by Dr. Gabino Perez M.D.      CT Head Without Contrast Stroke Protocol    Result Date: 1/28/2022  Normal CT scan of the head without contrast.  This report was finalized on 1/28/2022 10:50 PM by Dr. Gabino Perez M.D.      Results for orders placed during the hospital encounter of 01/28/22    Adult Transthoracic Echo Complete W/ Cont if Necessary Per Protocol (With Agitated Saline)    Interpretation Summary  · Left ventricular ejection fraction appears to be 61 - 65%. Normal left ventricular cavity size noted. Left ventricular wall thickness is consistent with mild to moderate concentric hypertrophy. All left ventricular wall segments contract normally. Left ventricular diastolic function was indeterminate. Elevated left atrial pressure.  · The right ventricular cavity is moderately dilated. Moderately reduced right ventricular systolic function noted.  · The left atrial cavity is severely dilated.  · The right atrial cavity is severely dilated.  · The patient had no IV access for saline bubble study or contrast; attempts were made between 9A-2P to see if the patient had obtained access.        Impression/Assessment:  This is a 61-year-old female with past medical history of hypertension, diabetes  type 2, hyperlipidemia, atrial fibrillation not on anticoagulation secondary to a history of GI bleed on Xarelto, obesity who presented to the hospital on 1/28/2022 with complaints of left-sided weakness and facial droop.  The patient actually fell in the bathroom and her  found her.  EMS was called and she was found to be severely hyperglycemic.  Upon arrival here to the ER she was noted to have a nonfocal exam.  Initial noncontrast CT head did not reveal any acute intracranial abnormalities.  /83, .  EKG revealed A. fib.  .  Temp 98.4.  U/A 4+ bacteria/6-12 WBC.  She received Rocephin for UTI.    She was unable to undergo a CTA due to a reported anaphylactic allergy to contrast therefore she underwent a MRI/MRA that revealed right MCA infarcts, no significant flow-limiting stenosis or occlusion.  Bilateral carotid duplex did reveal normal MOHIT and LICA flow.  2D echo with a severely dilated LA cavity, EF 61 to 65%, no AV or MV stenosis present.  No mention of PFO.    On 1/31 the patient was noted to be more lethargic and had worsening of her speech and left-sided weakness.  She underwent a repeat CT head that revealed extension of her infarcts.    Cardiology has evaluated the patient and feel that there is no indication for MARIZA.  They have decided to place the patient on Eliquis 5 mg twice daily as her prior GI bleed was on Xarelto.  GI has cleared the patient as well for anticoagulation and recommend repeat blood counts post discharge.  ASA has been discontinued.    Diagnosis:  Acute right MCA infarcts, embolic  Atrial fibrillation  History of GI bleed while on Xarelto  Uncontrolled diabetes  Obesity  UTI    Plan:  No further worsening of her neurological exam.  Remains at baseline.  Continue Eliquis 5 mg twice daily, monitor for s/s of bleeding.  Decrease Lipitor to 40mg, LDL 33  Neurochecks per stroke protocol  Okay to Normalize BP  Stroke Education  GIL/SCDs  PT/OT/ST  Needs better  control of her diabetes as well as weight loss.  Would recommend WES evaluation with a sleep study in the outpatient setting.  Follow-up with me in the office in 3 months for stroke follow-up.  I will arrange.  Therapies as written. CCP for discharge planning. Call RRT for any acute neurological changes. We will sign off, will see him per request.    Case discussed with patient, , RN, and Dr. Candelario, and he agrees with plan above.     TRUDY Youssef

## 2022-02-02 NOTE — THERAPY TREATMENT NOTE
Patient Name: Jenelle Kasper  : 1960    MRN: [id number]                              Today's Date: 2022       Admit Date: 2022    Visit Dx:     ICD-10-CM ICD-9-CM   1. TIA (transient ischemic attack)  G45.9 435.9   2. Left-sided weakness  R53.1 728.87   3. Facial droop  R29.810 781.94   4. Slurred speech  R47.81 784.59     Patient Active Problem List   Diagnosis   • Persistent atrial fibrillation (HCC)   • Benign essential HTN   • Hyperparathyroidism (HCC)   • Morbid obesity with BMI of 45.0-49.9, adult (HCC)   • Pulmonary hypertension (HCC)   • Precordial pain   • Shortness of breath   • TIA (transient ischemic attack)     Past Medical History:   Diagnosis Date   • Anxiety    • Arrhythmia    • Asthma    • Atrial fibrillation with RVR (HCC)    • C. difficile diarrhea    • COPD (chronic obstructive pulmonary disease) (HCC)    • Depression    • Diabetes mellitus (HCC)    • Diabetic peripheral neuropathy associated with type 2 diabetes mellitus (HCC)    • GERD (gastroesophageal reflux disease)    • Heart murmur    • History of fall     closed head injury after falling down steps; fx nose   • Hypercalcemia    • Hyperlipidemia    • Hypertension    • IBS (irritable bowel syndrome)    • Kidney stone    • Morbid obesity (HCC)    • PAF (paroxysmal atrial fibrillation) (HCC)    • PCOS (polycystic ovarian syndrome)    • PONV (postoperative nausea and vomiting)    • Sarcoidosis    • Shortness of breath    • Thyroid cancer (HCC)     WITH RADIATION   • Wounds, multiple      Past Surgical History:   Procedure Laterality Date   • CARDIAC CATHETERIZATION N/A 2016    Procedure: Coronary angiography;  Surgeon: Chris Perez MD;  Location: [facility] CATH INVASIVE LOCATION;  Service:    • CARDIAC CATHETERIZATION N/A 2016    Procedure: Left heart cath;  Surgeon: Chris Perez MD;  Location: [facility] CATH INVASIVE LOCATION;  Service:    • CARDIAC CATHETERIZATION N/A 2016    Procedure: Left  ventriculography;  Surgeon: Chris Perez MD;  Location:  [facility] CATH INVASIVE LOCATION;  Service:    • CARDIAC CATHETERIZATION N/A 2/26/2020    Procedure: Right Heart Cath;  Surgeon: Chris Perez MD;  Location:  [facility] CATH INVASIVE LOCATION;  Service: Cardiovascular;  Laterality: N/A;  If there is no evidence of pulmonary hypertension please add a left heart cath to evaluate coronary arteries as patient is having chest pain.   • CHOLECYSTECTOMY     • COLONOSCOPY     • DILATATION AND CURETTAGE     • ENDOSCOPY     • GASTRIC BYPASS      gastric surgery for morbid obesity   • GASTROPLASTY  2008    GASTRIC REVISION FROM PREVIOUS GASTRIC BYPASS   • HIATAL HERNIA REPAIR     • INGUINAL HERNIA REPAIR Right    • LUNG BIOPSY      to dx sarcoidosis   • LUNG SURGERY      Removal of granulomas   • TOTAL THYROIDECTOMY        General Information     Row Name 02/02/22 1228          OT Time and Intention    Document Type therapy note (daily note)  -SM (r) LR (t) SM (c)     Mode of Treatment individual therapy; occupational therapy  -SM (r) LR (t) SM (c)     Row Name 02/02/22 1228          General Information    Patient Profile Reviewed yes  -SM (r) LR (t) SM (c)     Existing Precautions/Restrictions fall; oxygen therapy device and L/min  -SM (r) LR (t) SM (c)     Barriers to Rehab medically complex  -SM (r) LR (t) SM (c)     Row Name 02/02/22 1228          Cognition    Orientation Status (Cognition) oriented x 3  -SM (r) LR (t) SM (c)     Row Name 02/02/22 1228          Safety Issues, Functional Mobility    Safety Issues Affecting Function (Mobility) insight into deficits/self-awareness  -SM (r) LR (t) SM (c)     Impairments Affecting Function (Mobility) cognition; endurance/activity tolerance; grasp; visual/perceptual; strength; range of motion (ROM); coordination; pain; balance  -SM (r) LR (t) SM (c)     Cognitive Impairments, Mobility Safety/Performance insight into deficits/self-awareness; problem-solving/reasoning  -SM (r)  LR (t) SM (c)           User Key  (r) = Recorded By, (t) = Taken By, (c) = Cosigned By    Initials Name Provider Type    Lexy Blue, OT Occupational Therapist    Yanet Bhandari, OT Student OT Student                 Mobility/ADL's     Row Name 02/02/22 1230          Bed Mobility    Bed Mobility supine-sit; sit-supine  -SM (r) LR (t) SM (c)     Rolling Left [place] (Bed Mobility) verbal cues; maximum assist (25% patient effort); 2 person assist  -SM (r) LR (t) SM (c)     Rolling Right [place] (Bed Mobility) 2 person assist; maximum assist (25% patient effort); verbal cues  -SM (r) LR (t) SM (c)     Scooting/Bridging [place] (Bed Mobility) 2 person assist; dependent (less than 25% patient effort)  -SM (r) LR (t) SM (c)     Supine-Sit [place] (Bed Mobility) maximum assist (25% patient effort); 1 person assist; verbal cues  -SM (r) LR (t) SM (c)     Sit-Supine [place] (Bed Mobility) maximum assist (25% patient effort); 2 person assist; verbal cues  -SM (r) LR (t) SM (c)     Bed Mobility, Safety Issues decreased use of arms for pushing/pulling; decreased use of legs for bridging/pushing  -SM (r) LR (t) SM (c)     Assistive Device (Bed Mobility) bed rails; draw sheet; head of bed elevated  -SM (r) LR (t) SM (c)     Comment (Bed Mobility) Pt required extra time for bed mobility, was able to move L LE better today, reduced need for active assist in moving L LE during bed mobility. Pt required max A x2 to scoot to sitting EOB.  -SM (r) LR (t) SM (c)     Row Name 02/02/22 1230          Transfers    Transfers sit-stand transfer  -SM (r) LR (t) SM (c)     Comment (Transfers) Pt required max A x2 to transfer from sitting EOB to standing. Pt with decreased stand time today compared to yesterday. Pt c/o nausea during 2nd stand. Pt required verbal cues to stand up straight and look up, pt demo'd understanding on first stand.  -SM (r) LR (t) SM (c)     Sit-Stand [place] (Transfers) maximum  assist (25% patient effort); 2 person assist; verbal cues  -SM (r) LR (t) SM (c)     Row Name 02/02/22 1230          Functional Mobility    Functional Mobility- Comment Unable to assess funcational mobility this encounter.  -SM (r) LR (t) SM (c)     Row Name 02/02/22 1230          Activities of Daily Living    BADL Assessment/Intervention upper body dressing; feeding; toileting; grooming  -SM (r) LR (t) SM (c)     Row Name 02/02/22 1230          Upper Body Dressing Assessment/Training    [place] Level (Upper Body Dressing) upper body dressing skills; doff; don; pajama/robe; minimum assist (75% patient effort); verbal cues  -SM (r) LR (t) SM (c)     Position (Upper Body Dressing) edge of bed sitting  -SM (r) LR (t) SM (c)     Comment (Upper Body Dressing) Pt required min A to doff/don hospital gown sitting EOB.  -SM (r) LR (t) SM (c)     Row Name 02/02/22 1230          Toileting Assessment/Training    [place] Level (Toileting) toileting skills; dependent (less than 25% patient effort)  -SM (r) LR (t) SM (c)     Comment (Toileting) Pt with purewick and brief on.  -SM (r) LR (t) SM (c)     Row Name 02/02/22 1230          Grooming Assessment/Training    [place] Level (Grooming) grooming skills; set up; wash face, hands  -SM (r) LR (t) SM (c)     Position (Grooming) edge of bed sitting  -SM (r) LR (t) SM (c)     Row Name 02/02/22 1230          Self-Feeding Assessment/Training    [place] Level (Feeding) feeding skills; set up; minimum assist (75% patient effort); contact guard assist; verbal cues  -SM (r) LR (t) SM (c)     Position (Self-Feeding) sitting up in bed  -SM (r) LR (t) SM (c)     Comment (Feeding) Pt required verbal cues to include L hand into self-feeding breakfast. Pt stated she only wanted to eat the fruit on her tray, intially used L hand to stabilize fruit cup on tray, progressed to holding cup in L hand while feeding with fork in R hand. Pt with 1 dropped piece of fruit and 1 spill of  juice during self-feeding. Pt required min A to open lid on cup of tea to add ice and sugar. Pt required set-up of ice and opening of sugar packets. Pt required min A to verify lid was placed on cup prior to drinking. Pt required verbal cues to use B hands to hold cup for drinking. Pt required extra time to complete self-feeding breakfast.  -SM (r) LR (t) SM (c)           User Key  (r) = Recorded By, (t) = Taken By, (c) = Cosigned By    Initials Name Provider Type    Lexy Blue, OT Occupational Therapist    LR Yanet Santiago, OT Student OT Student               Obj/Interventions     Row Name 02/02/22 1239          Balance    Balance Assessment sitting static balance; sitting dynamic balance  -SM (r) LR (t) SM (c)     Static Sitting Balance WFL; sitting, edge of bed  -SM (r) LR (t) SM (c)     Dynamic Sitting Balance WFL; sitting, edge of bed  -SM (r) LR (t) SM (c)     Comment, Balance Pt required supervision for sitting balance EOB.  -SM (r) LR (t) SM (c)           User Key  (r) = Recorded By, (t) = Taken By, (c) = Cosigned By    Initials Name Provider Type    Lexy Blue, OT Occupational Therapist    LR Yanet Santiago, OT Student OT Student               Goals/Plan    No documentation.                Clinical Impression     Row Name 02/02/22 1245          Pain Assessment    Additional Documentation Pain Scale: FACES Pre/Post-Treatment (Group)  -SM (r) LR (t) SM (c)     Row Name 02/02/22 0658          Pain Scale: Numbers Pre/Post-Treatment    Pain Intervention(s) Ambulation/increased activity; Repositioned; Back rub  -SM (r) LR (t) SM (c)     Row Name 02/02/22 1245          Pain Scale: FACES Pre/Post-Treatment    Pain: FACES Scale, Pretreatment 8-->hurts whole lot  -SM (r) LR (t) SM (c)     Posttreatment Pain Rating 8-->hurts whole lot  -SM (r) LR (t) SM (c)     Pre/Posttreatment Pain Comment Pt stated L knee hurts, feels it was jostled with EMS. Pt stated spine and R shoulder blade are hurting  from laying in bed.  -SM (r) LR (t) SM (c)     Row Name 02/02/22 1245          Plan of Care Review    Plan of Care Reviewed With patient  -SM (r) LR (t) SM (c)     Progress improving  -SM (r) LR (t) SM (c)     Outcome Summary Pt's spouse in room with pt upon arrival for session. They stated pt had not had breakfast yet, pt's tray was brought over to her for self-feeding. Pt required min A and verbal cues to include L UE in self-feeding. Pt required max A x2 for most bed mobility today. Pt required min A for UB dressing sitting EOB. Pt sat EOB ~25 min addressing sitting balance. Pt required max A x 2 for sit to stand x2. Pt with decreased standing tolerance this encounter compared to yesterday. Pt c/o nausea during second stand. Recommend continued skilled OT services to increase independence in self-care and functional transfers. Pt is requesting [facility].  -SM (r) LR (t) SM (c)     Row Name 02/02/22 1244          Therapy Assessment/Plan (OT)    Rehab Potential (OT) good, to achieve stated therapy goals  -SM (r) LR (t) SM (c)     Criteria for Skilled Therapeutic Interventions Met (OT) yes; skilled treatment is necessary  -SM (r) LR (t) SM (c)     Row Name 02/02/22 124          Therapy Plan Review/Discharge Plan (OT)    Anticipated Discharge Disposition (OT) inpatient rehabilitation facility  -SM (r) LR (t) SM (c)     Row Name 02/02/22 1245          Vital Signs    O2 Delivery Pre Treatment supplemental O2  -SM (r) LR (t) SM (c)     O2 Delivery Intra Treatment supplemental O2  -SM (r) LR (t) SM (c)     O2 Delivery Post Treatment supplemental O2  -SM (r) LR (t) SM (c)     Row Name 02/02/22 1243          Positioning and Restraints    Pre-Treatment Position in bed  -SM (r) LR (t) SM (c)     Post Treatment Position bed  -SM (r) LR (t) SM (c)     In Bed side lying left; with nsg; encouraged to call for assist; call light within reach; exit alarm on  -SM (r) LR (t) SM (c)           User Key  (r) = Recorded By,  (t) = Taken By, (c) = Cosigned By    Initials Name Provider Type    Lexy Blue, OT Occupational Therapist    LR Yanet Santiago, OT Student OT Student               Outcome Measures     Row Name 02/02/22 1256          How much help from another is currently needed...    Putting on and taking off regular lower body clothing? 2  -SM (r) LR (t) SM (c)     Bathing (including washing, rinsing, and drying) 2  -SM (r) LR (t) SM (c)     Toileting (which includes using toilet bed pan or urinal) 1  -SM (r) LR (t) SM (c)     Putting on and taking off regular upper body clothing 2  -SM (r) LR (t) SM (c)     Taking care of personal grooming (such as brushing teeth) 3  -SM (r) LR (t) SM (c)     Eating meals 2  -SM (r) LR (t) SM (c)     AM-PAC 6 Clicks Score (OT) 12  -SM (r) LR (t)     Row Name 02/02/22 1256          Functional Assessment    Outcome Measure Options AM-PAC 6 Clicks Daily Activity (OT)  -SM (r) LR (t) SM (c)           User Key  (r) = Recorded By, (t) = Taken By, (c) = Cosigned By    Initials Name Provider Type    Lexy Blue OT Occupational Therapist    LR Yanet Santiago, OT Student OT Student                Occupational Therapy Education                 Title: PT OT SLP Therapies (In Progress)     Topic: Occupational Therapy (In Progress)     Point: ADL training (Done)     Description:   Instruct learner(s) on proper safety adaptation and remediation techniques during self care or transfers.   Instruct in proper use of assistive devices.              Learning Progress Summary           Patient Acceptance, E, VU,NR by MINESH at 1/31/2022 1201    Comment: Pt ed on role of OT, POC goals, d/c planning. Pt ed on LE placement prior to transfer from sitting EOB to standing, pt verbalized understanding, requires tactile cues place L LE in proper placement. Pt requires reinforcement for safety in transfers.                   Point: Home exercise program (Not Started)     Description:   Instruct learner(s)  on appropriate technique for monitoring, assisting and/or progressing therapeutic exercises/activities.              Learner Progress:  Not documented in this visit.          Point: Precautions (Not Started)     Description:   Instruct learner(s) on prescribed precautions during self-care and functional transfers.              Learner Progress:  Not documented in this visit.          Point: Body mechanics (Not Started)     Description:   Instruct learner(s) on proper positioning and spine alignment during self-care, functional mobility activities and/or exercises.              Learner Progress:  Not documented in this visit.                      User Key     Initials Effective Dates Name Provider Type Discipline     01/18/22 -  Yanet Santiago, OT Student OT Student OT              OT Recommendation and Plan  Planned Therapy Interventions (OT): activity tolerance training, BADL retraining, functional balance retraining, neuromuscular control/coordination retraining, occupation/activity based interventions, patient/caregiver education/training, transfer/mobility retraining, strengthening exercise, ROM/therapeutic exercise  Therapy Frequency (OT): 5 times/wk  Plan of Care Review  Plan of Care Reviewed With: patient  Progress: improving  Outcome Summary: Pt's spouse in room with pt upon arrival for session. They stated pt had not had breakfast yet, pt's tray was brought over to her for self-feeding. Pt required min A and verbal cues to include L UE in self-feeding. Pt required max A x2 for most bed mobility today. Pt required min A for UB dressing sitting EOB. Pt sat EOB ~25 min addressing sitting balance. Pt required max A x 2 for sit to stand x2. Pt with decreased standing tolerance this encounter compared to yesterday. Pt c/o nausea during second stand. Recommend continued skilled OT services to increase independence in self-care and functional transfers. Pt is requesting [facility] [place], operated by [facility].     Time Calculation:     Time Calculation- OT     Row Name 02/02/22 1256             Time Calculation- OT    OT Start Time 1042  -SM (r) LR (t) SM (c)      OT Stop Time 1155  -SM (r) LR (t) SM (c)      OT Time Calculation (min) 73 min  -SM (r) LR (t)      Total Timed Code Minutes- OT 73 minute(s)  -SM (r) LR (t) SM (c)      OT Received On 02/02/22  -SM (r) LR (t) SM (c)      OT - Next Appointment 02/03/22  -SM (r) LR (t) SM (c)              Timed Charges    27400 - OT Therapeutic Activity Minutes 28  -SM (r) LR (t) SM (c)      20771 - OT Self Care/Mgmt Minutes 45  -SM (r) LR (t) SM (c)              Total Minutes    Timed Charges Total Minutes 73  -SM (r) LR (t)       Total Minutes 73  -SM (r) LR (t)            User Key  (r) = Recorded By, (t) = Taken By, (c) = Cosigned By    Initials Name Provider Type     Lexy Tarango, OT Occupational Therapist    LR Yanet Santiago, OT Student OT Student              Therapy Charges for Today     Code Description Service Date Service Provider Modifiers Qty    92486799963 HC OT THERAPEUTIC ACT EA 15 MIN 2/1/2022 Yanet Santiago OT Student GO 2    35232446470 HC OT SELF CARE/MGMT/TRAIN EA 15 MIN 2/1/2022 Yanet Santiago OT Student GO 1    67346626403 HC OT SELF CARE/MGMT/TRAIN EA 15 MIN 2/2/2022 Yanet Santiago OT Student GO 3    88603996816 HC OT THERAPEUTIC ACT EA 15 MIN 2/2/2022 Yanet Santiago OT Student GO 2    53547163317 HC OT THER SUPP EA 15 MIN 2/2/2022 Yanet Santiago OT Student GO 1               ELSA HAYDEN  2/2/2022

## 2022-02-02 NOTE — PROGRESS NOTES
[facility]  Stroke screening now full referral. Following progress with therapy to help determine the most appropriate level of rehab needed at the time of dc    Patricia Clarke RN  Acute Rehab Admisison Nurse      Saw pt and s/o in room. She was bent over moaning in pain and complaining of nausea. States she was unable to get OOB with therapy today. Noted OT notes and pt was max x2 sit to stand with bilateral knees blocked and unable to ambulate.   Discussed with Dr. Huang and he would like to continue to follow progress with therapy. CCP informed.

## 2022-02-02 NOTE — PLAN OF CARE
Goal Outcome Evaluation:  Plan of Care Reviewed With: patient, spouse        Progress: improving  Outcome Summary: Pt agreeable to PT this PM and demonstrated improved overall mobility. Pt cued for use of bedrails and able to complete bed mobility with min-mod A x2 requiring increased time to complete. Used draw sheet to scoot hips out to EOB and get feet on floor. Pt attempted 3 STS and was able to stand all the way up on 3rd attempt with max A x2 - tolerated standing for ~10 seconds before fatiguing and c/o B knee pain and requesting to return to bed. Pt assisted back to supine. Pt will continue to benefit from skilled PT to address functional deficits and improve overall independent mobility. PT recommending D/C to SNF.    Patient was intermittently wearing a face mask during this therapy encounter. Therapist used appropriate personal protective equipment including eye protection, mask, and gloves.  Mask used was standard procedure mask. Appropriate PPE was worn during the entire therapy session. Hand hygiene was completed before and after therapy session. Patient is not in enhanced droplet precautions.

## 2022-02-02 NOTE — CONSULTS
"Adult Nutrition  Assessment/PES    Patient Name:  Jneelle Kasper  YOB: 1960  MRN: [id number]  Admit Date:  1/28/2022    Assessment Date:  2/2/2022    Comments:  Nutrition consult for diet education.  Admitted with TIA.  L sided weakness.  Diabetes educator saw patient yesterday.  50% x 2 meals per chart PO data.    Spoke with patient and significant other at bedside.  Patient is somewhat out of it and in pain.  Discussed diabetic diet with significant other.  Gave handouts for home use.  He discussed foods patient typically eats at home.    RD to continue to follow.     Reason for Assessment     Row Name 02/02/22 1325          Reason for Assessment    Reason For Assessment nurse/nurse practitioner consult; identified at risk by screening criteria     Diagnosis cardiac disease; pulmonary disease; diabetes diagnosis/complications; other (see comments); gastrointestinal disease; neurologic conditions  Afib, pulm HTN, COPD, DM, sarcoidosis, gastric bypass; adm with TIA     Identified At Risk by Screening Criteria MST SCORE 2+; need for education                Nutrition/Diet History     Row Name 02/02/22 1326          Nutrition/Diet History    Typical Food/Fluid Intake 50% x 2 meals                Anthropometrics     Row Name 02/02/22 1326          Anthropometrics    Height 170.2 cm (67.01\")            Admit Weight    Admit Weight --  266# 1/30            Ideal Body Weight (IBW)    Ideal Body Weight (IBW) (kg) 61.88            Body Mass Index (BMI)    BMI Assessment BMI 40 or greater: obesity grade III  41.69                Labs/Tests/Procedures/Meds     Row Name 02/02/22 1341          Labs/Procedures/Meds    Lab Results Reviewed reviewed, pertinent     Lab Results Comments Gluc, Mg, Hgb            Diagnostic Tests/Procedures    Diagnostic Test/Procedure Reviewed reviewed, pertinent            Medications    Pertinent Medications Reviewed reviewed, pertinent     Pertinent Medications Comments roberto" "admelog, synthroid, protonix, KCl, demadex                Physical Findings     Row Name 02/02/22 1345          Physical Findings    Overall Physical Appearance obese; on oxygen therapy     Skin edema; other (see comments)  B=16, bruised, skin tear; mild/mod edema                Estimated/Assessed Needs     Row Name 02/02/22 1326          Calculation Measurements    Height 170.2 cm (67.01\")                Nutrition Prescription Ordered     Row Name 02/02/22 1348          Nutrition Prescription PO    Current PO Diet Regular     Fluid Consistency Thin     Common Modifiers Cardiac; Consistent Carbohydrate                Evaluation of Received Nutrient/Fluid Intake     Row Name 02/02/22 1348          PO Evaluation    Number of Meals 2     % PO Intake 50               Problem/Interventions:   Problem 1     Row Name 02/02/22 1348          Nutrition Diagnoses Problem 1    Problem 1 Knowledge Deficit     Etiology (related to) Medical Diagnosis     Endocrine DM2     Signs/Symptoms (evidenced by) Report/Observation                Intervention Goal     Row Name 02/02/22 1348          Intervention Goal    General Disease management/therapy; Maintain nutrition; Meet nutritional needs for age/condition     PO Increase intake; PO intake (%)     PO Intake % 75 %     Weight Appropriate weight loss                Nutrition Intervention     Row Name 02/02/22 1349          Nutrition Intervention    RD/Tech Action Follow Tx progress; Care plan reviewd                Education/Evaluation     Row Name 02/02/22 1349          Education    Education Provided education regarding     Provided education regarding Healthy eating for diabetes     Education Topics Diabetes; CHO counting            Monitor/Evaluation    Monitor Per protocol; PO intake; Pertinent labs; Weight; Skin status; Symptoms                 Electronically signed by:  Sammi Vanessa RD  02/02/22 13:49 EST  "

## 2022-02-03 LAB
ANION GAP SERPL CALCULATED.3IONS-SCNC: 11.7 MMOL/L (ref 5–15)
BASOPHILS # BLD AUTO: 0.04 10*3/MM3 (ref 0–0.2)
BASOPHILS NFR BLD AUTO: 0.6 % (ref 0–1.5)
BUN SERPL-MCNC: 13 MG/DL (ref 6–20)
BUN/CREAT SERPL: 11.5 (ref 7–25)
CALCIUM SPEC-SCNC: 10.3 MG/DL (ref 8.6–10.5)
CHLORIDE SERPL-SCNC: 92 MMOL/L (ref 98–107)
CO2 SERPL-SCNC: 32.3 MMOL/L (ref 22–29)
CREAT SERPL-MCNC: 1.13 MG/DL (ref 0.57–1)
DEPRECATED RDW RBC AUTO: 45.5 FL (ref 37–54)
EOSINOPHIL # BLD AUTO: 0.11 10*3/MM3 (ref 0–0.4)
EOSINOPHIL NFR BLD AUTO: 1.6 % (ref 0.3–6.2)
ERYTHROCYTE [DISTWIDTH] IN BLOOD BY AUTOMATED COUNT: 14.4 % (ref 12.3–15.4)
GFR SERPL CREATININE-BSD FRML MDRD: 49 ML/MIN/1.73
GLUCOSE BLDC GLUCOMTR-MCNC: 136 MG/DL (ref 70–130)
GLUCOSE BLDC GLUCOMTR-MCNC: 153 MG/DL (ref 70–130)
GLUCOSE BLDC GLUCOMTR-MCNC: 157 MG/DL (ref 70–130)
GLUCOSE BLDC GLUCOMTR-MCNC: 207 MG/DL (ref 70–130)
GLUCOSE SERPL-MCNC: 146 MG/DL (ref 65–99)
HCT VFR BLD AUTO: 37.8 % (ref 34–46.6)
HGB BLD-MCNC: 11.9 G/DL (ref 12–15.9)
IMM GRANULOCYTES # BLD AUTO: 0.05 10*3/MM3 (ref 0–0.05)
IMM GRANULOCYTES NFR BLD AUTO: 0.7 % (ref 0–0.5)
LYMPHOCYTES # BLD AUTO: 0.9 10*3/MM3 (ref 0.7–3.1)
LYMPHOCYTES NFR BLD AUTO: 12.7 % (ref 19.6–45.3)
MAGNESIUM SERPL-MCNC: 1.7 MG/DL (ref 1.6–2.6)
MCH RBC QN AUTO: 27 PG (ref 26.6–33)
MCHC RBC AUTO-ENTMCNC: 31.5 G/DL (ref 31.5–35.7)
MCV RBC AUTO: 85.9 FL (ref 79–97)
MONOCYTES # BLD AUTO: 0.59 10*3/MM3 (ref 0.1–0.9)
MONOCYTES NFR BLD AUTO: 8.3 % (ref 5–12)
NEUTROPHILS NFR BLD AUTO: 5.39 10*3/MM3 (ref 1.7–7)
NEUTROPHILS NFR BLD AUTO: 76.1 % (ref 42.7–76)
NRBC BLD AUTO-RTO: 0 /100 WBC (ref 0–0.2)
PLATELET # BLD AUTO: 312 10*3/MM3 (ref 140–450)
PMV BLD AUTO: 11 FL (ref 6–12)
POTASSIUM SERPL-SCNC: 3.9 MMOL/L (ref 3.5–5.2)
RBC # BLD AUTO: 4.4 10*6/MM3 (ref 3.77–5.28)
SODIUM SERPL-SCNC: 136 MMOL/L (ref 136–145)
WBC NRBC COR # BLD: 7.08 10*3/MM3 (ref 3.4–10.8)

## 2022-02-03 PROCEDURE — 63710000001 INSULIN LISPRO (HUMAN) PER 5 UNITS: Performed by: HOSPITALIST

## 2022-02-03 PROCEDURE — 97530 THERAPEUTIC ACTIVITIES: CPT

## 2022-02-03 PROCEDURE — 83735 ASSAY OF MAGNESIUM: CPT | Performed by: HOSPITALIST

## 2022-02-03 PROCEDURE — 80048 BASIC METABOLIC PNL TOTAL CA: CPT | Performed by: HOSPITALIST

## 2022-02-03 PROCEDURE — 25010000002 ONDANSETRON PER 1 MG: Performed by: HOSPITALIST

## 2022-02-03 PROCEDURE — 99232 SBSQ HOSP IP/OBS MODERATE 35: CPT | Performed by: NURSE PRACTITIONER

## 2022-02-03 PROCEDURE — 82962 GLUCOSE BLOOD TEST: CPT

## 2022-02-03 PROCEDURE — 25010000002 CEFTRIAXONE PER 250 MG: Performed by: HOSPITALIST

## 2022-02-03 PROCEDURE — 63710000001 INSULIN LISPRO (HUMAN) PER 5 UNITS: Performed by: INTERNAL MEDICINE

## 2022-02-03 PROCEDURE — 85025 COMPLETE CBC W/AUTO DIFF WBC: CPT | Performed by: HOSPITALIST

## 2022-02-03 RX ORDER — ONDANSETRON 2 MG/ML
4 INJECTION INTRAMUSCULAR; INTRAVENOUS EVERY 6 HOURS PRN
Status: DISCONTINUED | OUTPATIENT
Start: 2022-02-03 | End: 2022-02-05

## 2022-02-03 RX ORDER — TORSEMIDE 20 MG/1
20 TABLET ORAL
Status: DISCONTINUED | OUTPATIENT
Start: 2022-02-03 | End: 2022-02-06 | Stop reason: HOSPADM

## 2022-02-03 RX ADMIN — INSULIN GLARGINE-YFGN 20 UNITS: 100 INJECTION, SOLUTION SUBCUTANEOUS at 21:06

## 2022-02-03 RX ADMIN — GABAPENTIN 300 MG: 300 CAPSULE ORAL at 08:49

## 2022-02-03 RX ADMIN — POTASSIUM CHLORIDE 20 MEQ: 750 TABLET, EXTENDED RELEASE ORAL at 17:30

## 2022-02-03 RX ADMIN — SPIRONOLACTONE 25 MG: 25 TABLET, FILM COATED ORAL at 08:49

## 2022-02-03 RX ADMIN — APIXABAN 5 MG: 5 TABLET, FILM COATED ORAL at 21:06

## 2022-02-03 RX ADMIN — POTASSIUM CHLORIDE 20 MEQ: 750 TABLET, EXTENDED RELEASE ORAL at 12:11

## 2022-02-03 RX ADMIN — AMLODIPINE BESYLATE 10 MG: 10 TABLET ORAL at 08:49

## 2022-02-03 RX ADMIN — ATORVASTATIN CALCIUM 40 MG: 20 TABLET, FILM COATED ORAL at 21:05

## 2022-02-03 RX ADMIN — ATENOLOL 75 MG: 25 TABLET ORAL at 21:05

## 2022-02-03 RX ADMIN — ATENOLOL 75 MG: 25 TABLET ORAL at 08:49

## 2022-02-03 RX ADMIN — INSULIN LISPRO 7 UNITS: 100 INJECTION, SOLUTION INTRAVENOUS; SUBCUTANEOUS at 08:48

## 2022-02-03 RX ADMIN — INSULIN LISPRO 7 UNITS: 100 INJECTION, SOLUTION INTRAVENOUS; SUBCUTANEOUS at 17:30

## 2022-02-03 RX ADMIN — TORSEMIDE 20 MG: 20 TABLET ORAL at 17:30

## 2022-02-03 RX ADMIN — GABAPENTIN 300 MG: 300 CAPSULE ORAL at 16:35

## 2022-02-03 RX ADMIN — HYDRALAZINE HYDROCHLORIDE 100 MG: 50 TABLET, FILM COATED ORAL at 21:06

## 2022-02-03 RX ADMIN — DIGOXIN 250 MCG: 250 TABLET ORAL at 08:49

## 2022-02-03 RX ADMIN — HYDRALAZINE HYDROCHLORIDE 100 MG: 50 TABLET, FILM COATED ORAL at 08:49

## 2022-02-03 RX ADMIN — APIXABAN 5 MG: 5 TABLET, FILM COATED ORAL at 08:49

## 2022-02-03 RX ADMIN — POTASSIUM CHLORIDE 20 MEQ: 750 TABLET, EXTENDED RELEASE ORAL at 08:49

## 2022-02-03 RX ADMIN — INSULIN LISPRO 4 UNITS: 100 INJECTION, SOLUTION INTRAVENOUS; SUBCUTANEOUS at 17:36

## 2022-02-03 RX ADMIN — CEFTRIAXONE 1 G: 1 INJECTION, POWDER, FOR SOLUTION INTRAMUSCULAR; INTRAVENOUS at 16:35

## 2022-02-03 RX ADMIN — INSULIN LISPRO 2 UNITS: 100 INJECTION, SOLUTION INTRAVENOUS; SUBCUTANEOUS at 12:11

## 2022-02-03 RX ADMIN — INSULIN LISPRO 7 UNITS: 100 INJECTION, SOLUTION INTRAVENOUS; SUBCUTANEOUS at 12:12

## 2022-02-03 RX ADMIN — LEVOTHYROXINE SODIUM 300 MCG: 0.15 TABLET ORAL at 08:49

## 2022-02-03 RX ADMIN — LOSARTAN POTASSIUM 100 MG: 100 TABLET, FILM COATED ORAL at 08:49

## 2022-02-03 RX ADMIN — ONDANSETRON 4 MG: 2 INJECTION INTRAMUSCULAR; INTRAVENOUS at 18:53

## 2022-02-03 RX ADMIN — PANTOPRAZOLE SODIUM 40 MG: 40 TABLET, DELAYED RELEASE ORAL at 06:08

## 2022-02-03 RX ADMIN — TORSEMIDE 40 MG: 20 TABLET ORAL at 08:49

## 2022-02-03 RX ADMIN — GABAPENTIN 300 MG: 300 CAPSULE ORAL at 21:06

## 2022-02-03 NOTE — PROGRESS NOTES
"    Patient Name: Jenelle Kasper  :1960  61 y.o.      Patient Care Team:  Zay Olivares MD as PCP - General (General Practice)    Chief Complaint: follow up stroke, atrial fibrillation    Interval History: she feels hot and clammy. Lethargic but answering questions appropriately. She thinks she needs her blood pressure checked. HR in the low 60's. 's.        Objective   Vital Signs  Temp:  [96.8 °F (36 °C)-98.9 °F (37.2 °C)] 96.8 °F (36 °C)  Heart Rate:  [50-64] 64  Resp:  [16-18] 18  BP: (120-143)/(71-84) 142/84    Intake/Output Summary (Last 24 hours) at 2/3/2022 1054  Last data filed at 2/3/2022 0500  Gross per 24 hour   Intake --   Output 1600 ml   Net -1600 ml     Flowsheet Rows      First Filed Value   Admission Height 171.5 cm (67.5\") Documented at 2022 2151   Admission Weight 121 kg (266 lb 12.1 oz) Documented at 20225          Physical Exam:   General Appearance:    Alert, cooperative, in no acute distress   Lungs:     Clear to auscultation.  Normal respiratory effort and rate.      Heart:    Regular rhythm and normal rate, normal S1 and S2, no murmurs, gallops or rubs.     Chest Wall:    No abnormalities observed   Abdomen:     Soft, nontender, positive bowel sounds.     Extremities:   no cyanosis, clubbing or edema.  No marked joint deformities.  Adequate musculoskeletal strength.       Results Review:    Results from last 7 days   Lab Units 22  0621   SODIUM mmol/L 136   POTASSIUM mmol/L 3.9   CHLORIDE mmol/L 92*   CO2 mmol/L 32.3*   BUN mg/dL 13   CREATININE mg/dL 1.13*   GLUCOSE mg/dL 146*   CALCIUM mg/dL 10.3     Results from last 7 days   Lab Units 22   TROPONIN T ng/mL <0.010     Results from last 7 days   Lab Units 22  0621   WBC 10*3/mm3 7.08   HEMOGLOBIN g/dL 11.9*   HEMATOCRIT % 37.8   PLATELETS 10*3/mm3 312     Results from last 7 days   Lab Units 22   INR  1.20*   APTT seconds 32.8     Results from last 7 days   Lab " Units 02/03/22  0621   MAGNESIUM mg/dL 1.7     Results from last 7 days   Lab Units 02/01/22  0506   CHOLESTEROL mg/dL 71   TRIGLYCERIDES mg/dL 112   HDL CHOL mg/dL 17*   LDL CHOL mg/dL 33               Medication Review:   amLODIPine, 10 mg, Oral, Q24H  apixaban, 5 mg, Oral, Q12H  atenolol, 75 mg, Oral, BID  atorvastatin, 40 mg, Oral, Nightly  cefTRIAXone, 1 g, Intravenous, Q24H  digoxin, 250 mcg, Oral, Daily  gabapentin, 300 mg, Oral, TID  hydrALAZINE, 100 mg, Oral, Q12H  insulin glargine, 20 Units, Subcutaneous, Nightly  insulin lispro, 0-9 Units, Subcutaneous, TID AC  insulin lispro, 7 Units, Subcutaneous, TID With Meals  levothyroxine, 300 mcg, Oral, Daily  losartan, 100 mg, Oral, Daily  pantoprazole, 40 mg, Oral, Q AM  potassium chloride, 20 mEq, Oral, TID With Meals  spironolactone, 25 mg, Oral, Daily  torsemide, 40 mg, Oral, BID              Assessment/Plan   1. acute cerebrovascular event , embolic secondary to atrial fibrillation  2. Chronic atrial fibrillation - has had GIB on rivaroxaban. Now trialing apixaban. If she tolerates this, could consider left atrial appendage occluder. For now, careful observation for bleeding complication. Rate controlled.   3. Severe LVH  4. Prior gastric bypass  5. Acute urinary tract infection - on antibiotics  6. Diabetes mellitus type II  7. Morbid obesity    H/h remains stable. No s/s of bleeding. Continue apixaban. HR well controlled. No indication for MARIZA at this time. 's prior to AM meds.     Nothing further to add from a cardiac standpoint. Continue to monitor for s/s of bleeding. Will see as needed.    We will see her back in the office in 4 weeks. Or sooner with problems.     TRUDY Faria  [facility]  02/03/22  10:54 EST

## 2022-02-03 NOTE — PROGRESS NOTES
[facility]  Yesterday's therapy noted. Discussed with Dr. Huang. He is recommending subacute rehab and if cholo is able to progress more in a few weeks, facility can make a referral to have her reevaluated for Acute Rehab at that time.   CCP called and informed. Called s/o Gabino to inform also- msg left.     WIll sign off    Patricia gracia RN  Acute Rehab Admission nurse

## 2022-02-03 NOTE — PROGRESS NOTES
"Daily progress note      2/3/2022    Patient Identification:    Name: Jenelle Kasper  Age: 61 y.o.  Sex: female  :  1960  MRN: [id number]                       Primary Care Physician: Zay Olivares MD    Chief Complaint:    Doing same  No new complaints  Family at bedside    History of Present Illness:   Patient is a 61-year-old female, with history of atrial fibrillation, pulmonary hypertension, COPD, diabetes mellitus, morbid obesity, sarcoidosis.  Patient was brought to the emergency room because she was found by her  with left-sided facial droop, weakness of her left arm and slurred speech.  Apparently, symptoms resolved with 10 minutes of EMS arrival, by the time she arrived to the ER, patient was back to baseline.  Apparently, patient is noted anticoagulation due to history of GI bleeding.  In the ER, patient was found to have: Hypertension, her NIH score was two, blood sugar was 361, creatinine 0.8, sodium 132, troponin negative, digoxin level 0.5, COVID-19 came back negative.  Urinalysis show WBC 6-12.  Chest ray showed no infiltrates.  CT scan of the head was normal.  Patient was placed in observation for further management.     Review of Systems  Unremarkable except left-sided weakness     Exam:  Blood pressure 114/73, pulse 64, temperature 97.1 °F (36.2 °C), temperature source Tympanic, resp. rate 18, height 170.2 cm (67.01\"), weight 121 kg (266 lb), SpO2 97 %.    General: Alert, oriented x 3. Cooperative, no distress, appears stated age.  Morbid obesity  HEENT:    Head: Normocephalic, without obvious abnormality, atraumatic  Eyes: EOM are normal. Pupils are equal  Oropharynx: Mucosa and tongue normal  Neck: Supple, symmetrical, trachea midline, no adenopathy;              thyroid:  no enlargement/tenderness/nodules;              no carotid bruit or JVD  Cardiovascular: Irregular rate, irregular rhythm.  Normal distal pulses.              Exam reveals no gallop and no friction rub. " No murmur heard  Chest wall: No tenderness or deformity  Pulmonary: Clear to auscultation bilaterally, respirations unlabored.               No rhonchi, wheezing or rales.   Abdominal: Soft, nontender, bowel sounds active all four quadrants,     no masses, no hepatomegaly, no splenomegaly.   Extremities: Normal, atraumatic, no cyanosis or edema  Pulses: 2 + symmetric all extremities  Neurological: Patient is alert and oriented to person, place, and time.  No neurological deficit at this time.  Skin: Skin color, texture, normal. Turgor is decreased. No rashes or lesions      Data Review:  Lab Results (last 24 hours)     Procedure Component Value Units Date/Time    POC Glucose Once [[id number]]  (Abnormal) Collected: 02/03/22 1043    Specimen: Blood Updated: 02/03/22 1058     Glucose 157 mg/dL      Comment: Meter: [id number] : [id number] Medeiros CNA       Magnesium [[id number]]  (Normal) Collected: 02/03/22 0621    Specimen: Blood Updated: 02/03/22 0809     Magnesium 1.7 mg/dL     Basic Metabolic Panel [[id number]]  (Abnormal) Collected: 02/03/22 0621    Specimen: Blood Updated: 02/03/22 0809     Glucose 146 mg/dL      BUN 13 mg/dL      Creatinine 1.13 mg/dL      Sodium 136 mmol/L      Potassium 3.9 mmol/L      Chloride 92 mmol/L      CO2 32.3 mmol/L      Calcium 10.3 mg/dL      eGFR Non African Amer 49 mL/min/1.73      BUN/Creatinine Ratio 11.5     Anion Gap 11.7 mmol/L     Narrative:      GFR Normal >60  Chronic Kidney Disease <60  Kidney Failure <15      CBC & Differential [[id number]]  (Abnormal) Collected: 02/03/22 0621    Specimen: Blood Updated: 02/03/22 0743    Narrative:      The following orders were created for panel order CBC & Differential.  Procedure                               Abnormality         Status                     ---------                               -----------         ------                     CBC Auto Differential[[id number]]        Abnormal            Final result                  Please view results for these tests on the individual orders.    CBC Auto Differential [[id number]]  (Abnormal) Collected: 02/03/22 0621    Specimen: Blood Updated: 02/03/22 0743     WBC 7.08 10*3/mm3      RBC 4.40 10*6/mm3      Hemoglobin 11.9 g/dL      Hematocrit 37.8 %      MCV 85.9 fL      MCH 27.0 pg      MCHC 31.5 g/dL      RDW 14.4 %      RDW-SD 45.5 fl      MPV 11.0 fL      Platelets 312 10*3/mm3      Neutrophil % 76.1 %      Lymphocyte % 12.7 %      Monocyte % 8.3 %      Eosinophil % 1.6 %      Basophil % 0.6 %      Immature Grans % 0.7 %      Neutrophils, Absolute 5.39 10*3/mm3      Lymphocytes, Absolute 0.90 10*3/mm3      Monocytes, Absolute 0.59 10*3/mm3      Eosinophils, Absolute 0.11 10*3/mm3      Basophils, Absolute 0.04 10*3/mm3      Immature Grans, Absolute 0.05 10*3/mm3      nRBC 0.0 /100 WBC     POC Glucose Once [[id number]]  (Abnormal) Collected: 02/03/22 0701    Specimen: Blood Updated: 02/03/22 0702     Glucose 136 mg/dL      Comment: Meter: [id number] : [id number] Velez Sofi NA       POC Glucose Once [[id number]]  (Normal) Collected: 02/02/22 2117    Specimen: Blood Updated: 02/02/22 2118     Glucose 114 mg/dL      Comment: Meter: [id number] : [id number] Velez Sofi NA       POC Glucose Once [[id number]]  (Abnormal) Collected: 02/02/22 1623    Specimen: Blood Updated: 02/02/22 1625     Glucose 165 mg/dL      Comment: Meter: [id number] : [id number] Oscar HANNON [facility]             Imaging Results (All)     Procedure Component Value Units Date/Time    MRI Brain Without Contrast [[id number]] Collected: 01/29/22 1154     Updated: 01/29/22 1215    Narrative:      MRI BRAIN WO CONTRAST-, MRI ANGIOGRAM HEAD WO CONTRAST-     CLINICAL HISTORY: Slurred speech and left arm weakness and left facial  drooping. TIA versus CVA.     TECHNIQUE: Multiple axial T1, T2, gradient echo and diffusion images  were acquired. Sagittal T1-weighted images were also obtained. MR  angiography images of  the brain were acquired using  time-of-flight-of-flight vascular enhancement techniques.      COMPARISON: CT scan of the head dated 01/28/2022.     FINDINGS: The brain and ventricular system appear structurally within  normal limits there is ill-defined mild T2 hyperintensity and restricted  diffusion involving cortical gray matter and white matter within the  inferior aspect of the right parietal lobe in the region of the  posterior central gyrus that is consistent with an area of acute  ischemic infarction. This measures approximately 4.0 x 2.3 cm in  diameter. There is no significant associated mass effect. Slightly  inferior and posterior to this lesion there is a second smaller area of  patchy ischemic infarction involving the posterior aspect of the right  temporal lobe that extends into the periventricular white matter of the  occipital horn of the right lateral ventricle. A couple punctate foci of  ischemic infarction are also present in the right occipital lobe. Again,  there is no associated mass effect or hemorrhage. These infarcts are all  in the vascular territory of the right middle cerebral artery. Since  they are  by normal appearing brain parenchyma, they are likely  embolic in etiology. No other foci of restricted diffusion are  identified in the brain or brainstem. No other foci of T2 hyperintensity  are evident in the brain or brainstem. There is no evidence of small  vessel chronic ischemic change. There are no masses. The paranasal  sinuses are well aerated.     The MR angiography images demonstrate no focal intracranial stenoses or  occlusions. In particular, there is no evidence of vasculitis. There are  no aneurysms or vascular malformations.       Impression:      Multiple areas of acute ischemic infarction within the right  parietal and temporal and occipital lobes as described in more detail  above. The infarcts are all in the vascular territory of the right  middle cerebral  artery, and are suspected to be embolic in etiology.  There is no significant associated mass effect and there is no  intracranial hemorrhage.     This report was finalized on 1/29/2022 12:12 PM by Dr. Gabino Perez M.D.       MRI Angiogram Head Without Contrast [[id number]] Collected: 01/29/22 1154     Updated: 01/29/22 1215    Narrative:      MRI BRAIN WO CONTRAST-, MRI ANGIOGRAM HEAD WO CONTRAST-     CLINICAL HISTORY: Slurred speech and left arm weakness and left facial  drooping. TIA versus CVA.     TECHNIQUE: Multiple axial T1, T2, gradient echo and diffusion images  were acquired. Sagittal T1-weighted images were also obtained. MR  angiography images of the brain were acquired using  time-of-flight-of-flight vascular enhancement techniques.      COMPARISON: CT scan of the head dated 01/28/2022.     FINDINGS: The brain and ventricular system appear structurally within  normal limits there is ill-defined mild T2 hyperintensity and restricted  diffusion involving cortical gray matter and white matter within the  inferior aspect of the right parietal lobe in the region of the  posterior central gyrus that is consistent with an area of acute  ischemic infarction. This measures approximately 4.0 x 2.3 cm in  diameter. There is no significant associated mass effect. Slightly  inferior and posterior to this lesion there is a second smaller area of  patchy ischemic infarction involving the posterior aspect of the right  temporal lobe that extends into the periventricular white matter of the  occipital horn of the right lateral ventricle. A couple punctate foci of  ischemic infarction are also present in the right occipital lobe. Again,  there is no associated mass effect or hemorrhage. These infarcts are all  in the vascular territory of the right middle cerebral artery. Since  they are  by normal appearing brain parenchyma, they are likely  embolic in etiology. No other foci of restricted diffusion  are  identified in the brain or brainstem. No other foci of T2 hyperintensity  are evident in the brain or brainstem. There is no evidence of small  vessel chronic ischemic change. There are no masses. The paranasal  sinuses are well aerated.     The MR angiography images demonstrate no focal intracranial stenoses or  occlusions. In particular, there is no evidence of vasculitis. There are  no aneurysms or vascular malformations.       Impression:      Multiple areas of acute ischemic infarction within the right  parietal and temporal and occipital lobes as described in more detail  above. The infarcts are all in the vascular territory of the right  middle cerebral artery, and are suspected to be embolic in etiology.  There is no significant associated mass effect and there is no  intracranial hemorrhage.     This report was finalized on 1/29/2022 12:12 PM by Dr. Gabino Perez M.D.       CT Head Without Contrast Stroke Protocol [[id number]] Collected: 01/28/22 2247     Updated: 01/28/22 2253    Narrative:      CT HEAD WO CONTRAST STROKE PROTOCOL-     CLINICAL HISTORY: Left-sided facial drooping and left arm weakness.  Slurred speech.     TECHNIQUE: Transverse 3 mm thick images were obtained from the base of  the skull to the vertex without IV contrast.     Radiation dose reduction techniques were utilized, including automated  exposure control and exposure modulation based on body size.     COMPARISON: None     FINDINGS: The brain and ventricular system appear structurally normal.  There is no evidence of recent or old intracranial hemorrhage or  infarction. There are no masses. The paranasal sinuses and mastoid air  cells and middle ear cavities are well aerated.       Impression:      Normal CT scan of the head without contrast.     This report was finalized on 1/28/2022 10:50 PM by Dr. Gabino Perez M.D.       XR Chest 1 View [[id number]] Collected: 01/28/22 2233     Updated: 01/28/22 2237    Narrative:       PORTABLE CHEST 01/28/2022 AT 10:08 PM     CLINICAL HISTORY: Possible acute CVA. Rule out aspiration.     The lungs are fairly well-expanded and appear free of focal infiltrates.  The heart is moderately enlarged and there is mild pulmonary vascular  engorgement but no anitra edema. There are no pleural effusions.     This report was finalized on 1/28/2022 10:33 PM by Dr. Gabino Perez M.D.             Current Facility-Administered Medications:   •  acetaminophen (TYLENOL) tablet 650 mg, 650 mg, Oral, Q6H PRN, Sánchez Maxwell MD, 650 mg at 02/02/22 1659  •  albuterol (PROVENTIL) nebulizer solution 0.083% 2.5 mg/3mL, 2.5 mg, Nebulization, Q6H PRN, Sánchez Maxwell MD  •  amLODIPine (NORVASC) tablet 10 mg, 10 mg, Oral, Q24H, Michele Subramanian MD, 10 mg at 02/03/22 0849  •  apixaban (ELIQUIS) tablet 5 mg, 5 mg, Oral, Q12H, Esvin Watkins APRN, 5 mg at 02/03/22 0849  •  atenolol (TENORMIN) tablet 75 mg, 75 mg, Oral, BID, Sánchez Maxwell MD, 75 mg at 02/03/22 0849  •  atorvastatin (LIPITOR) tablet 40 mg, 40 mg, Oral, Nightly, Mary Reardon APRZACK, 40 mg at 02/02/22 2146  •  cefTRIAXone (ROCEPHIN) 1 g in sodium chloride 0.9 % 100 mL IVPB-VTB, 1 g, Intravenous, Q24H, Michele Subramanian MD, Last Rate: 200 mL/hr at 02/02/22 1659, 1 g at 02/02/22 1659  •  digoxin (LANOXIN) tablet 250 mcg, 250 mcg, Oral, Daily, Sánchez Maxwell MD, 250 mcg at 02/03/22 0849  •  diphenoxylate-atropine (LOMOTIL) 2.5-0.025 MG per tablet 1 tablet, 1 tablet, Oral, 4x Daily PRN, Sánchez Maxwell MD  •  gabapentin (NEURONTIN) capsule 300 mg, 300 mg, Oral, TID, Sánchez Maxwell MD, 300 mg at 02/03/22 0849  •  hydrALAZINE (APRESOLINE) tablet 100 mg, 100 mg, Oral, Q12H, Sánchez Maxwell MD, 100 mg at 02/03/22 0849  •  insulin glargine (LANTUS, SEMGLEE) injection 20 Units, 20 Units, Subcutaneous, Nightly, Michele Subramanian MD, 20 Units at 02/02/22 2147  •  insulin lispro (ADMELOG) injection 0-9 Units, 0-9 Units, Subcutaneous, TID AC, Sánchez Maxwell MD, 2  Units at 02/03/22 1211  •  insulin lispro (ADMELOG) injection 7 Units, 7 Units, Subcutaneous, TID With Meals, Michele Subramanian MD, 7 Units at 02/03/22 1212  •  levothyroxine (SYNTHROID, LEVOTHROID) tablet 300 mcg, 300 mcg, Oral, Daily, Sánchez Maxwell MD, 300 mcg at 02/03/22 0849  •  losartan (COZAAR) tablet 100 mg, 100 mg, Oral, Daily, Sánchez Maxwell MD, 100 mg at 02/03/22 0849  •  nitroglycerin (NITROSTAT) SL tablet 0.4 mg, 0.4 mg, Sublingual, Q5 Min PRN, Michele Subramanian MD  •  ondansetron (ZOFRAN) tablet 4 mg, 4 mg, Oral, Q6H PRN, Michele Subramanian MD, 4 mg at 02/02/22 1757  •  pantoprazole (PROTONIX) EC tablet 40 mg, 40 mg, Oral, Q AM, Sánchez Maxwell MD, 40 mg at 02/03/22 0608  •  potassium chloride (K-DUR,KLOR-CON) ER tablet 20 mEq, 20 mEq, Oral, TID With Meals, Michele Subramanian MD, 20 mEq at 02/03/22 1211  •  sodium chloride 0.9 % flush 20 mL, 20 mL, Intravenous, PRN, Sánchez Maxwell MD  •  spironolactone (ALDACTONE) tablet 25 mg, 25 mg, Oral, Daily, Maria Teresa Judge MD, 25 mg at 02/03/22 0849  •  torsemide (DEMADEX) tablet 40 mg, 40 mg, Oral, BID, Michele Subramanian MD, 40 mg at 02/03/22 0849     ASSESSMENT  Acute multiple right MCA ischemic infarction involving parietal and occipital lobes  Acute UTI  Chronic atrial fibrillation  History of GI bleed  Diabetes mellitus  Hypertension  Hyperlipidemia  Morbidly obese  Pulmonary hypertension  Gastroesophageal reflux disease    PLAN  CPM  Eliquis Lipitor  IV antibiotics for 5 days  No need for MARIZA  Continue home medications  Stress ulcer DVT prophylaxis  Cardiology input appreciated  Neurology to follow patient  Supportive care  PT OT ST  Discussed with nursing staff and family  Subacute rehab once bed available    Michele Subramanian MD  2/3/2022  13:13 EST    I wore protective equipment throughout this patient encounter including a face mask, gloves, and protective eyewear.  Hand hygiene was performed before donning protective equipment and after removal when leaving the  room.

## 2022-02-03 NOTE — THERAPY TREATMENT NOTE
Patient Name: Jenelle Kasper  : 1960    MRN: [id number]                              Today's Date: 2/3/2022       Admit Date: 2022    Visit Dx:     ICD-10-CM ICD-9-CM   1. TIA (transient ischemic attack)  G45.9 435.9   2. Left-sided weakness  R53.1 728.87   3. Facial droop  R29.810 781.94   4. Slurred speech  R47.81 784.59     Patient Active Problem List   Diagnosis   • Persistent atrial fibrillation (HCC)   • Benign essential HTN   • Hyperparathyroidism (HCC)   • Morbid obesity with BMI of 45.0-49.9, adult (HCC)   • Pulmonary hypertension (HCC)   • Precordial pain   • Shortness of breath   • TIA (transient ischemic attack)     Past Medical History:   Diagnosis Date   • Anxiety    • Arrhythmia    • Asthma    • Atrial fibrillation with RVR (HCC)    • C. difficile diarrhea    • COPD (chronic obstructive pulmonary disease) (HCC)    • Depression    • Diabetes mellitus (HCC)    • Diabetic peripheral neuropathy associated with type 2 diabetes mellitus (HCC)    • GERD (gastroesophageal reflux disease)    • Heart murmur    • History of fall     closed head injury after falling down steps; fx nose   • Hypercalcemia    • Hyperlipidemia    • Hypertension    • IBS (irritable bowel syndrome)    • Kidney stone    • Morbid obesity (HCC)    • PAF (paroxysmal atrial fibrillation) (HCC)    • PCOS (polycystic ovarian syndrome)    • PONV (postoperative nausea and vomiting)    • Sarcoidosis    • Shortness of breath    • Thyroid cancer (HCC)     WITH RADIATION   • Wounds, multiple      Past Surgical History:   Procedure Laterality Date   • CARDIAC CATHETERIZATION N/A 2016    Procedure: Coronary angiography;  Surgeon: Chris Perez MD;  Location: [facility] CATH INVASIVE LOCATION;  Service:    • CARDIAC CATHETERIZATION N/A 2016    Procedure: Left heart cath;  Surgeon: Chris Perez MD;  Location: [facility] CATH INVASIVE LOCATION;  Service:    • CARDIAC CATHETERIZATION N/A 2016    Procedure: Left  ventriculography;  Surgeon: Chris Perez MD;  Location:  [facility] CATH INVASIVE LOCATION;  Service:    • CARDIAC CATHETERIZATION N/A 2/26/2020    Procedure: Right Heart Cath;  Surgeon: Chris Perez MD;  Location:  [facility] CATH INVASIVE LOCATION;  Service: Cardiovascular;  Laterality: N/A;  If there is no evidence of pulmonary hypertension please add a left heart cath to evaluate coronary arteries as patient is having chest pain.   • CHOLECYSTECTOMY     • COLONOSCOPY     • DILATATION AND CURETTAGE     • ENDOSCOPY     • GASTRIC BYPASS      gastric surgery for morbid obesity   • GASTROPLASTY  2008    GASTRIC REVISION FROM PREVIOUS GASTRIC BYPASS   • HIATAL HERNIA REPAIR     • INGUINAL HERNIA REPAIR Right    • LUNG BIOPSY      to dx sarcoidosis   • LUNG SURGERY      Removal of granulomas   • TOTAL THYROIDECTOMY        General Information     Row Name 02/03/22 1140          Physical Therapy Time and Intention    Document Type therapy note (daily note)  -     Mode of Treatment physical therapy  -     Row Name 02/03/22 1140          General Information    Existing Precautions/Restrictions fall; oxygen therapy device and L/min  -     Row Name 02/03/22 1140          Cognition    Orientation Status (Cognition) oriented x 3  -           User Key  (r) = Recorded By, (t) = Taken By, (c) = Cosigned By    Initials Name Provider Type     Lexy Torres PTA Physical Therapy Assistant               Mobility     Row Name 02/03/22 1140          Bed Mobility    Bed Mobility supine-sit  -     Supine-Sit [place] (Bed Mobility) moderate assist (50% patient effort); 2 person assist  -     Assistive Device (Bed Mobility) bed rails; head of bed elevated; draw sheet  -     Row Name 02/03/22 1140          Sit-Stand Transfer    Sit-Stand [place] (Transfers) minimum assist (75% patient effort); moderate assist (50% patient effort); 2 person assist  -     Assistive Device (Sit-Stand Transfers) walker,  front-wheeled  -SM     [facility] Name 02/03/22 1140          Gait/Stairs (Locomotion)    [place] Level (Gait) moderate assist (50% patient effort); 2 person assist  -     Assistive Device (Gait) walker, front-wheeled  -     Distance in Feet (Gait) 2  -SM     Deviations/Abnormal Patterns (Gait) kirsty decreased; festinating/shuffling; stride length decreased  -SM     Bilateral Gait Deviations forward flexed posture  -           User Key  (r) = Recorded By, (t) = Taken By, (c) = Cosigned By    Initials Name Provider Type    Lexy Hyatt PTA Physical Therapy Assistant               Obj/Interventions     [facility] Name 02/03/22 1142          Motor Skills    Therapeutic Exercise --  seated AP and LAQ x10 reps  -           User Key  (r) = Recorded By, (t) = Taken By, (c) = Cosigned By    Initials Name Provider Type    Lexy Hyatt PTA Physical Therapy Assistant               Goals/Plan    No documentation.                Clinical Impression     [facility] Name 02/03/22 1142          Pain    Additional Documentation Pain Scale: Numbers Pre/Post-Treatment (Group)  -SM     Row Name 02/03/22 1142          Pain Scale: Numbers Pre/Post-Treatment    Pretreatment Pain Rating 0/10 - no pain  -     Posttreatment Pain Rating 0/10 - no pain  -SM     [facility] Name 02/03/22 1142          Positioning and Restraints    Pre-Treatment Position in bed  -SM     Post Treatment Position chair  -SM     In Chair reclined; call light within reach; encouraged to call for assist; exit alarm on; notified nsg  -           User Key  (r) = Recorded By, (t) = Taken By, (c) = Cosigned By    Initials Name Provider Type    Lexy Hyatt PTA Physical Therapy Assistant               Outcome Measures     Row Name 02/03/22 1143          How much help from another person do you currently need...    Turning from your back to your side while in flat bed without using bedrails? 2  -SM     Moving from lying on back to sitting on the side  of a flat bed without bedrails? 2  -SM     Moving to and from a bed to a chair (including a wheelchair)? 2  -SM     Standing up from a chair using your arms (e.g., wheelchair, bedside chair)? 2  -SM     Climbing 3-5 steps with a railing? 1  -SM     To walk in hospital room? 2  -SM     AM-PAC 6 Clicks Score (PT) 11  -     Row Name 02/03/22 1143          Functional Assessment    Outcome Measure Options AM-PAC 6 Clicks Basic Mobility (PT)  -           User Key  (r) = Recorded By, (t) = Taken By, (c) = Cosigned By    Initials Name Provider Type    Lexy Hyatt, PTA Physical Therapy Assistant                             Physical Therapy Education                 Title: PT OT SLP Therapies (In Progress)     Topic: Physical Therapy (Done)     Point: Mobility training (Done)     Learning Progress Summary           Patient Acceptance, E,TB,D, VU,NR by  at 2/3/2022 1144    Acceptance, E,TB,D, VU,NR by  at 2/2/2022 1639    Acceptance, E, VU by  at 1/30/2022 1107   Family Acceptance, E, VU by  at 1/30/2022 1107                   Point: Home exercise program (Done)     Learning Progress Summary           Patient Acceptance, E,TB,D, VU,NR by  at 2/3/2022 1144    Acceptance, E,TB,D, VU,NR by  at 2/2/2022 1639    Acceptance, E, VU by SR at 1/30/2022 1107   Family Acceptance, E, VU by SR at 1/30/2022 1107                   Point: Body mechanics (Done)     Learning Progress Summary           Patient Acceptance, E,TB,D, VU,NR by  at 2/3/2022 1144    Acceptance, E,TB,D, VU,NR by  at 2/2/2022 1639    Acceptance, E, VU by  at 1/30/2022 1107   Family Acceptance, E, VU by  at 1/30/2022 1107                   Point: Precautions (Done)     Learning Progress Summary           Patient Acceptance, E,TB,D, VU,NR by  at 2/3/2022 1144    Acceptance, E,TB,D, VU,NR by  at 2/2/2022 1639    Acceptance, E, VU by  at 1/30/2022 1107   Family Acceptance, E, VU by SR at 1/30/2022 1107                                User Key     Initials Effective Dates Name Provider Type Discipline     03/07/18 -  Lexy Torres PTA Physical Therapy Assistant PT     02/08/21 -  Lexy Brothers Physical Therapist PT     05/10/21 -  Roselia Galindo Physical Therapist PT              PT Recommendation and Plan     Plan of Care Reviewed With: patient  Progress: improving  Outcome Summary: Pt tolerated treatment well this date. Increased overall mobility, requiring min-mod A x2 to stand and take a few shuffled steps to chair. Initially required mod A x2 for bed mobility, and cues given for posture.     Time Calculation:    PT Charges     Row Name 02/03/22 1152             Time Calculation    Start Time 1037  -      Stop Time 1053  -      Time Calculation (min) 16 min  -      PT Received On 02/03/22  -      PT - Next Appointment 02/04/22  -            User Key  (r) = Recorded By, (t) = Taken By, (c) = Cosigned By    Initials Name Provider Type     Torres Lexy Serrato PTA Physical Therapy Assistant              Therapy Charges for Today     Code Description Service Date Service Provider Modifiers Qty    96198032200 HC PT THER SUPP EA 15 MIN 2/3/2022 Lexy Torres PTA GP 1    17171949732 HC PT THERAPEUTIC ACT EA 15 MIN 2/3/2022 Leyx Torres PTA GP 1          PT G-Codes  Outcome Measure Options: AM-PAC 6 Clicks Basic Mobility (PT)  AM-PAC 6 Clicks Score (PT): 11  AM-PAC 6 Clicks Score (OT): 12  Modified [place] Scale: 5 - Severe disability.  Bedridden, incontinent, and requiring constant nursing care and attention.    Lexy Torres PTA  2/3/2022

## 2022-02-03 NOTE — PLAN OF CARE
Goal Outcome Evaluation:  Plan of Care Reviewed With: patient        Progress: improving  Outcome Summary: Pt tolerated treatment well this date. Increased overall mobility, requiring min-mod A x2 to stand and take a few shuffled steps to chair. Initially required mod A x2 for bed mobility, and cues given for posture.

## 2022-02-03 NOTE — CASE MANAGEMENT/SOCIAL WORK
Continued Stay Note  [facility]     Patient Name: Jenelle Kasper  MRN: [id number]  Today's Date: 2/3/2022    Admit Date: 1/28/2022     Discharge Plan     Row Name 02/03/22 1359       Plan    Plan SNF    Plan Comments S/W pt and her s/o Gabino at bedside.  Her first choice is [facility], but subacute rehab is more appropriate at this time.   Per their request, referrals made to [facility], [facility], [facility] and [facility].  [facility] pending. ...........sk               Discharge Codes    No documentation.               Expected Discharge Date and Time     Expected Discharge Date Expected Discharge Time    Feb 3, 2022             Tayler Lebron RN

## 2022-02-04 LAB
ANION GAP SERPL CALCULATED.3IONS-SCNC: 10.1 MMOL/L (ref 5–15)
BASOPHILS # BLD AUTO: 0.05 10*3/MM3 (ref 0–0.2)
BASOPHILS NFR BLD AUTO: 0.6 % (ref 0–1.5)
BUN SERPL-MCNC: 18 MG/DL (ref 6–20)
BUN/CREAT SERPL: 13 (ref 7–25)
CALCIUM SPEC-SCNC: 10.5 MG/DL (ref 8.6–10.5)
CHLORIDE SERPL-SCNC: 95 MMOL/L (ref 98–107)
CO2 SERPL-SCNC: 30.9 MMOL/L (ref 22–29)
CREAT SERPL-MCNC: 1.38 MG/DL (ref 0.57–1)
DEPRECATED RDW RBC AUTO: 43.5 FL (ref 37–54)
EOSINOPHIL # BLD AUTO: 0.12 10*3/MM3 (ref 0–0.4)
EOSINOPHIL NFR BLD AUTO: 1.6 % (ref 0.3–6.2)
ERYTHROCYTE [DISTWIDTH] IN BLOOD BY AUTOMATED COUNT: 13.8 % (ref 12.3–15.4)
GFR SERPL CREATININE-BSD FRML MDRD: 39 ML/MIN/1.73
GLUCOSE BLDC GLUCOMTR-MCNC: 149 MG/DL (ref 70–130)
GLUCOSE BLDC GLUCOMTR-MCNC: 161 MG/DL (ref 70–130)
GLUCOSE BLDC GLUCOMTR-MCNC: 173 MG/DL (ref 70–130)
GLUCOSE BLDC GLUCOMTR-MCNC: 176 MG/DL (ref 70–130)
GLUCOSE SERPL-MCNC: 153 MG/DL (ref 65–99)
HCT VFR BLD AUTO: 37.6 % (ref 34–46.6)
HGB BLD-MCNC: 12 G/DL (ref 12–15.9)
IMM GRANULOCYTES # BLD AUTO: 0.05 10*3/MM3 (ref 0–0.05)
IMM GRANULOCYTES NFR BLD AUTO: 0.6 % (ref 0–0.5)
LYMPHOCYTES # BLD AUTO: 1.06 10*3/MM3 (ref 0.7–3.1)
LYMPHOCYTES NFR BLD AUTO: 13.7 % (ref 19.6–45.3)
MCH RBC QN AUTO: 27.1 PG (ref 26.6–33)
MCHC RBC AUTO-ENTMCNC: 31.9 G/DL (ref 31.5–35.7)
MCV RBC AUTO: 84.9 FL (ref 79–97)
MONOCYTES # BLD AUTO: 0.61 10*3/MM3 (ref 0.1–0.9)
MONOCYTES NFR BLD AUTO: 7.9 % (ref 5–12)
NEUTROPHILS NFR BLD AUTO: 5.83 10*3/MM3 (ref 1.7–7)
NEUTROPHILS NFR BLD AUTO: 75.6 % (ref 42.7–76)
NRBC BLD AUTO-RTO: 0 /100 WBC (ref 0–0.2)
PLATELET # BLD AUTO: 295 10*3/MM3 (ref 140–450)
PMV BLD AUTO: 10.8 FL (ref 6–12)
POTASSIUM SERPL-SCNC: 4 MMOL/L (ref 3.5–5.2)
RBC # BLD AUTO: 4.43 10*6/MM3 (ref 3.77–5.28)
SODIUM SERPL-SCNC: 136 MMOL/L (ref 136–145)
WBC NRBC COR # BLD: 7.72 10*3/MM3 (ref 3.4–10.8)

## 2022-02-04 PROCEDURE — 25010000002 CEFTRIAXONE PER 250 MG: Performed by: HOSPITALIST

## 2022-02-04 PROCEDURE — 63710000001 INSULIN LISPRO (HUMAN) PER 5 UNITS: Performed by: HOSPITALIST

## 2022-02-04 PROCEDURE — 85025 COMPLETE CBC W/AUTO DIFF WBC: CPT | Performed by: HOSPITALIST

## 2022-02-04 PROCEDURE — 97530 THERAPEUTIC ACTIVITIES: CPT

## 2022-02-04 PROCEDURE — 80048 BASIC METABOLIC PNL TOTAL CA: CPT | Performed by: HOSPITALIST

## 2022-02-04 PROCEDURE — 82962 GLUCOSE BLOOD TEST: CPT

## 2022-02-04 PROCEDURE — 63710000001 INSULIN LISPRO (HUMAN) PER 5 UNITS: Performed by: INTERNAL MEDICINE

## 2022-02-04 RX ORDER — INSULIN LISPRO 100 [IU]/ML
8 INJECTION, SOLUTION INTRAVENOUS; SUBCUTANEOUS
Status: DISCONTINUED | OUTPATIENT
Start: 2022-02-04 | End: 2022-02-05

## 2022-02-04 RX ADMIN — ACETAMINOPHEN 650 MG: 325 TABLET, FILM COATED ORAL at 22:32

## 2022-02-04 RX ADMIN — INSULIN LISPRO 2 UNITS: 100 INJECTION, SOLUTION INTRAVENOUS; SUBCUTANEOUS at 12:32

## 2022-02-04 RX ADMIN — TORSEMIDE 20 MG: 20 TABLET ORAL at 17:07

## 2022-02-04 RX ADMIN — PANTOPRAZOLE SODIUM 40 MG: 40 TABLET, DELAYED RELEASE ORAL at 05:27

## 2022-02-04 RX ADMIN — ATENOLOL 75 MG: 25 TABLET ORAL at 20:27

## 2022-02-04 RX ADMIN — TORSEMIDE 20 MG: 20 TABLET ORAL at 09:10

## 2022-02-04 RX ADMIN — POTASSIUM CHLORIDE 20 MEQ: 750 TABLET, EXTENDED RELEASE ORAL at 09:12

## 2022-02-04 RX ADMIN — APIXABAN 5 MG: 5 TABLET, FILM COATED ORAL at 09:10

## 2022-02-04 RX ADMIN — INSULIN GLARGINE-YFGN 25 UNITS: 100 INJECTION, SOLUTION SUBCUTANEOUS at 20:28

## 2022-02-04 RX ADMIN — CEFTRIAXONE 1 G: 1 INJECTION, POWDER, FOR SOLUTION INTRAMUSCULAR; INTRAVENOUS at 17:07

## 2022-02-04 RX ADMIN — LEVOTHYROXINE SODIUM 300 MCG: 0.15 TABLET ORAL at 09:11

## 2022-02-04 RX ADMIN — POTASSIUM CHLORIDE 20 MEQ: 750 TABLET, EXTENDED RELEASE ORAL at 12:32

## 2022-02-04 RX ADMIN — INSULIN LISPRO 2 UNITS: 100 INJECTION, SOLUTION INTRAVENOUS; SUBCUTANEOUS at 17:08

## 2022-02-04 RX ADMIN — GABAPENTIN 300 MG: 300 CAPSULE ORAL at 20:27

## 2022-02-04 RX ADMIN — POTASSIUM CHLORIDE 20 MEQ: 750 TABLET, EXTENDED RELEASE ORAL at 17:07

## 2022-02-04 RX ADMIN — INSULIN LISPRO 7 UNITS: 100 INJECTION, SOLUTION INTRAVENOUS; SUBCUTANEOUS at 09:11

## 2022-02-04 RX ADMIN — INSULIN LISPRO 7 UNITS: 100 INJECTION, SOLUTION INTRAVENOUS; SUBCUTANEOUS at 12:32

## 2022-02-04 RX ADMIN — GABAPENTIN 300 MG: 300 CAPSULE ORAL at 17:07

## 2022-02-04 RX ADMIN — GABAPENTIN 300 MG: 300 CAPSULE ORAL at 09:10

## 2022-02-04 RX ADMIN — HYDRALAZINE HYDROCHLORIDE 100 MG: 50 TABLET, FILM COATED ORAL at 20:27

## 2022-02-04 RX ADMIN — INSULIN LISPRO 8 UNITS: 100 INJECTION, SOLUTION INTRAVENOUS; SUBCUTANEOUS at 17:08

## 2022-02-04 RX ADMIN — ATORVASTATIN CALCIUM 40 MG: 20 TABLET, FILM COATED ORAL at 20:27

## 2022-02-04 RX ADMIN — LOSARTAN POTASSIUM 100 MG: 100 TABLET, FILM COATED ORAL at 09:11

## 2022-02-04 RX ADMIN — APIXABAN 5 MG: 5 TABLET, FILM COATED ORAL at 20:27

## 2022-02-04 RX ADMIN — SPIRONOLACTONE 25 MG: 25 TABLET, FILM COATED ORAL at 09:11

## 2022-02-04 RX ADMIN — AMLODIPINE BESYLATE 10 MG: 10 TABLET ORAL at 09:11

## 2022-02-04 RX ADMIN — HYDRALAZINE HYDROCHLORIDE 100 MG: 50 TABLET, FILM COATED ORAL at 09:12

## 2022-02-04 NOTE — THERAPY TREATMENT NOTE
Patient Name: Jenelle Kasper  : 1960    MRN: [id number]                              Today's Date: 2022       Admit Date: 2022    Visit Dx:     ICD-10-CM ICD-9-CM   1. TIA (transient ischemic attack)  G45.9 435.9   2. Left-sided weakness  R53.1 728.87   3. Facial droop  R29.810 781.94   4. Slurred speech  R47.81 784.59     Patient Active Problem List   Diagnosis   • Persistent atrial fibrillation (HCC)   • Benign essential HTN   • Hyperparathyroidism (HCC)   • Morbid obesity with BMI of 45.0-49.9, adult (HCC)   • Pulmonary hypertension (HCC)   • Precordial pain   • Shortness of breath   • TIA (transient ischemic attack)     Past Medical History:   Diagnosis Date   • Anxiety    • Arrhythmia    • Asthma    • Atrial fibrillation with RVR (HCC)    • C. difficile diarrhea    • COPD (chronic obstructive pulmonary disease) (HCC)    • Depression    • Diabetes mellitus (HCC)    • Diabetic peripheral neuropathy associated with type 2 diabetes mellitus (HCC)    • GERD (gastroesophageal reflux disease)    • Heart murmur    • History of fall     closed head injury after falling down steps; fx nose   • Hypercalcemia    • Hyperlipidemia    • Hypertension    • IBS (irritable bowel syndrome)    • Kidney stone    • Morbid obesity (HCC)    • PAF (paroxysmal atrial fibrillation) (HCC)    • PCOS (polycystic ovarian syndrome)    • PONV (postoperative nausea and vomiting)    • Sarcoidosis    • Shortness of breath    • Thyroid cancer (HCC)     WITH RADIATION   • Wounds, multiple      Past Surgical History:   Procedure Laterality Date   • CARDIAC CATHETERIZATION N/A 2016    Procedure: Coronary angiography;  Surgeon: Chris Perez MD;  Location: [facility] CATH INVASIVE LOCATION;  Service:    • CARDIAC CATHETERIZATION N/A 2016    Procedure: Left heart cath;  Surgeon: Chris Perez MD;  Location: [facility] CATH INVASIVE LOCATION;  Service:    • CARDIAC CATHETERIZATION N/A 2016    Procedure: Left  ventriculography;  Surgeon: Chris Perez MD;  Location:  [facility] CATH INVASIVE LOCATION;  Service:    • CARDIAC CATHETERIZATION N/A 2/26/2020    Procedure: Right Heart Cath;  Surgeon: Chris Perez MD;  Location:  [facility] CATH INVASIVE LOCATION;  Service: Cardiovascular;  Laterality: N/A;  If there is no evidence of pulmonary hypertension please add a left heart cath to evaluate coronary arteries as patient is having chest pain.   • CHOLECYSTECTOMY     • COLONOSCOPY     • DILATATION AND CURETTAGE     • ENDOSCOPY     • GASTRIC BYPASS      gastric surgery for morbid obesity   • GASTROPLASTY  2008    GASTRIC REVISION FROM PREVIOUS GASTRIC BYPASS   • HIATAL HERNIA REPAIR     • INGUINAL HERNIA REPAIR Right    • LUNG BIOPSY      to dx sarcoidosis   • LUNG SURGERY      Removal of granulomas   • TOTAL THYROIDECTOMY        General Information     Row Name 02/04/22 1221          OT Time and Intention    Document Type therapy note (daily note) (P)   -LR     Mode of Treatment individual therapy; occupational therapy (P)   -LR     Row Name 02/04/22 1221          General Information    Patient Profile Reviewed yes (P)   -LR     Existing Precautions/Restrictions fall; oxygen therapy device and L/min (P)   -LR     Row Name 02/04/22 1221          Cognition    Orientation Status (Cognition) oriented x 3 (P)   -LR     Row Name 02/04/22 1221          Safety Issues, Functional Mobility    Safety Issues Affecting Function (Mobility) insight into deficits/self-awareness (P)   -LR     Impairments Affecting Function (Mobility) endurance/activity tolerance; grasp; visual/perceptual; strength; range of motion (ROM); coordination; pain; balance (P)   -LR     Cognitive Impairments, Mobility Safety/Performance problem-solving/reasoning; insight into deficits/self-awareness (P)   -LR           User Key  (r) = Recorded By, (t) = Taken By, (c) = Cosigned By    Initials Name Provider Type    LR Yanet Santiago, OT Student OT Student                  Mobility/ADL's     Row Name 02/04/22 1223          Bed Mobility    Bed Mobility supine-sit; scooting/bridging (P)   -LR     Rolling Left [place] (Bed Mobility) minimum assist (75% patient effort); 1 person assist; verbal cues (P)   -LR     Scooting/Bridging [place] (Bed Mobility) moderate assist (50% patient effort); 2 person assist; verbal cues (P)   -LR     Assistive Device (Bed Mobility) draw sheet; head of bed elevated (P)   -LR     Comment (Bed Mobility) Pt required increased time with min A and verbal cues to transfer from supine to sitting EOB. Ed pt on leaning towards one side and moving opposite hip to scoot to EOB, pt required mod A x2 and verbal cues to lean to scoot self forward while sitting EOB. (P)   -LR     Row Name 02/04/22 1223          Transfers    Transfers sit-stand transfer; bed-chair transfer (P)   -LR     Comment (Transfers) Pt required verbal cues to move LEs to pivot to bedside chair, pt required extra time, demo'd understanding and ability. (P)   -LR     Bed-Chair [place] (Transfers) 2 person assist; maximum assist (25% patient effort); verbal cues (P)   -LR     Sit-Stand [place] (Transfers) moderate assist (50% patient effort); 2 person assist; verbal cues (P)   -LR     Row Name 02/04/22 1223          Activities of Daily Living    BADL Assessment/Intervention feeding (P)   -LR     Row Name 02/04/22 1223          Self-Feeding Assessment/Training    [place] Level (Feeding) feeding skills; set up (P)   -LR     Position (Self-Feeding) supported sitting (P)   -LR     Comment (Feeding) Pt required set-up to self-feed lunch sitting in bedside chair. (P)   -LR           User Key  (r) = Recorded By, (t) = Taken By, (c) = Cosigned By    Initials Name Provider Type    Yanet Bhandari, OT Student OT Student               Obj/Interventions     Row Name 02/04/22 1228          Balance    Balance Assessment sitting static balance; sitting dynamic balance; sit to stand  dynamic balance (P)   -LR     Static Sitting Balance WFL; sitting, edge of bed (P)   -LR     Dynamic Sitting Balance WFL; sitting in chair (P)   -LR     Sit to Stand Dynamic Balance mild impairment; supported (P)   -LR     Balance Interventions dynamic; sitting; occupation based/functional task (P)   -LR           User Key  (r) = Recorded By, (t) = Taken By, (c) = Cosigned By    Initials Name Provider Type    LR Yanet Snatiago OT Student OT Student               Goals/Plan    No documentation.                Clinical Impression     Row Name 02/04/22 1229          Pain Assessment    Additional Documentation Pain Scale: FACES Pre/Post-Treatment (Group) (P)   -LR     Row Name 02/04/22 1229          Pain Scale: Numbers Pre/Post-Treatment    Pain Intervention(s) Ambulation/increased activity; Repositioned (P)   -LR     Row Name 02/04/22 1226          Pain Scale: FACES Pre/Post-Treatment    Pain: FACES Scale, Pretreatment 2-->hurts little bit (P)   -LR     Posttreatment Pain Rating 4-->hurts little more (P)   -LR     Pain Location - Side Left (P)   -LR     Pain Location back; knee (P)   -LR     Pre/Posttreatment Pain Comment Pt c/o pain in back, stated she felt like she was laying on something in bed, nothing under her after transfer to sitting EOB. Pt c/o L knee pain after transfer to bedside chair. (P)   -LR     Row Name 02/04/22 1221          Plan of Care Review    Plan of Care Reviewed With patient (P)   -LR     Progress improving (P)   -LR     Outcome Summary Pt agreeable to work with OT to sit up in bedside chair to eat lunch. Pt improving in functional transfers and mobilty. Pt required extra time and min A to transfer from supine to sitting EOB. Pt required mod A x2 to transfer from sitting EOB to standing. Pt requried max A x2 with verbal cues to move LE to pivot to bedside chair. Recommend continued skilled OT services to increase independence and safety in self-care, functional mobility, and functional  transfers. Recommend acute rehab at d/c, with pt's improved function. (P)   -LR     Row Name 02/04/22 1229          Therapy Assessment/Plan (OT)    Rehab Potential (OT) good, to achieve stated therapy goals (P)   -LR     Criteria for Skilled Therapeutic Interventions Met (OT) yes; skilled treatment is necessary (P)   -LR     Row Name 02/04/22 1229          Therapy Plan Review/Discharge Plan (OT)    Anticipated Discharge Disposition (OT) inpatient rehabilitation facility (P)   -LR     Row Name 02/04/22 1229          Vital Signs    O2 Delivery Pre Treatment supplemental O2 (P)   -LR     O2 Delivery Intra Treatment supplemental O2 (P)   -LR     O2 Delivery Post Treatment supplemental O2 (P)   -LR     Row Name 02/04/22 1229          Positioning and Restraints    Pre-Treatment Position in bed (P)   -LR     Post Treatment Position chair (P)   -LR     In Chair notified nsg; reclined; call light within reach; encouraged to call for assist; exit alarm on; legs elevated (P)   -LR           User Key  (r) = Recorded By, (t) = Taken By, (c) = Cosigned By    Initials Name Provider Type    Yanet Bhandari, OT Student OT Student               Outcome Measures     Row Name 02/04/22 1234          How much help from another is currently needed...    Putting on and taking off regular lower body clothing? 2 (P)   -LR     Bathing (including washing, rinsing, and drying) 2 (P)   -LR     Toileting (which includes using toilet bed pan or urinal) 1 (P)   -LR     Putting on and taking off regular upper body clothing 2 (P)   -LR     Taking care of personal grooming (such as brushing teeth) 3 (P)   -LR     Eating meals 3 (P)   -LR     AM-PAC 6 Clicks Score (OT) 13 (P)   -LR           User Key  (r) = Recorded By, (t) = Taken By, (c) = Cosigned By    Initials Name Provider Type    Yanet Bhandari, OT Student OT Student                Occupational Therapy Education                 Title: PT OT SLP Therapies (In Progress)     Topic:  Occupational Therapy (In Progress)     Point: ADL training (Done)     Description:   Instruct learner(s) on proper safety adaptation and remediation techniques during self care or transfers.   Instruct in proper use of assistive devices.              Learning Progress Summary           Patient Acceptance, E, VU,NR by LR at 1/31/2022 1201    Comment: Pt ed on role of OT, POC goals, d/c planning. Pt ed on LE placement prior to transfer from sitting EOB to standing, pt verbalized understanding, requires tactile cues place L LE in proper placement. Pt requires reinforcement for safety in transfers.                   Point: Home exercise program (Not Started)     Description:   Instruct learner(s) on appropriate technique for monitoring, assisting and/or progressing therapeutic exercises/activities.              Learner Progress:  Not documented in this visit.          Point: Precautions (Not Started)     Description:   Instruct learner(s) on prescribed precautions during self-care and functional transfers.              Learner Progress:  Not documented in this visit.          Point: Body mechanics (Not Started)     Description:   Instruct learner(s) on proper positioning and spine alignment during self-care, functional mobility activities and/or exercises.              Learner Progress:  Not documented in this visit.                      User Key     Initials Effective Dates Name Provider Type Discipline    MINESH 01/18/22 -  Yanet Santiago, ELSA Student OT Student OT              OT Recommendation and Plan  Planned Therapy Interventions (OT): activity tolerance training, BADL retraining, functional balance retraining, neuromuscular control/coordination retraining, occupation/activity based interventions, patient/caregiver education/training, transfer/mobility retraining, strengthening exercise, ROM/therapeutic exercise  Therapy Frequency (OT): 5 times/wk  Plan of Care Review  Plan of Care Reviewed With: (P)  patient  Progress: (P) improving  Outcome Summary: (P) Pt agreeable to work with OT to sit up in bedside chair to eat lunch. Pt improving in functional transfers and mobilty. Pt required extra time and min A to transfer from supine to sitting EOB. Pt required mod A x2 to transfer from sitting EOB to standing. Pt requried max A x2 with verbal cues to move LE to pivot to bedside chair. Recommend continued skilled OT services to increase independence and safety in self-care, functional mobility, and functional transfers. Recommend acute rehab at d/c, with pt's improved function.     Time Calculation:    Time Calculation- OT     Row Name 02/04/22 1235             Time Calculation- OT    OT Start Time 1157 (P)   -LR      OT Stop Time 1216 (P)   -LR      OT Time Calculation (min) 19 min (P)   -LR      Total Timed Code Minutes- OT 19 minute(s) (P)   -LR      OT Received On 02/04/22 (P)   -LR      OT - Next Appointment 02/07/22 (P)   -LR              Timed Charges    91772 - OT Therapeutic Activity Minutes 19 (P)   -LR              Total Minutes    Timed Charges Total Minutes 19 (P)   -LR       Total Minutes 19 (P)   -LR            User Key  (r) = Recorded By, (t) = Taken By, (c) = Cosigned By    Initials Name Provider Type    LR Yanet Santiago OT Student OT Student              Therapy Charges for Today     Code Description Service Date Service Provider Modifiers Qty    79953789685 HC OT THERAPEUTIC ACT EA 15 MIN 2/4/2022 Yanet Santiago OT Student GO 1    40222965834 HC OT THER SUPP EA 15 MIN 2/4/2022 Yanet Santiago OT Student GO 1               ELSA HAYDEN  2/4/2022

## 2022-02-04 NOTE — PLAN OF CARE
Goal Outcome Evaluation:  Plan of Care Reviewed With: (P) patient        Progress: (P) improving  Outcome Summary: (P) Pt agreeable to work with OT to sit up in bedside chair to eat lunch. Pt improving in functional transfers and mobilty. Pt required extra time and min A to transfer from supine to sitting EOB. Pt required mod A x2 to transfer from sitting EOB to standing. Pt requried max A x2 with verbal cues to move LE to pivot to bedside chair. Recommend continued skilled OT services to increase independence and safety in self-care, functional mobility, and functional transfers. Recommend acute rehab at d/c, with pt's improved function.    OTS wore PPE including mask, gloves, and completed hand hygiene prior to/after session. Pt wore a mask.

## 2022-02-04 NOTE — PROGRESS NOTES
"Daily progress note      2022    Patient Identification:    Name: Jenelle Kasper  Age: 61 y.o.  Sex: female  :  1960  MRN: [id number]                       Primary Care Physician: Zay Olivares MD    Chief Complaint:    Doing same  No new complaints  Family at bedside    History of Present Illness:   Patient is a 61-year-old female, with history of atrial fibrillation, pulmonary hypertension, COPD, diabetes mellitus, morbid obesity, sarcoidosis.  Patient was brought to the emergency room because she was found by her  with left-sided facial droop, weakness of her left arm and slurred speech.  Apparently, symptoms resolved with 10 minutes of EMS arrival, by the time she arrived to the ER, patient was back to baseline.  Apparently, patient is noted anticoagulation due to history of GI bleeding.  In the ER, patient was found to have: Hypertension, her NIH score was two, blood sugar was 361, creatinine 0.8, sodium 132, troponin negative, digoxin level 0.5, COVID-19 came back negative.  Urinalysis show WBC 6-12.  Chest ray showed no infiltrates.  CT scan of the head was normal.  Patient was placed in observation for further management.     Review of Systems  Unremarkable except left-sided weakness     Exam:  Blood pressure 124/72, pulse 52, temperature 98.1 °F (36.7 °C), temperature source Oral, resp. rate 18, height 170.2 cm (67.01\"), weight 113 kg (248 lb 12.8 oz), SpO2 96 %.    General: Alert, oriented x 3. Cooperative, no distress, appears stated age.  Morbid obesity  HEENT:    Head: Normocephalic, without obvious abnormality, atraumatic  Eyes: EOM are normal. Pupils are equal  Oropharynx: Mucosa and tongue normal  Neck: Supple, symmetrical, trachea midline, no adenopathy;              thyroid:  no enlargement/tenderness/nodules;              no carotid bruit or JVD  Cardiovascular: Irregular rate, irregular rhythm.  Normal distal pulses.              Exam reveals no gallop and no friction " rub. No murmur heard  Chest wall: No tenderness or deformity  Pulmonary: Clear to auscultation bilaterally, respirations unlabored.               No rhonchi, wheezing or rales.   Abdominal: Soft, nontender, bowel sounds active all four quadrants,     no masses, no hepatomegaly, no splenomegaly.   Extremities: Normal, atraumatic, no cyanosis or edema  Pulses: 2 + symmetric all extremities  Neurological: Patient is alert and oriented to person, place, and time.  No neurological deficit at this time.  Skin: Skin color, texture, normal. Turgor is decreased. No rashes or lesions      Data Review:  Lab Results (last 24 hours)     Procedure Component Value Units Date/Time    POC Glucose Once [[id number]]  (Abnormal) Collected: 02/04/22 1101    Specimen: Blood Updated: 02/04/22 1102     Glucose 161 mg/dL      Comment: Meter: [id number] : [id number] Robert DIAS       Basic Metabolic Panel [[id number]]  (Abnormal) Collected: 02/04/22 0518    Specimen: Blood Updated: 02/04/22 0611     Glucose 153 mg/dL      BUN 18 mg/dL      Creatinine 1.38 mg/dL      Sodium 136 mmol/L      Potassium 4.0 mmol/L      Chloride 95 mmol/L      CO2 30.9 mmol/L      Calcium 10.5 mg/dL      eGFR Non African Amer 39 mL/min/1.73      BUN/Creatinine Ratio 13.0     Anion Gap 10.1 mmol/L     Narrative:      GFR Normal >60  Chronic Kidney Disease <60  Kidney Failure <15      POC Glucose Once [[id number]]  (Abnormal) Collected: 02/04/22 0559    Specimen: Blood Updated: 02/04/22 0600     Glucose 149 mg/dL      Comment: Meter: [id number] : [id number] Allan DIAS       CBC & Differential [[id number]]  (Abnormal) Collected: 02/04/22 0518    Specimen: Blood Updated: 02/04/22 0541    Narrative:      The following orders were created for panel order CBC & Differential.  Procedure                               Abnormality         Status                     ---------                               -----------         ------                     CBC  Auto Differential[[id number]]        Abnormal            Final result                 Please view results for these tests on the individual orders.    CBC Auto Differential [[id number]]  (Abnormal) Collected: 02/04/22 0518    Specimen: Blood Updated: 02/04/22 0541     WBC 7.72 10*3/mm3      RBC 4.43 10*6/mm3      Hemoglobin 12.0 g/dL      Hematocrit 37.6 %      MCV 84.9 fL      MCH 27.1 pg      MCHC 31.9 g/dL      RDW 13.8 %      RDW-SD 43.5 fl      MPV 10.8 fL      Platelets 295 10*3/mm3      Neutrophil % 75.6 %      Lymphocyte % 13.7 %      Monocyte % 7.9 %      Eosinophil % 1.6 %      Basophil % 0.6 %      Immature Grans % 0.6 %      Neutrophils, Absolute 5.83 10*3/mm3      Lymphocytes, Absolute 1.06 10*3/mm3      Monocytes, Absolute 0.61 10*3/mm3      Eosinophils, Absolute 0.12 10*3/mm3      Basophils, Absolute 0.05 10*3/mm3      Immature Grans, Absolute 0.05 10*3/mm3      nRBC 0.0 /100 WBC     POC Glucose Once [[id number]]  (Abnormal) Collected: 02/03/22 2110    Specimen: Blood Updated: 02/03/22 2111     Glucose 153 mg/dL      Comment: Meter: [id number] : [id number] Allan DIAS       POC Glucose Once [[id number]]  (Abnormal) Collected: 02/03/22 1643    Specimen: Blood Updated: 02/03/22 1644     Glucose 207 mg/dL      Comment: Meter: [id number] : [id number] Jena DIAS             Imaging Results (All)     Procedure Component Value Units Date/Time    MRI Brain Without Contrast [[id number]] Collected: 01/29/22 1154     Updated: 01/29/22 1215    Narrative:      MRI BRAIN WO CONTRAST-, MRI ANGIOGRAM HEAD WO CONTRAST-     CLINICAL HISTORY: Slurred speech and left arm weakness and left facial  drooping. TIA versus CVA.     TECHNIQUE: Multiple axial T1, T2, gradient echo and diffusion images  were acquired. Sagittal T1-weighted images were also obtained. MR  angiography images of the brain were acquired using  time-of-flight-of-flight vascular enhancement techniques.      COMPARISON: CT scan of the head  dated 01/28/2022.     FINDINGS: The brain and ventricular system appear structurally within  normal limits there is ill-defined mild T2 hyperintensity and restricted  diffusion involving cortical gray matter and white matter within the  inferior aspect of the right parietal lobe in the region of the  posterior central gyrus that is consistent with an area of acute  ischemic infarction. This measures approximately 4.0 x 2.3 cm in  diameter. There is no significant associated mass effect. Slightly  inferior and posterior to this lesion there is a second smaller area of  patchy ischemic infarction involving the posterior aspect of the right  temporal lobe that extends into the periventricular white matter of the  occipital horn of the right lateral ventricle. A couple punctate foci of  ischemic infarction are also present in the right occipital lobe. Again,  there is no associated mass effect or hemorrhage. These infarcts are all  in the vascular territory of the right middle cerebral artery. Since  they are  by normal appearing brain parenchyma, they are likely  embolic in etiology. No other foci of restricted diffusion are  identified in the brain or brainstem. No other foci of T2 hyperintensity  are evident in the brain or brainstem. There is no evidence of small  vessel chronic ischemic change. There are no masses. The paranasal  sinuses are well aerated.     The MR angiography images demonstrate no focal intracranial stenoses or  occlusions. In particular, there is no evidence of vasculitis. There are  no aneurysms or vascular malformations.       Impression:      Multiple areas of acute ischemic infarction within the right  parietal and temporal and occipital lobes as described in more detail  above. The infarcts are all in the vascular territory of the right  middle cerebral artery, and are suspected to be embolic in etiology.  There is no significant associated mass effect and there is no  intracranial  hemorrhage.     This report was finalized on 1/29/2022 12:12 PM by Dr. Gabino Perez M.D.       MRI Angiogram Head Without Contrast [[id number]] Collected: 01/29/22 1154     Updated: 01/29/22 1215    Narrative:      MRI BRAIN WO CONTRAST-, MRI ANGIOGRAM HEAD WO CONTRAST-     CLINICAL HISTORY: Slurred speech and left arm weakness and left facial  drooping. TIA versus CVA.     TECHNIQUE: Multiple axial T1, T2, gradient echo and diffusion images  were acquired. Sagittal T1-weighted images were also obtained. MR  angiography images of the brain were acquired using  time-of-flight-of-flight vascular enhancement techniques.      COMPARISON: CT scan of the head dated 01/28/2022.     FINDINGS: The brain and ventricular system appear structurally within  normal limits there is ill-defined mild T2 hyperintensity and restricted  diffusion involving cortical gray matter and white matter within the  inferior aspect of the right parietal lobe in the region of the  posterior central gyrus that is consistent with an area of acute  ischemic infarction. This measures approximately 4.0 x 2.3 cm in  diameter. There is no significant associated mass effect. Slightly  inferior and posterior to this lesion there is a second smaller area of  patchy ischemic infarction involving the posterior aspect of the right  temporal lobe that extends into the periventricular white matter of the  occipital horn of the right lateral ventricle. A couple punctate foci of  ischemic infarction are also present in the right occipital lobe. Again,  there is no associated mass effect or hemorrhage. These infarcts are all  in the vascular territory of the right middle cerebral artery. Since  they are  by normal appearing brain parenchyma, they are likely  embolic in etiology. No other foci of restricted diffusion are  identified in the brain or brainstem. No other foci of T2 hyperintensity  are evident in the brain or brainstem. There is no evidence  of small  vessel chronic ischemic change. There are no masses. The paranasal  sinuses are well aerated.     The MR angiography images demonstrate no focal intracranial stenoses or  occlusions. In particular, there is no evidence of vasculitis. There are  no aneurysms or vascular malformations.       Impression:      Multiple areas of acute ischemic infarction within the right  parietal and temporal and occipital lobes as described in more detail  above. The infarcts are all in the vascular territory of the right  middle cerebral artery, and are suspected to be embolic in etiology.  There is no significant associated mass effect and there is no  intracranial hemorrhage.     This report was finalized on 1/29/2022 12:12 PM by Dr. Gabino Perez M.D.       CT Head Without Contrast Stroke Protocol [[id number]] Collected: 01/28/22 2247     Updated: 01/28/22 2253    Narrative:      CT HEAD WO CONTRAST STROKE PROTOCOL-     CLINICAL HISTORY: Left-sided facial drooping and left arm weakness.  Slurred speech.     TECHNIQUE: Transverse 3 mm thick images were obtained from the base of  the skull to the vertex without IV contrast.     Radiation dose reduction techniques were utilized, including automated  exposure control and exposure modulation based on body size.     COMPARISON: None     FINDINGS: The brain and ventricular system appear structurally normal.  There is no evidence of recent or old intracranial hemorrhage or  infarction. There are no masses. The paranasal sinuses and mastoid air  cells and middle ear cavities are well aerated.       Impression:      Normal CT scan of the head without contrast.     This report was finalized on 1/28/2022 10:50 PM by Dr. Gabino Perez M.D.       XR Chest 1 View [[id number]] Collected: 01/28/22 2233     Updated: 01/28/22 2237    Narrative:      PORTABLE CHEST 01/28/2022 AT 10:08 PM     CLINICAL HISTORY: Possible acute CVA. Rule out aspiration.     The lungs are fairly well-expanded  and appear free of focal infiltrates.  The heart is moderately enlarged and there is mild pulmonary vascular  engorgement but no anitra edema. There are no pleural effusions.     This report was finalized on 1/28/2022 10:33 PM by Dr. Gabino Perez M.D.             Current Facility-Administered Medications:   •  acetaminophen (TYLENOL) tablet 650 mg, 650 mg, Oral, Q6H PRN, Sánchez Maxwell MD, 650 mg at 02/02/22 1659  •  albuterol (PROVENTIL) nebulizer solution 0.083% 2.5 mg/3mL, 2.5 mg, Nebulization, Q6H PRN, Sánchez Maxwell MD  •  amLODIPine (NORVASC) tablet 10 mg, 10 mg, Oral, Q24H, Michele Subramanian MD, 10 mg at 02/04/22 0911  •  apixaban (ELIQUIS) tablet 5 mg, 5 mg, Oral, Q12H, Esvin Watkins APRZACK, 5 mg at 02/04/22 0910  •  atenolol (TENORMIN) tablet 75 mg, 75 mg, Oral, BID, Sánchez Maxwell MD, 75 mg at 02/03/22 2105  •  atorvastatin (LIPITOR) tablet 40 mg, 40 mg, Oral, Nightly, Mary Reardon APRN, 40 mg at 02/03/22 2105  •  cefTRIAXone (ROCEPHIN) 1 g in sodium chloride 0.9 % 100 mL IVPB-VTB, 1 g, Intravenous, Q24H, Michele Subramanian MD, Last Rate: 200 mL/hr at 02/03/22 1635, 1 g at 02/03/22 1635  •  digoxin (LANOXIN) tablet 250 mcg, 250 mcg, Oral, Daily, Sánchez Maxwell MD, 250 mcg at 02/03/22 0849  •  diphenoxylate-atropine (LOMOTIL) 2.5-0.025 MG per tablet 1 tablet, 1 tablet, Oral, 4x Daily PRN, Sánchez Maxwell MD  •  gabapentin (NEURONTIN) capsule 300 mg, 300 mg, Oral, TID, Sánchez Maxwell MD, 300 mg at 02/04/22 0910  •  hydrALAZINE (APRESOLINE) tablet 100 mg, 100 mg, Oral, Q12H, Sánchez Maxwell MD, 100 mg at 02/04/22 0912  •  insulin glargine (LANTUS, SEMGLEE) injection 20 Units, 20 Units, Subcutaneous, Nightly, Michele Subramanian MD, 20 Units at 02/03/22 2106  •  insulin lispro (ADMELOG) injection 0-9 Units, 0-9 Units, Subcutaneous, TID AC, Sánchez Maxwell MD, 2 Units at 02/04/22 1232  •  insulin lispro (ADMELOG) injection 7 Units, 7 Units, Subcutaneous, TID With Meals, Michele Subramanian MD, 7 Units at  02/04/22 1232  •  levothyroxine (SYNTHROID, LEVOTHROID) tablet 300 mcg, 300 mcg, Oral, Daily, Sánchez Maxwell MD, 300 mcg at 02/04/22 0911  •  losartan (COZAAR) tablet 100 mg, 100 mg, Oral, Daily, Sánchez Maxwell MD, 100 mg at 02/04/22 0911  •  nitroglycerin (NITROSTAT) SL tablet 0.4 mg, 0.4 mg, Sublingual, Q5 Min PRN, Michele Subramanian MD  •  ondansetron (ZOFRAN) injection 4 mg, 4 mg, Intravenous, Q6H PRN, Michele Subramanian MD, 4 mg at 02/03/22 1853  •  ondansetron (ZOFRAN) tablet 4 mg, 4 mg, Oral, Q6H PRN, Michele Subramanian MD, 4 mg at 02/02/22 1757  •  pantoprazole (PROTONIX) EC tablet 40 mg, 40 mg, Oral, Q AM, Sánchez Maxwell MD, 40 mg at 02/04/22 0527  •  potassium chloride (K-DUR,KLOR-CON) ER tablet 20 mEq, 20 mEq, Oral, TID With Meals, Michele Subramanian MD, 20 mEq at 02/04/22 1232  •  sodium chloride 0.9 % flush 20 mL, 20 mL, Intravenous, PRN, Sánchez Maxwell MD  •  spironolactone (ALDACTONE) tablet 25 mg, 25 mg, Oral, Daily, Maria Teresa Judge MD, 25 mg at 02/04/22 0911  •  torsemide (DEMADEX) tablet 20 mg, 20 mg, Oral, BID, Michele Subramanian MD, 20 mg at 02/04/22 0910     ASSESSMENT  Acute multiple right MCA ischemic infarction involving parietal and occipital lobes  Acute UTI  Chronic atrial fibrillation  History of GI bleed  Diabetes mellitus  Hypertension  Hyperlipidemia  Morbidly obese  Pulmonary hypertension  Gastroesophageal reflux disease    PLAN  CPM  Eliquis Lipitor  Antibiotics completed  No need for MARIZA  Continue home medications  Stress ulcer DVT prophylaxis  Cardiology input appreciated  Neurology to follow patient  Supportive care  PT OT ST  Discussed with nursing staff and family  Subacute rehab in 1 to 2 days    Michele Subramanian MD  2/4/2022  14:57 EST    I wore protective equipment throughout this patient encounter including a face mask, gloves, and protective eyewear.  Hand hygiene was performed before donning protective equipment and after removal when leaving the room.

## 2022-02-05 LAB
ANION GAP SERPL CALCULATED.3IONS-SCNC: 10.6 MMOL/L (ref 5–15)
BUN SERPL-MCNC: 20 MG/DL (ref 6–20)
BUN/CREAT SERPL: 14.2 (ref 7–25)
CALCIUM SPEC-SCNC: 10.4 MG/DL (ref 8.6–10.5)
CHLORIDE SERPL-SCNC: 95 MMOL/L (ref 98–107)
CO2 SERPL-SCNC: 30.4 MMOL/L (ref 22–29)
CREAT SERPL-MCNC: 1.41 MG/DL (ref 0.57–1)
GFR SERPL CREATININE-BSD FRML MDRD: 38 ML/MIN/1.73
GLUCOSE BLDC GLUCOMTR-MCNC: 172 MG/DL (ref 70–130)
GLUCOSE BLDC GLUCOMTR-MCNC: 206 MG/DL (ref 70–130)
GLUCOSE BLDC GLUCOMTR-MCNC: 228 MG/DL (ref 70–130)
GLUCOSE BLDC GLUCOMTR-MCNC: 262 MG/DL (ref 70–130)
GLUCOSE SERPL-MCNC: 161 MG/DL (ref 65–99)
MAGNESIUM SERPL-MCNC: 1.8 MG/DL (ref 1.6–2.6)
POTASSIUM SERPL-SCNC: 4.4 MMOL/L (ref 3.5–5.2)
QT INTERVAL: 393 MS
SODIUM SERPL-SCNC: 136 MMOL/L (ref 136–145)
TROPONIN T SERPL-MCNC: 0.01 NG/ML (ref 0–0.03)

## 2022-02-05 PROCEDURE — 80048 BASIC METABOLIC PNL TOTAL CA: CPT | Performed by: HOSPITALIST

## 2022-02-05 PROCEDURE — 82962 GLUCOSE BLOOD TEST: CPT

## 2022-02-05 PROCEDURE — 83735 ASSAY OF MAGNESIUM: CPT | Performed by: HOSPITALIST

## 2022-02-05 PROCEDURE — 93005 ELECTROCARDIOGRAM TRACING: CPT | Performed by: HOSPITALIST

## 2022-02-05 PROCEDURE — 63710000001 INSULIN LISPRO (HUMAN) PER 5 UNITS: Performed by: INTERNAL MEDICINE

## 2022-02-05 PROCEDURE — 93010 ELECTROCARDIOGRAM REPORT: CPT | Performed by: INTERNAL MEDICINE

## 2022-02-05 PROCEDURE — 97530 THERAPEUTIC ACTIVITIES: CPT

## 2022-02-05 PROCEDURE — 63710000001 INSULIN LISPRO (HUMAN) PER 5 UNITS: Performed by: HOSPITALIST

## 2022-02-05 PROCEDURE — 84484 ASSAY OF TROPONIN QUANT: CPT | Performed by: HOSPITALIST

## 2022-02-05 RX ORDER — PANTOPRAZOLE SODIUM 40 MG/1
40 TABLET, DELAYED RELEASE ORAL
Qty: 30 TABLET | Refills: 0 | Status: SHIPPED | OUTPATIENT
Start: 2022-02-06 | End: 2022-03-16 | Stop reason: HOSPADM

## 2022-02-05 RX ORDER — SPIRONOLACTONE 25 MG/1
25 TABLET ORAL DAILY
Qty: 30 TABLET | Refills: 0 | Status: SHIPPED | OUTPATIENT
Start: 2022-02-06 | End: 2022-03-16 | Stop reason: HOSPADM

## 2022-02-05 RX ORDER — ATORVASTATIN CALCIUM 40 MG/1
40 TABLET, FILM COATED ORAL NIGHTLY
Qty: 30 TABLET | Refills: 0 | Status: SHIPPED | OUTPATIENT
Start: 2022-02-05 | End: 2022-03-07

## 2022-02-05 RX ORDER — LEVOTHYROXINE SODIUM 0.15 MG/1
300 TABLET ORAL
Status: DISCONTINUED | OUTPATIENT
Start: 2022-02-06 | End: 2022-02-06 | Stop reason: HOSPADM

## 2022-02-05 RX ORDER — AMLODIPINE BESYLATE 5 MG/1
5 TABLET ORAL
Qty: 30 TABLET | Refills: 0 | Status: SHIPPED | OUTPATIENT
Start: 2022-02-06 | End: 2022-03-09

## 2022-02-05 RX ORDER — POTASSIUM CHLORIDE 20 MEQ/1
20 TABLET, EXTENDED RELEASE ORAL
Qty: 90 TABLET | Refills: 0 | Status: SHIPPED | OUTPATIENT
Start: 2022-02-05 | End: 2022-03-07

## 2022-02-05 RX ORDER — AMLODIPINE BESYLATE 5 MG/1
5 TABLET ORAL
Status: DISCONTINUED | OUTPATIENT
Start: 2022-02-06 | End: 2022-02-06 | Stop reason: HOSPADM

## 2022-02-05 RX ORDER — INSULIN LISPRO 100 [IU]/ML
10 INJECTION, SOLUTION INTRAVENOUS; SUBCUTANEOUS
Status: DISCONTINUED | OUTPATIENT
Start: 2022-02-05 | End: 2022-02-06 | Stop reason: HOSPADM

## 2022-02-05 RX ORDER — GABAPENTIN 300 MG/1
300 CAPSULE ORAL 3 TIMES DAILY
Qty: 9 CAPSULE | Refills: 0 | Status: SHIPPED | OUTPATIENT
Start: 2022-02-05 | End: 2023-03-08

## 2022-02-05 RX ADMIN — INSULIN LISPRO 8 UNITS: 100 INJECTION, SOLUTION INTRAVENOUS; SUBCUTANEOUS at 12:07

## 2022-02-05 RX ADMIN — INSULIN LISPRO 10 UNITS: 100 INJECTION, SOLUTION INTRAVENOUS; SUBCUTANEOUS at 17:23

## 2022-02-05 RX ADMIN — APIXABAN 5 MG: 5 TABLET, FILM COATED ORAL at 09:00

## 2022-02-05 RX ADMIN — INSULIN LISPRO 6 UNITS: 100 INJECTION, SOLUTION INTRAVENOUS; SUBCUTANEOUS at 17:22

## 2022-02-05 RX ADMIN — INSULIN GLARGINE-YFGN 25 UNITS: 100 INJECTION, SOLUTION SUBCUTANEOUS at 21:20

## 2022-02-05 RX ADMIN — POTASSIUM CHLORIDE 20 MEQ: 750 TABLET, EXTENDED RELEASE ORAL at 12:08

## 2022-02-05 RX ADMIN — LOSARTAN POTASSIUM 100 MG: 100 TABLET, FILM COATED ORAL at 09:01

## 2022-02-05 RX ADMIN — INSULIN LISPRO 8 UNITS: 100 INJECTION, SOLUTION INTRAVENOUS; SUBCUTANEOUS at 09:01

## 2022-02-05 RX ADMIN — GABAPENTIN 300 MG: 300 CAPSULE ORAL at 17:23

## 2022-02-05 RX ADMIN — NITROGLYCERIN 0.4 MG: 0.4 TABLET SUBLINGUAL at 00:33

## 2022-02-05 RX ADMIN — NITROGLYCERIN 0.4 MG: 0.4 TABLET SUBLINGUAL at 00:42

## 2022-02-05 RX ADMIN — APIXABAN 5 MG: 5 TABLET, FILM COATED ORAL at 21:16

## 2022-02-05 RX ADMIN — INSULIN LISPRO 4 UNITS: 100 INJECTION, SOLUTION INTRAVENOUS; SUBCUTANEOUS at 06:54

## 2022-02-05 RX ADMIN — POTASSIUM CHLORIDE 20 MEQ: 750 TABLET, EXTENDED RELEASE ORAL at 17:23

## 2022-02-05 RX ADMIN — GABAPENTIN 300 MG: 300 CAPSULE ORAL at 21:16

## 2022-02-05 RX ADMIN — TORSEMIDE 20 MG: 20 TABLET ORAL at 17:23

## 2022-02-05 RX ADMIN — POTASSIUM CHLORIDE 20 MEQ: 750 TABLET, EXTENDED RELEASE ORAL at 09:01

## 2022-02-05 RX ADMIN — ATENOLOL 75 MG: 25 TABLET ORAL at 09:01

## 2022-02-05 RX ADMIN — SPIRONOLACTONE 25 MG: 25 TABLET, FILM COATED ORAL at 09:01

## 2022-02-05 RX ADMIN — AMLODIPINE BESYLATE 10 MG: 10 TABLET ORAL at 09:01

## 2022-02-05 RX ADMIN — HYDRALAZINE HYDROCHLORIDE 100 MG: 50 TABLET, FILM COATED ORAL at 09:00

## 2022-02-05 RX ADMIN — DIGOXIN 250 MCG: 250 TABLET ORAL at 09:01

## 2022-02-05 RX ADMIN — PANTOPRAZOLE SODIUM 40 MG: 40 TABLET, DELAYED RELEASE ORAL at 06:20

## 2022-02-05 RX ADMIN — LEVOTHYROXINE SODIUM 300 MCG: 0.15 TABLET ORAL at 09:00

## 2022-02-05 RX ADMIN — NITROGLYCERIN 0.4 MG: 0.4 TABLET SUBLINGUAL at 00:26

## 2022-02-05 RX ADMIN — INSULIN LISPRO 2 UNITS: 100 INJECTION, SOLUTION INTRAVENOUS; SUBCUTANEOUS at 12:05

## 2022-02-05 RX ADMIN — ACETAMINOPHEN 650 MG: 325 TABLET, FILM COATED ORAL at 09:01

## 2022-02-05 RX ADMIN — TORSEMIDE 20 MG: 20 TABLET ORAL at 09:01

## 2022-02-05 RX ADMIN — ATORVASTATIN CALCIUM 40 MG: 20 TABLET, FILM COATED ORAL at 21:16

## 2022-02-05 RX ADMIN — GABAPENTIN 300 MG: 300 CAPSULE ORAL at 09:01

## 2022-02-05 NOTE — CASE MANAGEMENT/SOCIAL WORK
Continued Stay Note  [facility]     Patient Name: Jenelle Kasper  MRN: [id number]  Today's Date: 2/5/2022    Admit Date: 1/28/2022     Discharge Plan     Row Name 02/05/22 0907       Plan    Plan [facility]    Plan Comments S/W Jessy/ [facility] Norris pt accepted at [facility] and precert has been received.  Precert is good thru Monday.  Packet placed in cubby ..........sk               Discharge Codes    No documentation.               Expected Discharge Date and Time     Expected Discharge Date Expected Discharge Time    Feb 4, 2022             Tayler Lebron RN

## 2022-02-05 NOTE — CASE MANAGEMENT/SOCIAL WORK
Continued Stay Note  [facility]     Patient Name: Jenelle Kasper  MRN: [id number]  Today's Date: 2/5/2022    Admit Date: 1/28/2022     Discharge Plan     Row Name 02/05/22 1558       Plan    Plan [facility]    Patient/Family in Agreement with Plan yes    Plan Comments [facility] to transport patient tomorrow 2/6/22 at 1300.  CCP advised Staff RN of plan.  CCP to follow.  Og               Discharge Codes    No documentation.               Expected Discharge Date and Time     Expected Discharge Date Expected Discharge Time    Feb 6, 2022             Edu Carolina

## 2022-02-05 NOTE — CASE MANAGEMENT/SOCIAL WORK
"Continued Stay Note  [facility]     Patient Name: Jenelle Kasper  MRN: [id number]  Today's Date: 2/5/2022    Admit Date: 1/28/2022     Discharge Plan     Row Name 02/05/22 1229       Plan    Plan [facility]    Plan Comments CCP spoke with [facility] with [facility] who confirms patient can come \"anytime\" and CCP advised that arrival would be tomorrow 2/6 due to lack of ambulance availability today.  CCP advised Staff RNKelsey, of plan.  CCP to follow and arrange transportation once dc orders obtained.  Og               Discharge Codes    No documentation.               Expected Discharge Date and Time     Expected Discharge Date Expected Discharge Time    Feb 4, 2022             Edu Carolina    "

## 2022-02-05 NOTE — DISCHARGE SUMMARY
Discharge summary    Date of admission 1/28/2022  Date of discharge 2/6/2022    Final diagnosis    Acute multiple right MCA ischemic infarction involving parietal and occipital lobes  Acute UTI  Chronic atrial fibrillation  History of GI bleed  Diabetes mellitus  Hypertension  Hyperlipidemia  Morbidly obese  Pulmonary hypertension  Gastroesophageal reflux disease    Discharge medications    Current Facility-Administered Medications:   •  acetaminophen (TYLENOL) tablet 650 mg, 650 mg, Oral, Q6H PRN, Sánchez Maxwell MD, 650 mg at 02/05/22 0901  •  albuterol (PROVENTIL) nebulizer solution 0.083% 2.5 mg/3mL, 2.5 mg, Nebulization, Q6H PRN, Sánchez Maxwell MD  •  [START ON 2/6/2022] amLODIPine (NORVASC) tablet 5 mg, 5 mg, Oral, Q24H, Michele Subramanian MD  •  apixaban (ELIQUIS) tablet 5 mg, 5 mg, Oral, Q12H, Esvin Watkins APRZACK, 5 mg at 02/05/22 0900  •  atenolol (TENORMIN) tablet 75 mg, 75 mg, Oral, BID, Sánchez Maxwell MD, 75 mg at 02/05/22 0901  •  atorvastatin (LIPITOR) tablet 40 mg, 40 mg, Oral, Nightly, Mary Reardon APRN, 40 mg at 02/04/22 2027  •  digoxin (LANOXIN) tablet 250 mcg, 250 mcg, Oral, Daily, Sánchez Maxwell MD, 250 mcg at 02/05/22 0901  •  gabapentin (NEURONTIN) capsule 300 mg, 300 mg, Oral, TID, Sánchez Maxwell MD, 300 mg at 02/05/22 0901  •  hydrALAZINE (APRESOLINE) tablet 100 mg, 100 mg, Oral, Q12H, Sánchez Maxwell MD, 100 mg at 02/05/22 0900  •  insulin glargine (LANTUS, SEMGLEE) injection 25 Units, 25 Units, Subcutaneous, Nightly, Michele Subramanian MD, 25 Units at 02/04/22 2028  •  insulin lispro (ADMELOG) injection 0-9 Units, 0-9 Units, Subcutaneous, TID AC, Sánchez Maxwell MD, 2 Units at 02/05/22 1205  •  insulin lispro (ADMELOG) injection 10 Units, 10 Units, Subcutaneous, TID With Meals, Michele Subramanian MD  •  [START ON 2/6/2022] levothyroxine (SYNTHROID, LEVOTHROID) tablet 300 mcg, 300 mcg, Oral, Q AM, Michele Subramanian MD  •  losartan (COZAAR) tablet 100 mg, 100 mg, Oral, Daily, Hans  Sánchez MARTINES MD, 100 mg at 02/05/22 0901  •  ondansetron (ZOFRAN) tablet 4 mg, 4 mg, Oral, Q6H PRN, Michele Subramanian MD, 4 mg at 02/02/22 1757  •  pantoprazole (PROTONIX) EC tablet 40 mg, 40 mg, Oral, Q AM, Sánchez Maxwell MD, 40 mg at 02/05/22 0620  •  potassium chloride (K-DUR,KLOR-CON) ER tablet 20 mEq, 20 mEq, Oral, TID With Meals, Michele Subramanian MD, 20 mEq at 02/05/22 1208  •  spironolactone (ALDACTONE) tablet 25 mg, 25 mg, Oral, Daily, Maria Teresa Judge MD, 25 mg at 02/05/22 0901  •  torsemide (DEMADEX) tablet 20 mg, 20 mg, Oral, BID, Michele Subramanian MD, 20 mg at 02/05/22 0901     Consults obtained  Neurology  Cardiology  EP service  Gastroenterology  Diabetic education nurse  Nutrition    Procedures  None    Hospital course  61-year-old white female with very complex past medical history including atrial fibrillation COPD pulmonary hypertension diabetes mellitus morbidly obese sarcoidosis and gastroesophageal disease admitted to emergency room with lower extremity weakness.  Patient work-up in ER revealed acute CVA probably embolic since she has atrial fibrillation admit for management.  Patient admitted started on anticoagulation but apparently she has history of GI bleeding so GI also consulted and recommend no evidence of GI bleeding continue anticoagulation.  Also followed by cardiology and MARIZA was recommended but EP service recommended that it will not change the treatment so continue with Eliquis Lipitor and modify the risk factors.  Patient is back to baseline but will be discharged to subacute rehab for PT OT nursing care.  Patient cleared from neurology cardiology gastroenterology and EP service.  Patient has sleep apnea and outpatients sleep study recommended after completion of subacute rehab.    Diet diet regular    Activity per PT OT    Medication as above    Further care per accepting physician at subacute rehab and follow-up with neurology cardiology and gastroenterology per their instruction and  take medication as directed    HERIBERTO LYNN MD

## 2022-02-05 NOTE — PROGRESS NOTES
"Daily progress note      2022    Patient Identification:    Name: Jenelle Kasper  Age: 61 y.o.  Sex: female  :  1960  MRN: [id number]                       Primary Care Physician: Zay Olivares MD    Chief Complaint:    Doing same  No new complaints  Family at bedside    History of Present Illness:   Patient is a 61-year-old female, with history of atrial fibrillation, pulmonary hypertension, COPD, diabetes mellitus, morbid obesity, sarcoidosis.  Patient was brought to the emergency room because she was found by her  with left-sided facial droop, weakness of her left arm and slurred speech.  Apparently, symptoms resolved with 10 minutes of EMS arrival, by the time she arrived to the ER, patient was back to baseline.  Apparently, patient is noted anticoagulation due to history of GI bleeding.  In the ER, patient was found to have: Hypertension, her NIH score was two, blood sugar was 361, creatinine 0.8, sodium 132, troponin negative, digoxin level 0.5, COVID-19 came back negative.  Urinalysis show WBC 6-12.  Chest ray showed no infiltrates.  CT scan of the head was normal.  Patient was placed in observation for further management.     Review of Systems  Unremarkable except left-sided weakness     Exam:  Blood pressure 102/50, pulse 54, temperature 98.1 °F (36.7 °C), temperature source Oral, resp. rate 18, height 170.2 cm (67.01\"), weight 113 kg (248 lb 12.8 oz), SpO2 96 %.    General: Alert, oriented x 3. Cooperative, no distress, appears stated age.  Morbid obesity  HEENT:    Head: Normocephalic, without obvious abnormality, atraumatic  Eyes: EOM are normal. Pupils are equal  Oropharynx: Mucosa and tongue normal  Neck: Supple, symmetrical, trachea midline, no adenopathy;              thyroid:  no enlargement/tenderness/nodules;              no carotid bruit or JVD  Cardiovascular: Irregular rate, irregular rhythm.  Normal distal pulses.              Exam reveals no gallop and no friction " rub. No murmur heard  Chest wall: No tenderness or deformity  Pulmonary: Clear to auscultation bilaterally, respirations unlabored.               No rhonchi, wheezing or rales.   Abdominal: Soft, nontender, bowel sounds active all four quadrants,     no masses, no hepatomegaly, no splenomegaly.   Extremities: Normal, atraumatic, no cyanosis or edema  Pulses: 2 + symmetric all extremities  Neurological: Patient is alert and oriented to person, place, and time.  No neurological deficit at this time.  Skin: Skin color, texture, normal. Turgor is decreased. No rashes or lesions      Data Review:  Lab Results (last 24 hours)     Procedure Component Value Units Date/Time    POC Glucose Once [[id number]]  (Abnormal) Collected: 02/05/22 1140    Specimen: Blood Updated: 02/05/22 1143     Glucose 172 mg/dL      Comment: Meter: [id number] : [id number] Jatin DIAS       POC Glucose Once [[id number]]  (Abnormal) Collected: 02/05/22 0651    Specimen: Blood Updated: 02/05/22 0705     Glucose 206 mg/dL      Comment: Meter: [id number] : [id number] Katt DIAS       Troponin [[id number]]  (Normal) Collected: 02/05/22 0057    Specimen: Blood Updated: 02/05/22 0129     Troponin T 0.012 ng/mL     Narrative:      Troponin T Reference Range:  <= 0.03 ng/mL-   Negative for AMI  >0.03 ng/mL-     Abnormal for myocardial necrosis.  Clinicians would have to utilize clinical acumen, EKG, Troponin and serial changes to determine if it is an Acute Myocardial Infarction or myocardial injury due to an underlying chronic condition.       Results may be falsely decreased if patient taking Biotin.      Basic Metabolic Panel [[id number]]  (Abnormal) Collected: 02/05/22 0057    Specimen: Blood Updated: 02/05/22 0128     Glucose 161 mg/dL      BUN 20 mg/dL      Creatinine 1.41 mg/dL      Sodium 136 mmol/L      Potassium 4.4 mmol/L      Chloride 95 mmol/L      CO2 30.4 mmol/L      Calcium 10.4 mg/dL      eGFR Non African Amer 38 mL/min/1.73       BUN/Creatinine Ratio 14.2     Anion Gap 10.6 mmol/L     Narrative:      GFR Normal >60  Chronic Kidney Disease <60  Kidney Failure <15      Magnesium [[id number]]  (Normal) Collected: 02/05/22 0057    Specimen: Blood Updated: 02/05/22 0128     Magnesium 1.8 mg/dL     POC Glucose Once [[id number]]  (Abnormal) Collected: 02/04/22 2140    Specimen: Blood Updated: 02/04/22 2141     Glucose 173 mg/dL      Comment: Meter: [id number] : [id number] Katt DIAS       POC Glucose Once [[id number]]  (Abnormal) Collected: 02/04/22 1654    Specimen: Blood Updated: 02/04/22 1655     Glucose 176 mg/dL      Comment: Meter: [id number] : [id number] Moody DIAS             Imaging Results (All)     Procedure Component Value Units Date/Time    MRI Brain Without Contrast [[id number]] Collected: 01/29/22 1154     Updated: 01/29/22 1215    Narrative:      MRI BRAIN WO CONTRAST-, MRI ANGIOGRAM HEAD WO CONTRAST-     CLINICAL HISTORY: Slurred speech and left arm weakness and left facial  drooping. TIA versus CVA.     TECHNIQUE: Multiple axial T1, T2, gradient echo and diffusion images  were acquired. Sagittal T1-weighted images were also obtained. MR  angiography images of the brain were acquired using  time-of-flight-of-flight vascular enhancement techniques.      COMPARISON: CT scan of the head dated 01/28/2022.     FINDINGS: The brain and ventricular system appear structurally within  normal limits there is ill-defined mild T2 hyperintensity and restricted  diffusion involving cortical gray matter and white matter within the  inferior aspect of the right parietal lobe in the region of the  posterior central gyrus that is consistent with an area of acute  ischemic infarction. This measures approximately 4.0 x 2.3 cm in  diameter. There is no significant associated mass effect. Slightly  inferior and posterior to this lesion there is a second smaller area of  patchy ischemic infarction involving the posterior aspect of the  right  temporal lobe that extends into the periventricular white matter of the  occipital horn of the right lateral ventricle. A couple punctate foci of  ischemic infarction are also present in the right occipital lobe. Again,  there is no associated mass effect or hemorrhage. These infarcts are all  in the vascular territory of the right middle cerebral artery. Since  they are  by normal appearing brain parenchyma, they are likely  embolic in etiology. No other foci of restricted diffusion are  identified in the brain or brainstem. No other foci of T2 hyperintensity  are evident in the brain or brainstem. There is no evidence of small  vessel chronic ischemic change. There are no masses. The paranasal  sinuses are well aerated.     The MR angiography images demonstrate no focal intracranial stenoses or  occlusions. In particular, there is no evidence of vasculitis. There are  no aneurysms or vascular malformations.       Impression:      Multiple areas of acute ischemic infarction within the right  parietal and temporal and occipital lobes as described in more detail  above. The infarcts are all in the vascular territory of the right  middle cerebral artery, and are suspected to be embolic in etiology.  There is no significant associated mass effect and there is no  intracranial hemorrhage.     This report was finalized on 1/29/2022 12:12 PM by Dr. Gabino Perez M.D.       MRI Angiogram Head Without Contrast [[id number]] Collected: 01/29/22 1154     Updated: 01/29/22 1215    Narrative:      MRI BRAIN WO CONTRAST-, MRI ANGIOGRAM HEAD WO CONTRAST-     CLINICAL HISTORY: Slurred speech and left arm weakness and left facial  drooping. TIA versus CVA.     TECHNIQUE: Multiple axial T1, T2, gradient echo and diffusion images  were acquired. Sagittal T1-weighted images were also obtained. MR  angiography images of the brain were acquired using  time-of-flight-of-flight vascular enhancement techniques.       COMPARISON: CT scan of the head dated 01/28/2022.     FINDINGS: The brain and ventricular system appear structurally within  normal limits there is ill-defined mild T2 hyperintensity and restricted  diffusion involving cortical gray matter and white matter within the  inferior aspect of the right parietal lobe in the region of the  posterior central gyrus that is consistent with an area of acute  ischemic infarction. This measures approximately 4.0 x 2.3 cm in  diameter. There is no significant associated mass effect. Slightly  inferior and posterior to this lesion there is a second smaller area of  patchy ischemic infarction involving the posterior aspect of the right  temporal lobe that extends into the periventricular white matter of the  occipital horn of the right lateral ventricle. A couple punctate foci of  ischemic infarction are also present in the right occipital lobe. Again,  there is no associated mass effect or hemorrhage. These infarcts are all  in the vascular territory of the right middle cerebral artery. Since  they are  by normal appearing brain parenchyma, they are likely  embolic in etiology. No other foci of restricted diffusion are  identified in the brain or brainstem. No other foci of T2 hyperintensity  are evident in the brain or brainstem. There is no evidence of small  vessel chronic ischemic change. There are no masses. The paranasal  sinuses are well aerated.     The MR angiography images demonstrate no focal intracranial stenoses or  occlusions. In particular, there is no evidence of vasculitis. There are  no aneurysms or vascular malformations.       Impression:      Multiple areas of acute ischemic infarction within the right  parietal and temporal and occipital lobes as described in more detail  above. The infarcts are all in the vascular territory of the right  middle cerebral artery, and are suspected to be embolic in etiology.  There is no significant associated mass  effect and there is no  intracranial hemorrhage.     This report was finalized on 1/29/2022 12:12 PM by Dr. Gabino Perez M.D.       CT Head Without Contrast Stroke Protocol [[id number]] Collected: 01/28/22 2247     Updated: 01/28/22 2253    Narrative:      CT HEAD WO CONTRAST STROKE PROTOCOL-     CLINICAL HISTORY: Left-sided facial drooping and left arm weakness.  Slurred speech.     TECHNIQUE: Transverse 3 mm thick images were obtained from the base of  the skull to the vertex without IV contrast.     Radiation dose reduction techniques were utilized, including automated  exposure control and exposure modulation based on body size.     COMPARISON: None     FINDINGS: The brain and ventricular system appear structurally normal.  There is no evidence of recent or old intracranial hemorrhage or  infarction. There are no masses. The paranasal sinuses and mastoid air  cells and middle ear cavities are well aerated.       Impression:      Normal CT scan of the head without contrast.     This report was finalized on 1/28/2022 10:50 PM by Dr. Gabino Perez M.D.       XR Chest 1 View [[id number]] Collected: 01/28/22 2233     Updated: 01/28/22 2237    Narrative:      PORTABLE CHEST 01/28/2022 AT 10:08 PM     CLINICAL HISTORY: Possible acute CVA. Rule out aspiration.     The lungs are fairly well-expanded and appear free of focal infiltrates.  The heart is moderately enlarged and there is mild pulmonary vascular  engorgement but no anitra edema. There are no pleural effusions.     This report was finalized on 1/28/2022 10:33 PM by Dr. Gabino Perez M.D.             Current Facility-Administered Medications:   •  acetaminophen (TYLENOL) tablet 650 mg, 650 mg, Oral, Q6H PRN, Sánchez Maxwell MD, 650 mg at 02/05/22 0901  •  albuterol (PROVENTIL) nebulizer solution 0.083% 2.5 mg/3mL, 2.5 mg, Nebulization, Q6H PRN, Sánchez Maxwell MD  •  amLODIPine (NORVASC) tablet 10 mg, 10 mg, Oral, Q24H, Michele Subramanian MD, 10 mg at  02/05/22 0901  •  apixaban (ELIQUIS) tablet 5 mg, 5 mg, Oral, Q12H, Esvin Watkins, APRN, 5 mg at 02/05/22 0900  •  atenolol (TENORMIN) tablet 75 mg, 75 mg, Oral, BID, Sánchez Maxwell MD, 75 mg at 02/05/22 0901  •  atorvastatin (LIPITOR) tablet 40 mg, 40 mg, Oral, Nightly, Mary Reardon APRN, 40 mg at 02/04/22 2027  •  digoxin (LANOXIN) tablet 250 mcg, 250 mcg, Oral, Daily, Sánchez Maxwell MD, 250 mcg at 02/05/22 0901  •  diphenoxylate-atropine (LOMOTIL) 2.5-0.025 MG per tablet 1 tablet, 1 tablet, Oral, 4x Daily PRN, Sánchez Maxwell MD  •  gabapentin (NEURONTIN) capsule 300 mg, 300 mg, Oral, TID, Sánchez Maxwell MD, 300 mg at 02/05/22 0901  •  hydrALAZINE (APRESOLINE) tablet 100 mg, 100 mg, Oral, Q12H, Sánhcez Maxwell MD, 100 mg at 02/05/22 0900  •  insulin glargine (LANTUS, SEMGLEE) injection 25 Units, 25 Units, Subcutaneous, Nightly, Michele Subramanian MD, 25 Units at 02/04/22 2028  •  insulin lispro (ADMELOG) injection 0-9 Units, 0-9 Units, Subcutaneous, TID AC, Sánchez Maxwell MD, 2 Units at 02/05/22 1205  •  insulin lispro (ADMELOG) injection 8 Units, 8 Units, Subcutaneous, TID With Meals, Michele Subramanian MD, 8 Units at 02/05/22 1207  •  [START ON 2/6/2022] levothyroxine (SYNTHROID, LEVOTHROID) tablet 300 mcg, 300 mcg, Oral, Q AM, Michele Subramanian MD  •  losartan (COZAAR) tablet 100 mg, 100 mg, Oral, Daily, Sánchez Maxwell MD, 100 mg at 02/05/22 0901  •  nitroglycerin (NITROSTAT) SL tablet 0.4 mg, 0.4 mg, Sublingual, Q5 Min PRN, Michele Subramanian MD, 0.4 mg at 02/05/22 0042  •  ondansetron (ZOFRAN) injection 4 mg, 4 mg, Intravenous, Q6H PRN, Michele Subramanain MD, 4 mg at 02/03/22 1853  •  ondansetron (ZOFRAN) tablet 4 mg, 4 mg, Oral, Q6H PRN, Michele Subramanian MD, 4 mg at 02/02/22 1757  •  pantoprazole (PROTONIX) EC tablet 40 mg, 40 mg, Oral, Q AM, Sánchez Maxwell MD, 40 mg at 02/05/22 0620  •  potassium chloride (K-DUR,KLOR-CON) ER tablet 20 mEq, 20 mEq, Oral, TID With Meals, Michele Subramanian MD, 20 mEq at 02/05/22  1208  •  sodium chloride 0.9 % flush 20 mL, 20 mL, Intravenous, PRN, Sánchez Maxwell MD  •  spironolactone (ALDACTONE) tablet 25 mg, 25 mg, Oral, Daily, Maria Teresa Judge MD, 25 mg at 02/05/22 0901  •  torsemide (DEMADEX) tablet 20 mg, 20 mg, Oral, BID, Michele Subramanian MD, 20 mg at 02/05/22 0901     ASSESSMENT  Acute multiple right MCA ischemic infarction involving parietal and occipital lobes  Acute UTI  Chronic atrial fibrillation  History of GI bleed  Diabetes mellitus  Hypertension  Hyperlipidemia  Morbidly obese  Pulmonary hypertension  Gastroesophageal reflux disease    PLAN  Discharge to subacute rehab in a.m.  Discharge summary dictated    Michele Subramanian MD  2/5/2022  14:22 EST    I wore protective equipment throughout this patient encounter including a face mask, gloves, and protective eyewear.  Hand hygiene was performed before donning protective equipment and after removal when leaving the room.

## 2022-02-05 NOTE — PLAN OF CARE
Goal Outcome Evaluation:  Plan of Care Reviewed With: patient           Outcome Summary: Pt. agreeable to PT though very emotional throughout session. Required mod-maxA for bed mobility, sat EOB ~3 minutes and cued patient to maintain upright trunk and utilize UE to assist with posture/balance though unable to maintain seated balance without CGA-modA at times when attempting LAQ. Pt. frequently crying out, reports she became nauseous and was c/o L sided chest pain therefore returned pt. to supine and informed RN. RN aware, they have already given patient medication and EKG. Will continue to monitor.    Patient was not wearing a face mask during this therapy encounter. Therapist used appropriate personal protective equipment including eye protection, mask, and gloves.  Mask used was standard procedure mask. Appropriate PPE was worn during the entire therapy session. Hand hygiene was completed before and after therapy session. Patient is not in enhanced droplet precautions.

## 2022-02-05 NOTE — PLAN OF CARE
"Goal Outcome Evaluation:  Plan of Care Reviewed With: patient        Progress: no change  Outcome Summary: VSS. Patient c/o chest pain overnight. EKG showed nothing acute and lab work normal. Cardiology nuse Adele Langston made aware. Chest pain was unrelieved with 3 rounds of Nitroglycerin. Patient later stated it was more like epigastric pain on the left side which reminded her of the time she ate \"bad meat.\" Patient did have 1 BM overnight. Remains on 2L oxygen. Possible d/c to [facility] rehab today.      Problem: Fall Injury Risk  Goal: Absence of Fall and Fall-Related Injury  Outcome: Ongoing, Progressing  Intervention: Identify and Manage Contributors to Fall Injury Risk  Recent Flowsheet Documentation  Taken 2/4/2022 2033 by Jackie Joel RN  Medication Review/Management: medications reviewed  Intervention: Promote Injury-Free Environment  Recent Flowsheet Documentation  Taken 2/5/2022 0440 by Jackie Joel RN  Safety Promotion/Fall Prevention:   activity supervised   assistive device/personal items within reach   clutter free environment maintained   fall prevention program maintained   nonskid shoes/slippers when out of bed   safety round/check completed   room organization consistent  Taken 2/5/2022 0205 by Jackie Joel RN  Safety Promotion/Fall Prevention:   activity supervised   assistive device/personal items within reach   clutter free environment maintained   fall prevention program maintained   nonskid shoes/slippers when out of bed   room organization consistent   safety round/check completed  Taken 2/4/2022 2232 by Jackie Joel RN  Safety Promotion/Fall Prevention:   activity supervised   assistive device/personal items within reach   clutter free environment maintained   fall prevention program maintained   nonskid shoes/slippers when out of bed   room organization consistent   safety round/check completed  Taken 2/4/2022 2033 by Jackie Joel RN  Safety Promotion/Fall Prevention:   activity supervised   " assistive device/personal items within reach   clutter free environment maintained   fall prevention program maintained   nonskid shoes/slippers when out of bed   room organization consistent   safety round/check completed     Problem: Skin Injury Risk Increased  Goal: Skin Health and Integrity  Outcome: Ongoing, Progressing  Intervention: Optimize Skin Protection  Recent Flowsheet Documentation  Taken 2/5/2022 0440 by Jackie Joel RN  Head of Bed (HOB): HOB at 45 degrees  Taken 2/5/2022 0205 by Jackie Joel RN  Head of Bed (HOB): HOB at 30-45 degrees  Taken 2/4/2022 2232 by Jackie Joel RN  Head of Bed (HOB): HOB at 20-30 degrees  Taken 2/4/2022 2033 by Jackie Joel RN  Pressure Reduction Techniques:   frequent weight shift encouraged   weight shift assistance provided   heels elevated off bed  Head of Bed (HOB): HOB at 20-30 degrees  Pressure Reduction Devices: pressure-redistributing mattress utilized  Skin Protection:   adhesive use limited   incontinence pads utilized     Problem: Hypertension Comorbidity  Goal: Blood Pressure in Desired Range  Outcome: Ongoing, Progressing  Intervention: Maintain Hypertension-Management Strategies  Recent Flowsheet Documentation  Taken 2/4/2022 2033 by Jackie Joel RN  Medication Review/Management: medications reviewed     Problem: Diabetes Comorbidity  Goal: Blood Glucose Level Within Desired Range  Outcome: Ongoing, Progressing  Intervention: Maintain Glycemic Control  Recent Flowsheet Documentation  Taken 2/4/2022 2033 by Jackie Joel RN  Glycemic Management: blood glucose monitoring

## 2022-02-05 NOTE — THERAPY TREATMENT NOTE
Patient Name: Jenelle Kasper  : 1960    MRN: [id number]                              Today's Date: 2022       Admit Date: 2022    Visit Dx:     ICD-10-CM ICD-9-CM   1. TIA (transient ischemic attack)  G45.9 435.9   2. Left-sided weakness  R53.1 728.87   3. Facial droop  R29.810 781.94   4. Slurred speech  R47.81 784.59     Patient Active Problem List   Diagnosis   • Persistent atrial fibrillation (HCC)   • Benign essential HTN   • Hyperparathyroidism (HCC)   • Morbid obesity with BMI of 45.0-49.9, adult (HCC)   • Pulmonary hypertension (HCC)   • Precordial pain   • Shortness of breath   • TIA (transient ischemic attack)     Past Medical History:   Diagnosis Date   • Anxiety    • Arrhythmia    • Asthma    • Atrial fibrillation with RVR (HCC)    • C. difficile diarrhea    • COPD (chronic obstructive pulmonary disease) (HCC)    • Depression    • Diabetes mellitus (HCC)    • Diabetic peripheral neuropathy associated with type 2 diabetes mellitus (HCC)    • GERD (gastroesophageal reflux disease)    • Heart murmur    • History of fall     closed head injury after falling down steps; fx nose   • Hypercalcemia    • Hyperlipidemia    • Hypertension    • IBS (irritable bowel syndrome)    • Kidney stone    • Morbid obesity (HCC)    • PAF (paroxysmal atrial fibrillation) (HCC)    • PCOS (polycystic ovarian syndrome)    • PONV (postoperative nausea and vomiting)    • Sarcoidosis    • Shortness of breath    • Thyroid cancer (HCC)     WITH RADIATION   • Wounds, multiple      Past Surgical History:   Procedure Laterality Date   • CARDIAC CATHETERIZATION N/A 2016    Procedure: Coronary angiography;  Surgeon: Chris Perez MD;  Location: [facility] CATH INVASIVE LOCATION;  Service:    • CARDIAC CATHETERIZATION N/A 2016    Procedure: Left heart cath;  Surgeon: Chris Perez MD;  Location: [facility] CATH INVASIVE LOCATION;  Service:    • CARDIAC CATHETERIZATION N/A 2016    Procedure: Left  ventriculography;  Surgeon: Chris Perez MD;  Location:  [facility] CATH INVASIVE LOCATION;  Service:    • CARDIAC CATHETERIZATION N/A 2/26/2020    Procedure: Right Heart Cath;  Surgeon: Chris Perez MD;  Location:  [facility] CATH INVASIVE LOCATION;  Service: Cardiovascular;  Laterality: N/A;  If there is no evidence of pulmonary hypertension please add a left heart cath to evaluate coronary arteries as patient is having chest pain.   • CHOLECYSTECTOMY     • COLONOSCOPY     • DILATATION AND CURETTAGE     • ENDOSCOPY     • GASTRIC BYPASS      gastric surgery for morbid obesity   • GASTROPLASTY  2008    GASTRIC REVISION FROM PREVIOUS GASTRIC BYPASS   • HIATAL HERNIA REPAIR     • INGUINAL HERNIA REPAIR Right    • LUNG BIOPSY      to dx sarcoidosis   • LUNG SURGERY      Removal of granulomas   • TOTAL THYROIDECTOMY        General Information     Row Name 02/05/22 1320          Physical Therapy Time and Intention    Document Type therapy note (daily note)  -     Mode of Treatment physical therapy  -     Row Name 02/05/22 1320          General Information    Patient Profile Reviewed yes  -     Existing Precautions/Restrictions fall; oxygen therapy device and L/min  -     Row Name 02/05/22 1320          Cognition    Orientation Status (Cognition) oriented x 3  -     Row Name 02/05/22 1320          Safety Issues, Functional Mobility    Impairments Affecting Function (Mobility) cognition; endurance/activity tolerance; grasp; visual/perceptual; strength; range of motion (ROM); coordination; pain; balance  -           User Key  (r) = Recorded By, (t) = Taken By, (c) = Cosigned By    Initials Name Provider Type     Concepcion Lundberg PT Physical Therapist               Mobility     Row Name 02/05/22 1320          Bed Mobility    Rolling Left [place] (Bed Mobility) verbal cues; maximum assist (25% patient effort); 2 person assist  -     Scooting/[place]ging [place] (Bed Mobility) 2 person assist; dependent  (less than 25% patient effort)  -     Supine-Sit [place] (Bed Mobility) moderate assist (50% patient effort); 2 person assist  -     Sit-Supine [place] (Bed Mobility) maximum assist (25% patient effort); 2 person assist; verbal cues  -     Assistive Device (Bed Mobility) bed rails; head of bed elevated; draw sheet  -     Comment (Bed Mobility) sat EOB ~3 minutes, attempted ther ex w/ LAQ though pt. with heavy psoterior lean when attempting, frequently crying out. Reports she becamse nauseaous and L sided chest pain therefore returned to supine and notified RN  -     Row Name 02/05/22 1320          Bed-Chair Transfer    Bed-Chair [place] (Transfers) unable to assess  -     Row Name 02/05/22 1320          Sit-Stand Transfer    Sit-Stand [place] (Transfers) unable to assess  -           User Key  (r) = Recorded By, (t) = Taken By, (c) = Cosigned By    Initials Name Provider Type     Concepcion Lundberg PT Physical Therapist               Obj/Interventions     Row Name 02/05/22 1321          Motor Skills    Therapeutic Exercise other (see comments)  attempted in sitting though unable to actively participate  -           User Key  (r) = Recorded By, (t) = Taken By, (c) = Cosigned By    Initials Name Provider Type     Concepcion Lundberg PT Physical Therapist               Goals/Plan    No documentation.                Clinical Impression     Row Name 02/05/22 1322          Pain    Additional Documentation Pain Scale: FACES Pre/Post-Treatment (Group)  -[facility] Name 02/05/22 1322          Pain Scale: FACES Pre/Post-Treatment    Pain: FACES Scale, Pretreatment 6-->hurts even more  -     Posttreatment Pain Rating 6-->hurts even more  -     Pain Location chest  -     Row Name 02/05/22 1322          Plan of Care Review    Plan of Care Reviewed With patient  -     Outcome Summary Pt. agreeable to PT though very emotional throughout session. Required mod-maxA for bed mobility, sat EOB  ~3 minutes and cued patient to maintain upright trunk and utilize UE to assist with posture/balance though unable to maintain seated balance without CGA-modA at times when attempting LAQ. Pt. frequently crying out, reports she became nauseous and was c/o L sided chest pain therefore returned pt. to supine and informed RN. RN aware, they have already given patient medication and EKG. Will continue to monitor.  -     Row Name 02/05/22 1322          Therapy Assessment/Plan (PT)    Rehab Potential (PT) good, to achieve stated therapy goals  -     Criteria for Skilled Interventions Met (PT) yes; meets criteria  -     Row Name 02/05/22 1322          Positioning and Restraints    In Bed notified nsg; supine; call light within reach; encouraged to call for assist; exit alarm on; with family/caregiver  -           User Key  (r) = Recorded By, (t) = Taken By, (c) = Cosigned By    Initials Name Provider Type    Conecpcion Huffman PT Physical Therapist               Outcome Measures     Row Name 02/05/22 1324          How much help from another person do you currently need...    Turning from your back to your side while in flat bed without using bedrails? 2  -MH     Moving from lying on back to sitting on the side of a flat bed without bedrails? 2  -MH     Moving to and from a bed to a chair (including a wheelchair)? 2  -MH     Standing up from a chair using your arms (e.g., wheelchair, bedside chair)? 2  -MH     Climbing 3-5 steps with a railing? 1  -MH     To walk in hospital room? 1  -     AM-PAC 6 Clicks Score (PT) 10  -     Row Name 02/05/22 1324          Functional Assessment    Outcome Measure Options AM-PAC 6 Clicks Basic Mobility (PT)  -           User Key  (r) = Recorded By, (t) = Taken By, (c) = Cosigned By    Initials Name Provider Type    Concepcion Huffman PT Physical Therapist                             Physical Therapy Education                 Title: PT OT SLP Therapies (In Progress)     Topic:  Physical Therapy (Done)     Point: Mobility training (Done)     Learning Progress Summary           Patient Acceptance, E,TB,D, VU,DU,NR by  at 2/5/2022 1324    Acceptance, E,TB,D, VU,NR by  at 2/3/2022 1144    Acceptance, E,TB,D, VU,NR by  at 2/2/2022 1639    Acceptance, E, VU by SR at 1/30/2022 1107   Family Acceptance, E, VU by SR at 1/30/2022 1107                   Point: Home exercise program (Done)     Learning Progress Summary           Patient Acceptance, E,TB,D, VU,NR by  at 2/3/2022 1144    Acceptance, E,TB,D, VU,NR by  at 2/2/2022 1639    Acceptance, E, VU by  at 1/30/2022 1107   Family Acceptance, E, VU by SR at 1/30/2022 1107                   Point: Body mechanics (Done)     Learning Progress Summary           Patient Acceptance, E,TB,D, VU,DU,NR by  at 2/5/2022 1324    Acceptance, E,TB,D, VU,NR by  at 2/3/2022 1144    Acceptance, E,TB,D, VU,NR by  at 2/2/2022 1639    Acceptance, E, VU by  at 1/30/2022 1107   Family Acceptance, E, VU by  at 1/30/2022 1107                   Point: Precautions (Done)     Learning Progress Summary           Patient Acceptance, E,TB,D, VU,DU,NR by  at 2/5/2022 1324    Acceptance, E,TB,D, VU,NR by  at 2/3/2022 1144    Acceptance, E,TB,D, VU,NR by  at 2/2/2022 1639    Acceptance, E, VU by  at 1/30/2022 1107   Family Acceptance, E, VU by  at 1/30/2022 1107                               User Key     Initials Effective Dates Name Provider Type Discipline     03/07/18 -  Lexy Torres, PTA Physical Therapy Assistant PT     05/26/20 -  Concepcion Lundberg, JESUS ALBERTO Physical Therapist PT     02/08/21 -  Lexy Brothers Physical Therapist PT     05/10/21 -  Galindo, Roselia Physical Therapist PT              PT Recommendation and Plan     Plan of Care Reviewed With: patient  Outcome Summary: Pt. agreeable to PT though very emotional throughout session. Required mod-maxA for bed mobility, sat EOB ~3 minutes and cued patient to maintain upright trunk  and utilize UE to assist with posture/balance though unable to maintain seated balance without CGA-modA at times when attempting LAQ. Pt. frequently crying out, reports she became nauseous and was c/o L sided chest pain therefore returned pt. to supine and informed RN. RN aware, they have already given patient medication and EKG. Will continue to monitor.     Time Calculation:    PT Charges     Row Name 02/05/22 1325             Time Calculation    Start Time 1116  -      Stop Time 1127  -      Time Calculation (min) 11 min  -      PT Received On 02/05/22  -      PT - Next Appointment 02/07/22  -              Time Calculation- PT    Total Timed Code Minutes- PT 11 minute(s)  -MH              Timed Charges    88794 - PT Therapeutic Activity Minutes 11  -MH              Total Minutes    Timed Charges Total Minutes 11  -MH       Total Minutes 11  -MH            User Key  (r) = Recorded By, (t) = Taken By, (c) = Cosigned By    Initials Name Provider Type     Concepcion Lundberg, PT Physical Therapist              Therapy Charges for Today     Code Description Service Date Service Provider Modifiers Qty    28013122112  PT THERAPEUTIC ACT EA 15 MIN 2/5/2022 Concepcion Lundberg, PT GP 1          PT G-Codes  Outcome Measure Options: AM-PAC 6 Clicks Basic Mobility (PT)  AM-PAC 6 Clicks Score (PT): 10  AM-PAC 6 Clicks Score (OT): (P) 13  Modified Dom Scale: 5 - Severe disability.  Bedridden, incontinent, and requiring constant nursing care and attention.    Concepcion Lundberg PT  2/5/2022

## 2022-02-06 VITALS
SYSTOLIC BLOOD PRESSURE: 127 MMHG | DIASTOLIC BLOOD PRESSURE: 63 MMHG | BODY MASS INDEX: 39.05 KG/M2 | WEIGHT: 248.8 LBS | HEART RATE: 58 BPM | OXYGEN SATURATION: 90 % | HEIGHT: 67 IN | RESPIRATION RATE: 18 BRPM | TEMPERATURE: 97.4 F

## 2022-02-06 LAB
GLUCOSE BLDC GLUCOMTR-MCNC: 162 MG/DL (ref 70–130)
GLUCOSE BLDC GLUCOMTR-MCNC: 196 MG/DL (ref 70–130)

## 2022-02-06 PROCEDURE — 82962 GLUCOSE BLOOD TEST: CPT

## 2022-02-06 PROCEDURE — 63710000001 INSULIN LISPRO (HUMAN) PER 5 UNITS: Performed by: HOSPITALIST

## 2022-02-06 PROCEDURE — 63710000001 INSULIN LISPRO (HUMAN) PER 5 UNITS: Performed by: INTERNAL MEDICINE

## 2022-02-06 RX ADMIN — POTASSIUM CHLORIDE 20 MEQ: 750 TABLET, EXTENDED RELEASE ORAL at 09:11

## 2022-02-06 RX ADMIN — SPIRONOLACTONE 25 MG: 25 TABLET, FILM COATED ORAL at 09:11

## 2022-02-06 RX ADMIN — AMLODIPINE BESYLATE 5 MG: 5 TABLET ORAL at 09:11

## 2022-02-06 RX ADMIN — INSULIN LISPRO 2 UNITS: 100 INJECTION, SOLUTION INTRAVENOUS; SUBCUTANEOUS at 12:52

## 2022-02-06 RX ADMIN — ACETAMINOPHEN 650 MG: 325 TABLET, FILM COATED ORAL at 04:32

## 2022-02-06 RX ADMIN — DIGOXIN 250 MCG: 250 TABLET ORAL at 09:11

## 2022-02-06 RX ADMIN — PANTOPRAZOLE SODIUM 40 MG: 40 TABLET, DELAYED RELEASE ORAL at 06:13

## 2022-02-06 RX ADMIN — HYDRALAZINE HYDROCHLORIDE 100 MG: 50 TABLET, FILM COATED ORAL at 09:11

## 2022-02-06 RX ADMIN — LEVOTHYROXINE SODIUM 300 MCG: 0.15 TABLET ORAL at 06:13

## 2022-02-06 RX ADMIN — INSULIN LISPRO 10 UNITS: 100 INJECTION, SOLUTION INTRAVENOUS; SUBCUTANEOUS at 12:52

## 2022-02-06 RX ADMIN — INSULIN LISPRO 2 UNITS: 100 INJECTION, SOLUTION INTRAVENOUS; SUBCUTANEOUS at 06:30

## 2022-02-06 RX ADMIN — TORSEMIDE 20 MG: 20 TABLET ORAL at 09:11

## 2022-02-06 RX ADMIN — GABAPENTIN 300 MG: 300 CAPSULE ORAL at 09:11

## 2022-02-06 RX ADMIN — INSULIN LISPRO 10 UNITS: 100 INJECTION, SOLUTION INTRAVENOUS; SUBCUTANEOUS at 09:12

## 2022-02-06 RX ADMIN — ATENOLOL 75 MG: 25 TABLET ORAL at 09:11

## 2022-02-06 RX ADMIN — APIXABAN 5 MG: 5 TABLET, FILM COATED ORAL at 09:11

## 2022-02-06 RX ADMIN — LOSARTAN POTASSIUM 100 MG: 100 TABLET, FILM COATED ORAL at 09:12

## 2022-02-06 NOTE — CASE MANAGEMENT/SOCIAL WORK
Case Management Discharge Note      Final Note: DC'd to skilled bed at [facility] via [facility]    Provided Post Acute Provider List?: Yes  Post Acute Provider List: Nursing Home, Inpatient Rehab  Delivered To: Patient, Support Person  Support Person: Gabino  Method of Delivery: In person    Selected Continued Care - Discharged on 2/6/2022 Admission date: 1/28/2022 - Discharge disposition: Skilled Nursing Facility (DC - External)    Destination Coordination complete.    Service Provider Selected Services Address Phone Fax Patient Preferred    [facility]illed [facility] [address] [facility] [address] [phone] --                      Transportation Services  Ambulance: [facility]    Final Discharge Disposition Code: 03 - skilled nursing facility (SNF)

## 2022-02-06 NOTE — NURSING NOTE
Pt sister Mervat Hardin requested that pt maykel be given her email to be updated on pt transfer location. Email address given [email or web address] information provided to AM GARETT.

## 2022-02-06 NOTE — CASE MANAGEMENT/SOCIAL WORK
"Physicians Statement of Medical Necessity for  Ambulance Transportation    GENERAL INFORMATION     Name: Jenelle Kasper  YOB: 1960  Medicare #: [id number]  Transport Date: 2/6/2022  Origin: [address]  [facility] [address] [place] [id number] [phone]  Is the Patient's stay covered under Medicare Part A (PPS/DRG?)YES  Closest appropriate facility?YES   If this a hosp-hosp transfer? NO  Is this a hospice patient? NO    MEDICAL NECESSITY QUESTIONAIRE    Ambulance Transportation is medically necessary only if other means of transportation are contraindicated or would be potentially harmful to the patient.  To meet this requirement, the patient must be either \"bed confined\" or suffer from a condition such that transport by means other than an ambulance is contraindicated by the patient's condition.  The following questions must be answered by the healthcare professional signing below for this form to be valid:     1) Describe the MEDICAL CONDITION (physical and/or mental) of this patient AT THE TIME OF AMBULANCE TRANSPORT that requires the patient to be transported in an ambulance, and why transport by other means is contraindicated by the patient's condition: immobile   Past Medical History:   Diagnosis Date   • Anxiety    • Arrhythmia    • Asthma    • Atrial fibrillation with RVR ([facility])    • C. difficile diarrhea    • COPD (chronic obstructive pulmonary disease) ([facility])    • Depression    • Diabetes mellitus ([facility])    • Diabetic peripheral neuropathy associated with type 2 diabetes mellitus ([facility])    • GERD (gastroesophageal reflux disease)    • Heart murmur    • History of fall     closed head injury after falling down steps; fx nose   • Hypercalcemia    • Hyperlipidemia    • Hypertension    • IBS (irritable bowel syndrome)    • Kidney stone    • Morbid obesity ([facility])    • PAF (paroxysmal atrial fibrillation) ([facility])    • " "PCOS (polycystic ovarian syndrome)    • PONV (postoperative nausea and vomiting)    • Sarcoidosis    • Shortness of breath    • Thyroid cancer (HCC)     WITH RADIATION   • Wounds, multiple       Past Surgical History:   Procedure Laterality Date   • CARDIAC CATHETERIZATION N/A 7/27/2016    Procedure: Coronary angiography;  Surgeon: Chris Perez MD;  Location:  [facility] CATH INVASIVE LOCATION;  Service:    • CARDIAC CATHETERIZATION N/A 7/27/2016    Procedure: Left heart cath;  Surgeon: Chris Perez MD;  Location:  [facility] CATH INVASIVE LOCATION;  Service:    • CARDIAC CATHETERIZATION N/A 7/27/2016    Procedure: Left ventriculography;  Surgeon: Chris Perez MD;  Location:  [facility] CATH INVASIVE LOCATION;  Service:    • CARDIAC CATHETERIZATION N/A 2/26/2020    Procedure: Right Heart Cath;  Surgeon: Chris Perez MD;  [facility];  Service: Cardiovascular;  Laterality: N/A;  If there is no evidence of pulmonary hypertension please add a left heart cath to evaluate coronary arteries as patient is having chest pain.   • CHOLECYSTECTOMY     • COLONOSCOPY     • DILATATION AND CURETTAGE     • ENDOSCOPY     • GASTRIC BYPASS      gastric surgery for morbid obesity   • GASTROPLASTY  2008    GASTRIC REVISION FROM PREVIOUS GASTRIC BYPASS   • HIATAL HERNIA REPAIR     • INGUINAL HERNIA REPAIR Right    • LUNG BIOPSY      to dx sarcoidosis   • LUNG SURGERY      Removal of granulomas   • TOTAL THYROIDECTOMY        2) Is this patient \"bed confined\" as defined below?YES   To be \"bed confined\" the patient must satisfy all three of the following criteria:  (1) unable to get up from bed without assistance; AND (2) unable to ambulate;  AND (3) unable to sit in a chair or wheelchair.  3) Can this patient safely be transported by car or wheelchair van (I.e., may safely sit during transport, without an attendant or monitoring?)NO  4. In addition to completing questions 1-3 above, please check any of the " following conditions that apply:          SIGNATURE OF PHYSICIAN OR OTHER AUTHORIZED HEALTHCARE PROFESSIONAL    I certify that the above information is true and correct based on my evaluation of this patient, and represent that the patient requires transport by ambulance and that other forms of transport are contraindicated.  I understand that this information will be used by the Centers for Medicare and Medicaid Services (CMS) to support the determiniation of medical necessity for ambulance services, and I represent that I have personal knowledge of the patient's condition at the time of transport.       If this box is checked, I also certify that the patient is physically or mentally incapable of signing the ambulance service's claim form and that the institution with which I am affiliated has furnished care, services or assistance to the patient.  My signature below is made on behalf of the patient pursuant to 42 .36(b)(4). In accordance with 42 .37, the specific reason(s) that the patient is physically or mentally incapable of signing the claim for is as follows:     Signature of Physician or Healthcare Professional  Date/Time:   2/6/2022 1316     (For Scheduled repetitive transport, this form is not valid for transports performed more than 60 days after this date).                                                                                                                                            --------------------------------------------------------------------------------------------  Printed Name and Credentials of Physician or Authorized Healthcare Professional     *Form must be signed by patient's attending physician for scheduled, repetitive transports,.  For non-repetitive ambulance transports, if unable to obtain the signature of the attending physician, any of the following may sign (please select below):     Physician  Clinical Nurse Specialist  Registered Nurse     Physician  Assistant  Discharge Planner  Licensed Practical Nurse     Nurse Practitioner

## 2022-02-06 NOTE — PROGRESS NOTES
"Daily progress note      2022    Patient Identification:    Name: Jenelle Kasper  Age: 61 y.o.  Sex: female  :  1960  MRN: [id number]                       Primary Care Physician: Zay Olivares MD    Chief Complaint:    Doing same  No new complaints  Family at bedside    History of Present Illness:   Patient is a 61-year-old female, with history of atrial fibrillation, pulmonary hypertension, COPD, diabetes mellitus, morbid obesity, sarcoidosis.  Patient was brought to the emergency room because she was found by her  with left-sided facial droop, weakness of her left arm and slurred speech.  Apparently, symptoms resolved with 10 minutes of EMS arrival, by the time she arrived to the ER, patient was back to baseline.  Apparently, patient is noted anticoagulation due to history of GI bleeding.  In the ER, patient was found to have: Hypertension, her NIH score was two, blood sugar was 361, creatinine 0.8, sodium 132, troponin negative, digoxin level 0.5, COVID-19 came back negative.  Urinalysis show WBC 6-12.  Chest ray showed no infiltrates.  CT scan of the head was normal.  Patient was placed in observation for further management.     Review of Systems  Unremarkable except left-sided weakness     Exam:  Blood pressure 127/63, pulse 58, temperature 97.4 °F (36.3 °C), temperature source Oral, resp. rate 18, height 170.2 cm (67.01\"), weight 113 kg (248 lb 12.8 oz), SpO2 90 %.    General: Alert, oriented x 3. Cooperative, no distress, appears stated age.  Morbid obesity  HEENT:    Head: Normocephalic, without obvious abnormality, atraumatic  Eyes: EOM are normal. Pupils are equal  Oropharynx: Mucosa and tongue normal  Neck: Supple, symmetrical, trachea midline, no adenopathy;              thyroid:  no enlargement/tenderness/nodules;              no carotid bruit or JVD  Cardiovascular: Irregular rate, irregular rhythm.  Normal distal pulses.              Exam reveals no gallop and no friction " rub. No murmur heard  Chest wall: No tenderness or deformity  Pulmonary: Clear to auscultation bilaterally, respirations unlabored.               No rhonchi, wheezing or rales.   Abdominal: Soft, nontender, bowel sounds active all four quadrants,     no masses, no hepatomegaly, no splenomegaly.   Extremities: Normal, atraumatic, no cyanosis or edema  Pulses: 2 + symmetric all extremities  Neurological: Patient is alert and oriented to person, place, and time.  No neurological deficit at this time.  Skin: Skin color, texture, normal. Turgor is decreased. No rashes or lesions      Data Review:  Lab Results (last 24 hours)     Procedure Component Value Units Date/Time    POC Glucose Once [[id number]]  (Abnormal) Collected: 02/06/22 1135    Specimen: Blood Updated: 02/06/22 1137     Glucose 196 mg/dL      Comment: Meter: [id number] : [id number] Jatin Antonio NA       POC Glucose Once [[id number]]  (Abnormal) Collected: 02/06/22 0554    Specimen: Blood Updated: 02/06/22 0559     Glucose 162 mg/dL      Comment: Meter: [id number] : [id number] Bolivar Jane NA       POC Glucose Once [[id number]]  (Abnormal) Collected: 02/05/22 2222    Specimen: Blood Updated: 02/05/22 2233     Glucose 228 mg/dL      Comment: Meter: [id number] : [id number] Bolivar Jane NA       POC Glucose Once [[id number]]  (Abnormal) Collected: 02/05/22 1616    Specimen: Blood Updated: 02/05/22 1617     Glucose 262 mg/dL      Comment: Meter: [id number] : [id number] Jatin Antonio NA             Imaging Results (All)     Procedure Component Value Units Date/Time    MRI Brain Without Contrast [[id number]] Collected: 01/29/22 1154     Updated: 01/29/22 1215    Narrative:      MRI BRAIN WO CONTRAST-, MRI ANGIOGRAM HEAD WO CONTRAST-     CLINICAL HISTORY: Slurred speech and left arm weakness and left facial  drooping. TIA versus CVA.     TECHNIQUE: Multiple axial T1, T2, gradient echo and diffusion images  were acquired. Sagittal T1-weighted images were  also obtained. MR  angiography images of the brain were acquired using  time-of-flight-of-flight vascular enhancement techniques.      COMPARISON: CT scan of the head dated 01/28/2022.     FINDINGS: The brain and ventricular system appear structurally within  normal limits there is ill-defined mild T2 hyperintensity and restricted  diffusion involving cortical gray matter and white matter within the  inferior aspect of the right parietal lobe in the region of the  posterior central gyrus that is consistent with an area of acute  ischemic infarction. This measures approximately 4.0 x 2.3 cm in  diameter. There is no significant associated mass effect. Slightly  inferior and posterior to this lesion there is a second smaller area of  patchy ischemic infarction involving the posterior aspect of the right  temporal lobe that extends into the periventricular white matter of the  occipital horn of the right lateral ventricle. A couple punctate foci of  ischemic infarction are also present in the right occipital lobe. Again,  there is no associated mass effect or hemorrhage. These infarcts are all  in the vascular territory of the right middle cerebral artery. Since  they are  by normal appearing brain parenchyma, they are likely  embolic in etiology. No other foci of restricted diffusion are  identified in the brain or brainstem. No other foci of T2 hyperintensity  are evident in the brain or brainstem. There is no evidence of small  vessel chronic ischemic change. There are no masses. The paranasal  sinuses are well aerated.     The MR angiography images demonstrate no focal intracranial stenoses or  occlusions. In particular, there is no evidence of vasculitis. There are  no aneurysms or vascular malformations.       Impression:      Multiple areas of acute ischemic infarction within the right  parietal and temporal and occipital lobes as described in more detail  above. The infarcts are all in the vascular  territory of the right  middle cerebral artery, and are suspected to be embolic in etiology.  There is no significant associated mass effect and there is no  intracranial hemorrhage.     This report was finalized on 1/29/2022 12:12 PM by Dr. Gabino Perez M.D.       MRI Angiogram Head Without Contrast [[id number]] Collected: 01/29/22 1154     Updated: 01/29/22 1215    Narrative:      MRI BRAIN WO CONTRAST-, MRI ANGIOGRAM HEAD WO CONTRAST-     CLINICAL HISTORY: Slurred speech and left arm weakness and left facial  drooping. TIA versus CVA.     TECHNIQUE: Multiple axial T1, T2, gradient echo and diffusion images  were acquired. Sagittal T1-weighted images were also obtained. MR  angiography images of the brain were acquired using  time-of-flight-of-flight vascular enhancement techniques.      COMPARISON: CT scan of the head dated 01/28/2022.     FINDINGS: The brain and ventricular system appear structurally within  normal limits there is ill-defined mild T2 hyperintensity and restricted  diffusion involving cortical gray matter and white matter within the  inferior aspect of the right parietal lobe in the region of the  posterior central gyrus that is consistent with an area of acute  ischemic infarction. This measures approximately 4.0 x 2.3 cm in  diameter. There is no significant associated mass effect. Slightly  inferior and posterior to this lesion there is a second smaller area of  patchy ischemic infarction involving the posterior aspect of the right  temporal lobe that extends into the periventricular white matter of the  occipital horn of the right lateral ventricle. A couple punctate foci of  ischemic infarction are also present in the right occipital lobe. Again,  there is no associated mass effect or hemorrhage. These infarcts are all  in the vascular territory of the right middle cerebral artery. Since  they are  by normal appearing brain parenchyma, they are likely  embolic in etiology. No  other foci of restricted diffusion are  identified in the brain or brainstem. No other foci of T2 hyperintensity  are evident in the brain or brainstem. There is no evidence of small  vessel chronic ischemic change. There are no masses. The paranasal  sinuses are well aerated.     The MR angiography images demonstrate no focal intracranial stenoses or  occlusions. In particular, there is no evidence of vasculitis. There are  no aneurysms or vascular malformations.       Impression:      Multiple areas of acute ischemic infarction within the right  parietal and temporal and occipital lobes as described in more detail  above. The infarcts are all in the vascular territory of the right  middle cerebral artery, and are suspected to be embolic in etiology.  There is no significant associated mass effect and there is no  intracranial hemorrhage.     This report was finalized on 1/29/2022 12:12 PM by Dr. Gabino Perez M.D.       CT Head Without Contrast Stroke Protocol [[id number]] Collected: 01/28/22 2247     Updated: 01/28/22 2253    Narrative:      CT HEAD WO CONTRAST STROKE PROTOCOL-     CLINICAL HISTORY: Left-sided facial drooping and left arm weakness.  Slurred speech.     TECHNIQUE: Transverse 3 mm thick images were obtained from the base of  the skull to the vertex without IV contrast.     Radiation dose reduction techniques were utilized, including automated  exposure control and exposure modulation based on body size.     COMPARISON: None     FINDINGS: The brain and ventricular system appear structurally normal.  There is no evidence of recent or old intracranial hemorrhage or  infarction. There are no masses. The paranasal sinuses and mastoid air  cells and middle ear cavities are well aerated.       Impression:      Normal CT scan of the head without contrast.     This report was finalized on 1/28/2022 10:50 PM by Dr. Gabino Perez M.D.       XR Chest 1 View [[id number]] Collected: 01/28/22 2233     Updated:  01/28/22 2237    Narrative:      PORTABLE CHEST 01/28/2022 AT 10:08 PM     CLINICAL HISTORY: Possible acute CVA. Rule out aspiration.     The lungs are fairly well-expanded and appear free of focal infiltrates.  The heart is moderately enlarged and there is mild pulmonary vascular  engorgement but no anitra edema. There are no pleural effusions.     This report was finalized on 1/28/2022 10:33 PM by Dr. Gabino Perez M.D.             Current Facility-Administered Medications:   •  acetaminophen (TYLENOL) tablet 650 mg, 650 mg, Oral, Q6H PRN, Sánchez Maxwell MD, 650 mg at 02/06/22 0432  •  albuterol (PROVENTIL) nebulizer solution 0.083% 2.5 mg/3mL, 2.5 mg, Nebulization, Q6H PRN, Sánchez Maxwell MD  •  amLODIPine (NORVASC) tablet 5 mg, 5 mg, Oral, Q24H, Michele Subramanian MD, 5 mg at 02/06/22 0911  •  apixaban (ELIQUIS) tablet 5 mg, 5 mg, Oral, Q12H, Esvin Watkins, APRN, 5 mg at 02/06/22 0911  •  atenolol (TENORMIN) tablet 75 mg, 75 mg, Oral, BID, Sánchez Maxwell MD, 75 mg at 02/06/22 0911  •  atorvastatin (LIPITOR) tablet 40 mg, 40 mg, Oral, Nightly, Mary Reardon APRN, 40 mg at 02/05/22 2116  •  digoxin (LANOXIN) tablet 250 mcg, 250 mcg, Oral, Daily, Sánchez Maxwell MD, 250 mcg at 02/06/22 0911  •  gabapentin (NEURONTIN) capsule 300 mg, 300 mg, Oral, TID, Sánchez Maxwell MD, 300 mg at 02/06/22 0911  •  hydrALAZINE (APRESOLINE) tablet 100 mg, 100 mg, Oral, Q12H, Sánchez Maxwell MD, 100 mg at 02/06/22 0911  •  insulin glargine (LANTUS, SEMGLEE) injection 25 Units, 25 Units, Subcutaneous, Nightly, Michele Subramanian MD, 25 Units at 02/05/22 2120  •  insulin lispro (ADMELOG) injection 0-9 Units, 0-9 Units, Subcutaneous, TID AC, Sánchez Maxwell MD, 2 Units at 02/06/22 1252  •  insulin lispro (ADMELOG) injection 10 Units, 10 Units, Subcutaneous, TID With Meals, Michele Subramanian MD, 10 Units at 02/06/22 1252  •  levothyroxine (SYNTHROID, LEVOTHROID) tablet 300 mcg, 300 mcg, Oral, Q AM, Michele Subramanian MD, 300 mcg at  02/06/22 0613  •  losartan (COZAAR) tablet 100 mg, 100 mg, Oral, Daily, Sánchez Maxwell MD, 100 mg at 02/06/22 0912  •  ondansetron (ZOFRAN) tablet 4 mg, 4 mg, Oral, Q6H PRN, Michele Subramanian MD, 4 mg at 02/02/22 1757  •  pantoprazole (PROTONIX) EC tablet 40 mg, 40 mg, Oral, Q AM, Sánchez Maxwell MD, 40 mg at 02/06/22 0613  •  potassium chloride (K-DUR,KLOR-CON) ER tablet 20 mEq, 20 mEq, Oral, TID With Meals, Michele Subramanian MD, 20 mEq at 02/06/22 0911  •  spironolactone (ALDACTONE) tablet 25 mg, 25 mg, Oral, Daily, Maria Teresa Judge MD, 25 mg at 02/06/22 0911  •  torsemide (DEMADEX) tablet 20 mg, 20 mg, Oral, BID, Michele Subramanian MD, 20 mg at 02/06/22 0911    Current Outpatient Medications:   •  albuterol sulfate  (90 Base) MCG/ACT inhaler, Inhale 2 puffs., Disp: , Rfl:   •  atenolol (TENORMIN) 25 MG tablet, Take 3 tablets by mouth 2 (Two) Times a Day., Disp: 180 tablet, Rfl: 11  •  digoxin (LANOXIN) 250 MCG tablet, Take 1 tablet by mouth Daily., Disp: 30 tablet, Rfl: 11  •  hydrALAZINE (APRESOLINE) 100 MG tablet, Take 1 tablet by mouth 2 (two) times a day., Disp: 60 tablet, Rfl: 11  •  insulin NPH-insulin regular (Novolin 70/30) (70-30) 100 UNIT/ML injection, Inject 80 Units under the skin into the appropriate area as directed 2 (Two) Times a Day Before Meals., Disp: , Rfl:   •  levothyroxine (SYNTHROID) 200 MCG tablet, Take 300 mcg by mouth Daily., Disp: , Rfl:   •  losartan (COZAAR) 100 MG tablet, Take 1 tablet by mouth Daily., Disp: 30 tablet, Rfl: 11  •  ondansetron (ZOFRAN) 8 MG tablet, Take 8 mg by mouth Daily., Disp: , Rfl:   •  torsemide (Demadex) 20 MG tablet, Take 2 tablets by mouth Daily., Disp: 60 tablet, Rfl: 11  •  amLODIPine (NORVASC) 5 MG tablet, Take 1 tablet by mouth Daily for 30 days., Disp: 30 tablet, Rfl: 0  •  apixaban (ELIQUIS) 5 MG tablet tablet, Take 1 tablet by mouth Every 12 (Twelve) Hours for 30 days. Indications: Atrial Fibrillation, Disp: 60 tablet, Rfl: 0  •  atorvastatin  (LIPITOR) 40 MG tablet, Take 1 tablet by mouth Every Night for 30 days., Disp: 30 tablet, Rfl: 0  •  gabapentin (NEURONTIN) 300 MG capsule, Take 1 capsule by mouth 3 (Three) Times a Day for 3 days. Take 3 tablets three times daily, Disp: 9 capsule, Rfl: 0  •  pantoprazole (PROTONIX) 40 MG EC tablet, Take 1 tablet by mouth Every Morning for 30 days., Disp: 30 tablet, Rfl: 0  •  potassium chloride (K-DUR,KLOR-CON) 20 MEQ CR tablet, Take 1 tablet by mouth 3 (Three) Times a Day With Meals for 30 days., Disp: 90 tablet, Rfl: 0  •  spironolactone (ALDACTONE) 25 MG tablet, Take 1 tablet by mouth Daily for 30 days., Disp: 30 tablet, Rfl: 0  •  vitamin D (ERGOCALCIFEROL) 74620 units capsule capsule, Take 1 capsule by mouth Every 7 (Seven) Days., Disp: , Rfl:      ASSESSMENT  Acute multiple right MCA ischemic infarction involving parietal and occipital lobes  Acute UTI  Chronic atrial fibrillation  History of GI bleed  Diabetes mellitus  Hypertension  Hyperlipidemia  Morbidly obese  Pulmonary hypertension  Gastroesophageal reflux disease    PLAN  Discharge to subacute rehab today  Discharge summary dictated    Michele Subramanian MD  2/6/2022  13:40 EST    I wore protective equipment throughout this patient encounter including a face mask, gloves, and protective eyewear.  Hand hygiene was performed before donning protective equipment and after removal when leaving the room.

## 2022-02-06 NOTE — PLAN OF CARE
Goal Outcome Evaluation:           Progress: no change  Outcome Summary: VSS. Pt remained oriented x 4 but drowsy arousable to voice. Pt remained in a-fib, rates as low as 40s with a pause approx 2.7 seconds this shift. Pt had complaints of pain, tylenol given x 1. Will continue plan of care and monitoring.

## 2022-03-06 ENCOUNTER — NURSE TRIAGE (OUTPATIENT)
Dept: CALL CENTER | Facility: HOSPITAL | Age: 62
End: 2022-03-06

## 2022-03-06 NOTE — TELEPHONE ENCOUNTER
Advised to check bp before administer her meds and if have concern then call her md. I cannot give him permission to take or not take her prescribed medications. Right now her bp is 107/78 and she has had gastric bypass and is not eating well at all he states. States she was receiving salt tablets when she was in the hospital.  Then she went to rehab after having a stroke. I advised with her complicated medical history this is definitely a md call.     Reason for Disposition  • [1] Caller has NON-URGENT medicine question about med that PCP prescribed AND [2] triager unable to answer question    Additional Information  • Negative: [1] Intentional drug overdose AND [2] suicidal thoughts or ideas  • Negative: Drug overdose and triager unable to answer question  • Negative: Caller requesting information unrelated to medicine  • Negative: Caller requesting information about COVID-19 Vaccine  • Negative: Caller requesting information about Emergency Contraception  • Negative: Caller requesting information about Combined Birth Control Pills  • Negative: Caller requesting information about Progestin Birth Control Pills  • Negative: Caller requesting information about Post-Op pain or medicines  • Negative: Caller requesting a prescription antibiotic (such as Penicillin) for Strep throat and has a positive culture result  • Negative: Caller requesting a prescription anti-viral med (such as Tamiflu) and has influenza (flu)  symptoms  • Negative: Immunization reaction suspected  • Negative: Rash while taking a medicine or within 3 days of stopping it  • Negative: [1] Asthma and [2] having symptoms of asthma (cough, wheezing, etc.)  • Negative: [1] Symptom of illness (e.g., headache, abdominal pain, earache, vomiting) AND [2] more than mild  • Negative: Breastfeeding questions about mother's medicines and diet  • Negative: MORE THAN A DOUBLE DOSE of a prescription or over-the-counter (OTC) drug  • Negative: [1] DOUBLE DOSE (an  "extra dose or lesser amount) of prescription drug AND [2] any symptoms (e.g., dizziness, nausea, pain, sleepiness)  • Negative: [1] DOUBLE DOSE (an extra dose or lesser amount) of over-the-counter (OTC) drug AND [2] any symptoms (e.g., dizziness, nausea, pain, sleepiness)  • Negative: Took another person's prescription drug  • Negative: [1] DOUBLE DOSE (an extra dose or lesser amount) of prescription drug AND [2] NO symptoms (Exception: a double dose of antibiotics)  • Negative: Diabetes drug error or overdose (e.g., took wrong type of insulin or took extra dose)  • Negative: [1] Prescription refill request for ESSENTIAL medicine (i.e., likelihood of harm to patient if not taken) AND [2] triager unable to refill per department policy  • Negative: [1] Prescription not at pharmacy AND [2] was prescribed by PCP recently (Exception: triager has access to EMR and prescription is recorded there. Go to Home Care and confirm for pharmacy.)  • Negative: [1] Pharmacy calling with prescription question AND [2] triager unable to answer question  • Negative: [1] Caller has URGENT medicine question about med that PCP or specialist prescribed AND [2] triager unable to answer question  • Negative: Medicine patch causing local rash or itching  • Negative: [1] Caller has medicine question about med NOT prescribed by PCP AND [2] triager unable to answer question (e.g., compatibility with other med, storage)  • Negative: Prescription request for new medicine (not a refill)  • Negative: Prescription refill request for a CONTROLLED substance (e.g., narcotics, ADHD medicines)  • Negative: [1] Prescription refill request for NON-ESSENTIAL medicine (i.e., no harm to patient if med not taken) AND [2] triager unable to refill per department policy    Answer Assessment - Initial Assessment Questions  1. NAME of MEDICATION: \"What medicine are you calling about?\"      Amlodipine and hydrolazine  2. QUESTION: \"What is your question?\" (e.g., " "medication refill, side effect)      Should he give the medications if worried abouther bp dropping too low  3. PRESCRIBING HCP: \"Who prescribed it?\" Reason: if prescribed by specialist, call should be referred to that group.      [place]  4. SYMPTOMS: \"Do you have any symptoms?\"      HTN  5. SEVERITY: If symptoms are present, ask \"Are they mild, moderate or severe?\"      BP is 107/78  6. PREGNANCY:  \"Is there any chance that you are pregnant?\" \"When was your last menstrual period?\"      na    Protocols used: MEDICATION QUESTION CALL-ADULT-      "

## 2022-03-09 ENCOUNTER — HOSPITAL ENCOUNTER (OUTPATIENT)
Facility: HOSPITAL | Age: 62
Setting detail: OBSERVATION
Discharge: HOME | End: 2022-03-16
Attending: EMERGENCY MEDICINE | Admitting: HOSPITALIST

## 2022-03-09 DIAGNOSIS — T46.0X1A POISONING BY DIGOXIN, ACCIDENTAL OR UNINTENTIONAL, INITIAL ENCOUNTER: ICD-10-CM

## 2022-03-09 DIAGNOSIS — R00.1 BRADYCARDIA: Primary | ICD-10-CM

## 2022-03-09 LAB
ALBUMIN SERPL-MCNC: 3.7 G/DL (ref 3.5–5.2)
ALBUMIN/GLOB SERPL: 1.1 G/DL
ALP SERPL-CCNC: 125 U/L (ref 39–117)
ALT SERPL W P-5'-P-CCNC: 27 U/L (ref 1–33)
ANION GAP SERPL CALCULATED.3IONS-SCNC: 12 MMOL/L (ref 5–15)
AST SERPL-CCNC: 32 U/L (ref 1–32)
BASOPHILS # BLD AUTO: 0.03 10*3/MM3 (ref 0–0.2)
BASOPHILS NFR BLD AUTO: 0.9 % (ref 0–1.5)
BILIRUB SERPL-MCNC: 1.5 MG/DL (ref 0–1.2)
BUN SERPL-MCNC: 12 MG/DL (ref 8–23)
BUN/CREAT SERPL: 12.4 (ref 7–25)
CALCIUM SPEC-SCNC: 10.7 MG/DL (ref 8.6–10.5)
CHLORIDE SERPL-SCNC: 100 MMOL/L (ref 98–107)
CO2 SERPL-SCNC: 25 MMOL/L (ref 22–29)
CREAT SERPL-MCNC: 0.97 MG/DL (ref 0.57–1)
DEPRECATED RDW RBC AUTO: 50.6 FL (ref 37–54)
DIGOXIN SERPL-MCNC: 2.2 NG/ML (ref 0.6–1.2)
EGFRCR SERPLBLD CKD-EPI 2021: 67 ML/MIN/1.73
EOSINOPHIL # BLD AUTO: 0.1 10*3/MM3 (ref 0–0.4)
EOSINOPHIL NFR BLD AUTO: 3 % (ref 0.3–6.2)
ERYTHROCYTE [DISTWIDTH] IN BLOOD BY AUTOMATED COUNT: 16.1 % (ref 12.3–15.4)
GLOBULIN UR ELPH-MCNC: 3.4 GM/DL
GLUCOSE BLDC GLUCOMTR-MCNC: 185 MG/DL (ref 70–130)
GLUCOSE BLDC GLUCOMTR-MCNC: 241 MG/DL (ref 70–130)
GLUCOSE BLDC GLUCOMTR-MCNC: 266 MG/DL (ref 70–130)
GLUCOSE SERPL-MCNC: 294 MG/DL (ref 65–99)
HCT VFR BLD AUTO: 41.9 % (ref 34–46.6)
HGB BLD-MCNC: 13.2 G/DL (ref 12–15.9)
LYMPHOCYTES # BLD AUTO: 0.67 10*3/MM3 (ref 0.7–3.1)
LYMPHOCYTES NFR BLD AUTO: 20.3 % (ref 19.6–45.3)
MCH RBC QN AUTO: 27.3 PG (ref 26.6–33)
MCHC RBC AUTO-ENTMCNC: 31.5 G/DL (ref 31.5–35.7)
MCV RBC AUTO: 86.7 FL (ref 79–97)
MONOCYTES # BLD AUTO: 0.43 10*3/MM3 (ref 0.1–0.9)
MONOCYTES NFR BLD AUTO: 13 % (ref 5–12)
NEUTROPHILS NFR BLD AUTO: 2.06 10*3/MM3 (ref 1.7–7)
NEUTROPHILS NFR BLD AUTO: 62.5 % (ref 42.7–76)
PLATELET # BLD AUTO: 123 10*3/MM3 (ref 140–450)
PMV BLD AUTO: 11.5 FL (ref 6–12)
POTASSIUM SERPL-SCNC: 4.1 MMOL/L (ref 3.5–5.2)
PROT SERPL-MCNC: 7.1 G/DL (ref 6–8.5)
QT INTERVAL: 364 MS
RBC # BLD AUTO: 4.83 10*6/MM3 (ref 3.77–5.28)
SARS-COV-2 ORF1AB RESP QL NAA+PROBE: NOT DETECTED
SODIUM SERPL-SCNC: 137 MMOL/L (ref 136–145)
T4 FREE SERPL-MCNC: 2.21 NG/DL (ref 0.93–1.7)
WBC NRBC COR # BLD: 3.3 10*3/MM3 (ref 3.4–10.8)

## 2022-03-09 PROCEDURE — 63710000001 INSULIN LISPRO (HUMAN) PER 5 UNITS: Performed by: HOSPITALIST

## 2022-03-09 PROCEDURE — 36415 COLL VENOUS BLD VENIPUNCTURE: CPT | Performed by: HOSPITALIST

## 2022-03-09 PROCEDURE — 80053 COMPREHEN METABOLIC PANEL: CPT | Performed by: EMERGENCY MEDICINE

## 2022-03-09 PROCEDURE — 99214 OFFICE O/P EST MOD 30 MIN: CPT | Performed by: NURSE PRACTITIONER

## 2022-03-09 PROCEDURE — 99284 EMERGENCY DEPT VISIT MOD MDM: CPT

## 2022-03-09 PROCEDURE — 85025 COMPLETE CBC W/AUTO DIFF WBC: CPT | Performed by: EMERGENCY MEDICINE

## 2022-03-09 PROCEDURE — G0378 HOSPITAL OBSERVATION PER HR: HCPCS

## 2022-03-09 PROCEDURE — C9803 HOPD COVID-19 SPEC COLLECT: HCPCS

## 2022-03-09 PROCEDURE — U0004 COV-19 TEST NON-CDC HGH THRU: HCPCS | Performed by: EMERGENCY MEDICINE

## 2022-03-09 PROCEDURE — 93010 ELECTROCARDIOGRAM REPORT: CPT | Performed by: INTERNAL MEDICINE

## 2022-03-09 PROCEDURE — 93005 ELECTROCARDIOGRAM TRACING: CPT | Performed by: EMERGENCY MEDICINE

## 2022-03-09 PROCEDURE — 82962 GLUCOSE BLOOD TEST: CPT

## 2022-03-09 PROCEDURE — 80162 ASSAY OF DIGOXIN TOTAL: CPT | Performed by: EMERGENCY MEDICINE

## 2022-03-09 PROCEDURE — 84439 ASSAY OF FREE THYROXINE: CPT | Performed by: HOSPITALIST

## 2022-03-09 PROCEDURE — 63710000001 INSULIN GLARGINE PER 5 UNITS: Performed by: HOSPITALIST

## 2022-03-09 RX ORDER — ALBUTEROL SULFATE 2.5 MG/3ML
2.5 SOLUTION RESPIRATORY (INHALATION) EVERY 6 HOURS PRN
COMMUNITY

## 2022-03-09 RX ORDER — INSULIN LISPRO 100 [IU]/ML
10 INJECTION, SOLUTION INTRAVENOUS; SUBCUTANEOUS
Status: DISCONTINUED | OUTPATIENT
Start: 2022-03-09 | End: 2022-03-10

## 2022-03-09 RX ORDER — SODIUM CHLORIDE 0.9 % (FLUSH) 0.9 %
10 SYRINGE (ML) INJECTION AS NEEDED
Status: DISCONTINUED | OUTPATIENT
Start: 2022-03-09 | End: 2022-03-16 | Stop reason: HOSPADM

## 2022-03-09 RX ORDER — DIPHENOXYLATE HYDROCHLORIDE AND ATROPINE SULFATE 2.5; .025 MG/1; MG/1
1 TABLET ORAL DAILY
COMMUNITY
End: 2022-03-16 | Stop reason: HOSPADM

## 2022-03-09 RX ORDER — INSULIN LISPRO 100 [IU]/ML
12 INJECTION, SOLUTION INTRAVENOUS; SUBCUTANEOUS
Status: DISCONTINUED | OUTPATIENT
Start: 2022-03-09 | End: 2022-03-09

## 2022-03-09 RX ORDER — IBUPROFEN 400 MG/1
400 TABLET ORAL EVERY 4 HOURS PRN
COMMUNITY
End: 2022-03-16 | Stop reason: HOSPADM

## 2022-03-09 RX ORDER — INSULIN LISPRO 100 [IU]/ML
0-7 INJECTION, SOLUTION INTRAVENOUS; SUBCUTANEOUS
Status: DISCONTINUED | OUTPATIENT
Start: 2022-03-09 | End: 2022-03-09

## 2022-03-09 RX ORDER — INSULIN GLARGINE 100 [IU]/ML
21 INJECTION, SOLUTION SUBCUTANEOUS 2 TIMES DAILY
Status: DISCONTINUED | OUTPATIENT
Start: 2022-03-09 | End: 2022-03-09

## 2022-03-09 RX ORDER — SENNOSIDES 8.6 MG
650 CAPSULE ORAL EVERY 6 HOURS PRN
COMMUNITY
End: 2022-03-16 | Stop reason: HOSPADM

## 2022-03-09 RX ORDER — INSULIN GLARGINE 100 [IU]/ML
21 INJECTION, SOLUTION SUBCUTANEOUS 2 TIMES DAILY
Status: ON HOLD | COMMUNITY
End: 2022-03-14 | Stop reason: SDUPTHER

## 2022-03-09 RX ORDER — POTASSIUM BICARBONATE 782 MG/1
1 TABLET, EFFERVESCENT ORAL DAILY
COMMUNITY
End: 2022-03-16 | Stop reason: HOSPADM

## 2022-03-09 RX ORDER — INSULIN GLARGINE 100 [IU]/ML
30 INJECTION, SOLUTION SUBCUTANEOUS 2 TIMES DAILY
Status: DISCONTINUED | OUTPATIENT
Start: 2022-03-09 | End: 2022-03-10

## 2022-03-09 RX ORDER — ALBUTEROL SULFATE 2.5 MG/3ML
2.5 SOLUTION RESPIRATORY (INHALATION) EVERY 6 HOURS PRN
Status: DISCONTINUED | OUTPATIENT
Start: 2022-03-09 | End: 2022-03-14

## 2022-03-09 RX ORDER — FLUTICASONE PROPIONATE 50 MCG
2 SPRAY, SUSPENSION (ML) NASAL DAILY
COMMUNITY
End: 2022-03-16 | Stop reason: HOSPADM

## 2022-03-09 RX ORDER — LEVOTHYROXINE SODIUM 0.15 MG/1
300 TABLET ORAL DAILY
Status: DISCONTINUED | OUTPATIENT
Start: 2022-03-09 | End: 2022-03-10

## 2022-03-09 RX ORDER — GABAPENTIN 300 MG/1
300 CAPSULE ORAL 3 TIMES DAILY
Status: DISCONTINUED | OUTPATIENT
Start: 2022-03-09 | End: 2022-03-16 | Stop reason: HOSPADM

## 2022-03-09 RX ORDER — SODIUM CHLORIDE 9 MG/ML
75 INJECTION, SOLUTION INTRAVENOUS CONTINUOUS
Status: DISCONTINUED | OUTPATIENT
Start: 2022-03-09 | End: 2022-03-10

## 2022-03-09 RX ORDER — CALCIUM CARBONATE 200(500)MG
1 TABLET,CHEWABLE ORAL EVERY 6 HOURS PRN
COMMUNITY
End: 2022-03-16 | Stop reason: HOSPADM

## 2022-03-09 RX ORDER — LORATADINE 10 MG/1
10 TABLET ORAL DAILY
COMMUNITY
End: 2022-03-16 | Stop reason: HOSPADM

## 2022-03-09 RX ORDER — PANTOPRAZOLE SODIUM 40 MG/1
40 TABLET, DELAYED RELEASE ORAL
Status: DISCONTINUED | OUTPATIENT
Start: 2022-03-09 | End: 2022-03-16 | Stop reason: HOSPADM

## 2022-03-09 RX ORDER — FLUTICASONE PROPIONATE 50 MCG
2 SPRAY, SUSPENSION (ML) NASAL DAILY
Status: DISCONTINUED | OUTPATIENT
Start: 2022-03-09 | End: 2022-03-09

## 2022-03-09 RX ORDER — INSULIN LISPRO 100 [IU]/ML
0-7 INJECTION, SOLUTION INTRAVENOUS; SUBCUTANEOUS
Status: DISCONTINUED | OUTPATIENT
Start: 2022-03-09 | End: 2022-03-16 | Stop reason: HOSPADM

## 2022-03-09 RX ORDER — ATORVASTATIN CALCIUM 10 MG/1
10 TABLET, FILM COATED ORAL NIGHTLY
Status: DISCONTINUED | OUTPATIENT
Start: 2022-03-09 | End: 2022-03-16 | Stop reason: HOSPADM

## 2022-03-09 RX ORDER — ATORVASTATIN CALCIUM 40 MG/1
40 TABLET, FILM COATED ORAL NIGHTLY
COMMUNITY
End: 2022-03-16 | Stop reason: HOSPADM

## 2022-03-09 RX ORDER — SODIUM CHLORIDE 1000 MG
1 TABLET, SOLUBLE MISCELLANEOUS 2 TIMES DAILY
COMMUNITY
End: 2022-03-16 | Stop reason: HOSPADM

## 2022-03-09 RX ORDER — PANTOPRAZOLE SODIUM 40 MG/1
40 TABLET, DELAYED RELEASE ORAL
Status: DISCONTINUED | OUTPATIENT
Start: 2022-03-10 | End: 2022-03-09

## 2022-03-09 RX ORDER — ATORVASTATIN CALCIUM 20 MG/1
40 TABLET, FILM COATED ORAL NIGHTLY
Status: DISCONTINUED | OUTPATIENT
Start: 2022-03-09 | End: 2022-03-09

## 2022-03-09 RX ORDER — ANORECTAL OINTMENT 15.7; .44; 24; 20.6 G/100G; G/100G; G/100G; G/100G
1 OINTMENT TOPICAL AS NEEDED
COMMUNITY
End: 2022-03-16 | Stop reason: HOSPADM

## 2022-03-09 RX ADMIN — PANTOPRAZOLE SODIUM 40 MG: 40 TABLET, DELAYED RELEASE ORAL at 16:31

## 2022-03-09 RX ADMIN — INSULIN LISPRO 4 UNITS: 100 INJECTION, SOLUTION INTRAVENOUS; SUBCUTANEOUS at 16:32

## 2022-03-09 RX ADMIN — SODIUM CHLORIDE 75 ML/HR: 9 INJECTION, SOLUTION INTRAVENOUS at 14:46

## 2022-03-09 RX ADMIN — INSULIN LISPRO 10 UNITS: 100 INJECTION, SOLUTION INTRAVENOUS; SUBCUTANEOUS at 16:30

## 2022-03-09 RX ADMIN — GABAPENTIN 300 MG: 300 CAPSULE ORAL at 16:29

## 2022-03-09 RX ADMIN — ATORVASTATIN CALCIUM 10 MG: 10 TABLET, FILM COATED ORAL at 20:12

## 2022-03-09 RX ADMIN — APIXABAN 5 MG: 5 TABLET, FILM COATED ORAL at 16:31

## 2022-03-09 RX ADMIN — INSULIN GLARGINE-YFGN 30 UNITS: 100 INJECTION, SOLUTION SUBCUTANEOUS at 20:18

## 2022-03-09 RX ADMIN — INSULIN LISPRO 2 UNITS: 100 INJECTION, SOLUTION INTRAVENOUS; SUBCUTANEOUS at 20:14

## 2022-03-09 RX ADMIN — GABAPENTIN 300 MG: 300 CAPSULE ORAL at 20:12

## 2022-03-09 RX ADMIN — LEVOTHYROXINE SODIUM 300 MCG: 0.15 TABLET ORAL at 16:30

## 2022-03-09 NOTE — PLAN OF CARE
Goal Outcome Evaluation:  Plan of Care Reviewed With: patient        Progress: improving   New admit from ED for digoxin toxicity. In atrial fibrillation with rates as low as mid 30's. Blood pressure stable and is asymptomatic. Alert and oriented x4. Room air. Hx of stroke with some aphasia,left sided residual weakness, and left peripheral visual field affected. Rates currently in the 70's. Will CTM

## 2022-03-09 NOTE — ED PROVIDER NOTES
EMERGENCY DEPARTMENT ENCOUNTER    Room Number:  34/34  PCP: Zay Olivares MD  Historian: Patient and   History Limited By: CVA      HPI  Chief Complaint: Dig toxicity  Context: Jenelle Kasper is a 62 y.o. female who presents to the ED c/o digoxin toxicity.  Patient has history of atrial fibrillation and recent stroke.  Patient had been in hospital and then rehab released on March 3.  Patient has been at home and has been taken care of by her .  Patient states he is diligent with her medications and did not give her any more than prescribed.  Patient went for follow-up with primary doctor yesterday and had blood work drawn.  Patient's digoxin was found to be elevated.   Had to send police to her house because no one was answering their phone.  Patient last dose of digoxin was 830 yesterday.  Patient has had no worsening weakness.  No chest pain or shortness of breath.      Location: Digoxin toxicity  Radiation: N/A  Character: Chronic elevation  Duration: Drawn yesterday  Severity: Moderate  Progression: Unknown  Aggravating Factors: Nothing  Alleviating Factors: Nothing        MEDICAL RECORD REVIEW    Patient admitted here for CVA January 22          PAST MEDICAL HISTORY  Active Ambulatory Problems     Diagnosis Date Noted   • Persistent atrial fibrillation (HCC) 11/01/2018   • Benign essential HTN 07/21/2015   • Hyperparathyroidism (HCC) 12/31/2019   • Morbid obesity with BMI of 45.0-49.9, adult ([facility]) 01/06/2020   • Pulmonary hypertension (HCC) 01/24/2020   • Precordial pain 01/24/2020   • Shortness of breath 01/24/2020   • TIA (transient ischemic attack) 01/29/2022     Resolved Ambulatory Problems     Diagnosis Date Noted   • PAF (paroxysmal atrial fibrillation) ([facility]) 05/03/2018     Past Medical History:   Diagnosis Date   • Anxiety    • Arrhythmia    • Asthma    • Atrial fibrillation with RVR ([facility])    • C. difficile diarrhea    • COPD (chronic obstructive pulmonary disease) ([facility])    •  Depression    • Diabetes mellitus (HCC)    • Diabetic peripheral neuropathy associated with type 2 diabetes mellitus (HCC)    • GERD (gastroesophageal reflux disease)    • Heart murmur    • History of fall    • Hypercalcemia    • Hyperlipidemia    • Hypertension    • IBS (irritable bowel syndrome)    • Kidney stone    • Morbid obesity (HCC)    • PCOS (polycystic ovarian syndrome)    • PONV (postoperative nausea and vomiting)    • Sarcoidosis    • Thyroid cancer (HCC)    • Wounds, multiple          PAST SURGICAL HISTORY  Past Surgical History:   Procedure Laterality Date   • CARDIAC CATHETERIZATION N/A 7/27/2016    Procedure: Coronary angiography;  Surgeon: Chris Perez MD;  Location:  [facility]VASIVE [facility];  Service:    • CARDIAC CATHETERIZATION N/A 7/27/2016    Procedure: Left heart cath;  Surgeon: Chris Perez MD;  Location:  [facility];  Service:    • CARDIAC CATHETERIZATION N/A 7/27/2016    Procedure: Left ventriculography;  Surgeon: Chris Perez MD;  Location:  [facility];  Service:    • CARDIAC CATHETERIZATION N/A 2/26/2020    Procedure: Right Heart Cath;  Surgeon: Chris Perez MD;  Location: [facility];  Service: Cardiovascular;  Laterality: N/A;  If there is no evidence of pulmonary hypertension please add a left heart cath to evaluate coronary arteries as patient is having chest pain.   • CHOLECYSTECTOMY     • COLONOSCOPY     • DILATATION AND CURETTAGE     • ENDOSCOPY     • GASTRIC BYPASS      gastric surgery for morbid obesity   • GASTROPLASTY  2008    GASTRIC REVISION FROM PREVIOUS GASTRIC BYPASS   • HIATAL HERNIA REPAIR     • INGUINAL HERNIA REPAIR Right    • LUNG BIOPSY      to dx sarcoidosis   • LUNG SURGERY      Removal of granulomas   • TOTAL THYROIDECTOMY           FAMILY HISTORY  Family History   Problem Relation Age of Onset   • Stroke Mother    • Hypertension Mother    • Arthritis Mother    • Anemia Mother    • Glaucoma  Mother    • Pneumonia Mother    • Irritable bowel syndrome Mother    • Heart disease Father    • Hypertension Father    • Diabetes Father    • Cancer Father    • Arthritis Father    • Glaucoma Father    • Heart attack Father    • Heart disease Sister    • Hypertension Sister    • Liver cancer Sister    • Pancreatic cancer Sister    • Colon cancer Neg Hx    • Colon polyps Neg Hx          SOCIAL HISTORY  Social History     Socioeconomic History   • Marital status: Single   Tobacco Use   • Smoking status: Never Smoker   • Smokeless tobacco: Never Used   Substance and Sexual Activity   • Alcohol use: No     Comment: cafeine use   • Drug use: No   • Sexual activity: Defer         ALLERGIES  Clindamycin/lincomycin; Codeine sulfate; Contrast dye; Iodinated diagnostic agents; Morphine sulfate; Other; Pradaxa [dabigatran etexilate mesylate]; Latex; Latex, natural rubber; Bee venom; Hydrocodone; Propoxyphene; and Sulfa antibiotics        REVIEW OF SYSTEMS  Review of Systems   Constitutional: Negative for fever.   HENT: Negative for sore throat.    Eyes: Negative.    Respiratory: Negative for cough and shortness of breath.    Cardiovascular: Negative for chest pain.   Gastrointestinal: Negative for abdominal pain, diarrhea and vomiting.   Genitourinary: Negative for dysuria.   Musculoskeletal: Negative for neck pain.   Skin: Negative for rash.   Allergic/Immunologic: Negative.    Neurological: Positive for weakness. Negative for numbness and headaches.   Hematological: Negative.    Psychiatric/Behavioral: Negative.    All other systems reviewed and are negative.           PHYSICAL EXAM  ED Triage Vitals [03/09/22 0848]   Temp Heart Rate Resp BP SpO2   -- 57 -- -- 99 %      Temp src Heart Rate Source Patient Position BP Location FiO2 (%)   -- -- -- -- --       Physical Exam  Vitals and nursing note reviewed.   Constitutional:       General: She is not in acute distress.  HENT:      Head: Normocephalic and atraumatic.   Eyes:       Pupils: Pupils are equal, round, and reactive to light.   Cardiovascular:      Rate and Rhythm: Regular rhythm. Bradycardia present.      Heart sounds: Normal heart sounds.   Pulmonary:      Effort: Pulmonary effort is normal. No respiratory distress.      Breath sounds: Normal breath sounds.   Abdominal:      Palpations: Abdomen is soft.      Tenderness: There is no abdominal tenderness. There is no guarding or rebound.   Musculoskeletal:         General: Normal range of motion.      Cervical back: Normal range of motion and neck supple.   Skin:     General: Skin is warm and dry.      Findings: No rash.   Neurological:      Mental Status: She is alert and oriented to person, place, and time.      Sensory: Sensation is intact.      Comments: Chronic weakness of left side   Psychiatric:         Mood and Affect: Mood and affect normal.       Patient was wearing a face mask when I entered the room and they continued to wear a mask throughout their stay in the ED.  I wore PPE, including  gloves, face mask with shield or face mask with goggles whenever I was in the room with patient.       LAB RESULTS  Recent Results (from the past 24 hour(s))   VITAMIN D 25 HYDROXY    Collection Time: 03/08/22  1:13 PM    Specimen type and source: Serum, Blood   Result Value Ref Range    25 Hydroxy, Vitamin D 40.6 >30 ng/mL   CBC AND DIFFERENTIAL    Collection Time: 03/08/22  1:13 PM    Specimen type and source: Whole Blood, Blood   Result Value Ref Range    WBC 4.14 (L) 4.5 - 11.0 10*3/uL    RBC 5.07 4.0 - 5.2 10*6/uL    Hemoglobin 13.9 12.0 - 16.0 g/dL    Hematocrit 44.1 36.0 - 46.0 %    MCV 87.0 80.0 - 100.0 fL    MCH 27.4 26.0 - 34.0 pg    MCHC 31.5 31.0 - 37.0 g/dL    RDW 16.4 12.0 - 16.8 %    Platelets 148 140 - 440 10*3/uL    MPV 13.0 (H) 8.4 - 12.4 fL    Differential Type Hospital CBC w/AutoDiff (arb'U)    Neutrophil Rel % 73.8 45 - 80 %    Lymphocyte Rel % 14.7 (L) 15 - 50 %    Monocyte Rel % 8.9 0 - 15 %    Eosinophil % 1.7  0 - 7 %    Basophil Rel % 0.7 0 - 2 %    Immature Grans % 0.2 0.0 - 1.0 %    nRBC 0 0 /100(WBC)    Neutrophils Absolute 3.05 2.0 - 8.8 10*3/uL    Lymphocytes Absolute 0.61 (L) 0.7 - 5.5 10*3/uL    Monocytes Absolute 0.37 0.0 - 1.7 10*3/uL    Eosinophils Absolute 0.07 0.0 - 0.8 10*3/uL    Basophils Absolute 0.03 0.0 - 0.2 10*3/uL    Immature Grans, Absolute 0.01 0.00 - 0.10 10*3/uL   COMPREHENSIVE METABOLIC PANEL    Collection Time: 03/08/22  1:13 PM    Specimen: Fresh Frozen Plasma    Specimen type and source: Plasma, Blood   Result Value Ref Range    Sodium 136 136 - 145 mmol/L    Potassium 4.2 3.5 - 5.1 mmol/L    Chloride 99 98 - 107 mmol/L    Total CO2 22 22 - 29 mmol/L    Glucose 333 (H) 74 - 99 mg/dL    BUN 15 10 - 20 mg/dL    Creatinine 1.03 (H) 0.55 - 1.02 mg/dL    BUN/Creatinine Ratio 14.9 10.0 - 20.0 RATIO    Est GFR by Clearance 55 (L) >60 /1.73 m2    Total Protein 8.0 6.2 - 8.0 g/dL    Albumin 4.2 3.2 - 4.6 g/dL    Globulin 3.8 1.5 - 4.5 g/dL    A/G Ratio 1.1 1.1 - 2.5 RATIO    Calcium 11.4 (H) 8.4 - 10.2 mg/dL    Total Bilirubin 1.6 (H) 0.2 - 1.2 mg/dL    AST (SGOT) 45 (H) 10 - 35 U/L    ALT (SGPT) 32 10 - 35 U/L    Alkaline Phosphatase 139 40 - 150 U/L   DIGOXIN LEVEL    Collection Time: 03/08/22  1:13 PM    Specimen type and source: Serum, Blood   Result Value Ref Range    Digoxin 3.00 (C) 0.8 - 1.8 ng/mL   HEMOGLOBIN A1C    Collection Time: 03/08/22  1:13 PM    Specimen type and source: Whole Blood, Blood   Result Value Ref Range    Hemoglobin A1C 9.8 (H) 4.3 - 5.6 %    Mean Bld Glu Estim. 234 mg/dL   TSH    Collection Time: 03/08/22  1:13 PM    Specimen: Fresh Frozen Plasma    Specimen type and source: Plasma, Blood   Result Value Ref Range    TSH 0.039 (L) 0.27 - 4.20 u(iU)/mL   Comprehensive Metabolic Panel    Collection Time: 03/09/22  9:03 AM    Specimen: Blood   Result Value Ref Range    Glucose 294 (H) 65 - 99 mg/dL    BUN 12 8 - 23 mg/dL    Creatinine 0.97 0.57 - 1.00 mg/dL    Sodium 137 136 -  145 mmol/L    Potassium 4.1 3.5 - 5.2 mmol/L    Chloride 100 98 - 107 mmol/L    CO2 25.0 22.0 - 29.0 mmol/L    Calcium 10.7 (H) 8.6 - 10.5 mg/dL    Total Protein 7.1 6.0 - 8.5 g/dL    Albumin 3.70 3.50 - 5.20 g/dL    ALT (SGPT) 27 1 - 33 U/L    AST (SGOT) 32 1 - 32 U/L    Alkaline Phosphatase 125 (H) 39 - 117 U/L    Total Bilirubin 1.5 (H) 0.0 - 1.2 mg/dL    Globulin 3.4 gm/dL    A/G Ratio 1.1 g/dL    BUN/Creatinine Ratio 12.4 7.0 - 25.0    Anion Gap 12.0 5.0 - 15.0 mmol/L    eGFR 67.0 >60.0 mL/min/1.73   Digoxin Level    Collection Time: 03/09/22  9:03 AM    Specimen: Blood   Result Value Ref Range    Digoxin 2.20 (H) 0.60 - 1.20 ng/mL   CBC Auto Differential    Collection Time: 03/09/22  9:03 AM    Specimen: Blood   Result Value Ref Range    WBC 3.30 (L) 3.40 - 10.80 10*3/mm3    RBC 4.83 3.77 - 5.28 10*6/mm3    Hemoglobin 13.2 12.0 - 15.9 g/dL    Hematocrit 41.9 34.0 - 46.6 %    MCV 86.7 79.0 - 97.0 fL    MCH 27.3 26.6 - 33.0 pg    MCHC 31.5 31.5 - 35.7 g/dL    RDW 16.1 (H) 12.3 - 15.4 %    RDW-SD 50.6 37.0 - 54.0 fl    MPV 11.5 6.0 - 12.0 fL    Platelets 123 (L) 140 - 450 10*3/mm3    Neutrophil % 62.5 42.7 - 76.0 %    Lymphocyte % 20.3 19.6 - 45.3 %    Monocyte % 13.0 (H) 5.0 - 12.0 %    Eosinophil % 3.0 0.3 - 6.2 %    Basophil % 0.9 0.0 - 1.5 %    Neutrophils, Absolute 2.06 1.70 - 7.00 10*3/mm3    Lymphocytes, Absolute 0.67 (L) 0.70 - 3.10 10*3/mm3    Monocytes, Absolute 0.43 0.10 - 0.90 10*3/mm3    Eosinophils, Absolute 0.10 0.00 - 0.40 10*3/mm3    Basophils, Absolute 0.03 0.00 - 0.20 10*3/mm3   ECG 12 Lead    Collection Time: 03/09/22  9:14 AM   Result Value Ref Range    QT Interval 364 ms       Ordered the above labs and reviewed the results.        RADIOLOGY  No orders to display                PROCEDURES  Critical Care  Performed by: Naif Raphael MD  Authorized by: Michele Subramanian MD     Critical care provider statement:     Critical care time (minutes):  30    Critical care time was exclusive of:   Separately billable procedures and treating other patients    Critical care was necessary to treat or prevent imminent or life-threatening deterioration of the following conditions:  Circulatory failure    Critical care was time spent personally by me on the following activities:  Ordering and performing treatments and interventions, ordering and review of laboratory studies, ordering and review of radiographic studies, discussions with primary provider and discussions with consultants          EKG:          EKG time: 914  Rhythm/Rate: Atrial fibrillation rate of 44  P waves and MI: No P waves  QRS, axis: Normal QRS   ST and T waves: Digoxin related ST segment changes    Interpreted Contemporaneously by me, independently viewed  No prior        MEDICATIONS GIVEN IN ER  Medications   sodium chloride 0.9 % flush 10 mL (has no administration in time range)             PROGRESS AND CONSULTS  ED Course as of 03/09/22 1143   Wed Mar 09, 2022   1048 10:48 EST  Patient presents for digitoxicity.  Patient dig level down from 3-2.  Patient's blood pressure is normal.  Patient is somewhat bradycardic as she is on both digoxin and beta-blocker.  Discussed with poison center who states they would observe her and not use Digibind at this point.  Discussed with Dr. Subramanian who will admit. [SL]      ED Course User Index  [SL] Naif Raphael MD           MEDICAL DECISION MAKING      MDM  Number of Diagnoses or Management Options     Amount and/or Complexity of Data Reviewed  Clinical lab tests: ordered and reviewed (Digoxin level 2.2)  Decide to obtain previous medical records or to obtain history from someone other than the patient: yes  Discuss the patient with other providers: yes (Discussed with Dr. Subramanian who will admit.  Discussed with poison center.  Will hold off on digibind as patient is bradycardic but stable with normal BP.)               DIAGNOSIS  Final diagnoses:   Bradycardia   Poisoning by digoxin, accidental or  unintentional, initial encounter           DISPOSITION  admit        Latest Documented Vital Signs:  As of 11:43 EST  BP- 136/76 HR- 53 Temp- 98.9 °F (37.2 °C) (Tympanic) O2 sat- 96%                       Naif Raphael MD  03/09/22 1149

## 2022-03-09 NOTE — CONSULTS
"    Patient Name: Jenelle Kasper  :1960  62 y.o.    Date of Admission: 3/9/2022  Date of Consultation:  22  Encounter Provider: TRUDY Faria  Place of Service: [facility] CARDIOLOGY  Referring Provider: Michele Subramanian MD  Patient Care Team:  Zay Olivares MD as PCP - General (General Practice)      Chief complaint: Dig toxicity    Reason for Consult: chronic AF    History of Present Illness: Ms. Kasper is a 62 year old female, followed by Dr. Winter for atrial fibrillation. She has been on atenolol and digoxin chronically.  She had a GIB in the past on both rivaroxaban and pradaxa and anticoagulation was stopped. She was admitted  with right MCA stroke. She was started on lovenox and we considered MARIZA. Dr. Melendez saw her in consultation and recommended a trial of apixaban (as she would need to be able to tolerate some anticoagulation to even be considered for left atrial appendage occluder). MARIZA was deferred as it would not likely change acute management. Transthoracic echocardiogram completed 22 noting mild/moderate concentric LVH with EF 61-65%.    She was to follow up with Dr. Melendez to decide wether to continue apixaban or consider left atrial occluder. If she were to re bleed she would be deemed unable to tolerate anticoagulation and would not be a candidate for implant.     She presented to [facility] ED 3/9/22 with reports of generalized weakness. She had been seen by her PCP and found to be bradycardic with a toxic Digoxin level fo 3.0, and sent to the ED.     She says she just feels \"crummy\". She hasn't felt well since her stroke. Nothing specific though. No light headed or dizziness. No syncope or near syncope. No nausea. No vision changes.       Previous Cardiac Testing:    Echocardiogram 22  · Left ventricular ejection fraction appears to be 61 - 65%. Normal left ventricular cavity size noted. Left ventricular wall " thickness is consistent with mild to moderate concentric hypertrophy. All left ventricular wall segments contract normally. Left ventricular diastolic function was indeterminate. Elevated left atrial pressure.  · The right ventricular cavity is moderately dilated. Moderately reduced right ventricular systolic function noted.  · The left atrial cavity is severely dilated.  · The right atrial cavity is severely dilated.  · The patient had no IV access for saline bubble study or contrast; attempts were made between 9A-2P to see if the patient had obtained access.    Cardiac Catheterization 2/26/20  SUMMARY: Severe pulmonary hypertension in the setting of a markedly elevated pulmonary capillary wedge pressure.  In reviewing her echo she does not have severe mitral insufficiencies or V wave is likely due to his severe diastolic dysfunction she has moderate to severe LVH  RECOMMENDATIONS: We will going to increase her diuretic as well as her blood pressure control she will need close follow-up with that and aggressive treatment of it    Stress Test 6/28/16  · Myocardial perfusion imaging indicates a medium-to-large-sized, moderately severe area of ischemia located in the inferior wall.  · Left ventricular ejection fraction is normal (Calculated EF = 67%).  · Impressions are consistent with an intermediate risk study.  · There is no prior study available for comparison.      Past Medical History:   Diagnosis Date   • Anxiety    • Arrhythmia    • Asthma    • Atrial fibrillation with RVR ([facility])    • C. difficile diarrhea    • COPD (chronic obstructive pulmonary disease) ([facility])    • Depression    • Diabetes mellitus ([facility])    • Diabetic peripheral neuropathy associated with type 2 diabetes mellitus ([facility])    • GERD (gastroesophageal reflux disease)    • Heart murmur    • History of fall     closed head injury after falling down steps; fx nose   • Hypercalcemia    • Hyperlipidemia    • Hypertension    • IBS (irritable bowel  syndrome)    • Kidney stone    • Morbid obesity ([facility])    • PAF (paroxysmal atrial fibrillation) (H[facility])    • PCOS (polycystic ovarian syndrome)    • PONV (postoperative nausea and vomiting)    • Sarcoidosis    • Shortness of breath    • Thyroid cancer (HCC)     WITH RADIATION   • Wounds, multiple        Past Surgical History:   Procedure Laterality Date   • CARDIAC CATHETERIZATION N/A 7/27/2016    Procedure: Coronary angiography;  Surgeon: Chris Perez MD;  Location:  [facility];  Service:    • CARDIAC CATHETERIZATION N/A 7/27/2016    Procedure: Left heart cath;  Surgeon: Chris Perez MD;  Location:  [facility];  Service:    • CARDIAC CATHETERIZATION N/A 7/27/2016    Procedure: Left ventriculography;  Surgeon: Chris Perez MD;  Location:  [facility];  Service:    • CARDIAC CATHETERIZATION N/A 2/26/2020    Procedure: Right Heart Cath;  Surgeon: Chris Perez MD;  Location:  [facility];  Service: Cardiovascular;  Laterality: N/A;  If there is no evidence of pulmonary hypertension please add a left heart cath to evaluate coronary arteries as patient is having chest pain.   • CHOLECYSTECTOMY     • COLONOSCOPY     • DILATATION AND CURETTAGE     • ENDOSCOPY     • GASTRIC BYPASS      gastric surgery for morbid obesity   • GASTROPLASTY  2008    GASTRIC REVISION FROM PREVIOUS GASTRIC BYPASS   • HIATAL HERNIA REPAIR     • INGUINAL HERNIA REPAIR Right    • LUNG BIOPSY      to dx sarcoidosis   • LUNG SURGERY      Removal of granulomas   • TOTAL THYROIDECTOMY           Prior to Admission medications    Medication Sig Start Date End Date Taking? Authorizing Provider   acetaminophen (TYLENOL) 650 MG 8 hr tablet Take 650 mg by mouth Every 6 (Six) Hours As Needed for Mild Pain .   Yes Provider, MD Vanessa   albuterol (PROVENTIL) (2.5 MG/3ML) 0.083% nebulizer solution Take 2.5 mg by nebulization Every 6 (Six) Hours As Needed for Wheezing.   Yes  Vanessa Croft MD   apixaban (ELIQUIS) 5 MG tablet tablet Take 5 mg by mouth Daily.   Yes Vanessa Croft MD   atenolol (TENORMIN) 25 MG tablet Take 3 tablets by mouth 2 (Two) Times a Day.  Patient taking differently: Take 12.5 mg by mouth Daily. 8/25/21  Yes Baldev Winter MD   atorvastatin (LIPITOR) 40 MG tablet Take 40 mg by mouth Every Night.   Yes Vanessa Croft MD   calcium carbonate (TUMS) 500 MG chewable tablet Chew 1 tablet Every 6 (Six) Hours As Needed for Indigestion or Heartburn.   Yes Vanessa Croft MD   digoxin (LANOXIN) 250 MCG tablet Take 1 tablet by mouth Daily. 8/25/21  Yes Baldev Winetr MD   fluticasone (FLONASE) 50 MCG/ACT nasal spray 2 sprays into the nostril(s) as directed by provider Daily.   Yes Vanessa Croft MD   gabapentin (NEURONTIN) 300 MG capsule Take 1 capsule by mouth 3 (Three) Times a Day for 3 days. Take 3 tablets three times daily  Patient taking differently: Take 300 mg by mouth 3 (Three) Times a Day. 2/5/22 2/8/22 Yes Michele Subramanian MD   Hydrocortisone (Maria Luisa's magic butt cream) Apply 1 application topically to the appropriate area as directed As Needed.   Yes Vanessa Croft MD   ibuprofen (ADVIL,MOTRIN) 400 MG tablet Take 400 mg by mouth Every 4 (Four) Hours As Needed for Mild Pain .   Yes Vanessa Croft MD   insulin glargine (LANTUS, SEMGLEE) 100 UNIT/ML injection Inject 21 Units under the skin into the appropriate area as directed 2 (Two) Times a Day.   Yes Vanessa Croft MD   insulin lispro (Admelog) 100 UNIT/ML injection Inject 12 Units under the skin into the appropriate area as directed 3 (Three) Times a Day Before Meals.   Yes Vanessa Croft MD   insulin lispro (Admelog) 100 UNIT/ML injection Inject 2-10 Units under the skin into the appropriate area as directed See Admin Instructions. Per sliding scale   Yes Vanessa Croft MD   levothyroxine (SYNTHROID, LEVOTHROID) 150 MCG tablet Take 300 mcg  by mouth Daily. 12/10/12  Yes Vanessa Croft MD   loratadine (CLARITIN) 10 MG tablet Take 10 mg by mouth Daily.   Yes Vanessa Croft MD   magnesium hydroxide (MILK OF MAGNESIA) 400 MG/5ML suspension Take 30 mL by mouth Daily As Needed for Constipation.   Yes Vanessa Croft MD   Menthol-Zinc Oxide (Calmoseptine) 0.44-20.6 % ointment Apply 1 application topically to the appropriate area as directed As Needed.   Yes Vanessa Croft MD   multivitamin (THERAGRAN) tablet tablet Take 1 tablet by mouth Daily.   Yes Vanessa Croft MD   ondansetron (ZOFRAN) 4 MG tablet Take 4 mg by mouth Every 6 (Six) Hours As Needed.   Yes Vanessa Croft MD   pantoprazole (PROTONIX) 40 MG EC tablet Take 1 tablet by mouth Every Morning for 30 days. 2/6/22 3/8/22 Yes Michele Subramanian MD   Potassium Bicarb-Citric Acid (Effer-K) 20 MEQ effervescent tablet Take 1 tablet by mouth Daily.   Yes Vanessa Croft MD   sodium chloride 1 g tablet Take 1 g by mouth 2 (Two) Times a Day.   Yes Vanessa Croft MD   spironolactone (ALDACTONE) 25 MG tablet Take 1 tablet by mouth Daily for 30 days. 2/6/22 3/8/22 Yes Michele Subramanian MD   torsemide (Demadex) 20 MG tablet Take 2 tablets by mouth Daily.  Patient taking differently: Take 20 mg by mouth Daily. 8/25/21  Yes Baldev Winter MD   albuterol sulfate  (90 Base) MCG/ACT inhaler Inhale 2 puffs. 1/19/21 3/9/22  Vanessa Croft MD   amLODIPine (NORVASC) 5 MG tablet Take 1 tablet by mouth Daily for 30 days. 2/6/22 3/9/22  Michele Subramanian MD   hydrALAZINE (APRESOLINE) 100 MG tablet Take 1 tablet by mouth 2 (two) times a day. 8/25/21 3/9/22  Baldev Winter MD   insulin NPH-insulin regular (Novolin 70/30) (70-30) 100 UNIT/ML injection Inject 80 Units under the skin into the appropriate area as directed 2 (Two) Times a Day Before Meals.  3/9/22  Provider, MD Vanessa   losartan (COZAAR) 100 MG tablet Take 1 tablet by mouth Daily. 8/25/21 3/9/22   Baldev Winter MD   vitamin D (ERGOCALCIFEROL) 21894 units capsule capsule Take 1 capsule by mouth Every 7 (Seven) Days. 1/18/13 3/9/22  Provider, MD Vanessa       Allergies   Allergen Reactions   • Clindamycin/Lincomycin Shortness Of Breath, Rash and GI Intolerance   • Codeine Sulfate Shortness Of Breath   • Contrast Dye Anaphylaxis   • Iodinated Diagnostic Agents Anaphylaxis     SOA, THROAT SWELLED, RASH   • Morphine Sulfate Hives and Shortness Of Breath   • Other Other (See Comments)     Blood thinners contraindicated due to micro perforations in the small intestine.   • Pradaxa [Dabigatran Etexilate Mesylate] GI Intolerance   • Latex Itching, Rash and Swelling   • Latex, Natural Rubber Rash   • Bee Venom Other (See Comments)     SWELLING   • Hydrocodone Nausea And Vomiting   • Propoxyphene Other (See Comments)     N/V,  ITCHING, RASH   • Sulfa Antibiotics Hives       Social History     Socioeconomic History   • Marital status: Single   Tobacco Use   • Smoking status: Never Smoker   • Smokeless tobacco: Never Used   Substance and Sexual Activity   • Alcohol use: No     Comment: cafeine use   • Drug use: No   • Sexual activity: Defer       Family History   Problem Relation Age of Onset   • Stroke Mother    • Hypertension Mother    • Arthritis Mother    • Anemia Mother    • Glaucoma Mother    • Pneumonia Mother    • Irritable bowel syndrome Mother    • Heart disease Father    • Hypertension Father    • Diabetes Father    • Cancer Father    • Arthritis Father    • Glaucoma Father    • Heart attack Father    • Heart disease Sister    • Hypertension Sister    • Liver cancer Sister    • Pancreatic cancer Sister    • Colon cancer Neg Hx    • Colon polyps Neg Hx        REVIEW OF SYSTEMS:   All systems reviewed.  Pertinent positives identified in HPI.  All other systems are negative.    I have reviewed and confirmed the accuracy of the ROS as documented by the MA/LPN/RN TRUDY Faria    Constitutional:  "She is oriented to person, place, and time. She appears well-developed. She does not appear ill.   HENT:   Head: Normocephalic and atraumatic. Head is without contusion.   Right Ear: Hearing normal. No drainage.   Left Ear: Hearing normal. No drainage.   Nose: No nasal deformity. No epistaxis.   Eyes: Lids are normal. Right eye exhibits no exudate. Left eye exhibits no exudate.  Neck: No JVD present.   Cardiovascular: bradycardic rate, irrregular rhythm and normal heart sounds.    Pulses:       Posterior tibial pulses are 2+ on the right side, and 2+ on the left side.   Pulmonary/Chest: Effort normal and breath sounds normal.   Abdominal: Soft. Normal appearance and bowel sounds are normal. There is no tenderness.   Musculoskeletal: Normal range of motion.        Right shoulder: She exhibits no deformity.        Left shoulder: She exhibits no deformity.   Neurological: She is alert and oriented to person, place, and time. She has normal strength.   Skin: Skin is warm, dry and intact. No rash noted.   Psychiatric: She has a normal mood and affect. Her behavior is normal. Thought content normal.   Vitals reviewed      Objective:     Vitals:    03/09/22 1119 03/09/22 1141 03/09/22 1315 03/09/22 1433   BP:    124/84   BP Location:    Right arm   Patient Position:    Lying   Pulse: 52 53 51 (!) 44   Resp:    20   Temp:    97.7 °F (36.5 °C)   TempSrc:    Oral   SpO2: 97% 96% 98% 100%   Weight:    97.1 kg (214 lb 1.1 oz)   Height:    172.7 cm (68\")     Body mass index is 32.55 kg/m².    Lab Review:     Results from last 7 days   Lab Units 03/09/22  0903   SODIUM mmol/L 137   POTASSIUM mmol/L 4.1   CHLORIDE mmol/L 100   CO2 mmol/L 25.0   BUN mg/dL 12   CREATININE mg/dL 0.97   CALCIUM mg/dL 10.7*   BILIRUBIN mg/dL 1.5*   ALK PHOS U/L 125*   ALT (SGPT) U/L 27   AST (SGOT) U/L 32   GLUCOSE mg/dL 294*         Results from last 7 days   Lab Units 03/09/22  0903   WBC 10*3/mm3 3.30*   HEMOGLOBIN g/dL 13.2   HEMATOCRIT % 41.9 "   PLATELETS 10*3/mm3 123*                          EKG 3/9/22    Previous EKG 2/5/22        Assessment and Plan:       1. Digoxin toxicity  2. Permanent atrial fibrillation  3. Recent acute right MCA stroke  4. History of gastrointestinal bleeding on pradaxa and rivaroxaban   5. Chronic diastolic heart failure with LVH  6. Insulin dependent diabetes mellitus type II  7. Hyperlipidemia   8. Chronic lymphedema   9. History of sarcoidosis    She has permanent atrial fibrillation. She has been rate controlled with atenolol and digoxin for many years. Digoxin level 3 at PCP office. 2.2 today. She is hemodynamically stable. digibind not indicated at this time. Repeat dig level in AM. Hold atenolol.     No s/s of GIB bleeding. H/H is stable. Seems to be tolerating apixaban.     Discussed with Dr. Wilson.     Rabia Thornton, TRUDY  03/09/22  15:42 EST

## 2022-03-09 NOTE — ED NOTES
Pt arrived by EMS, MD called [employer] for welfare check, office was unable to get a hold of pt of family. Call for dig toxicity. Pt normal speech, AOX4, pt is more lethargic than normal self.       Patient was placed in face mask during first look triage.  Patient was wearing a face mask throughout encounter.  I wore personal protective equipment throughout the encounter.  Hand hygiene was performed before and after patient encounter.        Ana Cheung RN  03/09/22 [id number]

## 2022-03-09 NOTE — PROGRESS NOTES
Clinical Pharmacy Services: Medication History    Jenelle Kasper is a 62 y.o. female presenting to [facility] for   Chief Complaint   Patient presents with   • Abnormal Lab       She  has a past medical history of Anxiety, Arrhythmia, Asthma, Atrial fibrillation with RVR ([facility]), C. difficile diarrhea, COPD (chronic obstructive pulmonary disease) ([facility]), Depression, Diabetes mellitus ([facility]), Diabetic peripheral neuropathy associated with type 2 diabetes mellitus ([facility]), GERD (gastroesophageal reflux disease), Heart murmur, History of fall, Hypercalcemia, Hyperlipidemia, Hypertension, IBS (irritable bowel syndrome), Kidney stone, Morbid obesity ([facility]), PAF (paroxysmal atrial fibrillation) ([facility]), PCOS (polycystic ovarian syndrome), PONV (postoperative nausea and vomiting), Sarcoidosis, Shortness of breath, Thyroid cancer ([facility]), and Wounds, multiple.    Allergies as of 03/09/2022 - Reviewed 03/09/2022   Allergen Reaction Noted   • Clindamycin/lincomycin Shortness Of Breath, Rash, and GI Intolerance 12/09/2019   • Codeine sulfate Shortness Of Breath 05/12/2016   • Contrast dye Anaphylaxis 05/12/2016   • Iodinated diagnostic agents Anaphylaxis 02/16/2012   • Morphine sulfate Hives and Shortness Of Breath 05/12/2016   • Other Other (See Comments) 09/07/2018   • Pradaxa [dabigatran etexilate mesylate] GI Intolerance 07/27/2016   • Latex Itching, Rash, and Swelling 02/25/2021   • Latex, natural rubber Rash 08/25/2021   • Bee venom Other (See Comments) 11/12/2014   • Hydrocodone Nausea And Vomiting 02/26/2020   • Propoxyphene Other (See Comments) 02/16/2012   • Sulfa antibiotics Hives 05/26/2015       Medication information was obtained from: Patient/Nursing Home/Spouse  Pharmacy and Phone Number:     Prior to Admission Medications     Prescriptions Last Dose Informant Patient Reported? Taking?    acetaminophen (TYLENOL) 650 MG 8 hr tablet  Nursing Home Yes Yes    Take 650 mg by mouth Every 6 (Six) Hours As Needed  for Mild Pain .    albuterol (PROVENTIL) (2.5 MG/3ML) 0.083% nebulizer solution  Nursing Home Yes Yes    Take 2.5 mg by nebulization Every 6 (Six) Hours As Needed for Wheezing.    apixaban (ELIQUIS) 5 MG tablet tablet  Nursing Home Yes Yes    Take 5 mg by mouth Daily.    atenolol (TENORMIN) 25 MG tablet  Nursing Home No Yes    Take 3 tablets by mouth 2 (Two) Times a Day.    Patient taking differently:  Take 12.5 mg by mouth Daily.    atorvastatin (LIPITOR) 40 MG tablet  Nursing Home Yes Yes    Take 40 mg by mouth Every Night.    calcium carbonate (TUMS) 500 MG chewable tablet  Nursing Home Yes Yes    Chew 1 tablet Every 6 (Six) Hours As Needed for Indigestion or Heartburn.    digoxin (LANOXIN) 250 MCG tablet  Nursing Home No Yes    Take 1 tablet by mouth Daily.    fluticasone (FLONASE) 50 MCG/ACT nasal spray  Nursing Home Yes Yes    2 sprays into the nostril(s) as directed by provider Daily.    gabapentin (NEURONTIN) 300 MG capsule  Nursing Home No Yes    Take 1 capsule by mouth 3 (Three) Times a Day for 3 days. Take 3 tablets three times daily    Patient taking differently:  Take 300 mg by mouth 3 (Three) Times a Day.    Hydrocortisone (Maria Luisa's magic butt cream)  Nursing Home Yes Yes    Apply 1 application topically to the appropriate area as directed As Needed.    ibuprofen (ADVIL,MOTRIN) 400 MG tablet  Nursing Home Yes Yes    Take 400 mg by mouth Every 4 (Four) Hours As Needed for Mild Pain .    insulin glargine (LANTUS, SEMGLEE) 100 UNIT/ML injection  Nursing Home Yes Yes    Inject 21 Units under the skin into the appropriate area as directed 2 (Two) Times a Day.    insulin lispro (Admelog) 100 UNIT/ML injection  Nursing Home Yes Yes    Inject 12 Units under the skin into the appropriate area as directed 3 (Three) Times a Day Before Meals.    insulin lispro (Admelog) 100 UNIT/ML injection  Nursing Home Yes Yes    Inject 2-10 Units under the skin into the appropriate area as directed See Admin Instructions. Per  "sliding scale    levothyroxine (SYNTHROID, LEVOTHROID) 150 MCG tablet  Nursing Home Yes Yes    Take 300 mcg by mouth Daily.    loratadine (CLARITIN) 10 MG tablet  Nursing Home Yes Yes    Take 10 mg by mouth Daily.    magnesium hydroxide (MILK OF MAGNESIA) 400 MG/5ML suspension  Nursing Home Yes Yes    Take 30 mL by mouth Daily As Needed for Constipation.    Menthol-Zinc Oxide (Calmoseptine) 0.44-20.6 % ointment  Nursing Home Yes Yes    Apply 1 application topically to the appropriate area as directed As Needed.    multivitamin (THERAGRAN) tablet tablet  Nursing Home Yes Yes    Take 1 tablet by mouth Daily.    ondansetron (ZOFRAN) 4 MG tablet  Nursing Home Yes Yes    Take 4 mg by mouth Every 6 (Six) Hours As Needed.    pantoprazole (PROTONIX) 40 MG EC tablet  Nursing Home No Yes    Take 1 tablet by mouth Every Morning for 30 days.    Potassium Bicarb-Citric Acid (Effer-K) 20 MEQ effervescent tablet  Nursing Home Yes Yes    Take 1 tablet by mouth Daily.    sodium chloride 1 g tablet  Nursing Home Yes Yes    Take 1 g by mouth 2 (Two) Times a Day.    spironolactone (ALDACTONE) 25 MG tablet  Nursing Home No Yes    Take 1 tablet by mouth Daily for 30 days.    torsemide (Demadex) 20 MG tablet  Nursing Home No Yes    Take 2 tablets by mouth Daily.    Patient taking differently:  Take 20 mg by mouth Daily.            Medication notes: Patient's spouse states the patient was brought into the ER today because the patient's primary doctor said her blood work showed she had \"toxic\" levels of digoxin in her system. Digoxin 250 mcg is still an active medication the patient has been taking since leaving the rehab except for today. Patient's spouse also mentioned the patient's insulin had been changed from Novolin 70/30  to Lantus during her last hospital stay here at [facility], and wanted to look into alternative insulins that are not as expensive.     This medication list is complete to the best of my knowledge as of " 3/9/2022    Please call if questions.    Tashi De León  Medication History Technician  [phone]    3/9/2022 13:04 EST

## 2022-03-09 NOTE — H&P
History and physical    Primary care physician Dr. Dr. CHAVEZ    Chief complaint  Generalized weakness    History of present illness  62-year old white female with history of atrial fibrillation recent multiple right CVA with left hemiparesis diabetes mellitus hypertension hyperlipidemia and gastroesophageal reflux disease who is taking atenolol and digoxin to control the heart rate presented to [facility] emergency room after seen by primary care doctor for generalized weakness and found to have dig toxicity with low heart rate asked to come to the ER.  Patient dig level was 3.0 and now it is 2.2 but heart rate remained in 40s.  Patient has weakness but denies any palpitations lightheadedness dizziness chest pain or shortness of breath.  Patient has been feeling weak.  Patient admitted for further work-up.    PAST MEDICAL HISTORY  • Anxiety     • Arrhythmia     • Asthma     • Atrial fibrillation with RVR (HCC)     • C. difficile diarrhea     • COPD (chronic obstructive pulmonary disease) ([facility])     • Depression     • Diabetes mellitus (HCC)     • Diabetic peripheral neuropathy associated with type 2 diabetes mellitus ([facility])     • GERD (gastroesophageal reflux disease)     • Heart murmur     • History of fall     • Hypercalcemia     • Hyperlipidemia     • Hypertension     • IBS (irritable bowel syndrome)     • Kidney stone     • Morbid obesity (HCC)     • PCOS (polycystic ovarian syndrome)     • PONV (postoperative nausea and vomiting)     • Sarcoidosis     • Thyroid cancer (HCC)     • Wounds, multiple        PAST SURGICAL HISTORY              Procedure Laterality Date   • CARDIAC CATHETERIZATION N/A 7/27/2016     Procedure: Coronary angiography;  Surgeon: Chris Perez MD;  Location: [facility] INVASIVE LOCATION;  Service:    • CARDIAC CATHETERIZATION N/A 7/27/2016     Procedure: Left heart cath;  Surgeon: Chris Perez MD;  Location: [facility] INVASIVE LOCATION;  Service:    • CARDIAC  CATHETERIZATION N/A 7/27/2016     Procedure: Left ventriculography;  Surgeon: Chris Perez MD;  Location:  [facility] INVASIVE LOCATION;  Service:    • CARDIAC CATHETERIZATION N/A 2/26/2020     Procedure: Right Heart Cath;  Surgeon: Chris Perez MD;  Location:  [facility] INVASIVE LOCATION;  Service: Cardiovascular;  Laterality: N/A;  If there is no evidence of pulmonary hypertension please add a left heart cath to evaluate coronary arteries as patient is having chest pain.   • CHOLECYSTECTOMY       • COLONOSCOPY       • DILATATION AND CURETTAGE       • ENDOSCOPY       • GASTRIC BYPASS         gastric surgery for morbid obesity   • GASTROPLASTY   2008     GASTRIC REVISION FROM PREVIOUS GASTRIC BYPASS   • HIATAL HERNIA REPAIR       • INGUINAL HERNIA REPAIR Right     • LUNG BIOPSY         to dx sarcoidosis   • LUNG SURGERY         Removal of granulomas   • TOTAL THYROIDECTOMY             FAMILY HISTORY           Problem Relation Age of Onset   • Stroke Mother     • Hypertension Mother     • Arthritis Mother     • Anemia Mother     • Glaucoma Mother     • Pneumonia Mother     • Irritable bowel syndrome Mother     • Heart disease Father     • Hypertension Father     • Diabetes Father     • Cancer Father     • Arthritis Father     • Glaucoma Father     • Heart attack Father     • Heart disease Sister     • Hypertension Sister     • Liver cancer Sister     • Pancreatic cancer Sister     • Colon cancer Neg Hx     • Colon polyps Neg Hx        SOCIAL HISTORY                 Socioeconomic History   • Marital status: Single   Tobacco Use   • Smoking status: Never Smoker   • Smokeless tobacco: Never Used   Substance and Sexual Activity   • Alcohol use: No       Comment: cafeine use   • Drug use: No   • Sexual activity: Defer         ALLERGIES  Clindamycin/lincomycin; Codeine sulfate; Contrast dye; Iodinated diagnostic agents; Morphine sulfate; Other; Pradaxa [dabigatran etexilate mesylate]; Latex; Latex, natural rubber;  Bee venom; Hydrocodone; Propoxyphene; and Sulfa antibiotics  Home medications reviewed     REVIEW OF SYSTEMS  Constitutional: Negative for fever.   HENT: Negative for sore throat.    Eyes: Negative.    Respiratory: Negative for cough and shortness of breath.    Cardiovascular: Negative for chest pain.   Gastrointestinal: Negative for abdominal pain, diarrhea and vomiting.   Genitourinary: Negative for dysuria.   Musculoskeletal: Negative for neck pain.   Skin: Negative for rash.   Allergic/Immunologic: Negative.    Neurological: Positive for weakness. Negative for numbness and headaches.   Hematological: Negative.    Psychiatric/Behavioral: Negative.    All other systems reviewed and are negative.     PHYSICAL EXAM  Blood pressure 136/76, pulse 51, temperature 98.9 °F (37.2 °C), temperature source Tympanic, resp. rate 18, SpO2 98 %.    Constitutional:       General: She is not in acute distress.  HENT:      Head: Normocephalic and atraumatic.   Eyes:      Pupils: Pupils are equal, round, and reactive to light.   Cardiovascular:      Rate and Rhythm: Regular rhythm. Bradycardia present.      Heart sounds: Normal heart sounds.   Pulmonary:      Effort: Pulmonary effort is normal. No respiratory distress.      Breath sounds: Normal breath sounds.   Abdominal:      Palpations: Abdomen is soft.      Tenderness: There is no abdominal tenderness. There is no guarding or rebound.   Musculoskeletal:         General: Normal range of motion.      Cervical back: Normal range of motion and neck supple.   Skin:     General: Skin is warm and dry.      Findings: No rash.   Neurological:      Mental Status: She is alert and oriented to person, place, and time.      Sensory: Sensation is intact.      Comments: Chronic weakness of left side   Psychiatric:         Mood and Affect: Mood and affect normal.      LAB RESULTS  Lab Results (last 24 hours)     Procedure Component Value Units Date/Time    POC Glucose Once [[id number]]   (Abnormal) Collected: 03/09/22 1415    Specimen: Blood Updated: 03/09/22 1416     Glucose 266 mg/dL      Comment: Meter: [id number] : [id number] Dario DIAS       Comprehensive Metabolic Panel [[id number]]  (Abnormal) Collected: 03/09/22 0903    Specimen: Blood Updated: 03/09/22 0931     Glucose 294 mg/dL      BUN 12 mg/dL      Creatinine 0.97 mg/dL      Sodium 137 mmol/L      Potassium 4.1 mmol/L      Chloride 100 mmol/L      CO2 25.0 mmol/L      Calcium 10.7 mg/dL      Total Protein 7.1 g/dL      Albumin 3.70 g/dL      ALT (SGPT) 27 U/L      AST (SGOT) 32 U/L      Alkaline Phosphatase 125 U/L      Total Bilirubin 1.5 mg/dL      Globulin 3.4 gm/dL      A/G Ratio 1.1 g/dL      BUN/Creatinine Ratio 12.4     Anion Gap 12.0 mmol/L      eGFR 67.0 mL/min/1.73      Comment: National Kidney Foundation and American Society of Nephrology (ASN) Task Force recommended calculation based on the Chronic Kidney Disease Epidemiology Collaboration (CKD-EPI) equation refit without adjustment for race.       Narrative:      GFR Normal >60  Chronic Kidney Disease <60  Kidney Failure <15      Digoxin Level [[id number]]  (Abnormal) Collected: 03/09/22 0903    Specimen: Blood Updated: 03/09/22 0931     Digoxin 2.20 ng/mL     CBC & Differential [[id number]]  (Abnormal) Collected: 03/09/22 0903    Specimen: Blood Updated: 03/09/22 0929    Narrative:      The following orders were created for panel order CBC & Differential.  Procedure                               Abnormality         Status                     ---------                               -----------         ------                     CBC Auto Differential[[id number]]        Abnormal            Final result                 Please view results for these tests on the individual orders.    CBC Auto Differential [[id number]]  (Abnormal) Collected: 03/09/22 0903    Specimen: Blood Updated: 03/09/22 0929     WBC 3.30 10*3/mm3      RBC 4.83 10*6/mm3      Hemoglobin 13.2 g/dL       Hematocrit 41.9 %      MCV 86.7 fL      MCH 27.3 pg      MCHC 31.5 g/dL      RDW 16.1 %      RDW-SD 50.6 fl      MPV 11.5 fL      Platelets 123 10*3/mm3      Neutrophil % 62.5 %      Lymphocyte % 20.3 %      Monocyte % 13.0 %      Eosinophil % 3.0 %      Basophil % 0.9 %      Neutrophils, Absolute 2.06 10*3/mm3      Lymphocytes, Absolute 0.67 10*3/mm3      Monocytes, Absolute 0.43 10*3/mm3      Eosinophils, Absolute 0.10 10*3/mm3      Basophils, Absolute 0.03 10*3/mm3         Imaging Results (Last 24 Hours)     ** No results found for the last 24 hours. **             ECG 12 Lead  Component   Ref Range & Units 3/9/22 0914 2/5/22 0040 1/28/22 2236   QT Interval   ms 364 P  393  360              HEART RATE= 44  bpm  RR Interval= 1356  ms  OH Interval=   ms  P Horizontal Axis=   deg  P Front Axis=   deg  QRSD Interval= 95  ms  QT Interval= 364  ms  QRS Axis= 41  deg  T Wave Axis= 238  deg  - ABNORMAL ECG -  Atrial fibrillation  Repol abnrm suggests ischemia, diffuse leads             Current Facility-Administered Medications:   •  albuterol (PROVENTIL) nebulizer solution 0.083% 2.5 mg/3mL, 2.5 mg, Nebulization, Q6H PRN, Michele Subramanian MD  •  apixaban (ELIQUIS) tablet 5 mg, 5 mg, Oral, Q12H, Micheel Subramanian MD  •  atorvastatin (LIPITOR) tablet 10 mg, 10 mg, Oral, Nightly, Michele Subramanian MD  •  gabapentin (NEURONTIN) capsule 300 mg, 300 mg, Oral, TID, Michele Subramanian MD  •  insulin glargine (LANTUS, SEMGLEE) injection 30 Units, 30 Units, Subcutaneous, BID, Michele Subramanian MD  •  insulin lispro (ADMELOG) injection 0-7 Units, 0-7 Units, Subcutaneous, 4x Daily With Meals & Nightly, Michele Subramanian MD  •  insulin lispro (ADMELOG) injection 10 Units, 10 Units, Subcutaneous, TID AC, Michele Subramanian MD  •  levothyroxine (SYNTHROID, LEVOTHROID) tablet 300 mcg, 300 mcg, Oral, Daily, Michele Subramanian MD  •  pantoprazole (PROTONIX) EC tablet 40 mg, 40 mg, Oral, BID AC, Michele Subramanian MD  •  [COMPLETED] Insert peripheral IV, , , Once **AND** sodium  chloride 0.9 % flush 10 mL, 10 mL, Intravenous, PRN, Naif Raphael MD  •  sodium chloride 0.9 % infusion, 75 mL/hr, Intravenous, Continuous, Michele Subramanian MD    Current Outpatient Medications:   •  acetaminophen (TYLENOL) 650 MG 8 hr tablet, Take 650 mg by mouth Every 6 (Six) Hours As Needed for Mild Pain ., Disp: , Rfl:   •  albuterol (PROVENTIL) (2.5 MG/3ML) 0.083% nebulizer solution, Take 2.5 mg by nebulization Every 6 (Six) Hours As Needed for Wheezing., Disp: , Rfl:   •  apixaban (ELIQUIS) 5 MG tablet tablet, Take 5 mg by mouth Daily., Disp: , Rfl:   •  atenolol (TENORMIN) 25 MG tablet, Take 3 tablets by mouth 2 (Two) Times a Day. (Patient taking differently: Take 12.5 mg by mouth Daily.), Disp: 180 tablet, Rfl: 11  •  atorvastatin (LIPITOR) 40 MG tablet, Take 40 mg by mouth Every Night., Disp: , Rfl:   •  calcium carbonate (TUMS) 500 MG chewable tablet, Chew 1 tablet Every 6 (Six) Hours As Needed for Indigestion or Heartburn., Disp: , Rfl:   •  digoxin (LANOXIN) 250 MCG tablet, Take 1 tablet by mouth Daily., Disp: 30 tablet, Rfl: 11  •  fluticasone (FLONASE) 50 MCG/ACT nasal spray, 2 sprays into the nostril(s) as directed by provider Daily., Disp: , Rfl:   •  gabapentin (NEURONTIN) 300 MG capsule, Take 1 capsule by mouth 3 (Three) Times a Day for 3 days. Take 3 tablets three times daily (Patient taking differently: Take 300 mg by mouth 3 (Three) Times a Day.), Disp: 9 capsule, Rfl: 0  •  Hydrocortisone (Maria Luisa's magic butt cream), Apply 1 application topically to the appropriate area as directed As Needed., Disp: , Rfl:   •  ibuprofen (ADVIL,MOTRIN) 400 MG tablet, Take 400 mg by mouth Every 4 (Four) Hours As Needed for Mild Pain ., Disp: , Rfl:   •  insulin glargine (LANTUS, SEMGLEE) 100 UNIT/ML injection, Inject 21 Units under the skin into the appropriate area as directed 2 (Two) Times a Day., Disp: , Rfl:   •  insulin lispro (Admelog) 100 UNIT/ML injection, Inject 12 Units under the skin into the  appropriate area as directed 3 (Three) Times a Day Before Meals., Disp: , Rfl:   •  insulin lispro (Admelog) 100 UNIT/ML injection, Inject 2-10 Units under the skin into the appropriate area as directed See Admin Instructions. Per sliding scale, Disp: , Rfl:   •  levothyroxine (SYNTHROID, LEVOTHROID) 150 MCG tablet, Take 300 mcg by mouth Daily., Disp: , Rfl:   •  loratadine (CLARITIN) 10 MG tablet, Take 10 mg by mouth Daily., Disp: , Rfl:   •  magnesium hydroxide (MILK OF MAGNESIA) 400 MG/5ML suspension, Take 30 mL by mouth Daily As Needed for Constipation., Disp: , Rfl:   •  Menthol-Zinc Oxide (Calmoseptine) 0.44-20.6 % ointment, Apply 1 application topically to the appropriate area as directed As Needed., Disp: , Rfl:   •  multivitamin (THERAGRAN) tablet tablet, Take 1 tablet by mouth Daily., Disp: , Rfl:   •  ondansetron (ZOFRAN) 4 MG tablet, Take 4 mg by mouth Every 6 (Six) Hours As Needed., Disp: , Rfl:   •  Potassium Bicarb-Citric Acid (Effer-K) 20 MEQ effervescent tablet, Take 1 tablet by mouth Daily., Disp: , Rfl:   •  sodium chloride 1 g tablet, Take 1 g by mouth 2 (Two) Times a Day., Disp: , Rfl:   •  spironolactone (ALDACTONE) 25 MG tablet, Take 1 tablet by mouth Daily for 30 days., Disp: 30 tablet, Rfl: 0  •  torsemide (Demadex) 20 MG tablet, Take 2 tablets by mouth Daily. (Patient taking differently: Take 20 mg by mouth Daily.), Disp: 60 tablet, Rfl: 11     ASSESSMENT  Dig toxicity  Chronic atrial fibrillation with bradycardia  Recent right MCA infarct with left hemiparesis  Diabetes mellitus  Hypertension  Hyperlipidemia  Gastroesophageal reflux disease    PLAN  Admit  IVF  Hold Lanoxin and atenolol  Monitor  Cardiology consult  Adjust home medications   Stress ulcer DVT prophylaxis  Supportive care  Patient is full code  Discussed with  and nursing staff  Follow closely and further recommendation according to hospital course    HERIBERTO LYNN MD       Global muscle tone and symmetry normal/Joint contractures absent/Periods of alertness noted/Grossly responds to touch light and sound stimuli/Gag reflex present/Normal suck-swallow patterns for age/Cry with normal variation of amplitude and frequency/Tongue motility size and shape normal/Tongue - no atrophy or fasciculations/[place] and grasp reflexes acceptable

## 2022-03-10 LAB
ALBUMIN SERPL-MCNC: 3.2 G/DL (ref 3.5–5.2)
ALBUMIN/GLOB SERPL: 0.9 G/DL
ALP SERPL-CCNC: 121 U/L (ref 39–117)
ALT SERPL W P-5'-P-CCNC: 35 U/L (ref 1–33)
ANION GAP SERPL CALCULATED.3IONS-SCNC: 11.1 MMOL/L (ref 5–15)
AST SERPL-CCNC: 42 U/L (ref 1–32)
BASOPHILS # BLD AUTO: 0.03 10*3/MM3 (ref 0–0.2)
BASOPHILS NFR BLD AUTO: 0.9 % (ref 0–1.5)
BILIRUB SERPL-MCNC: 1.2 MG/DL (ref 0–1.2)
BUN SERPL-MCNC: 9 MG/DL (ref 8–23)
BUN/CREAT SERPL: 9.9 (ref 7–25)
CALCIUM SPEC-SCNC: 10.3 MG/DL (ref 8.6–10.5)
CHLORIDE SERPL-SCNC: 102 MMOL/L (ref 98–107)
CO2 SERPL-SCNC: 23.9 MMOL/L (ref 22–29)
CREAT SERPL-MCNC: 0.91 MG/DL (ref 0.57–1)
DEPRECATED RDW RBC AUTO: 50.5 FL (ref 37–54)
DIGOXIN SERPL-MCNC: 1.5 NG/ML (ref 0.6–1.2)
EGFRCR SERPLBLD CKD-EPI 2021: 72.4 ML/MIN/1.73
EOSINOPHIL # BLD AUTO: 0.11 10*3/MM3 (ref 0–0.4)
EOSINOPHIL NFR BLD AUTO: 3.4 % (ref 0.3–6.2)
ERYTHROCYTE [DISTWIDTH] IN BLOOD BY AUTOMATED COUNT: 15.7 % (ref 12.3–15.4)
GLOBULIN UR ELPH-MCNC: 3.5 GM/DL
GLUCOSE BLDC GLUCOMTR-MCNC: 103 MG/DL (ref 70–130)
GLUCOSE BLDC GLUCOMTR-MCNC: 165 MG/DL (ref 70–130)
GLUCOSE BLDC GLUCOMTR-MCNC: 181 MG/DL (ref 70–130)
GLUCOSE BLDC GLUCOMTR-MCNC: 190 MG/DL (ref 70–130)
GLUCOSE BLDC GLUCOMTR-MCNC: 254 MG/DL (ref 70–130)
GLUCOSE SERPL-MCNC: 266 MG/DL (ref 65–99)
HBA1C MFR BLD: 10.1 % (ref 4.8–5.6)
HCT VFR BLD AUTO: 39.5 % (ref 34–46.6)
HGB BLD-MCNC: 12.3 G/DL (ref 12–15.9)
LYMPHOCYTES # BLD AUTO: 0.8 10*3/MM3 (ref 0.7–3.1)
LYMPHOCYTES NFR BLD AUTO: 24.6 % (ref 19.6–45.3)
MCH RBC QN AUTO: 27.4 PG (ref 26.6–33)
MCHC RBC AUTO-ENTMCNC: 31.1 G/DL (ref 31.5–35.7)
MCV RBC AUTO: 88 FL (ref 79–97)
MONOCYTES # BLD AUTO: 0.29 10*3/MM3 (ref 0.1–0.9)
MONOCYTES NFR BLD AUTO: 8.9 % (ref 5–12)
NEUTROPHILS NFR BLD AUTO: 2.01 10*3/MM3 (ref 1.7–7)
NEUTROPHILS NFR BLD AUTO: 61.9 % (ref 42.7–76)
NT-PROBNP SERPL-MCNC: 3349 PG/ML (ref 0–900)
PLATELET # BLD AUTO: 115 10*3/MM3 (ref 140–450)
PMV BLD AUTO: 11.4 FL (ref 6–12)
POTASSIUM SERPL-SCNC: 3.9 MMOL/L (ref 3.5–5.2)
PROT SERPL-MCNC: 6.7 G/DL (ref 6–8.5)
RBC # BLD AUTO: 4.49 10*6/MM3 (ref 3.77–5.28)
SODIUM SERPL-SCNC: 137 MMOL/L (ref 136–145)
TSH SERPL DL<=0.05 MIU/L-ACNC: 0.03 UIU/ML (ref 0.27–4.2)
WBC NRBC COR # BLD: 3.25 10*3/MM3 (ref 3.4–10.8)

## 2022-03-10 PROCEDURE — 83880 ASSAY OF NATRIURETIC PEPTIDE: CPT | Performed by: HOSPITALIST

## 2022-03-10 PROCEDURE — 80053 COMPREHEN METABOLIC PANEL: CPT | Performed by: HOSPITALIST

## 2022-03-10 PROCEDURE — 63710000001 INSULIN GLARGINE PER 5 UNITS: Performed by: HOSPITALIST

## 2022-03-10 PROCEDURE — 99214 OFFICE O/P EST MOD 30 MIN: CPT | Performed by: INTERNAL MEDICINE

## 2022-03-10 PROCEDURE — 63710000001 INSULIN LISPRO (HUMAN) PER 5 UNITS: Performed by: HOSPITALIST

## 2022-03-10 PROCEDURE — 97110 THERAPEUTIC EXERCISES: CPT

## 2022-03-10 PROCEDURE — G0378 HOSPITAL OBSERVATION PER HR: HCPCS

## 2022-03-10 PROCEDURE — 97530 THERAPEUTIC ACTIVITIES: CPT

## 2022-03-10 PROCEDURE — 80162 ASSAY OF DIGOXIN TOTAL: CPT | Performed by: HOSPITALIST

## 2022-03-10 PROCEDURE — 84443 ASSAY THYROID STIM HORMONE: CPT | Performed by: HOSPITALIST

## 2022-03-10 PROCEDURE — 97535 SELF CARE MNGMENT TRAINING: CPT

## 2022-03-10 PROCEDURE — 97162 PT EVAL MOD COMPLEX 30 MIN: CPT

## 2022-03-10 PROCEDURE — 85025 COMPLETE CBC W/AUTO DIFF WBC: CPT | Performed by: HOSPITALIST

## 2022-03-10 PROCEDURE — 82962 GLUCOSE BLOOD TEST: CPT

## 2022-03-10 PROCEDURE — 97166 OT EVAL MOD COMPLEX 45 MIN: CPT

## 2022-03-10 PROCEDURE — 83036 HEMOGLOBIN GLYCOSYLATED A1C: CPT | Performed by: HOSPITALIST

## 2022-03-10 RX ORDER — SPIRONOLACTONE 25 MG/1
25 TABLET ORAL DAILY
Status: DISCONTINUED | OUTPATIENT
Start: 2022-03-10 | End: 2022-03-12

## 2022-03-10 RX ORDER — INSULIN LISPRO 100 [IU]/ML
12 INJECTION, SOLUTION INTRAVENOUS; SUBCUTANEOUS
Status: DISCONTINUED | OUTPATIENT
Start: 2022-03-10 | End: 2022-03-13

## 2022-03-10 RX ORDER — LEVOTHYROXINE SODIUM 0.15 MG/1
150 TABLET ORAL DAILY
Status: DISCONTINUED | OUTPATIENT
Start: 2022-03-11 | End: 2022-03-15

## 2022-03-10 RX ADMIN — INSULIN GLARGINE-YFGN 30 UNITS: 100 INJECTION, SOLUTION SUBCUTANEOUS at 08:00

## 2022-03-10 RX ADMIN — SPIRONOLACTONE 25 MG: 25 TABLET ORAL at 21:12

## 2022-03-10 RX ADMIN — LEVOTHYROXINE SODIUM 300 MCG: 0.15 TABLET ORAL at 08:00

## 2022-03-10 RX ADMIN — APIXABAN 5 MG: 5 TABLET, FILM COATED ORAL at 06:26

## 2022-03-10 RX ADMIN — INSULIN LISPRO 4 UNITS: 100 INJECTION, SOLUTION INTRAVENOUS; SUBCUTANEOUS at 13:00

## 2022-03-10 RX ADMIN — INSULIN GLARGINE-YFGN 36 UNITS: 100 INJECTION, SOLUTION SUBCUTANEOUS at 21:10

## 2022-03-10 RX ADMIN — PANTOPRAZOLE SODIUM 40 MG: 40 TABLET, DELAYED RELEASE ORAL at 17:20

## 2022-03-10 RX ADMIN — INSULIN LISPRO 10 UNITS: 100 INJECTION, SOLUTION INTRAVENOUS; SUBCUTANEOUS at 08:00

## 2022-03-10 RX ADMIN — GABAPENTIN 300 MG: 300 CAPSULE ORAL at 21:12

## 2022-03-10 RX ADMIN — ATORVASTATIN CALCIUM 10 MG: 10 TABLET, FILM COATED ORAL at 21:12

## 2022-03-10 RX ADMIN — INSULIN LISPRO 2 UNITS: 100 INJECTION, SOLUTION INTRAVENOUS; SUBCUTANEOUS at 08:01

## 2022-03-10 RX ADMIN — INSULIN LISPRO 10 UNITS: 100 INJECTION, SOLUTION INTRAVENOUS; SUBCUTANEOUS at 13:00

## 2022-03-10 RX ADMIN — APIXABAN 5 MG: 5 TABLET, FILM COATED ORAL at 17:20

## 2022-03-10 RX ADMIN — GABAPENTIN 300 MG: 300 CAPSULE ORAL at 15:17

## 2022-03-10 RX ADMIN — PANTOPRAZOLE SODIUM 40 MG: 40 TABLET, DELAYED RELEASE ORAL at 06:30

## 2022-03-10 RX ADMIN — INSULIN LISPRO 2 UNITS: 100 INJECTION, SOLUTION INTRAVENOUS; SUBCUTANEOUS at 17:20

## 2022-03-10 RX ADMIN — GABAPENTIN 300 MG: 300 CAPSULE ORAL at 08:00

## 2022-03-10 RX ADMIN — INSULIN LISPRO 12 UNITS: 100 INJECTION, SOLUTION INTRAVENOUS; SUBCUTANEOUS at 17:20

## 2022-03-10 NOTE — PLAN OF CARE
Goal Outcome Evaluation:  Plan of Care Reviewed With: patient           Outcome Evaluation: Pt adm with digoxin toxicity from home, she is recovering from a recent R CVA with L hemiparesis, went to SNF at discharge from hospital then went home. Pt presents with deficits including L side weakness, impaired balance, impaired functional mobility, B knee OA with limited ROM affecting her ability to stand, motor planning deficits, impaired tone. Pt reports that she didn't get much therapy at the SNF and at home her  was assisting her. Pt may benefit from acute rehab as she has a good home plan in place  Patient was intermittently wearing a face mask during this therapy encounter. Therapist used appropriate personal protective equipment including eye protection, mask, and gloves.  Mask used was standard procedure mask. Appropriate PPE was worn during the entire therapy session. Hand hygiene was completed before and after therapy session. Patient is not in enhanced droplet precautions.

## 2022-03-10 NOTE — CONSULTS
Adult Nutrition  Assessment/PES    Patient Name:  Jenelle Kasper  YOB: 1960  MRN: [id number]  Admit Date:  3/9/2022    Assessment Date:  3/10/2022    Comments:  Nutrition Consult for significant wt loss x 3 months  Pt presented w/ c/o weakness, bradycardia, digoxin poisoning; PMH: recent multiple R CVA w/ L hemiparesis, HTN, HLD, Afib, DM, GERD, COPD, asthma, Depression, IBS, pulm HTN, morbid obesity, gastric bypass (2008). Pt stated 85# wt loss x 3 months due to rehab/NH not honoring food preferences and resulting minimal po intake x 3 months.    [facility]   (2/4) 248 lbs   (1/28) 266 lbs  Pt w/ 49 lb (18%) wt loss x 2.5 months.     Based on ASPEN/AND criteria, pt meets a nutrition dianosis of Starvation-Related Severe Malnutrition r/t 18% wt loss x 2.5 months & chronic inadequate po intake.    Pt tolerating Consistent CHO diet well now with % po intake of meals. Obtained food preferences & will honor.  Provided education on adequate protein intake.  Will follow per protocol.     Reason for Assessment     Row Name 03/10/22 9896          Reason for Assessment    Reason For Assessment nurse/nurse practitioner consult  RN screen consult for 85# wt loss x 3 months per pt     Diagnosis cardiac disease;diabetes diagnosis/complications;neurologic conditions;nutrition related history;other (see comments)  Pt c/o weakness, bradycardia, digoxin poisoning; PMH: recent multiple R CVA w/ L hemiparesis, HTN, HLD, Afib, DM, GERD, COPD, asthma, Depression, IBS, pulm HTN, morbid obesity, gastric bypass (2008)     Identified At Risk by Screening Criteria unintentional loss of 10 lbs or more in the past 2 mos;MST SCORE 2+;reduced oral intake over the last month                Nutrition/Diet History     Row Name 03/10/22 2306          Nutrition/Diet History    Typical Intake (Food/Fluid/EN/PN) Pt stated decreased po intake at NH past 3 months due to them not honoring her food preferences.  "Pt w/ good appetite now, eating % po intake of Consistent CHO/Vegetarian diet (no meat, seafood/eggs/dairy ok)     Food Preferences likes cottage cheese, probiotic yogurt w/ blueberries     Supplemental Drinks/Foods/Additives dislikes protein shakes     Factors Affecting Nutritional Intake altered gastrointestinal function  gastric bypass                Anthropometrics     Row Name 03/10/22 1430          Anthropometrics    Height 172.7 cm (68\")     Weight --  3/10 bedscale wt by RN, RD did not weigh pt     Height for Calculation 1.727 m (5' 8\")     Weight for Calculation 98.4 kg (217 lb)     Additional Documentation Ideal Body Weight (IBW) (Group);Usual Body Weight (UBW) (Group)            Ideal Body Weight (IBW)    Ideal Body Weight 140lb (155% IBW)     Retired Ideal Body Weight (IBW) (kg) 64.15            Usual Body Weight (UBW)    Usual Body Weight 121 kg (266 lb)  1/28/22     Weight Change (Amount and Duration) 49lb (18%) wt loss x 2.5 months                Labs/Tests/Procedures/Meds     Row Name 03/10/22 1422          Labs/Procedures/Meds    Lab Results Reviewed reviewed     Lab Results Comments A1C- 10.10, Gluc 254/266/181, WBC 3.25            Diagnostic Tests/Procedures    Diagnostic Test/Procedure Reviewed reviewed            Medications    Pertinent Medications Reviewed reviewed     Pertinent Medications Comments eliquis, lipitor, insulin, synthroid, protonix, IVF@75ml/hr,                Physical Findings     Row Name 03/10/22 1427          Physical Findings    Overall Physical Appearance Pt w/ L hemiparesis from recent CVA, obese     Exam Agreement consented  no muscle loss or excessive subcutaneous fat loss found upon nutrition focused physical exam                Estimated/Assessed Needs - Anthropometrics     Row Name 03/10/22 1430          Anthropometrics    Height 172.7 cm (68\")     Weight --  3/10 bedscale wt by RN, RD did not weigh pt     Height for Calculation 1.727 m (5' 8\")     Weight for " Calculation 98.4 kg (217 lb)     Additional Documentation Ideal Body Weight (IBW) (Group);Usual Body Weight (UBW) (Group)            Ideal Body Weight (IBW)    Ideal Body Weight 140lb (155% IBW)            Usual Body Weight (UBW)    Usual Body Weight 121 kg (266 lb)  1/28/22     Weight Change (Amount and Duration) 49lb (18%) wt loss x 2.5 months            Estimated/Assessed Needs    Additional Documentation Fluid Requirements (Group);KCAL/KG (Group);Protein Requirements (Group)            KCAL/KG    KCAL/KG 20 Kcal/Kg (kcal);18 Kcal/Kg (kcal)     18 Kcal/Kg (kcal) 1771.758     20 Kcal/Kg (kcal) 1968.62            Protein Requirements    Weight Used For Protein Calculations 98.4 kg (217 lb)     Est Protein Requirement Amount (gms/kg) 1.0 gm protein  1.5g/kg IBW     Estimated Protein Requirements (gms/day) 98.43            Fluid Requirements    Fluid Requirements (mL/day) 2450     Estimated Fluid Requirement Method RDA Method     RDA Method (mL) 2450                Nutrition Prescription Ordered     Row Name 03/10/22 1438          Nutrition Prescription PO    Current PO Diet Regular     Fluid Consistency Thin     Common Modifiers Consistent Carbohydrate;Vegetarian;Other (comment)  No meat; seafood/eggs/dairy ok                Evaluation of Received Nutrient/Fluid Intake     Row Name 03/10/22 1439          Intake Assessment    Energy/Calorie Requirement Assessment meeting needs     Protein Requirement Assessment meeting needs     Fluid Requirement Assessment meeting needs            Fluid Intake Evaluation    Other Fluid (mL) 1800  IVF@75ml/hr                  Malnutrition Severity Assessment     Row Name 03/10/22 1442          Malnutrition Severity Assessment    Malnutrition Type Starvation - Related Malnutrition            Insufficient Energy Intake     Insufficient Energy Intake Findings Severe     Insufficient Energy Intake  <75% of est. energy requirement for > or equal to 3 months            Unintentional Weight  Loss     Unintentional Weight Loss Findings Severe     Unintentional Weight Loss  Weight loss greater than 7.5% in three months  18% wt loss x 2.5 months            Muscle Loss    Loss of Muscle Mass Findings None     [place] Region None     Clavicle Bone Region None     Acromion Bone Region None     Scapular Bone Region None     Dorsal Hand Region None     Patellar Region None     Anterior Thigh Region None     Posterior Calf Region None            Fat Loss    Subcutaneous Fat Loss Findings None     Orbital Region  None     Upper Arm Region None     Thoracic & Lumbar Region None            Criteria Met (Must meet criteria for severity in at least 2 of these categories: M Wasting, Fat Loss, Fluid, Secondary Signs, Wt. Status, Intake)    Patient meets criteria for  Severe Malnutrition  Based on ASPEN/AND criteria, pt meets a nutrition dianosis of Starvation-Related Severe Malnutrition r/t 18% wt loss x 2.5 months & chronic inadequate po intake                 Problem/Interventions:   Problem 1     Row Name 03/10/22 1451          Nutrition Diagnoses Problem 1    Problem 1 Malnutrition     Etiology (related to) Factors Affecting Nutrition     Food Habit/Preferences Limited Food Preferences  vegetarian, dislikes supplements, rehab/NH not honoring food prefs past 2.5 months=decreased po intake     Poor Intake of Supplement;Meats;Other  minimal po intake at rehab/NH past 2.5 months     Signs/Symptoms (evidenced by) Report of Mnimal PO Intake;Unintended Weight Change     Unintended Weight Change Loss     Number of Pounds Lost 49lbs     Weight loss time period 2.5 months                      Intervention Goal     Row Name 03/10/22 1457          General Maintain nutrition;Disease management/therapy;Improved nutrition related lab(s);Meet nutritional needs for age/condition    PO PO intake (%);Maintain intake    PO Intake % 75 %    Weight No significant weight loss               Nutrition Intervention     Row Name 03/10/22 4008           Nutrition Intervention    RD/Tech Action Interview for preference;Encourage intake;Follow Tx progress;Care plan reviewd;Advise alternate selection                  Education/Evaluation     Row Name 03/10/22 3808          Education    Education Provided education regarding     Provided education regarding Other (comment)  adequate protein intake, menu prefs            Monitor/Evaluation    Monitor Per protocol;I&O;PO intake;Pertinent labs;Weight                 Electronically signed by:  Gayle Noguera RD  03/10/22 15:04 EST

## 2022-03-10 NOTE — THERAPY EVALUATION
Patient Name: Jenelle Kasper  : 1960    MRN: [id number]                              Today's Date: 3/10/2022       Admit Date: 3/9/2022    Visit Dx:     ICD-10-CM ICD-9-CM   1. Bradycardia  R00.1 427.89   2. Poisoning by digoxin, accidental or unintentional, initial encounter  T46.0X1A 972.1     E858.3     Patient Active Problem List   Diagnosis   • Persistent atrial fibrillation (HCC)   • Benign essential HTN   • Hyperparathyroidism (HCC)   • Morbid obesity with BMI of 45.0-49.9, adult (HCC)   • Pulmonary hypertension (HCC)   • Precordial pain   • Shortness of breath   • TIA (transient ischemic attack)   • Bradycardia     Past Medical History:   Diagnosis Date   • Anxiety    • Arrhythmia    • Asthma    • Atrial fibrillation with RVR (HCC)    • C. difficile diarrhea    • COPD (chronic obstructive pulmonary disease) (HCC)    • Depression    • Diabetes mellitus (HCC)    • Diabetic peripheral neuropathy associated with type 2 diabetes mellitus (HCC)    • GERD (gastroesophageal reflux disease)    • Heart murmur    • History of fall     closed head injury after falling down steps; fx nose   • Hypercalcemia    • Hyperlipidemia    • Hypertension    • IBS (irritable bowel syndrome)    • Kidney stone    • Morbid obesity (HCC)    • PAF (paroxysmal atrial fibrillation) (HCC)    • PCOS (polycystic ovarian syndrome)    • PONV (postoperative nausea and vomiting)    • Sarcoidosis    • Shortness of breath    • Thyroid cancer (HCC)     WITH RADIATION   • Wounds, multiple      Past Surgical History:   Procedure Laterality Date   • CARDIAC CATHETERIZATION N/A 2016    Procedure: Coronary angiography;  Surgeon: Chris Perez MD;  Location: [facility] CATH INVASIVE LOCATION;  Service:    • CARDIAC CATHETERIZATION N/A 2016    Procedure: Left heart cath;  Surgeon: Chris Perez MD;  Location: [facility] CATH INVASIVE LOCATION;  Service:    • CARDIAC CATHETERIZATION N/A 2016    Procedure: Left ventriculography;   Surgeon: Chris Perez MD;  Location:  [facility] CATH INVASIVE LOCATION;  Service:    • CARDIAC CATHETERIZATION N/A 2/26/2020    Procedure: Right Heart Cath;  Surgeon: Chris Perez MD;  Location:  [facility] CATH INVASIVE LOCATION;  Service: Cardiovascular;  Laterality: N/A;  If there is no evidence of pulmonary hypertension please add a left heart cath to evaluate coronary arteries as patient is having chest pain.   • CHOLECYSTECTOMY     • COLONOSCOPY     • DILATATION AND CURETTAGE     • ENDOSCOPY     • GASTRIC BYPASS      gastric surgery for morbid obesity   • GASTROPLASTY  2008    GASTRIC REVISION FROM PREVIOUS GASTRIC BYPASS   • HIATAL HERNIA REPAIR     • INGUINAL HERNIA REPAIR Right    • LUNG BIOPSY      to dx sarcoidosis   • LUNG SURGERY      Removal of granulomas   • TOTAL THYROIDECTOMY        General Information     Row Name 03/10/22 1009          OT Time and Intention    Document Type evaluation  -     Mode of Treatment occupational therapy;individual therapy  -     Row Name 03/10/22 1009          General Information    Patient Profile Reviewed yes  -SM     Prior Level of Function independent:;ADL's;transfer  prior to recent stroke, pt does report limited with mobility at baseline d/t LE lymphedema and arthritis but able to complete her ADLs and transfers. Spouse is currently managing her medications.  -     Existing Precautions/Restrictions fall  -     Row Name 03/10/22 1009          Occupational Profile    Reason for Services/Referral (Occupational Profile) recent stroke, L sided weakness and visual deficits. Decreased ADL since dc home from rehab and now admitted with digoxin toxicity.  -     Environmental Supports and Barriers (Occupational Profile) Pt has a w/c and BSC. She reports she does not have any bathing DME and has not been able to bath since dc home from rehab.  -     Row Name 03/10/22 1009          Living Environment    People in Home spouse  -     Row Name 03/10/22 1006           Cognition    Orientation Status (Cognition) oriented x 4  some expressive aphasia noted. Has difficulty with answering some questions about PLOF/time line of events.  -     Row Name 03/10/22 1009          Safety Issues, Functional Mobility    Safety Issues Affecting Function (Mobility) insight into deficits/self-awareness;awareness of need for assistance;judgment;problem-solving  -     Impairments Affecting Function (Mobility) balance;cognition;coordination;endurance/activity tolerance;motor control;strength;range of motion (ROM);pain;visual/perceptual;motor planning;grasp  -           User Key  (r) = Recorded By, (t) = Taken By, (c) = Cosigned By    Initials Name Provider Type     Lexy Tarango OT Occupational Therapist                 Mobility/ADL's     Row Name 03/10/22 1013          Bed Mobility    Bed Mobility supine-sit  -     Supine-Sit [place] (Bed Mobility) minimum assist (75% patient effort)  -     Assistive Device (Bed Mobility) head of bed elevated;bed rails  -     Comment, (Bed Mobility) extra time and cues for sequencing, heavy use of bed rails.  -     Row Name 03/10/22 1013          Transfers    Transfers sit-stand transfer;bed-chair transfer  -     Comment, (Transfers) HHAX2 for transfers and pivot to chair completed.  -     Bed-Chair [place] (Transfers) minimum assist (75% patient effort);2 person assist  -     Sit-Stand [place] (Transfers) moderate assist (50% patient effort);2 person assist  -     Row Name 03/10/22 1013          Activities of Daily Living    BADL Assessment/Intervention upper body dressing;lower body dressing;grooming;toileting  -     Row Name 03/10/22 1013          Upper Body Dressing Assessment/Training    [place] Level (Upper Body Dressing) upper body dressing skills;don;doff;pull-over garment;minimum assist (75% patient effort);verbal cues  -     Position (Upper Body Dressing) supported sitting  -     Row Name 03/10/22  1013          Lower Body Dressing Assessment/Training    [place] Level (Lower Body Dressing) lower body dressing skills;don;socks;dependent (less than 25% patient effort)  -     Position (Lower Body Dressing) supported sitting  -     Row Name 03/10/22 1013          Grooming Assessment/Training    [place] Level (Grooming) grooming skills;oral care regimen;standby assist  -     Position (Grooming) supported sitting  -     Comment, (Grooming) primarly uses R hand for task.  -[facility] Name 03/10/22 1013          Toileting Assessment/Training    [place] Level (Toileting) toileting skills;dependent (less than 25% patient effort)  -     Comment, (Toileting) purewick/brief in place today.  -           User Key  (r) = Recorded By, (t) = Taken By, (c) = Cosigned By    Initials Name Provider Type    Lexy Blue, OT Occupational Therapist               Obj/Interventions     Row Name 03/10/22 1020          Sensory Assessment (Somatosensory)    Sensory Assessment (Somatosensory) left UE  -     Left UE Sensory Assessment general sensation;intact  -[facility] Name 03/10/22 1020          Vision Assessment/Intervention    Visual Impairment/Limitations peripheral vision impaired left  -[facility] Name 03/10/22 1020          Range of Motion Comprehensive    Comment, General Range of Motion RUE AROM WFL, LUE AROM impaired all planes. L shoulder flexion/abduction 1/2, L elbow flexion/extension 3/4, L wrist flexion/extension 3/4, digit flexion/extension 3/4  -[facility] Name 03/10/22 1020          Strength Comprehensive (MMT)    Comment, General Manual Muscle Testing (MMT) Assessment RUE grossly 4/5, LUE 2+/5 to 3-/5  -[facility] Name 03/10/22 1020          Motor Skills    Motor Skills coordination;muscle tone  -     Coordination left;upper extremity;minimal impairment;moderate impairment;fine motor deficit;gross motor deficit  -     Muscle Tone left;upper extremity(s);WNL  -     Row Name  03/10/22 1020          Balance    Balance Assessment sitting static balance;standing static balance  -     Static Sitting Balance supervision  -     Position, Sitting Balance sitting edge of bed  -SM     Static Standing Balance 2-person assist;contact guard;minimal assist  -SM     Position/Device Used, Standing Balance --  HHAX2  -           User Key  (r) = Recorded By, (t) = Taken By, (c) = Cosigned By    Initials Name Provider Type     Lexy Tarango OT Occupational Therapist               Goals/Plan     Row Name 03/10/22 1030          Transfer Goal 1 (OT)    Activity/Assistive Device (Transfer Goal 1, OT) transfers, all;commode, bedside without drop arms;bed-to-chair/chair-to-bed  -SM     [place] Level/Cues Needed (Transfer Goal 1, OT) moderate assist (50-74% patient effort)  -SM     Time Frame (Transfer Goal 1, OT) short term goal (STG);2 weeks  -SM     Progress/Outcome (Transfer Goal 1, OT) goal ongoing  -     Row Name 03/10/22 1030          Dressing Goal 1 (OT)    Activity/Device (Dressing Goal 1, OT) dressing skills, all;lower body dressing  -SM     [place]/Cues Needed (Dressing Goal 1, OT) moderate assist (50-74% patient effort)  -SM     Time Frame (Dressing Goal 1, OT) short term goal (STG);2 weeks  -SM     Progress/Outcome (Dressing Goal 1, OT) goal ongoing  -     Row Name 03/10/22 1030          Toileting Goal 1 (OT)    Activity/Device (Toileting Goal 1, OT) toileting skills, all  -SM     [place] Level/Cues Needed (Toileting Goal 1, OT) moderate assist (50-74% patient effort)  -SM     Time Frame (Toileting Goal 1, OT) short term goal (STG);2 weeks  -SM     Progress/Outcome (Toileting Goal 1, OT) goal ongoing  -     Row Name 03/10/22 1030          Problem Specific Goal 1 (OT)    Problem Specific Goal 1 (OT) Pt to increase LUE strength to 3/5 all planes to increase functional use of LUE for  ADL, iADL, transfers.  -SM     Time Frame (Problem Specific Goal 1, OT) short term  "goal (STG);2 weeks  -     Progress/Outcome (Problem Specific Goal 1, OT) goal ongoing  -     Row Name 03/10/22 1030          Therapy Assessment/Plan (OT)    Planned Therapy Interventions (OT) activity tolerance training;adaptive equipment training;BADL retraining;cognitive/visual perception retraining;functional balance retraining;IADL retraining;occupation/activity based interventions;patient/caregiver education/training;neuromuscular control/coordination retraining;ROM/therapeutic exercise;strengthening exercise;transfer/mobility retraining  -           User Key  (r) = Recorded By, (t) = Taken By, (c) = Cosigned By    Initials Name Provider Type     Lexy Tarango, OT Occupational Therapist               Clinical Impression     Row Name 03/10/22 1023          Pain Assessment    Pre/Posttreatment Pain Comment reports pain in [place] with standing.  -     Pain Intervention(s) Repositioned;Ambulation/increased activity  -     Additional Documentation Pain Scale: FACES Pre/Post-Treatment (Group)  -     Row Name 03/10/22 1023          Pain Scale: FACES Pre/Post-Treatment    Pain: FACES Scale, Pretreatment 4-->hurts little more  -     Posttreatment Pain Rating 4-->hurts little more  -     Row Name 03/10/22 1023          Plan of Care Review    Plan of Care Reviewed With patient  -     Outcome Evaluation Pt is a 62 y.o female admitted to [facility] with digoxin toxcicity. She is well known to this OT after recently seeing her for after R MCA stroke. She was discharged to rehab In [facility] and spent a few weeks at SNF, but pt reports very unsatisfied with her care and limited therapy and reports since dc home her  \"is doing everything for her\". She reports to her PCP appointment this week she required X2 assist to pivot to her w/c. She was evaluated today by OT. Her L sided weakness, impaired L visual deficits, impaired standing balance, impaired mobility are limiting with all ADLs. Pt requires " min/mod AX2 to complete transfers this date to chair. In chair she required total A for LBD. Min A for UBD and SBA for set up for grooming with use of R hand. She reports her goal is to get better and care for herself again. She has a supportive spouse at [place]. I feel she would benefit from acute rehab at [place]. Continued OT to address LUE function, ADL training, balance, transfers.  -     Row Name 03/10/22 1023          Therapy Assessment/Plan (OT)    Rehab Potential (OT) good, to achieve stated therapy goals  -     Criteria for Skilled Therapeutic Interventions Met (OT) yes;skilled treatment is necessary  -     Therapy Frequency (OT) 5 times/wk  -     Row Name 03/10/22 1023          Therapy Plan Review/Discharge Plan (OT)    Anticipated Discharge Disposition (OT) inpatient rehabilitation facility  -     Row Name 03/10/22 1023          Vital Signs    Pretreatment Heart Rate (beats/min) 54  -SM     Posttreatment Heart Rate (beats/min) 60  -     Row Name 03/10/22 1023          Positioning and Restraints    Pre-Treatment Position in bed  -SM     Post Treatment Position chair  -SM     In Chair reclined;call light within reach;encouraged to call for assist;exit alarm on;notified [facility]  -           User Key  (r) = Recorded By, (t) = Taken By, (c) = Cosigned By    Initials Name Provider Type    Lexy Blue, OT Occupational Therapist               Outcome Measures     Row Name 03/10/22 1032          How much help from another is currently needed...    Putting on and taking off regular lower body clothing? 1  -SM     Bathing (including washing, rinsing, and drying) 2  -SM     Toileting (which includes using toilet bed pan or urinal) 1  -SM     Putting on and taking off regular upper body clothing 3  -SM     Taking care of personal grooming (such as brushing teeth) 3  -SM     Eating meals 3  -SM     AM-PAC 6 Clicks Score (OT) 13  -     Row Name 03/10/22 1032          Modified [place]wick Scale    Modified Dom  Scale 4 - Moderately severe disability.  Unable to walk without assistance, and unable to attend to own bodily needs without assistance.  -     Row Name 03/10/22 1032          Functional Assessment    Outcome Measure Options AM-PAC 6 Clicks Daily Activity (OT);Modified Dom  -           User Key  (r) = Recorded By, (t) = Taken By, (c) = Cosigned By    Initials Name Provider Type     Lexy Tarango OT Occupational Therapist                Occupational Therapy Education                 Title: PT OT SLP Therapies (In Progress)     Topic: Occupational Therapy (In Progress)     Point: ADL training (Done)     Description:   Instruct learner(s) on proper safety adaptation and remediation techniques during self care or transfers.   Instruct in proper use of assistive devices.              Learning Progress Summary           Patient Acceptance, E, VU by  at 3/10/2022 1033    Comment: Role of OT and POC/goals. Safety with falls precautions.                   Point: Home exercise program (Not Started)     Description:   Instruct learner(s) on appropriate technique for monitoring, assisting and/or progressing therapeutic exercises/activities.              Learner Progress:  Not documented in this visit.          Point: Precautions (Not Started)     Description:   Instruct learner(s) on prescribed precautions during self-care and functional transfers.              Learner Progress:  Not documented in this visit.          Point: Body mechanics (Not Started)     Description:   Instruct learner(s) on proper positioning and spine alignment during self-care, functional mobility activities and/or exercises.              Learner Progress:  Not documented in this visit.                      User Key     Initials Effective Dates Name Provider Type [facility] 04/02/20 -  Lexy Tarango OT Occupational Therapist OT              OT Recommendation and Plan  Planned Therapy Interventions (OT): activity tolerance  "training, adaptive equipment training, BADL retraining, cognitive/visual perception retraining, functional balance retraining, IADL retraining, occupation/activity based interventions, patient/caregiver education/training, neuromuscular control/coordination retraining, ROM/therapeutic exercise, strengthening exercise, transfer/mobility retraining  Therapy Frequency (OT): 5 times/wk  Plan of Care Review  Plan of Care Reviewed With: patient  Outcome Evaluation: Pt is a 62 y.o female admitted to [facility] with digoxin toxcicity. She is well known to this OT after recently seeing her for after R MCA stroke. She was discharged to rehab In [facility] and spent a few weeks at SNF, but pt reports very unsatisfied with her care and limited therapy and reports since [place] home her  \"is doing everything for her\". She reports to her PCP appointment this week she required X2 assist to pivot to her w/c. She was evaluated today by OT. Her L sided weakness, impaired L visual deficits, impaired standing balance, impaired mobility are limiting with all ADLs. Pt requires min/mod AX2 to complete transfers this date to chair. In chair she required total A for LBD. Min A for UBD and SBA for set up for grooming with use of R hand. She reports her goal is to get better and care for herself again. She has a supportive spouse at [place]. I feel she would benefit from acute rehab at [place]. Continued OT to address LUE function, ADL training, balance, transfers.     Time Calculation:    Time Calculation- OT     Row Name 03/10/22 1034             Time Calculation- OT    OT Start Time 0928  -      OT Stop Time 1000  -      OT Time Calculation (min) 32 min  -      Total Timed Code Minutes- OT 23 minute(s)  -SM      OT Received On 03/10/22  -      OT - Next Appointment 03/11/22  -      OT Goal Re-Cert Due Date 03/24/22  -              Timed Charges    04159 - OT Therapeutic Activity Minutes 10  -SM      85819 - OT Self Care/Mgmt Minutes 13  -SM      "         Untimed Charges    OT Eval/Re-eval Minutes 9  -SM              Total Minutes    Timed Charges Total Minutes 23  -SM      Untimed Charges Total Minutes 9  -SM       Total Minutes 32  -SM            User Key  (r) = Recorded By, (t) = Taken By, (c) = Cosigned By    Initials Name Provider Type    Lexy Blue OT Occupational Therapist              Therapy Charges for Today     Code Description Service Date Service Provider Modifiers Qty    08109815416 HC OT EVAL MOD COMPLEXITY 3 3/10/2022 Lexy Tarango OT GO 1    38125246319 HC OT SELF CARE/MGMT/TRAIN EA 15 MIN 3/10/2022 Lexy Tarango OT GO 1    96442122267 HC OT THERAPEUTIC ACT EA 15 MIN 3/10/2022 Lexy Tarango OT GO 1               Lexy Tarango OT  3/10/2022

## 2022-03-10 NOTE — NURSING NOTE
"Diabetes Education  Assessment/Teaching    Patient Name:  Jenelle Kasper  YOB: 1960  MRN: [id number]  Admit Date:  3/9/2022      Assessment Date:  3/10/2022  Flowsheet Row Most Recent Value   General Information     Referral From: Other -[facility] staff. Meet with 61 y/o at bedside while her s.o. here.    Height 172.7 cm (68\")   Height Method Stated   Weight --  [3/10 bedscale wt by RN, RD did not weigh pt]   Weight Method Bed scale   Diabetes History    What type of diabetes do you have? Type 2   Have you had high blood sugar? (>140mg/dl) yes -current a1C >10%.   Do you have any diabetes complications? -- s/p cva   Education Preferences    Barriers to Learning -- visual. pt does not have her glasses here.   Assessment Topics    Taking Medication - Assessment Needs education. pt, s.o. requesting instruction on insulin pen use.   Healthy Coping - Assessment Competent -supportive s.o. Gabino here at bedside.   Monitoring - Assessment Competent   DM Goals    Contact Plan Follow-up medical care          Flowsheet Row Most Recent Value   DM Education Needs    Medication Insulin pen, Administration, correct insulin pen use. Syringe use.   Healthy Coping Appropriate   Discharge Plan Facility per pt.   Motivation Engaged   Teaching Method Explanation, Discussion, Demonstration, Handouts   Patient Response Verbalized understanding -on the part of pt's s.o. who adminsters insulin for pt.      Other Comments: Pt's s.o. states he received insulin pen instruction over the phone with facility staff. He states he was instructed that he could re-use insulin pen needles. He describes using auto-shield needles and experiencing insulin leaking from pen after injection. He is requesting further instructions. Instruct pt, s.o. not to re-use insulin pen needles. Rather, they are designed for one-time use. Provide printed instructions with step-by-step instructions for pen use.   Electronically signed by:  Marcia FLORES" Jack RN, BSN  03/10/22 16:42 EST

## 2022-03-10 NOTE — PLAN OF CARE
Problem: Malnutrition  Goal: Improved Nutritional Intake  Outcome: Ongoing, Progressing  Intervention: Promote and Optimize Oral Intake  3/10/2022 1502 by Gayle Noguera RD  Flowsnile (Taken 3/10/2022 1502)  Oral Nutrition Promotion: nutritional therapy counseling provided  3/10/2022 1501 by Gayle Noguera RD Flowsheets (Taken 3/10/2022 1501)  Oral Nutrition Promotion: nutritional therapy counseling provided   Goal Outcome Evaluation:      Pt tolerating >75% po intake of meals. No significant wt. Loss.

## 2022-03-10 NOTE — THERAPY EVALUATION
Patient Name: Jenelle Kasper  : 1960    MRN: [id number]                              Today's Date: 3/10/2022       Admit Date: 3/9/2022    Visit Dx:     ICD-10-CM ICD-9-CM   1. Bradycardia  R00.1 427.89   2. Poisoning by digoxin, accidental or unintentional, initial encounter  T46.0X1A 972.1     E858.3     Patient Active Problem List   Diagnosis   • Persistent atrial fibrillation (HCC)   • Benign essential HTN   • Hyperparathyroidism (HCC)   • Morbid obesity with BMI of 45.0-49.9, adult (HCC)   • Pulmonary hypertension (HCC)   • Precordial pain   • Shortness of breath   • TIA (transient ischemic attack)   • Bradycardia     Past Medical History:   Diagnosis Date   • Anxiety    • Arrhythmia    • Asthma    • Atrial fibrillation with RVR (HCC)    • C. difficile diarrhea    • COPD (chronic obstructive pulmonary disease) (HCC)    • Depression    • Diabetes mellitus (HCC)    • Diabetic peripheral neuropathy associated with type 2 diabetes mellitus (HCC)    • GERD (gastroesophageal reflux disease)    • Heart murmur    • History of fall     closed head injury after falling down steps; fx nose   • Hypercalcemia    • Hyperlipidemia    • Hypertension    • IBS (irritable bowel syndrome)    • Kidney stone    • Morbid obesity (HCC)    • PAF (paroxysmal atrial fibrillation) (HCC)    • PCOS (polycystic ovarian syndrome)    • PONV (postoperative nausea and vomiting)    • Sarcoidosis    • Shortness of breath    • Thyroid cancer (HCC)     WITH RADIATION   • Wounds, multiple      Past Surgical History:   Procedure Laterality Date   • CARDIAC CATHETERIZATION N/A 2016    Procedure: Coronary angiography;  Surgeon: Chris Perez MD;  Location: [facility] CATH INVASIVE LOCATION;  Service:    • CARDIAC CATHETERIZATION N/A 2016    Procedure: Left heart cath;  Surgeon: Chris Perez MD;  Location: [facility] CATH INVASIVE LOCATION;  Service:    • CARDIAC CATHETERIZATION N/A 2016    Procedure: Left ventriculography;   Surgeon: Chris Perez MD;  Location:  [facility] CATH INVASIVE LOCATION;  Service:    • CARDIAC CATHETERIZATION N/A 2/26/2020    Procedure: Right Heart Cath;  Surgeon: Chris Perez MD;  Location:  [facility] CATH INVASIVE LOCATION;  Service: Cardiovascular;  Laterality: N/A;  If there is no evidence of pulmonary hypertension please add a left heart cath to evaluate coronary arteries as patient is having chest pain.   • CHOLECYSTECTOMY     • COLONOSCOPY     • DILATATION AND CURETTAGE     • ENDOSCOPY     • GASTRIC BYPASS      gastric surgery for morbid obesity   • GASTROPLASTY  2008    GASTRIC REVISION FROM PREVIOUS GASTRIC BYPASS   • HIATAL HERNIA REPAIR     • INGUINAL HERNIA REPAIR Right    • LUNG BIOPSY      to dx sarcoidosis   • LUNG SURGERY      Removal of granulomas   • TOTAL THYROIDECTOMY        General Information     Row Name 03/10/22 1205          Physical Therapy Time and Intention    Document Type evaluation  -PC     Mode of Treatment physical therapy  -PC     Row Name 03/10/22 1205          General Information    Patient Profile Reviewed yes  -PC     Prior Level of Function mod assist:;max assist:  after her recent CVA, pt went to SNF then went home and  was assisting her  -PC     Existing Precautions/Restrictions fall  -PC     Barriers to Rehab medically complex;previous functional deficit  -PC     Row Name 03/10/22 1205          Living Environment    People in Home spouse  -PC     Row Name 03/10/22 1205          Cognition    Orientation Status (Cognition) oriented x 4  some expressive aphasia, slow speech  -PC     Row Name 03/10/22 1205          Safety Issues, Functional Mobility    Safety Issues Affecting Function (Mobility) insight into deficits/self-awareness  -PC     Impairments Affecting Function (Mobility) balance;cognition;coordination;endurance/activity tolerance;strength;grasp;motor control;pain;postural/trunk control  -PC           User Key  (r) = Recorded By, (t) = Taken By, (c) =  Cosigned By    Initials Name Provider Type    PC Abbie Hodge, PT Physical Therapist               Mobility     Row Name 03/10/22 1210          Bed Mobility    Comment, (Bed Mobility) in chair upon entry  -     Row Name 03/10/22 1210          Sit-Stand Transfer    Sit-Stand [place] (Transfers) moderate assist (50% patient effort);2 person assist  -PC     Assistive Device (Sit-Stand Transfers) --  HHA X 2  -PC     Comment, (Sit-Stand Transfer) worked on weight shifting in standing, pt not able to move LEs in standing  -PC           User Key  (r) = Recorded By, (t) = Taken By, (c) = Cosigned By    Initials Name Provider Type    PC Abbie Hodge, PT Physical Therapist               Obj/Interventions     Row Name 03/10/22 1212          Range of Motion Comprehensive    Comment, General Range of Motion B knees limited, not able to flex knees past 90 due to arthritis, B LE are edematous and have discoloration, very slow moving even her RLE  -     Row Name 03/10/22 1212          Strength Comprehensive (MMT)    Comment, General Manual Muscle Testing (MMT) Assessment RLE hip flex 3/5, knee ext 3/5, DF 3+/5, LLE hip flex 3-/5, knee ext 3-/5, DF 3-/5, all movements are slow  -     Row Name 03/10/22 1212          Motor Skills    Motor Skills muscle tone  -     Muscle Tone mild impairment;hypotonia  -     Row Name 03/10/22 1212          Balance    Balance Assessment standing static balance;standing dynamic balance  -PC     Static Standing Balance moderate assist;2-person assist  -PC     Dynamic Standing Balance moderate assist;2-person assist  -PC     Position/Device Used, Standing Balance --  HHA x2  -PC           User Key  (r) = Recorded By, (t) = Taken By, (c) = Cosigned By    Initials Name Provider Type    PC Abbie Hodge, PT Physical Therapist               Goals/Plan    No documentation.                Clinical Impression     Row Name 03/10/22 1217          Pain    Pre/Posttreatment Pain Comment pain  B LEs, knees with trying to stand, standing  -PC     Row Name 03/10/22 1217          Plan of Care Review    Plan of Care Reviewed With patient  -PC     Outcome Evaluation Pt adm with digoxin toxicity from home, she is recovering from a recent R CVA with L hemiparesis, went to SNF at discharge from hospital then went home. Pt presents with deficits including L side weakness, impaired balance, impaired functional mobility, B knee OA with limited ROM affecting her ability to stand, motor planning deficits, impaired tone. Pt reports that she didn't get much therapy at the SNF and at home her  was assisting her. Pt may benefit from acute rehab as she has a good home plan in place  -PC     Row Name 03/10/22 1217          Therapy Assessment/Plan (PT)    Rehab Potential (PT) good, to achieve stated therapy goals  -PC     Criteria for Skilled Interventions Met (PT) yes;meets criteria  -PC     Row Name 03/10/22 1217          Positioning and Restraints    Pre-Treatment Position sitting in chair/recliner  -PC     Post Treatment Position chair  -PC     In Chair reclined;call light within reach;encouraged to call for assist;exit alarm on;with family/caregiver  -PC           User Key  (r) = Recorded By, (t) = Taken By, (c) = Cosigned By    Initials Name Provider Type    Abbie Kirby, PT Physical Therapist               Outcome Measures     Row Name 03/10/22 1032          Modified [place] Scale    Modified Dom Scale 4 - Moderately severe disability.  Unable to walk without assistance, and unable to attend to own bodily needs without assistance.  -     Row Name 03/10/22 1032          Functional Assessment    Outcome Measure Options AM-PAC 6 Clicks Daily Activity (OT);Modified Dom  -           User Key  (r) = Recorded By, (t) = Taken By, (c) = Cosigned By    Initials Name Provider Type    Lexy Blue OT Occupational Therapist                             Physical Therapy Education                 Title:  PT OT SLP Therapies (In Progress)     Topic: Physical Therapy (In Progress)     Point: Mobility training (In Progress)     Learning Progress Summary           Patient Acceptance, E,D, NR by PC at 3/10/2022 1225                   Point: Home exercise program (In Progress)     Learning Progress Summary           Patient Acceptance, E,D, NR by PC at 3/10/2022 1225                   Point: Body mechanics (In Progress)     Learning Progress Summary           Patient Acceptance, E,D, NR by PC at 3/10/2022 1225                   Point: Precautions (In Progress)     Learning Progress Summary           Patient Acceptance, E,D, NR by PC at 3/10/2022 1225                               User Key     Initials Effective Dates Name Provider Type Discipline    PC 06/16/21 -  Abbie Hodge PT Physical Therapist PT              PT Recommendation and Plan     Plan of Care Reviewed With: patient  Outcome Evaluation: Pt adm with digoxin toxicity from home, she is recovering from a recent R CVA with L hemiparesis, went to SNF at discharge from hospital then went home. Pt presents with deficits including L side weakness, impaired balance, impaired functional mobility, B knee OA with limited ROM affecting her ability to stand, motor planning deficits, impaired tone. Pt reports that she didn't get much therapy at the SNF and at home her  was assisting her. Pt may benefit from acute rehab as she has a good home plan in place     Time Calculation:    PT Charges     Row Name 03/10/22 1223             Time Calculation    Start Time 1050  -PC      Stop Time 1111  -PC      Time Calculation (min) 21 min  -PC      PT Received On 03/10/22  -PC      PT - Next Appointment 03/11/22  -PC      PT Goal Re-Cert Due Date 03/17/22  -PC              Time Calculation- PT    Total Timed Code Minutes- PT 16 minute(s)  -PC            User Key  (r) = Recorded By, (t) = Taken By, (c) = Cosigned By    Initials Name Provider Type    PC Abbie Hodge PT  Physical Therapist              Therapy Charges for Today     Code Description Service Date Service Provider Modifiers Qty    [id number]  PT EVAL MOD COMPLEXITY 2 3/10/2022 Abbie Hodge, PT GP 1    30119681493 HC PT THER PROC EA 15 MIN 3/10/2022 Abbie Hodge, PT GP 1    [id number]  PT THER SUPP EA 15 MIN 3/10/2022 Abbie Hodge, PT GP 1          PT G-Codes  Outcome Measure Options: AM-PAC 6 Clicks Daily Activity (OT), Modified Dom  AM-PAC 6 Clicks Score (OT): 13  Modified [place] Scale: 4 - Moderately severe disability.  Unable to walk without assistance, and unable to attend to own bodily needs without assistance.    Abbie Hodge, PT  3/10/2022

## 2022-03-10 NOTE — PLAN OF CARE
Goal Outcome Evaluation:  Plan of Care Reviewed With: patient        Progress: improving   Digoxin levels improving. Remains in atrial fibrillation with a slow rate. Reports improvement of fatigue. Up in the chair and worked with physical therapy. Will CTM.

## 2022-03-10 NOTE — PROGRESS NOTES
"Daily progress note    Chief complaint  Doing same  Awake and alert  Denies any dizziness lightheadedness  No new complaints  Family at bedside    History of present illness  62-year old white female with history of atrial fibrillation recent multiple right CVA with left hemiparesis diabetes mellitus hypertension hyperlipidemia and gastroesophageal reflux disease who is taking atenolol and digoxin to control the heart rate presented to [facility] emergency room after seen by primary care doctor for generalized weakness and found to have dig toxicity with low heart rate asked to come to the ER.  Patient dig level was 3.0 and now it is 2.2 but heart rate remained in 40s.  Patient has weakness but denies any palpitations lightheadedness dizziness chest pain or shortness of breath.  Patient has been feeling weak.  Patient admitted for further work-up.     REVIEW OF SYSTEMS  Constitutional: Negative for fever.   HENT: Negative for sore throat.    Eyes: Negative.    Respiratory: Negative for cough and shortness of breath.    Cardiovascular: Negative for chest pain.   Gastrointestinal: Negative for abdominal pain, diarrhea and vomiting.   Genitourinary: Negative for dysuria.   Musculoskeletal: Negative for neck pain.   Skin: Negative for rash.   Allergic/Immunologic: Negative.    Neurological: Positive for weakness. Negative for numbness and headaches.   Hematological: Negative.    Psychiatric/Behavioral: Negative.    All other systems reviewed and are negative.     PHYSICAL EXAM  Blood pressure (!) 140/118, pulse (!) 44, temperature 97.6 °F (36.4 °C), temperature source Oral, resp. rate 18, height 172.7 cm (68\"), weight 98.7 kg (217 lb 9.5 oz), SpO2 99 %.    Constitutional:       General: She is not in acute distress.  HENT:      Head: Normocephalic and atraumatic.   Eyes:      Pupils: Pupils are equal, round, and reactive to light.   Cardiovascular:      Rate and Rhythm: Regular rhythm. Bradycardia present.    "   Heart sounds: Normal heart sounds.   Pulmonary:      Effort: Pulmonary effort is normal. No respiratory distress.      Breath sounds: Normal breath sounds.   Abdominal:      Palpations: Abdomen is soft.      Tenderness: There is no abdominal tenderness. There is no guarding or rebound.   Musculoskeletal:         General: Normal range of motion.      Cervical back: Normal range of motion and neck supple.   Skin:     General: Skin is warm and dry.      Findings: No rash.   Neurological:      Mental Status: She is alert and oriented to person, place, and time.      Sensory: Sensation is intact.      Comments: Chronic weakness of left side   Psychiatric:         Mood and Affect: Mood and affect normal.      LAB RESULTS  Lab Results (last 24 hours)     Procedure Component Value Units Date/Time    Digoxin Level [[id number]]  (Abnormal) Collected: 03/10/22 0844    Specimen: Blood Updated: 03/10/22 1250     Digoxin 1.50 ng/mL     POC Glucose Once [[id number]]  (Abnormal) Collected: 03/10/22 1203    Specimen: Blood Updated: 03/10/22 1205     Glucose 254 mg/dL      Comment: Meter: [id number] : [id number] Chet DIAS       CBC & Differential [[id number]]  (Abnormal) Collected: 03/10/22 0844    Specimen: Blood Updated: 03/10/22 1002    Narrative:      The following orders were created for panel order CBC & Differential.  Procedure                               Abnormality         Status                     ---------                               -----------         ------                     CBC Auto Differential[[id number]]        Abnormal            Final result                 Please view results for these tests on the individual orders.    CBC Auto Differential [[id number]]  (Abnormal) Collected: 03/10/22 0844    Specimen: Blood Updated: 03/10/22 1002     WBC 3.25 10*3/mm3      RBC 4.49 10*6/mm3      Hemoglobin 12.3 g/dL      Hematocrit 39.5 %      MCV 88.0 fL      MCH 27.4 pg      MCHC 31.1 g/dL      RDW 15.7 %       RDW-SD 50.5 fl      MPV 11.4 fL      Platelets 115 10*3/mm3      Neutrophil % 61.9 %      Lymphocyte % 24.6 %      Monocyte % 8.9 %      Eosinophil % 3.4 %      Basophil % 0.9 %      Neutrophils, Absolute 2.01 10*3/mm3      Lymphocytes, Absolute 0.80 10*3/mm3      Monocytes, Absolute 0.29 10*3/mm3      Eosinophils, Absolute 0.11 10*3/mm3      Basophils, Absolute 0.03 10*3/mm3     TSH [[id number]]  (Abnormal) Collected: 03/10/22 0844    Specimen: Blood Updated: 03/10/22 1001     TSH 0.033 uIU/mL     BNP [[id number]]  (Abnormal) Collected: 03/10/22 0844    Specimen: Blood Updated: 03/10/22 1001     proBNP 3,349.0 pg/mL     Narrative:      Among patients with dyspnea, NT-proBNP is highly sensitive for the detection of acute congestive heart failure. In addition NT-proBNP of <300 pg/ml effectively rules out acute congestive heart failure with 99% negative predictive value.    Results may be falsely decreased if patient taking Biotin.      Comprehensive Metabolic Panel [[id number]]  (Abnormal) Collected: 03/10/22 0844    Specimen: Blood Updated: 03/10/22 0956     Glucose 266 mg/dL      BUN 9 mg/dL      Creatinine 0.91 mg/dL      Sodium 137 mmol/L      Potassium 3.9 mmol/L      Chloride 102 mmol/L      CO2 23.9 mmol/L      Calcium 10.3 mg/dL      Total Protein 6.7 g/dL      Albumin 3.20 g/dL      ALT (SGPT) 35 U/L      AST (SGOT) 42 U/L      Alkaline Phosphatase 121 U/L      Total Bilirubin 1.2 mg/dL      Globulin 3.5 gm/dL      A/G Ratio 0.9 g/dL      BUN/Creatinine Ratio 9.9     Anion Gap 11.1 mmol/L      eGFR 72.4 mL/min/1.73      Comment: National Kidney Foundation and American Society of Nephrology (ASN) Task Force recommended calculation based on the Chronic Kidney Disease Epidemiology Collaboration (CKD-EPI) equation refit without adjustment for race.       Narrative:      GFR Normal >60  Chronic Kidney Disease <60  Kidney Failure <15      Hemoglobin A1c [[id number]]  (Abnormal) Collected: 03/10/22 0844    Specimen:  Blood Updated: 03/10/22 0942     Hemoglobin A1C 10.10 %     Narrative:      Hemoglobin A1C Ranges:    Increased Risk for Diabetes  5.7% to 6.4%  Diabetes                     >= 6.5%  Diabetic Goal                < 7.0%    POC Glucose Once [[id number]]  (Abnormal) Collected: 03/10/22 0603    Specimen: Blood Updated: 03/10/22 0604     Glucose 181 mg/dL      Comment: Meter: [id number] : [id number] Chuy DONAHUE RN       POC Glucose Once [[id number]]  (Abnormal) Collected: 03/10/22 0221    Specimen: Blood Updated: 03/10/22 0222     Glucose 165 mg/dL      Comment: Meter: [id number] : [id number] Chuy DONAHUE RN       POC Glucose Once [[id number]]  (Abnormal) Collected: 03/09/22 1953    Specimen: Blood Updated: 03/09/22 1953     Glucose 185 mg/dL      Comment: Meter: [id number] : [id number] Chuy DONAHUE RN       COVID PRE-OP / PRE-PROCEDURE SCREENING ORDER (NO ISOLATION) - Swab, Nasopharynx [[id number]]  (Normal) Collected: 03/09/22 1141    Specimen: Swab from Nasopharynx Updated: 03/09/22 1852    Narrative:      The following orders were created for panel order COVID PRE-OP / PRE-PROCEDURE SCREENING ORDER (NO ISOLATION) - Swab, Nasopharynx.  Procedure                               Abnormality         Status                     ---------                               -----------         ------                     COVID-19,APTIMA PANTHER(...[[id number]]  Normal              Final result                 Please view results for these tests on the individual orders.    COVID-19,APTIMA PANTHER(DANIEL),[facility], NP/OP SWAB IN UTM/VTM/SALINE TRANSPORT MEDIA,24 HR TAT - Swab, Nasopharynx [[id number]]  (Normal) Collected: 03/09/22 1141    Specimen: Swab from Nasopharynx Updated: 03/09/22 1852     COVID19 Not Detected    Narrative:      Fact sheet for providers: https://www.fda.gov/media/263184/download     Fact sheet for patients: https://www.fda.gov/media/875396/download    Test performed by RT PCR.    POC Glucose  Once [[id number]]  (Abnormal) Collected: 03/09/22 1638    Specimen: Blood Updated: 03/09/22 1640     Glucose 241 mg/dL      Comment: Meter: [id number] : [id number] Dario DIAS       T4, Free [[id number]]  (Abnormal) Collected: 03/09/22 1453    Specimen: Blood Updated: 03/09/22 1552     Free T4 2.21 ng/dL     Narrative:      Results may be falsely increased if patient taking Biotin.          Imaging Results (Last 24 Hours)     ** No results found for the last 24 hours. **             ECG 12 Lead  Component   Ref Range & Units 3/9/22 0914 2/5/22 0040 1/28/22 2236   QT Interval   ms 364 P  393  360              HEART RATE= 44  bpm  RR Interval= 1356  ms  KS Interval=   ms  P Horizontal Axis=   deg  P Front Axis=   deg  QRSD Interval= 95  ms  QT Interval= 364  ms  QRS Axis= 41  deg  T Wave Axis= 238  deg  - ABNORMAL ECG -  Atrial fibrillation  Repol abnrm suggests ischemia, diffuse leads             Current Facility-Administered Medications:   •  albuterol (PROVENTIL) nebulizer solution 0.083% 2.5 mg/3mL, 2.5 mg, Nebulization, Q6H PRN, Michele Subramanian MD  •  apixaban (ELIQUIS) tablet 5 mg, 5 mg, Oral, Q12H, Michele Subramanian MD, 5 mg at 03/10/22 0626  •  atorvastatin (LIPITOR) tablet 10 mg, 10 mg, Oral, Nightly, Michele Subramanian MD, 10 mg at 03/09/22 2012  •  gabapentin (NEURONTIN) capsule 300 mg, 300 mg, Oral, TID, Michele Subramanian MD, 300 mg at 03/10/22 0800  •  insulin glargine (LANTUS, SEMGLEE) injection 30 Units, 30 Units, Subcutaneous, BID, Michele Subramanian MD, 30 Units at 03/10/22 0800  •  insulin lispro (ADMELOG) injection 0-7 Units, 0-7 Units, Subcutaneous, 4x Daily With Meals & Nightly, Michele Subramanian MD, 4 Units at 03/10/22 1300  •  insulin lispro (ADMELOG) injection 10 Units, 10 Units, Subcutaneous, TID AC, Michele Subramanian MD, 10 Units at 03/10/22 1300  •  levothyroxine (SYNTHROID, LEVOTHROID) tablet 300 mcg, 300 mcg, Oral, Daily, Michele Subramanian MD, 300 mcg at 03/10/22 0800  •  pantoprazole (PROTONIX) EC tablet 40 mg, 40  mg, Oral, BID AC, Heriberto Subramanian MD, 40 mg at 03/10/22 0630  •  [COMPLETED] Insert peripheral IV, , , Once **AND** sodium chloride 0.9 % flush 10 mL, 10 mL, Intravenous, PRN, Naif Raphael MD  •  sodium chloride 0.9 % infusion, 75 mL/hr, Intravenous, Continuous, Heriberto Subramanian MD, Last Rate: 75 mL/hr at 03/09/22 1714, 75 mL/hr at 03/09/22 1714     ASSESSMENT  Dig toxicity  Chronic atrial fibrillation with bradycardia  Recent right MCA infarct with left hemiparesis  Diabetes mellitus  Hypertension  Hyperlipidemia  Hypothyroidism with elevated free T4 and low TSH  Gastroesophageal reflux disease    PLAN  CPM  Discontinue IVF  Continue to hold Lanoxin and atenolol  Adjust Synthroid dose  Cardiology consult appreciated  Adjust home medications   Stress ulcer DVT prophylaxis  Supportive care  PT/OT  Discussed with  and nursing staff  Follow closely and further recommendation according to hospital course    HERIBERTO SUBRAMANIAN MD

## 2022-03-10 NOTE — DISCHARGE PLACEMENT REQUEST
"Jenelle Kasper (62 y.o. Female)             Date of Birth   1960    Social Security Number       Address   [address] [address]    Home Phone   [phone]    MRN   [id number]       Sabianist   None    Marital Status   Single                            Admission Date   3/9/22    Admission Type   Emergency    Admitting Provider   Michele Subramanian MD    Attending Provider   Michele Subramanian MD    Department, Room/Bed   [address], [address]       Discharge Date       Discharge Disposition       Discharge Destination                               Attending Provider: Michele Subramanian MD    Allergies: Clindamycin/lincomycin, Codeine Sulfate, Contrast Dye, Iodinated Diagnostic Agents, Morphine Sulfate, Other, Pradaxa [Dabigatran Etexilate Mesylate], Latex, Latex, Natural Rubber, Bee Venom, Hydrocodone, Propoxyphene, Sulfa Antibiotics    Isolation: None   Infection: None   Code Status: CPR   Advance Care Planning Activity    Ht: 172.7 cm (68\")   Wt: 98.7 kg (217 lb 9.5 oz)    Admission Cmt: None   Principal Problem: None                Active Insurance as of 3/9/2022     Primary Coverage     Payor Plan Insurance Group Employer/Plan Group    [facility] MEDICARE REPLACEMENT [facility] MEDICARE REPLACEMENT [id number]     Payor Plan Address Payor Plan Phone Number Payor Plan Fax Number Effective Dates    [address] [address]   4/1/2019 - None Entered    [facility] [place] [id number]       Subscriber Name Subscriber Birth Date Member ID       Jenelle Kasper 1960 [id number]                 Emergency Contacts      (Rel.) Home Phone Work Phone Mobile Phone    Gabino Owen (Significant Other) [phone] -- [phone]    Cristobal Chapman -- -- --            "

## 2022-03-10 NOTE — CASE MANAGEMENT/SOCIAL WORK
Discharge Planning Assessment  [facility]     Patient Name: Jenelle Kasper  MRN: [id number]  Today's Date: 3/10/2022    Admit Date: 3/9/2022     Discharge Needs Assessment     Row Name 03/10/22 1724       Living Environment    People in Home significant other    Current Living Arrangements home    Primary Care Provided by spouse/significant other    Provides Primary Care For no one, unable/limited ability to care for self    Family Caregiver if Needed significant other    Quality of Family Relationships helpful;involved;supportive    Able to Return to Prior Arrangements yes       Resource/Environmental Concerns    Resource/Environmental Concerns home accessibility    Home Accessibility Concerns stairs to enter home       Transition Planning    Patient/Family Anticipates Transition to home with help/services;inpatient rehabilitation facility    Transportation Anticipated family or friend will provide;health plan transportation;other (see comments)       Discharge Needs Assessment    Readmission Within the Last 30 Days no previous admission in last 30 days    Concerns to be Addressed discharge planning    Anticipated Changes Related to Illness inability to care for someone else    Equipment Needed After Discharge other (see comments)               Discharge Plan     Row Name 03/10/22 1716       Plan    Plan Referral to [facility] acute rehab vs Home with [facility] -    Provided Post Acute Provider List? N/A    Patient/Family in Agreement with Plan yes    Plan Comments CCP spoke with pt and significant other Gabino Owen [phone] at bedside, introduced self and explained CCP role. Verified facesheet and confirmed local pharmacy is [facility]. Pt states she has trouble affording her insulins. Offered meds to beds and pt agreeable. Pt and s.o. are interested in speaking with the DM educator regarding Dm/Insulin medication costs and pen use. Pt lives at home with Gabino in a home with 4-5 steps to enter.  Once inside, single story. Pt has a cane, walker, bsc and currently they are remodeling their bathroom to have grab bars and shower seat installed. Pt has been to [facility] and they wish to not return. [facility] was to come out but pt readmitted before they came. CCP discussed dc planning and they would like referral to [facility] acute rehab. If not accepted they are not interested in SNF and will use HH. Discussed transportation options, and Gabino requests EMS transport. CCP explained unable to guarantee insurance payment but medical necessity form to be completed if pt goes home. CCP made referral to [facility] acute rehab and [place]. Vel BAJWA/BRITTANY              Continued Care and Services - Admitted Since 3/9/2022     Destination     Service Provider Request Status Selected Services Address Phone Fax Patient Preferred     [facility]  Pending - No Request Sent N/A [address] [address] [phone] -- --          Home Medical Care     Service Provider Request Status Selected Services Address Phone Fax Patient Preferred    [facility]  Pending - Request Sent N/A [id number] [place] [id number] [phone] --            Selected Continued Care - Prior Encounters Includes selections from prior encounters from 12/9/2021 to 3/10/2022    Discharged on 2/6/2022 Admission date: 1/28/2022 - Discharge disposition: Skilled Nursing Facility (DC - External)    Destination     Service Provider Selected Services Address Phone Fax Patient Preferred    [facility]  Skilled Nursing [address] [address] [phone] --                       Demographic Summary     Row Name 03/10/22 1724       General Information    Admission Type observation    Referral Source admission list    Reason for Consult discharge planning               Functional Status    No  documentation.                Psychosocial    No documentation.                Abuse/Neglect    No documentation.                Legal    No documentation.                Substance Abuse    No documentation.                Patient Forms    No documentation.                   Michelle Dennison RN

## 2022-03-11 LAB
ANION GAP SERPL CALCULATED.3IONS-SCNC: 9.4 MMOL/L (ref 5–15)
ANISOCYTOSIS BLD QL: ABNORMAL
BASOPHILS # BLD MANUAL: 0.04 10*3/MM3 (ref 0–0.2)
BASOPHILS NFR BLD MANUAL: 1 % (ref 0–1.5)
BUN SERPL-MCNC: 8 MG/DL (ref 8–23)
BUN/CREAT SERPL: 9.8 (ref 7–25)
CALCIUM SPEC-SCNC: 10.3 MG/DL (ref 8.6–10.5)
CHLORIDE SERPL-SCNC: 105 MMOL/L (ref 98–107)
CO2 SERPL-SCNC: 25.6 MMOL/L (ref 22–29)
CREAT SERPL-MCNC: 0.82 MG/DL (ref 0.57–1)
DEPRECATED RDW RBC AUTO: 50 FL (ref 37–54)
DIGOXIN SERPL-MCNC: 1.3 NG/ML (ref 0.6–1.2)
EGFRCR SERPLBLD CKD-EPI 2021: 82 ML/MIN/1.73
EOSINOPHIL # BLD MANUAL: 0.11 10*3/MM3 (ref 0–0.4)
EOSINOPHIL NFR BLD MANUAL: 3.1 % (ref 0.3–6.2)
ERYTHROCYTE [DISTWIDTH] IN BLOOD BY AUTOMATED COUNT: 16 % (ref 12.3–15.4)
GLUCOSE BLDC GLUCOMTR-MCNC: 115 MG/DL (ref 70–130)
GLUCOSE BLDC GLUCOMTR-MCNC: 205 MG/DL (ref 70–130)
GLUCOSE BLDC GLUCOMTR-MCNC: 205 MG/DL (ref 70–130)
GLUCOSE BLDC GLUCOMTR-MCNC: 91 MG/DL (ref 70–130)
GLUCOSE SERPL-MCNC: 221 MG/DL (ref 65–99)
HCT VFR BLD AUTO: 36.9 % (ref 34–46.6)
HGB BLD-MCNC: 11.8 G/DL (ref 12–15.9)
LYMPHOCYTES # BLD MANUAL: 0.44 10*3/MM3 (ref 0.7–3.1)
LYMPHOCYTES NFR BLD MANUAL: 5.2 % (ref 5–12)
MCH RBC QN AUTO: 27.6 PG (ref 26.6–33)
MCHC RBC AUTO-ENTMCNC: 32 G/DL (ref 31.5–35.7)
MCV RBC AUTO: 86.4 FL (ref 79–97)
MICROCYTES BLD QL: ABNORMAL
MONOCYTES # BLD: 0.18 10*3/MM3 (ref 0.1–0.9)
NEUTROPHILS # BLD AUTO: 2.78 10*3/MM3 (ref 1.7–7)
NEUTROPHILS NFR BLD MANUAL: 78.4 % (ref 42.7–76)
PLAT MORPH BLD: NORMAL
PLATELET # BLD AUTO: 107 10*3/MM3 (ref 140–450)
PMV BLD AUTO: 10.9 FL (ref 6–12)
POTASSIUM SERPL-SCNC: 3.7 MMOL/L (ref 3.5–5.2)
RBC # BLD AUTO: 4.27 10*6/MM3 (ref 3.77–5.28)
SMUDGE CELLS BLD QL SMEAR: ABNORMAL
SODIUM SERPL-SCNC: 140 MMOL/L (ref 136–145)
VARIANT LYMPHS NFR BLD MANUAL: 12.4 % (ref 19.6–45.3)
WBC NRBC COR # BLD: 3.54 10*3/MM3 (ref 3.4–10.8)

## 2022-03-11 PROCEDURE — 97530 THERAPEUTIC ACTIVITIES: CPT

## 2022-03-11 PROCEDURE — G0378 HOSPITAL OBSERVATION PER HR: HCPCS

## 2022-03-11 PROCEDURE — 63710000001 INSULIN LISPRO (HUMAN) PER 5 UNITS: Performed by: HOSPITALIST

## 2022-03-11 PROCEDURE — 82962 GLUCOSE BLOOD TEST: CPT

## 2022-03-11 PROCEDURE — 99214 OFFICE O/P EST MOD 30 MIN: CPT | Performed by: INTERNAL MEDICINE

## 2022-03-11 PROCEDURE — 85025 COMPLETE CBC W/AUTO DIFF WBC: CPT | Performed by: HOSPITALIST

## 2022-03-11 PROCEDURE — 85007 BL SMEAR W/DIFF WBC COUNT: CPT | Performed by: HOSPITALIST

## 2022-03-11 PROCEDURE — 80048 BASIC METABOLIC PNL TOTAL CA: CPT | Performed by: HOSPITALIST

## 2022-03-11 PROCEDURE — 80162 ASSAY OF DIGOXIN TOTAL: CPT | Performed by: HOSPITALIST

## 2022-03-11 PROCEDURE — 97112 NEUROMUSCULAR REEDUCATION: CPT

## 2022-03-11 PROCEDURE — 63710000001 INSULIN GLARGINE PER 5 UNITS: Performed by: HOSPITALIST

## 2022-03-11 RX ORDER — TORSEMIDE 20 MG/1
20 TABLET ORAL DAILY
Status: DISCONTINUED | OUTPATIENT
Start: 2022-03-11 | End: 2022-03-16 | Stop reason: HOSPADM

## 2022-03-11 RX ORDER — LOSARTAN POTASSIUM 50 MG/1
50 TABLET ORAL
Status: DISCONTINUED | OUTPATIENT
Start: 2022-03-11 | End: 2022-03-12

## 2022-03-11 RX ADMIN — ATORVASTATIN CALCIUM 10 MG: 10 TABLET, FILM COATED ORAL at 20:39

## 2022-03-11 RX ADMIN — INSULIN LISPRO 3 UNITS: 100 INJECTION, SOLUTION INTRAVENOUS; SUBCUTANEOUS at 12:58

## 2022-03-11 RX ADMIN — APIXABAN 5 MG: 5 TABLET, FILM COATED ORAL at 06:21

## 2022-03-11 RX ADMIN — APIXABAN 5 MG: 5 TABLET, FILM COATED ORAL at 18:08

## 2022-03-11 RX ADMIN — LOSARTAN POTASSIUM 50 MG: 50 TABLET, FILM COATED ORAL at 15:56

## 2022-03-11 RX ADMIN — INSULIN LISPRO 12 UNITS: 100 INJECTION, SOLUTION INTRAVENOUS; SUBCUTANEOUS at 12:58

## 2022-03-11 RX ADMIN — INSULIN GLARGINE-YFGN 36 UNITS: 100 INJECTION, SOLUTION SUBCUTANEOUS at 20:42

## 2022-03-11 RX ADMIN — PANTOPRAZOLE SODIUM 40 MG: 40 TABLET, DELAYED RELEASE ORAL at 18:09

## 2022-03-11 RX ADMIN — GABAPENTIN 300 MG: 300 CAPSULE ORAL at 08:32

## 2022-03-11 RX ADMIN — GABAPENTIN 300 MG: 300 CAPSULE ORAL at 20:39

## 2022-03-11 RX ADMIN — INSULIN LISPRO 3 UNITS: 100 INJECTION, SOLUTION INTRAVENOUS; SUBCUTANEOUS at 08:32

## 2022-03-11 RX ADMIN — PANTOPRAZOLE SODIUM 40 MG: 40 TABLET, DELAYED RELEASE ORAL at 06:21

## 2022-03-11 RX ADMIN — GABAPENTIN 300 MG: 300 CAPSULE ORAL at 18:09

## 2022-03-11 RX ADMIN — INSULIN LISPRO 12 UNITS: 100 INJECTION, SOLUTION INTRAVENOUS; SUBCUTANEOUS at 08:33

## 2022-03-11 RX ADMIN — INSULIN GLARGINE-YFGN 36 UNITS: 100 INJECTION, SOLUTION SUBCUTANEOUS at 08:26

## 2022-03-11 RX ADMIN — TORSEMIDE 20 MG: 20 TABLET ORAL at 15:56

## 2022-03-11 RX ADMIN — LEVOTHYROXINE SODIUM 150 MCG: 0.15 TABLET ORAL at 08:32

## 2022-03-11 RX ADMIN — SPIRONOLACTONE 25 MG: 25 TABLET ORAL at 08:32

## 2022-03-11 NOTE — PROGRESS NOTES
"DAILY PROGRESS NOTE  [facility]    Patient Identification:  Name: Jenelle Kasper  Age: 62 y.o.  Sex: female  :  1960  MRN: [id number]         Primary Care Physician: Zay Olivares MD    Subjective: patient is resting; complains of leg pain  Interval History: follow up for cva, bradycardia, chf, weakness    Objective:    Scheduled Meds:apixaban, 5 mg, Oral, Q12H  atorvastatin, 10 mg, Oral, Nightly  gabapentin, 300 mg, Oral, TID  insulin glargine, 36 Units, Subcutaneous, BID  insulin lispro, 0-7 Units, Subcutaneous, 4x Daily With Meals & Nightly  insulin lispro, 12 Units, Subcutaneous, TID AC  levothyroxine, 150 mcg, Oral, Daily  losartan, 50 mg, Oral, Q24H  pantoprazole, 40 mg, Oral, BID AC  spironolactone, 25 mg, Oral, Daily  torsemide, 20 mg, Oral, Daily      Continuous Infusions:     Vital signs in last 24 hours:  Temp:  [97.4 °F (36.3 °C)-98.7 °F (37.1 °C)] 98.7 °F (37.1 °C)  Heart Rate:  [52-62] 56  Resp:  [16-20] 20  BP: (117-159)/(62-79) 117/62    Intake/Output:    Intake/Output Summary (Last 24 hours) at 3/11/2022 1717  Last data filed at 3/11/2022 0900  Gross per 24 hour   Intake 360 ml   Output 750 ml   Net -390 ml       Exam:  /62 (BP Location: Right arm, Patient Position: Lying)   Pulse 56   Temp 98.7 °F (37.1 °C) (Oral)   Resp 20   Ht 172.7 cm (68\")   Wt 98.6 kg (217 lb 6 oz)   SpO2 93%   BMI 33.05 kg/m²     General Appearance:    Alert, cooperative, no distress, AAOx3                          Head:    Normocephalic, without obvious abnormality, atraumatic                           Eyes:    PERRL, conjunctivae/corneas clear, EOM's intact, both eyes                         Throat:   Lips, tongue, gums normal; oral mucosa pink and moist                           Neck:   Supple, symmetrical, trachea midline, no JVD                         Lungs:    Decreased breath sounds bilaterally, respirations unlabored                 Chest Wall:    No tenderness or " deformity                          Heart:    Regular rate and rhythm, S1 and S2 normal, no murmur,no  rub or gallop                  Abdomen:     Soft, nontender, bowel sounds active, no masses, no organomegaly                  Extremities:   Extremities normal, atraumatic, no cyanosis or edema                        Pulses:   Pulses palpable in all extremities                            Skin:   Skin is warm and dry,  no rashes or palpable lesions                  Neurologic:   CNII-XII intact, motor strength grossly intact, sensation grossly intact to light touch, no focal deficits noted       Data Review:  Labs in chart were reviewed.  WBC   Date Value Ref Range Status   03/11/2022 3.54 3.40 - 10.80 10*3/mm3 Final     Hemoglobin   Date Value Ref Range Status   03/11/2022 11.8 (L) 12.0 - 15.9 g/dL Final     Hematocrit   Date Value Ref Range Status   03/11/2022 36.9 34.0 - 46.6 % Final     Platelets   Date Value Ref Range Status   03/11/2022 107 (L) 140 - 450 10*3/mm3 Final     Sodium   Date Value Ref Range Status   03/11/2022 140 136 - 145 mmol/L Final     Potassium   Date Value Ref Range Status   03/11/2022 3.7 3.5 - 5.2 mmol/L Final     Chloride   Date Value Ref Range Status   03/11/2022 105 98 - 107 mmol/L Final     CO2   Date Value Ref Range Status   03/11/2022 25.6 22.0 - 29.0 mmol/L Final     BUN   Date Value Ref Range Status   03/11/2022 8 8 - 23 mg/dL Final     Creatinine   Date Value Ref Range Status   03/11/2022 0.82 0.57 - 1.00 mg/dL Final     Glucose   Date Value Ref Range Status   03/11/2022 221 (H) 65 - 99 mg/dL Final     Calcium   Date Value Ref Range Status   03/11/2022 10.3 8.6 - 10.5 mg/dL Final     AST (SGOT)   Date Value Ref Range Status   03/10/2022 42 (H) 1 - 32 U/L Final     ALT (SGPT)   Date Value Ref Range Status   03/10/2022 35 (H) 1 - 33 U/L Final     Alkaline Phosphatase   Date Value Ref Range Status   03/10/2022 121 (H) 39 - 117 U/L Final     No results found for: APTT, INR  Patient  Active Problem List   Diagnosis Code   • Persistent atrial fibrillation (HCC) I48.19   • Benign essential HTN I10   • Hyperparathyroidism (HCC) E21.3   • Morbid obesity with BMI of 45.0-49.9, adult (HCC) E66.01, Z68.42   • Pulmonary hypertension (HCC) I27.20   • Precordial pain R07.2   • Shortness of breath R06.02   • TIA (transient ischemic attack) G45.9   • Bradycardia R00.1       Assessment:  Active Hospital Problems    Diagnosis  POA   • Bradycardia [R00.1]  Yes      Resolved Hospital Problems   No resolved problems to display.   atrial fibrillation  Hypertension      Plan:  Will continue to hold digoxin  Level is drifting down  Medications being titrated by cardiology  Monitor on telemetry  D.w patient  Notes and labs reviewed  Medium risk    Priti Zayas MD  3/11/2022  17:17 EST

## 2022-03-11 NOTE — PROGRESS NOTES
LOS: 0 days   Patient Care Team:  Zay Olivares MD as PCP - General (General Practice)    Chief Complaint: Follow-up recent stroke, digoxin toxicity, persistent atrial fibrillation, chronic diastolic CHF.    Interval History: Her main complaint today is a headache.  Her heart rate still he is bradycardic at times, and she does have some pauses as well.  She is not having lightheadedness currently.  She denied any chest pain.  She has some baseline shortness of breath which is unchanged.    Vital Signs:  Temp:  [97.4 °F (36.3 °C)-98.7 °F (37.1 °C)] 98.7 °F (37.1 °C)  Heart Rate:  [52-62] 56  Resp:  [16-20] 20  BP: (117-159)/(62-79) 117/62    Intake/Output Summary (Last 24 hours) at 3/11/2022 1525  Last data filed at 3/11/2022 0900  Gross per 24 hour   Intake 360 ml   Output 750 ml   Net -390 ml       Physical Exam:   General Appearance:    No acute distress, alert and oriented x4   Lungs:     Clear to auscultation bilaterally     Heart:   Irregularly irregular rhythm with a mildly bradycardic rate.  II/VI SM RUSB and LUSB.   Abdomen:     Soft, nontender, nondistended.    Extremities:   Trace-1+ edema lower extremities.     Results Review:    Results from last 7 days   Lab Units 03/11/22  0737   SODIUM mmol/L 140   POTASSIUM mmol/L 3.7   CHLORIDE mmol/L 105   CO2 mmol/L 25.6   BUN mg/dL 8   CREATININE mg/dL 0.82   GLUCOSE mg/dL 221*   CALCIUM mg/dL 10.3         Results from last 7 days   Lab Units 03/11/22  0737   WBC 10*3/mm3 3.54   HEMOGLOBIN g/dL 11.8*   HEMATOCRIT % 36.9   PLATELETS 10*3/mm3 107*                       I reviewed the patient's new clinical results.        Assessment:  1.  Digoxin toxicity with associated bradycardia  2.  Persistent atrial fibrillation  3.  Acute embolic right MCA CVA on 1/30/2022  4.  History of GI bleeding with Pradaxa and Xarelto in the past  5.  Chronic diastolic CHF  6.  Moderate right ventricular enlargement with moderately reduced function  7.  Severe biatrial  enlargement  8.  History of gastric bypass  9.  Insulin-dependent diabetes  10.  Dyslipidemia with low HDL  11.  Leukopenia  12.  Thrombocytopenia    Plan:  -I am going to work on her blood pressure.  This may be why she has a headache.  It has gone up despite her heart rate still being bradycardic at times.  I am going to continue the spironolactone at 25 mg/day which was restarted yesterday.  I am going to place her on losartan 50 mg/day.  I am going to resume her torsemide at 20 mg/day (she is normally on 40 mg/day at home).    -She has baseline significant lower extremity edema at times.  I would avoid calcium channel blocker such as amlodipine for this reason.    -Digoxin level is down to 1.3.  Continue to follow.  No indication for Digibind.  Atenolol also on hold.  She is still bradycardic with pauses at times.  I am going to watch this very closely over the weekend.  No indication for a pacemaker yet.    -I would not give Digibind as she is not severely toxic.  As this clears out, I suspect her heart rate will get better.  Her atenolol is also on hold for now.  Recheck digoxin level tomorrow.    -Bleeding with Pradaxa and Xarelto in the past.  Consideration for an atrial occluder device, although she would have to tolerate the Eliquis.  Continue Eliquis because of the recent stroke and the persistent atrial fibrillation.    Luis Wilson MD  03/11/22  15:25 EST

## 2022-03-11 NOTE — CONSULTS
Acute rehab evaluation initiated. Chart reviewed and patient appears appropriate for admission to acute rehab. Need to discuss discharge plan with patient and significant other. [employer] Replacement pre-cert would be required prior to admission. Additionally, bed availability will be a significant barrier prior to the middle end of next week at the very earliest. Will continue to follow.    The above was discussed with Adele with CCP.      Thank you!    Tashi Pickett RN  p

## 2022-03-11 NOTE — PLAN OF CARE
Goal Outcome Evaluation:  Plan of Care Reviewed With: patient        Progress: improving  Outcome Evaluation: Pt agreeable to OT session. Pt with increased balance during STS and stand pivot to bedside chair. Pt completed pattern matching and stringing activity with L UE to address decreased fine motor coordination, visual scanning, and cognition. Recommend skilled OT servcies to address decreased L UE strength and coordination, functional transfers, functional balance and self-care. Recommend IRF upon d/c.    OTS wore PPE including mask, gloves, eye protection, and completed hand hygiene prior to/after session. Pt wore a mask.

## 2022-03-11 NOTE — PROGRESS NOTES
LOS: 0 days   Patient Care Team:  Zay Olivares MD as PCP - General (General Practice)    Chief Complaint: Follow-up recent stroke, digoxin toxicity, persistent atrial fibrillation, chronic diastolic CHF.    Interval History: Heart rate is slightly better, but still intermittently bradycardic with rates into the 30s at times.  She feels better in general, and is not having significant lightheadedness.  Shortness of breath is better.  No chest pain.    Vital Signs:  Temp:  [97.6 °F (36.4 °C)-98.5 °F (36.9 °C)] 97.6 °F (36.4 °C)  Heart Rate:  [44-62] 62  Resp:  [16-18] 18  BP: (132-159)/() 159/79    Intake/Output Summary (Last 24 hours) at 3/10/2022 1922  Last data filed at 3/10/2022 1800  Gross per 24 hour   Intake 2144 ml   Output 450 ml   Net 1694 ml       Physical Exam:   General Appearance:    No acute distress, alert and oriented x4   Lungs:     Clear to auscultation bilaterally     Heart:   Irregularly irregular rhythm with a mildly bradycardic rate.  II/VI SM RUSB and LUSB.   Abdomen:     Soft, nontender, nondistended.    Extremities:   Trace-1+ edema lower extremities.     Results Review:    Results from last 7 days   Lab Units 03/10/22  0844   SODIUM mmol/L 137   POTASSIUM mmol/L 3.9   CHLORIDE mmol/L 102   CO2 mmol/L 23.9   BUN mg/dL 9   CREATININE mg/dL 0.91   GLUCOSE mg/dL 266*   CALCIUM mg/dL 10.3         Results from last 7 days   Lab Units 03/10/22  0844   WBC 10*3/mm3 3.25*   HEMOGLOBIN g/dL 12.3   HEMATOCRIT % 39.5   PLATELETS 10*3/mm3 115*                       I reviewed the patient's new clinical results.        Assessment:  1.  Digoxin toxicity with associated bradycardia  2.  Persistent atrial fibrillation  3.  Acute embolic right MCA CVA on 1/30/2022  4.  History of GI bleeding with Pradaxa and Xarelto in the past  5.  Chronic diastolic CHF  6.  Moderate right ventricular enlargement with moderately reduced function  7.  Severe biatrial enlargement  8.  History of gastric  bypass  9.  Insulin-dependent diabetes  10.  Dyslipidemia with low HDL  11.  Leukopenia  12.  Thrombocytopenia    Plan:  -I had a long discussion with the patient today.  There was confusion about when the digoxin was started, and what her medications were for.  There was also some confusion about how many blood pressure medications she had been discharged on.  I basically told him that her regimen needs to be revamped completely at this point.  Digoxin is going to be contraindicated in the future because of this presentation.    -I would not give Digibind as she is not severely toxic.  As this clears out, I suspect her heart rate will get better.  Her atenolol is also on hold for now.  Recheck digoxin level tomorrow.    -I am going to resume her spironolactone for blood pressure control.  Her torsemide is currently being held, and may be able to be resumed tomorrow as well.  She had minimal edema today.  I would stay away from calcium channel blockers as edema has been an issue for her in the past.    -Bleeding with Pradaxa and Xarelto in the past.  Consideration for an atrial occluder device, although she would have to tolerate the Eliquis.  Continue Eliquis because of the recent stroke and the persistent atrial fibrillation.    Luis Wilson MD  03/10/22  19:22 EST
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
## 2022-03-11 NOTE — CASE MANAGEMENT/SOCIAL WORK
Continued Stay Note  [facility]     Patient Name: Jenelle Kasper  MRN: [id number]  Today's Date: 3/11/2022    Admit Date: 3/9/2022     Discharge Plan     Row Name 03/11/22 1155       Plan    Plan Home with [facility] vs referral pending to [facility] acute rehab (would need pre-cert)    Plan Comments CCP spoke with [place] acute rehab and he will f.u with person evaluating pt and let CCP know determination. Explained possible dc today. ranjan wagner/ccp               Discharge Codes    No documentation.                     Michelle Dennison, RN

## 2022-03-11 NOTE — THERAPY TREATMENT NOTE
Patient Name: Jenelle Kasper  : 1960    MRN: [id number]                              Today's Date: 3/11/2022       Admit Date: 3/9/2022    Visit Dx:     ICD-10-CM ICD-9-CM   1. Bradycardia  R00.1 427.89   2. Poisoning by digoxin, accidental or unintentional, initial encounter  T46.0X1A 972.1     E858.3     Patient Active Problem List   Diagnosis   • Persistent atrial fibrillation (HCC)   • Benign essential HTN   • Hyperparathyroidism (HCC)   • Morbid obesity with BMI of 45.0-49.9, adult (HCC)   • Pulmonary hypertension (HCC)   • Precordial pain   • Shortness of breath   • TIA (transient ischemic attack)   • Bradycardia     Past Medical History:   Diagnosis Date   • Anxiety    • Arrhythmia    • Asthma    • Atrial fibrillation with RVR (HCC)    • C. difficile diarrhea    • COPD (chronic obstructive pulmonary disease) (HCC)    • Depression    • Diabetes mellitus (HCC)    • Diabetic peripheral neuropathy associated with type 2 diabetes mellitus (HCC)    • GERD (gastroesophageal reflux disease)    • Heart murmur    • History of fall     closed head injury after falling down steps; fx nose   • Hypercalcemia    • Hyperlipidemia    • Hypertension    • IBS (irritable bowel syndrome)    • Kidney stone    • Morbid obesity (HCC)    • PAF (paroxysmal atrial fibrillation) (HCC)    • PCOS (polycystic ovarian syndrome)    • PONV (postoperative nausea and vomiting)    • Sarcoidosis    • Shortness of breath    • Thyroid cancer (HCC)     WITH RADIATION   • Wounds, multiple      Past Surgical History:   Procedure Laterality Date   • CARDIAC CATHETERIZATION N/A 2016    Procedure: Coronary angiography;  Surgeon: Chris Perez MD;  Location: [facility] CATH INVASIVE LOCATION;  Service:    • CARDIAC CATHETERIZATION N/A 2016    Procedure: Left heart cath;  Surgeon: Chris Perez MD;  Location: [facility] CATH INVASIVE LOCATION;  Service:    • CARDIAC CATHETERIZATION N/A 2016    Procedure: Left ventriculography;   Surgeon: Chris Perez MD;  Location:  [facility] CATH INVASIVE LOCATION;  Service:    • CARDIAC CATHETERIZATION N/A 2/26/2020    Procedure: Right Heart Cath;  Surgeon: Chris Perez MD;  Location:  [facility] CATH INVASIVE LOCATION;  Service: Cardiovascular;  Laterality: N/A;  If there is no evidence of pulmonary hypertension please add a left heart cath to evaluate coronary arteries as patient is having chest pain.   • CHOLECYSTECTOMY     • COLONOSCOPY     • DILATATION AND CURETTAGE     • ENDOSCOPY     • GASTRIC BYPASS      gastric surgery for morbid obesity   • GASTROPLASTY  2008    GASTRIC REVISION FROM PREVIOUS GASTRIC BYPASS   • HIATAL HERNIA REPAIR     • INGUINAL HERNIA REPAIR Right    • LUNG BIOPSY      to dx sarcoidosis   • LUNG SURGERY      Removal of granulomas   • TOTAL THYROIDECTOMY        General Information     Row Name 03/11/22 1516          OT Time and Intention    Document Type therapy note (daily note)  -SM (r) LR (t) SM (c)     Mode of Treatment occupational therapy;individual therapy  -SM (r) LR (t) SM (c)     Row Name 03/11/22 1516          General Information    Patient Profile Reviewed yes  -SM (r) LR (t) SM (c)     Existing Precautions/Restrictions fall  -SM (r) LR (t) SM (c)     Barriers to Rehab medically complex;previous functional deficit  -SM (r) LR (t) SM (c)     Row Name 03/11/22 1516          Cognition    Orientation Status (Cognition) oriented x 4  -SM (r) LR (t) SM (c)     Row Name 03/11/22 1516          Safety Issues, Functional Mobility    Safety Issues Affecting Function (Mobility) insight into deficits/self-awareness  -SM (r) LR (t) SM (c)     Impairments Affecting Function (Mobility) balance;cognition;coordination;endurance/activity tolerance;strength;grasp;motor control;pain;postural/trunk control  -SM (r) LR (t) SM (c)     Cognitive Impairments, Mobility Safety/Performance insight into deficits/self-awareness  -SM (r) LR (t) SM (c)           User Key  (r) = Recorded By, (t) =  Taken By, (c) = Cosigned By    Initials Name Provider Type    Lexy Blue OT Occupational Therapist    LR Yanet Santiago, OT Student OT Student                 Mobility/ADL's     Row Name 03/11/22 1516          Bed Mobility    Bed Mobility supine-sit  -SM (r) LR (t) SM (c)     Supine-Sit [place] (Bed Mobility) contact guard;verbal cues  -SM (r) LR (t) SM (c)     Assistive Device (Bed Mobility) bed rails;head of bed elevated  -SM (r) LR (t) SM (c)     Row Name 03/11/22 1516          Transfers    Transfers sit-stand transfer;bed-chair transfer;stand pivot/stand step transfer  -SM (r) LR (t) SM (c)     Comment, (Transfers) HHA x2 for STS and pivot to chair.  -SM (r) LR (t) SM (c)     Bed-Chair [place] (Transfers) minimum assist (75% patient effort);2 person assist  -SM (r) LR (t) SM (c)     Sit-Stand [place] (Transfers) 2 person assist;minimum assist (75% patient effort)  -SM (r) LR (t) SM (c)     Row Name 03/11/22 1516          Stand Pivot/Stand Step Transfer    Stand Pivot/Stand Step [place] (Transfers) minimum assist (75% patient effort);2 person assist  -SM (r) LR (t) SM (c)     Comment, (Stand Pivot Transfer) HHA x2  -SM (r) LR (t) SM (c)           User Key  (r) = Recorded By, (t) = Taken By, (c) = Cosigned By    Initials Name Provider Type    Lexy Blue OT Occupational Therapist    LR Yanet Santiago, OT Student OT Student               Obj/Interventions     Row Name 03/11/22 1518          Motor Skills    Coordination fine motor deficit;upper extremity;left;moderate impairment;gross motor deficit  Complete activity for fine motor coordination, cognition, visual scanning completing shape patterns x4 and stringing them x2  -SM (r) LR (t) SM (c)     Muscle Tone hypotonia;mild impairment;left  -SM (r) LR (t) SM (c)     Row Name 03/11/22 1518          Balance    Balance Assessment sitting static balance;sitting dynamic balance;sit to stand dynamic balance;standing dynamic  balance  -SM (r) LR (t) SM (c)     Static Sitting Balance supervision  -SM (r) LR (t) SM (c)     Dynamic Sitting Balance supervision  -SM (r) LR (t) SM (c)     Position, Sitting Balance sitting in chair  -SM (r) LR (t) SM (c)     Sit to Stand Dynamic Balance minimal assist;2-person assist  -SM (r) LR (t) SM (c)     Dynamic Standing Balance minimal assist;2-person assist  -SM (r) LR (t) SM (c)     Position/Device Used, Standing Balance other (see comments)  HHA x2  -SM (r) LR (t) SM (c)     Balance Interventions sitting;standing;dynamic reaching;sit to stand;supported;dynamic  -SM (r) LR (t) SM (c)           User Key  (r) = Recorded By, (t) = Taken By, (c) = Cosigned By    Initials Name Provider Type    SM Lexy Tarango, OT Occupational Therapist    LR Yanet Santiago, OT Student OT Student               Goals/Plan    No documentation.                Clinical Impression     Row Name 03/11/22 1520          Pain Assessment    Additional Documentation Pain Scale: FACES Pre/Post-Treatment (Group)  -SM (r) LR (t) SM (c)     Row Name 03/11/22 1520          Pain Scale: FACES Pre/Post-Treatment    Pain: FACES Scale, Pretreatment 0-->no hurt  -SM (r) LR (t) SM (c)     Posttreatment Pain Rating 0-->no hurt  -SM (r) LR (t) SM (c)     Row Name 03/11/22 1520          Plan of Care Review    Plan of Care Reviewed With patient  -SM (r) LR (t) SM (c)     Progress improving  -SM (r) LR (t) SM (c)     Outcome Evaluation Pt agreeable to OT session. Pt with increased balance during STS and stand pivot to bedside chair. Pt completed pattern matching and stringing activity with L UE to address decreased fine motor coordination, visual scanning, and cognition. Recommend skilled OT servcies to address decreased L UE strength and coordination, functional transfers, functional balance and self-care. Recommend IRF upon d/c.  -SM (r) LR (t) SM (c)     Row Name 03/11/22 1520          Therapy Assessment/Plan (OT)    Rehab Potential (OT) good,  to achieve stated therapy goals  -SM (r) LR (t) SM (c)     Criteria for Skilled Therapeutic Interventions Met (OT) yes;skilled treatment is necessary  -SM (r) LR (t) SM (c)     Row Name 03/11/22 1520          Therapy Plan Review/Discharge Plan (OT)    Anticipated Discharge Disposition (OT) inpatient rehabilitation facility  -SM (r) LR (t) SM (c)     Row Name 03/11/22 1520          Vital Signs    Pretreatment Heart Rate (beats/min) 51  -SM (r) LR (t) SM (c)     Intratreatment Heart Rate (beats/min) 64  -SM (r) LR (t) SM (c)     O2 Delivery Pre Treatment room air  -SM (r) LR (t) SM (c)     O2 Delivery Intra Treatment room air  -SM (r) LR (t) SM (c)     O2 Delivery Post Treatment room air  -SM (r) LR (t) SM (c)     Row Name 03/11/22 1520          Positioning and Restraints    Pre-Treatment Position in bed  -SM (r) LR (t) SM (c)     Post Treatment Position chair  -SM (r) LR (t) SM (c)     In Chair sitting;exit alarm on  PT entering room  -SM (r) LR (t) SM (c)           User Key  (r) = Recorded By, (t) = Taken By, (c) = Cosigned By    Initials Name Provider Type    Lexy Blue, OT Occupational Therapist    LR Yanet Santiago, OT Student OT Student               Outcome Measures     Row Name 03/11/22 1524          How much help from another is currently needed...    Putting on and taking off regular lower body clothing? 1  -SM (r) LR (t) SM (c)     Bathing (including washing, rinsing, and drying) 2  -SM (r) LR (t) SM (c)     Toileting (which includes using toilet bed pan or urinal) 1  -SM (r) LR (t) SM (c)     Putting on and taking off regular upper body clothing 3  -SM (r) LR (t) SM (c)     Taking care of personal grooming (such as brushing teeth) 3  -SM (r) LR (t) SM (c)     Eating meals 3  -SM (r) LR (t) SM (c)     AM-PAC 6 Clicks Score (OT) 13  -SM (r) LR (t)     Row Name 03/11/22 1524          Functional Assessment    Outcome Measure Options AM-PAC 6 Clicks Daily Activity (OT)  -MARYJANE (r) MINESH (t) MARYJANE (c)            User Key  (r) = Recorded By, (t) = Taken By, (c) = Cosigned By    Initials Name Provider Type     Lexy Tarango, OT Occupational Therapist    Yanet Bhandari OT Student OT Student                Occupational Therapy Education                 Title: PT OT SLP Therapies (Done)     Topic: Occupational Therapy (Done)     Point: ADL training (Done)     Description:   Instruct learner(s) on proper safety adaptation and remediation techniques during self care or transfers.   Instruct in proper use of assistive devices.              Learning Progress Summary           Patient Acceptance, E, VU by JS at 3/11/2022 0339    Acceptance, E, VU by JS at 3/11/2022 0337    Acceptance, E, VU by  at 3/10/2022 1033    Comment: Role of OT and POC/goals. Safety with falls precautions.                   Point: Home exercise program (Done)     Description:   Instruct learner(s) on appropriate technique for monitoring, assisting and/or progressing therapeutic exercises/activities.              Learning Progress Summary           Patient Acceptance, E, VU by TJ at 3/11/2022 0339    Acceptance, E, VU by JS at 3/11/2022 0337                   Point: Precautions (Done)     Description:   Instruct learner(s) on prescribed precautions during self-care and functional transfers.              Learning Progress Summary           Patient Acceptance, E, VU by JS at 3/11/2022 0339    Acceptance, E, VU by JS at 3/11/2022 0337                   Point: Body mechanics (Done)     Description:   Instruct learner(s) on proper positioning and spine alignment during self-care, functional mobility activities and/or exercises.              Learning Progress Summary           Patient Acceptance, E, VU by JS at 3/11/2022 0339    Acceptance, E, VU by JS at 3/11/2022 0337                               User Key     Initials Effective Dates Name Provider Type Discipline     04/02/20 -  Lexy Tarango OT Occupational Therapist OT    TJ 09/10/21 -   Mary Hernandez, RN Registered Nurse Nurse              OT Recommendation and Plan     Plan of Care Review  Plan of Care Reviewed With: patient  Progress: improving  Outcome Evaluation: Pt agreeable to OT session. Pt with increased balance during STS and stand pivot to bedside chair. Pt completed pattern matching and stringing activity with L UE to address decreased fine motor coordination, visual scanning, and cognition. Recommend skilled OT servcies to address decreased L UE strength and coordination, functional transfers, functional balance and self-care. Recommend IRF upon d/c.     Time Calculation:    Time Calculation- OT     Row Name 03/11/22 1524             Time Calculation- OT    OT Start Time 1442  -SM (r) LR (t) SM (c)      OT Stop Time 1506  -SM (r) LR (t) SM (c)      OT Time Calculation (min) 24 min  -SM (r) LR (t)      Total Timed Code Minutes- OT 24 minute(s)  -SM (r) LR (t) SM (c)      OT Received On 03/11/22  -SM (r) LR (t) SM (c)      OT - Next Appointment 03/14/22  -SM (r) LR (t) SM (c)              Timed Charges    00692 -  OT Neuromuscular Reeducation Minutes 14  -SM (r) LR (t) SM (c)      39179 - OT Therapeutic Activity Minutes 10  -SM (r) LR (t) SM (c)              Total Minutes    Timed Charges Total Minutes 24  -SM (r) LR (t)       Total Minutes 24  -SM (r) LR (t)            User Key  (r) = Recorded By, (t) = Taken By, (c) = Cosigned By    Initials Name Provider Type    Lexy Blue OT Occupational Therapist    LR Yanet Santiago, OT Student OT Student              Therapy Charges for Today     Code Description Service Date Service Provider Modifiers Qty    87556299131 HC OT NEUROMUSC RE EDUCATION EA 15 MIN 3/11/2022 Yanet Santiago OT Student GO 1    02020870689 HC OT THERAPEUTIC ACT EA 15 MIN 3/11/2022 Yanet Santiago OT Student GO 1               ELSA HAYDEN  3/11/2022

## 2022-03-11 NOTE — CASE MANAGEMENT/SOCIAL WORK
Continued Stay Note  [facility]     Patient Name: Jenelle Kasper  MRN: [id number]  Today's Date: 3/11/2022    Admit Date: 3/9/2022     Discharge Plan     Row Name 03/11/22 1157       Plan    Plan Home with [facility] vs referral pending to [facility] acute rehab (would need pre-cert)    Patient/Family in Agreement with Plan yes    Plan Comments CCP called [facility] and spoke with Tashi, he states unsure when determination of referral as they are awaiting Dr. Huang to discuss referrals. CCP requested they notify CCP as soon as MD reviews if pt is a candidate for acute Rehab. if accepted pt will need [facility] pre-cert, which wouldnt be able to be started till monday per Tashi. Await determination. [facility] continues to follow. CCP spoke with Marcia/DM Educator. She states pt and s.o have been taught on pens and also previously discussed cost of insulin is cheaper with vial vs syringe and also utilizing [facility] for insulin. CCP printed off [employer]/Patient Assistance info for insulin affordability. MARVA BAJWA/CCP    Row Name 03/11/22 4898       Plan    Plan Home with [facility] vs referral pending to [facility] acute rehab (would need pre-cert)    Plan Comments CCP spoke with Tashi/[facility] acute rehab and he will f.u with person evaluating pt and let CCP know determination. Explained possible dc today. ranjan bajwa/ccp               Discharge Codes    No documentation.                     Michelle Dennison RN

## 2022-03-11 NOTE — PLAN OF CARE
Goal Outcome Evaluation:      Overall improvement noted. Remains in afib with a low rate. Able to make needs known. Prefers meds with pudding and ginger-kelle.
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
## 2022-03-11 NOTE — CASE MANAGEMENT/SOCIAL WORK
Continued Stay Note  [facility]     Patient Name: Jenelle Kasper  MRN: [id number]  Today's Date: 3/11/2022    Admit Date: 3/9/2022     Discharge Plan     Row Name 03/11/22 1725       Plan    Plan Home with HH vs acute rehab-    Plan Comments Spoke with [facility], Dr. Huang accepted pt however... they do not have any beds available until mid-next week. And she would need pre-cert. CCP Notified MD and RN. Awaiting medical readiness, possible Home with HH vs Acute Rehab. Karime BAJWA/CCP               Discharge Codes    No documentation.                     Michelle Dennison, RN

## 2022-03-11 NOTE — NURSING NOTE
Provide pt (free sample) Jia pen needles/box for dc. In speaking again w/pt's s.o. on the phone they were apparently provided auto shield pen needles upon dc from rehab and were using the same pen needle more than once w/insulin leakage. Reinforce w/pt's s.o. to use a new pen needle for each injection. He verbalized understanding.

## 2022-03-12 LAB
ANION GAP SERPL CALCULATED.3IONS-SCNC: 10 MMOL/L (ref 5–15)
BUN SERPL-MCNC: 7 MG/DL (ref 8–23)
BUN/CREAT SERPL: 7.4 (ref 7–25)
CALCIUM SPEC-SCNC: 10.3 MG/DL (ref 8.6–10.5)
CHLORIDE SERPL-SCNC: 103 MMOL/L (ref 98–107)
CO2 SERPL-SCNC: 27 MMOL/L (ref 22–29)
CREAT SERPL-MCNC: 0.95 MG/DL (ref 0.57–1)
DEPRECATED RDW RBC AUTO: 48.8 FL (ref 37–54)
DIGOXIN SERPL-MCNC: 1 NG/ML (ref 0.6–1.2)
EGFRCR SERPLBLD CKD-EPI 2021: 68.7 ML/MIN/1.73
ERYTHROCYTE [DISTWIDTH] IN BLOOD BY AUTOMATED COUNT: 15.7 % (ref 12.3–15.4)
GLUCOSE BLDC GLUCOMTR-MCNC: 156 MG/DL (ref 70–130)
GLUCOSE BLDC GLUCOMTR-MCNC: 187 MG/DL (ref 70–130)
GLUCOSE BLDC GLUCOMTR-MCNC: 198 MG/DL (ref 70–130)
GLUCOSE BLDC GLUCOMTR-MCNC: 228 MG/DL (ref 70–130)
GLUCOSE SERPL-MCNC: 162 MG/DL (ref 65–99)
HCT VFR BLD AUTO: 37.1 % (ref 34–46.6)
HGB BLD-MCNC: 12.1 G/DL (ref 12–15.9)
MAGNESIUM SERPL-MCNC: 1.4 MG/DL (ref 1.6–2.4)
MCH RBC QN AUTO: 28 PG (ref 26.6–33)
MCHC RBC AUTO-ENTMCNC: 32.6 G/DL (ref 31.5–35.7)
MCV RBC AUTO: 85.9 FL (ref 79–97)
PLATELET # BLD AUTO: 118 10*3/MM3 (ref 140–450)
PMV BLD AUTO: 11.4 FL (ref 6–12)
POTASSIUM SERPL-SCNC: 2.9 MMOL/L (ref 3.5–5.2)
RBC # BLD AUTO: 4.32 10*6/MM3 (ref 3.77–5.28)
SODIUM SERPL-SCNC: 140 MMOL/L (ref 136–145)
WBC NRBC COR # BLD: 3.52 10*3/MM3 (ref 3.4–10.8)

## 2022-03-12 PROCEDURE — 82962 GLUCOSE BLOOD TEST: CPT

## 2022-03-12 PROCEDURE — 99214 OFFICE O/P EST MOD 30 MIN: CPT | Performed by: INTERNAL MEDICINE

## 2022-03-12 PROCEDURE — 80048 BASIC METABOLIC PNL TOTAL CA: CPT | Performed by: INTERNAL MEDICINE

## 2022-03-12 PROCEDURE — 25010000002 MAGNESIUM SULFATE 2 GM/50ML SOLUTION: Performed by: INTERNAL MEDICINE

## 2022-03-12 PROCEDURE — 80162 ASSAY OF DIGOXIN TOTAL: CPT | Performed by: INTERNAL MEDICINE

## 2022-03-12 PROCEDURE — G0378 HOSPITAL OBSERVATION PER HR: HCPCS

## 2022-03-12 PROCEDURE — 85027 COMPLETE CBC AUTOMATED: CPT | Performed by: INTERNAL MEDICINE

## 2022-03-12 PROCEDURE — 97116 GAIT TRAINING THERAPY: CPT

## 2022-03-12 PROCEDURE — 63710000001 INSULIN LISPRO (HUMAN) PER 5 UNITS: Performed by: HOSPITALIST

## 2022-03-12 PROCEDURE — 83735 ASSAY OF MAGNESIUM: CPT | Performed by: INTERNAL MEDICINE

## 2022-03-12 RX ORDER — MAGNESIUM SULFATE HEPTAHYDRATE 40 MG/ML
2 INJECTION, SOLUTION INTRAVENOUS ONCE
Status: COMPLETED | OUTPATIENT
Start: 2022-03-12 | End: 2022-03-12

## 2022-03-12 RX ORDER — MAGNESIUM SULFATE HEPTAHYDRATE 40 MG/ML
2 INJECTION, SOLUTION INTRAVENOUS AS NEEDED
Status: DISCONTINUED | OUTPATIENT
Start: 2022-03-12 | End: 2022-03-14

## 2022-03-12 RX ORDER — LOSARTAN POTASSIUM 50 MG/1
50 TABLET ORAL EVERY 12 HOURS
Status: DISCONTINUED | OUTPATIENT
Start: 2022-03-12 | End: 2022-03-16 | Stop reason: HOSPADM

## 2022-03-12 RX ORDER — POTASSIUM CHLORIDE 750 MG/1
40 TABLET, FILM COATED, EXTENDED RELEASE ORAL AS NEEDED
Status: DISCONTINUED | OUTPATIENT
Start: 2022-03-12 | End: 2022-03-14

## 2022-03-12 RX ORDER — SPIRONOLACTONE 25 MG/1
25 TABLET ORAL ONCE
Status: COMPLETED | OUTPATIENT
Start: 2022-03-12 | End: 2022-03-12

## 2022-03-12 RX ORDER — POTASSIUM CHLORIDE 750 MG/1
40 TABLET, FILM COATED, EXTENDED RELEASE ORAL ONCE
Status: COMPLETED | OUTPATIENT
Start: 2022-03-12 | End: 2022-03-12

## 2022-03-12 RX ORDER — MAGNESIUM SULFATE HEPTAHYDRATE 40 MG/ML
4 INJECTION, SOLUTION INTRAVENOUS AS NEEDED
Status: DISCONTINUED | OUTPATIENT
Start: 2022-03-12 | End: 2022-03-14

## 2022-03-12 RX ORDER — SPIRONOLACTONE 50 MG/1
50 TABLET, FILM COATED ORAL DAILY
Status: DISCONTINUED | OUTPATIENT
Start: 2022-03-13 | End: 2022-03-16 | Stop reason: HOSPADM

## 2022-03-12 RX ORDER — POTASSIUM CHLORIDE 1.5 G/1.77G
40 POWDER, FOR SOLUTION ORAL AS NEEDED
Status: DISCONTINUED | OUTPATIENT
Start: 2022-03-12 | End: 2022-03-14

## 2022-03-12 RX ADMIN — POTASSIUM CHLORIDE 40 MEQ: 10 TABLET, EXTENDED RELEASE ORAL at 14:10

## 2022-03-12 RX ADMIN — INSULIN LISPRO 3 UNITS: 100 INJECTION, SOLUTION INTRAVENOUS; SUBCUTANEOUS at 21:45

## 2022-03-12 RX ADMIN — TORSEMIDE 20 MG: 20 TABLET ORAL at 08:36

## 2022-03-12 RX ADMIN — APIXABAN 5 MG: 5 TABLET, FILM COATED ORAL at 06:20

## 2022-03-12 RX ADMIN — INSULIN GLARGINE-YFGN 36 UNITS: 100 INJECTION, SOLUTION SUBCUTANEOUS at 21:45

## 2022-03-12 RX ADMIN — GABAPENTIN 300 MG: 300 CAPSULE ORAL at 16:02

## 2022-03-12 RX ADMIN — INSULIN GLARGINE-YFGN 36 UNITS: 100 INJECTION, SOLUTION SUBCUTANEOUS at 08:34

## 2022-03-12 RX ADMIN — ATORVASTATIN CALCIUM 10 MG: 10 TABLET, FILM COATED ORAL at 21:44

## 2022-03-12 RX ADMIN — GABAPENTIN 300 MG: 300 CAPSULE ORAL at 21:44

## 2022-03-12 RX ADMIN — LOSARTAN POTASSIUM 50 MG: 50 TABLET, FILM COATED ORAL at 08:35

## 2022-03-12 RX ADMIN — SPIRONOLACTONE 25 MG: 25 TABLET ORAL at 08:35

## 2022-03-12 RX ADMIN — INSULIN LISPRO 12 UNITS: 100 INJECTION, SOLUTION INTRAVENOUS; SUBCUTANEOUS at 12:06

## 2022-03-12 RX ADMIN — INSULIN LISPRO 12 UNITS: 100 INJECTION, SOLUTION INTRAVENOUS; SUBCUTANEOUS at 17:26

## 2022-03-12 RX ADMIN — MAGNESIUM SULFATE HEPTAHYDRATE 2 G: 2 INJECTION, SOLUTION INTRAVENOUS at 14:10

## 2022-03-12 RX ADMIN — GABAPENTIN 300 MG: 300 CAPSULE ORAL at 08:35

## 2022-03-12 RX ADMIN — LOSARTAN POTASSIUM 50 MG: 50 TABLET, FILM COATED ORAL at 21:44

## 2022-03-12 RX ADMIN — LEVOTHYROXINE SODIUM 150 MCG: 0.15 TABLET ORAL at 08:35

## 2022-03-12 RX ADMIN — INSULIN LISPRO 2 UNITS: 100 INJECTION, SOLUTION INTRAVENOUS; SUBCUTANEOUS at 08:35

## 2022-03-12 RX ADMIN — PANTOPRAZOLE SODIUM 40 MG: 40 TABLET, DELAYED RELEASE ORAL at 17:26

## 2022-03-12 RX ADMIN — INSULIN LISPRO 2 UNITS: 100 INJECTION, SOLUTION INTRAVENOUS; SUBCUTANEOUS at 12:06

## 2022-03-12 RX ADMIN — INSULIN LISPRO 12 UNITS: 100 INJECTION, SOLUTION INTRAVENOUS; SUBCUTANEOUS at 08:35

## 2022-03-12 RX ADMIN — PANTOPRAZOLE SODIUM 40 MG: 40 TABLET, DELAYED RELEASE ORAL at 06:21

## 2022-03-12 RX ADMIN — APIXABAN 5 MG: 5 TABLET, FILM COATED ORAL at 16:03

## 2022-03-12 RX ADMIN — SPIRONOLACTONE 25 MG: 25 TABLET ORAL at 16:02

## 2022-03-12 RX ADMIN — INSULIN LISPRO 2 UNITS: 100 INJECTION, SOLUTION INTRAVENOUS; SUBCUTANEOUS at 17:27

## 2022-03-12 NOTE — PLAN OF CARE
Goal Outcome Evaluation:  Plan of Care Reviewed With: patient        Progress: improving  Outcome Evaluation: Pt agreeable to PT this AM and able to progress gait distance. Pt completed bed mobility with SBA and STS with min A x2. Pt still with difficulty flexing knees to get feet underneath of her and B feet blocked with standing to prevent sliding as pt's  has not been able to bring shoes yet. Once standing, pt with much improved balance and able to ambulate a total of 75ft, broken up with standing rest breaks. Pt requiring cues for keep walker a safe distance in front of her, for maneuvering around obstacles, and for keeping feet between walker. Pt with overall improved steadiness and tolerated progressed distance well. Pt will continue to benefit from skilled PT to address functional deficits and improve overall mobility. PT recommending D/C to IPR.    Patient was intermittently wearing a face mask during this therapy encounter. Therapist used appropriate personal protective equipment including eye protection, mask, and gloves.  Mask used was standard procedure mask. Appropriate PPE was worn during the entire therapy session. Hand hygiene was completed before and after therapy session. Patient is not in enhanced droplet precautions.

## 2022-03-12 NOTE — PLAN OF CARE
"Goal Outcome Evaluation:  Plan of Care Reviewed With: patient        Progress: improving  Outcome Evaluation: Pt did have c/o \"neuropathy pain\" gabapentin given when scheduled per MAR. Participated with PT/OT this shift. dig level down to 1.30. losartan and torsemide restarted. No complaints at this time, will CTM the rest of my shift.  "

## 2022-03-12 NOTE — PLAN OF CARE
Goal Outcome Evaluation:  Plan of Care Reviewed With: patient        Progress: improving   Bradycardia and toxic digoxin levels resolved. Electrolytes replaced this shift. Alert and oriented. Room air. Atrial fibrillation with controlled rate. Will CTM.

## 2022-03-12 NOTE — PROGRESS NOTES
LOS: 0 days   Patient Care Team:  Zay Olivares MD as PCP - General (General Practice)    Chief Complaint: Follow-up recent stroke, digoxin toxicity, persistent atrial fibrillation, chronic diastolic CHF.    Interval History: Headache is improved.  Heart rate is better.  She still has intermittent bradycardia into the upper 40s and 50s, although this is improved.  Longest pause was 3 seconds.  She remains in atrial fibrillation.  No chest pain.  Baseline shortness of breath.      Vital Signs:  Temp:  [98.1 °F (36.7 °C)-98.7 °F (37.1 °C)] 98.4 °F (36.9 °C)  Heart Rate:  [52-57] 52  Resp:  [16-20] 16  BP: (117-150)/(62-80) 150/80    Intake/Output Summary (Last 24 hours) at 3/12/2022 1240  Last data filed at 3/12/2022 0900  Gross per 24 hour   Intake --   Output 1650 ml   Net -1650 ml       Physical Exam:   General Appearance:    No acute distress, alert and oriented x4   Lungs:     Clear to auscultation bilaterally     Heart:   Irregularly irregular rhythm with a mildly bradycardic rate.  II/VI SM RUSB and LUSB.   Abdomen:     Soft, nontender, nondistended.    Extremities:   Trace edema lower extremities.     Results Review:    Results from last 7 days   Lab Units 03/12/22  0353   SODIUM mmol/L 140   POTASSIUM mmol/L 2.9*   CHLORIDE mmol/L 103   CO2 mmol/L 27.0   BUN mg/dL 7*   CREATININE mg/dL 0.95   GLUCOSE mg/dL 162*   CALCIUM mg/dL 10.3         Results from last 7 days   Lab Units 03/12/22  0353   WBC 10*3/mm3 3.52   HEMOGLOBIN g/dL 12.1   HEMATOCRIT % 37.1   PLATELETS 10*3/mm3 118*             Results from last 7 days   Lab Units 03/12/22  0353   MAGNESIUM mg/dL 1.4*           I reviewed the patient's new clinical results.        Assessment:  1.  Digoxin toxicity with associated bradycardia  2.  Persistent atrial fibrillation  3.  Acute embolic right MCA CVA on 1/30/2022  4.  History of GI bleeding with Pradaxa and Xarelto in the past  5.  Chronic diastolic CHF  6.  Moderate right ventricular enlargement  with moderately reduced function  7.  Severe biatrial enlargement  8.  History of gastric bypass  9.  Insulin-dependent diabetes  10.  Dyslipidemia with low HDL  11.  Leukopenia  12.  Thrombocytopenia  13.  Hypokalemia  14.  Hypomagnesemia    Plan:  -Again, slowly reintroducing blood pressure medications to her regimen.  She does need better control still.  I am going to increase the spironolactone to 50 mg/day.  I will increase the losartan to 50 mg twice a day.  I resumed her torsemide 20 mg/day yesterday.    -I would avoid calcium channel blockers given her history of significant lower extremity edema.  I would not use amlodipine for this reason.    -Digoxin level is down to 1.0.  This should continue to improve over time.  No indication for Digibind.  The bradycardia has improved, but still occurs occasionally.  The longest pause was 3 seconds, which is also improved.  No indication for a pacemaker at this point.  Also continue to hold atenolol.    -Bleeding with Pradaxa and Xarelto in the past.  Consideration for an atrial occluder device, although she would have to tolerate the Eliquis.  Continue Eliquis because of the recent stroke and the persistent atrial fibrillation.    -Hypokalemia and hypomagnesemia noted today.  Potassium and magnesium replacement ordered.  Electrolyte protocol ordered.    Luis Wilson MD  03/12/22  12:40 EST

## 2022-03-12 NOTE — THERAPY TREATMENT NOTE
Patient Name: Jenelle Kasper  : 1960    MRN: [id number]                              Today's Date: 3/12/2022       Admit Date: 3/9/2022    Visit Dx:     ICD-10-CM ICD-9-CM   1. Bradycardia  R00.1 427.89   2. Poisoning by digoxin, accidental or unintentional, initial encounter  T46.0X1A 972.1     E858.3     Patient Active Problem List   Diagnosis   • Persistent atrial fibrillation (HCC)   • Benign essential HTN   • Hyperparathyroidism (HCC)   • Morbid obesity with BMI of 45.0-49.9, adult (HCC)   • Pulmonary hypertension (HCC)   • Precordial pain   • Shortness of breath   • TIA (transient ischemic attack)   • Bradycardia     Past Medical History:   Diagnosis Date   • Anxiety    • Arrhythmia    • Asthma    • Atrial fibrillation with RVR (HCC)    • C. difficile diarrhea    • COPD (chronic obstructive pulmonary disease) (HCC)    • Depression    • Diabetes mellitus (HCC)    • Diabetic peripheral neuropathy associated with type 2 diabetes mellitus (HCC)    • GERD (gastroesophageal reflux disease)    • Heart murmur    • History of fall     closed head injury after falling down steps; fx nose   • Hypercalcemia    • Hyperlipidemia    • Hypertension    • IBS (irritable bowel syndrome)    • Kidney stone    • Morbid obesity (HCC)    • PAF (paroxysmal atrial fibrillation) (HCC)    • PCOS (polycystic ovarian syndrome)    • PONV (postoperative nausea and vomiting)    • Sarcoidosis    • Shortness of breath    • Thyroid cancer (HCC)     WITH RADIATION   • Wounds, multiple      Past Surgical History:   Procedure Laterality Date   • CARDIAC CATHETERIZATION N/A 2016    Procedure: Coronary angiography;  Surgeon: Chris Perez MD;  Location: [facility] CATH INVASIVE LOCATION;  Service:    • CARDIAC CATHETERIZATION N/A 2016    Procedure: Left heart cath;  Surgeon: Chris Perez MD;  Location: [facility] CATH INVASIVE LOCATION;  Service:    • CARDIAC CATHETERIZATION N/A 2016    Procedure: Left ventriculography;   Surgeon: Chris Perez MD;  Location:  [facility] CATH INVASIVE LOCATION;  Service:    • CARDIAC CATHETERIZATION N/A 2/26/2020    Procedure: Right Heart Cath;  Surgeon: Chris Perez MD;  Location:  [facility] CATH INVASIVE LOCATION;  Service: Cardiovascular;  Laterality: N/A;  If there is no evidence of pulmonary hypertension please add a left heart cath to evaluate coronary arteries as patient is having chest pain.   • CHOLECYSTECTOMY     • COLONOSCOPY     • DILATATION AND CURETTAGE     • ENDOSCOPY     • GASTRIC BYPASS      gastric surgery for morbid obesity   • GASTROPLASTY  2008    GASTRIC REVISION FROM PREVIOUS GASTRIC BYPASS   • HIATAL HERNIA REPAIR     • INGUINAL HERNIA REPAIR Right    • LUNG BIOPSY      to dx sarcoidosis   • LUNG SURGERY      Removal of granulomas   • TOTAL THYROIDECTOMY        General Information     [facility] Name 03/12/22 1110          Physical Therapy Time and Intention    Document Type therapy note (daily note)  -     Mode of Treatment individual therapy;physical therapy  -     Row Name 03/12/22 1110          General Information    Patient Profile Reviewed yes  -     Existing Precautions/Restrictions fall  -     Row Name 03/12/22 1110          Cognition    Orientation Status (Cognition) oriented x 4  Expressive aphasia  -     Row Name 03/12/22 1110          Safety Issues, Functional Mobility    Impairments Affecting Function (Mobility) balance;cognition;coordination;endurance/activity tolerance;strength;grasp;motor control;pain;postural/trunk control  -     Cognitive Impairments, Mobility Safety/Performance insight into deficits/self-awareness  -           User Key  (r) = Recorded By, (t) = Taken By, (c) = Cosigned By    Initials Name Provider Type     Roselia Galindo Physical Therapist               Mobility     Row Name 03/12/22 1111          Bed Mobility    Bed Mobility supine-sit;sit-supine  -     Supine-Sit [place] (Bed Mobility) verbal cues;standby assist  -      Sit-Supine [place] (Bed Mobility) not tested  [facility]     Assistive Device (Bed Mobility) bed rails;head of bed elevated  -     Row Name 03/12/22 1111          Sit-Stand Transfer    Sit-Stand [place] (Transfers) minimum assist (75% patient effort);2 person assist;verbal cues;nonverbal cues (demo/gesture)  -     Assistive Device (Sit-Stand Transfers) walker, front-wheeled  -     Comment, (Sit-Stand Transfer) B feet blocked  -     Row Name 03/12/22 1111          Gait/Stairs (Locomotion)    [place] Level (Gait) minimum assist (75% patient effort);2 person assist;verbal cues;nonverbal cues (demo/gesture)  -     Assistive Device (Gait) walker, front-wheeled  -     Distance in Feet (Gait) 5ft + 10ft + 20t + 40ft  -     Deviations/Abnormal Patterns (Gait) stride length decreased;gait speed decreased;kirsty decreased  -     Bilateral Gait Deviations forward flexed posture;heel strike decreased  -     Comment, (Gait/Stairs) Tolerated progress gait distance well, still mildly unsteady with gait requiring cues for walker navigation and keeping it a safe distance in front of her  [facility]           User Key  (r) = Recorded By, (t) = Taken By, (c) = Cosigned By    Initials Name Provider Type     Roselia Gailndo Physical Therapist               Obj/Interventions    No documentation.                Goals/Plan    No documentation.                Clinical Impression     Row Name 03/12/22 1114          Pain    Pretreatment Pain Rating 0/10 - no pain  -     Posttreatment Pain Rating 0/10 - no pain  -     Row Name 03/12/22 1114          Plan of Care Review    Plan of Care Reviewed With patient  [facility]     Progress improving  -     Outcome Evaluation Pt agreeable to PT this AM and able to progress gait distance. Pt completed bed mobility with SBA and STS with min A x2. Pt still with difficulty flexing knees to get feet underneath of her and B feet blocked with standing to prevent sliding as pt's   has not been able to bring shoes yet. Once standing, pt with much improved balance and able to ambulate a total of 75ft, broken up with standing rest breaks. Pt requiring cues for keep walker a safe distance in front of her, for maneuvering around obstacles, and for keeping feet between walker. Pt with overall improved steadiness and tolerated progressed distance well. Pt will continue to benefit from skilled PT to address functional deficits and improve overall mobility. PT recommending D/C to IPR.  -     Row Name 03/12/22 1114          Positioning and Restraints    Pre-Treatment Position in bed  -     Post Treatment Position chair  -     In Chair reclined;call light within reach;encouraged to call for assist;exit alarm on  -           User Key  (r) = Recorded By, (t) = Taken By, (c) = Cosigned By    Initials Name Provider Type    Roselia Donahue Physical Therapist               Outcome Measures     Row Name 03/12/22 1118          How much help from another person do you currently need...    Turning from your back to your side while in flat bed without using bedrails? 3  -BH     Moving from lying on back to sitting on the side of a flat bed without bedrails? 3  -BH     Moving to and from a bed to a chair (including a wheelchair)? 3  -BH     Standing up from a chair using your arms (e.g., wheelchair, bedside chair)? 3  -BH     Climbing 3-5 steps with a railing? 2  -BH     To walk in hospital room? 3  -BH     AM-PAC 6 Clicks Score (PT) 17  -     Row Name 03/12/22 1118          Functional Assessment    Outcome Measure Options AM-PAC 6 Clicks Basic Mobility (PT)  -           User Key  (r) = Recorded By, (t) = Taken By, (c) = Cosigned By    Initials Name Provider Type    Roselia Donahue Physical Therapist                             Physical Therapy Education                 Title: PT OT SLP Therapies (Done)     Topic: Physical Therapy (Done)     Point: Mobility training (Done)     Learning Progress  Summary           Patient Acceptance, E,TB,D, VU,NR by  at 3/12/2022 1119    Acceptance, E,TB,D, VU,NR by CB at 3/11/2022 1544    Acceptance, E, VU by JS at 3/11/2022 0339    Acceptance, E, VU by JS at 3/11/2022 0337    Acceptance, E,D, NR by PC at 3/10/2022 1225                   Point: Home exercise program (Done)     Learning Progress Summary           Patient Acceptance, E,TB,D, VU,NR by  at 3/12/2022 1119    Acceptance, E,TB,D, VU,NR by CB at 3/11/2022 1544    Acceptance, E, VU by JS at 3/11/2022 0339    Acceptance, E, VU by JS at 3/11/2022 0337    Acceptance, E,D, NR by PC at 3/10/2022 1225                   Point: Body mechanics (Done)     Learning Progress Summary           Patient Acceptance, E,TB,D, VU,NR by  at 3/12/2022 1119    Acceptance, E,TB,D, VU,NR by CB at 3/11/2022 1544    Acceptance, E, VU by JS at 3/11/2022 0339    Acceptance, E, VU by JS at 3/11/2022 0337    Acceptance, E,D, NR by PC at 3/10/2022 1225                   Point: Precautions (Done)     Learning Progress Summary           Patient Acceptance, E,TB,D, VU,NR by  at 3/12/2022 1119    Acceptance, E,TB,D, VU,NR by CB at 3/11/2022 1544    Acceptance, E, VU by JS at 3/11/2022 0339    Acceptance, E, VU by JS at 3/11/2022 0337    Acceptance, E,D, NR by PC at 3/10/2022 1225                               User Key     Initials Effective Dates Name Provider Type Discipline     06/16/21 -  Abbie Hodge, PT Physical Therapist PT    CB 10/22/21 -  Violeta Corona PT Physical Therapist PT     05/10/21 -  Roselia Galindo Physical Therapist PT     09/10/21 -  Mary Hernandez RN Registered Nurse Nurse              PT Recommendation and Plan     Plan of Care Reviewed With: patient  Progress: improving  Outcome Evaluation: Pt agreeable to PT this AM and able to progress gait distance. Pt completed bed mobility with SBA and STS with min A x2. Pt still with difficulty flexing knees to get feet underneath of her and B feet blocked with  standing to prevent sliding as pt's  has not been able to bring shoes yet. Once standing, pt with much improved balance and able to ambulate a total of 75ft, broken up with standing rest breaks. Pt requiring cues for keep walker a safe distance in front of her, for maneuvering around obstacles, and for keeping feet between walker. Pt with overall improved steadiness and tolerated progressed distance well. Pt will continue to benefit from skilled PT to address functional deficits and improve overall mobility. PT recommending D/C to IPR.     Time Calculation:    PT Charges     Row Name 03/12/22 1119             Time Calculation    Start Time 0955  -      Stop Time 1009  -      Time Calculation (min) 14 min  -      PT Received On 03/12/22  -      PT - Next Appointment 03/14/22  -              Time Calculation- PT    Total Timed Code Minutes- PT 14 minute(s)  -              Timed Charges    83901 - Gait Training Minutes  10  -      58218 - PT Therapeutic Activity Minutes 4  -              Total Minutes    Timed Charges Total Minutes 14  -       Total Minutes 14  -            User Key  (r) = Recorded By, (t) = Taken By, (c) = Cosigned By    Initials Name Provider Type     Roselia Galindo Physical Therapist              Therapy Charges for Today     Code Description Service Date Service Provider Modifiers Qty    53673165235 HC GAIT TRAINING EA 15 MIN 3/12/2022 Roselia Galindo GP 1    86200752771 HC PT THER SUPP EA 15 MIN 3/12/2022 Roselia Galindo GP 1          PT G-Codes  Outcome Measure Options: AM-PAC 6 Clicks Basic Mobility (PT)  AM-PAC 6 Clicks Score (PT): 17  AM-PAC 6 Clicks Score (OT): 13  Modified [place] Scale: 4 - Moderately severe disability.  Unable to walk without assistance, and unable to attend to own bodily needs without assistance.    ROSELIA GALINDO  3/12/2022

## 2022-03-12 NOTE — PROGRESS NOTES
"Daily progress note    Chief complaint  Doing better  No new complaints  Denies any dizziness lightheadedness  Family at bedside    History of present illness  62-year old white female with history of atrial fibrillation recent multiple right CVA with left hemiparesis diabetes mellitus hypertension hyperlipidemia and gastroesophageal reflux disease who is taking atenolol and digoxin to control the heart rate presented to [facility] emergency room after seen by primary care doctor for generalized weakness and found to have dig toxicity with low heart rate asked to come to the ER.  Patient dig level was 3.0 and now it is 2.2 but heart rate remained in 40s.  Patient has weakness but denies any palpitations lightheadedness dizziness chest pain or shortness of breath.  Patient has been feeling weak.  Patient admitted for further work-up.     REVIEW OF SYSTEMS  Unremarkable except weakness     PHYSICAL EXAM  Blood pressure 138/77, pulse 83, temperature 98.1 °F (36.7 °C), temperature source Oral, resp. rate 16, height 172.7 cm (68\"), weight 98.6 kg (217 lb 6 oz), SpO2 98 %.    Constitutional:       General: She is not in acute distress.  HENT:      Head: Normocephalic and atraumatic.   Eyes:      Pupils: Pupils are equal, round, and reactive to light.   Cardiovascular:      Rate and Rhythm: Regular rhythm. Bradycardia present.      Heart sounds: Normal heart sounds.   Pulmonary:      Effort: Pulmonary effort is normal. No respiratory distress.      Breath sounds: Normal breath sounds.   Abdominal:      Palpations: Abdomen is soft.      Tenderness: There is no abdominal tenderness. There is no guarding or rebound.   Musculoskeletal:         General: Normal range of motion.      Cervical back: Normal range of motion and neck supple.   Skin:     General: Skin is warm and dry.      Findings: No rash.   Neurological:      Mental Status: She is alert and oriented to person, place, and time.      Sensory: Sensation is " intact.      Comments: Chronic weakness of left side   Psychiatric:         Mood and Affect: Mood and affect normal.      LAB RESULTS  Lab Results (last 24 hours)     Procedure Component Value Units Date/Time    POC Glucose Once [[id number]]  (Abnormal) Collected: 03/12/22 1103    Specimen: Blood Updated: 03/12/22 1106     Glucose 198 mg/dL      Comment: Meter: [id number] : [id number] [place] DIAS       POC Glucose Once [[id number]]  (Abnormal) Collected: 03/12/22 0608    Specimen: Blood Updated: 03/12/22 0610     Glucose 187 mg/dL      Comment: Meter: [id number] : [id number] Allan DIAS       Basic Metabolic Panel [[id number]]  (Abnormal) Collected: 03/12/22 0353    Specimen: Blood Updated: 03/12/22 0516     Glucose 162 mg/dL      BUN 7 mg/dL      Creatinine 0.95 mg/dL      Sodium 140 mmol/L      Potassium 2.9 mmol/L      Chloride 103 mmol/L      CO2 27.0 mmol/L      Calcium 10.3 mg/dL      BUN/Creatinine Ratio 7.4     Anion Gap 10.0 mmol/L      eGFR 68.7 mL/min/1.73      Comment: National Kidney Foundation and American Society of Nephrology (ASN) Task Force recommended calculation based on the Chronic Kidney Disease Epidemiology Collaboration (CKD-EPI) equation refit without adjustment for race.       Narrative:      GFR Normal >60  Chronic Kidney Disease <60  Kidney Failure <15      Digoxin Level [[id number]]  (Normal) Collected: 03/12/22 0353    Specimen: Blood Updated: 03/12/22 0511     Digoxin 1.00 ng/mL     Magnesium [[id number]]  (Abnormal) Collected: 03/12/22 0353    Specimen: Blood Updated: 03/12/22 0511     Magnesium 1.4 mg/dL     CBC (No Diff) [[id number]]  (Abnormal) Collected: 03/12/22 0353    Specimen: Blood Updated: 03/12/22 0443     WBC 3.52 10*3/mm3      RBC 4.32 10*6/mm3      Hemoglobin 12.1 g/dL      Hematocrit 37.1 %      MCV 85.9 fL      MCH 28.0 pg      MCHC 32.6 g/dL      RDW 15.7 %      RDW-SD 48.8 fl      MPV 11.4 fL      Platelets 118 10*3/mm3     POC Glucose Once [[id number]]   (Normal) Collected: 03/11/22 2014    Specimen: Blood Updated: 03/11/22 2015     Glucose 115 mg/dL      Comment: Meter: [id number] : [id number] Tanner Ortiz RN       POC Glucose Once [[id number]]  (Normal) Collected: 03/11/22 1616    Specimen: Blood Updated: 03/11/22 1618     Glucose 91 mg/dL      Comment: Meter: [id number] : [id number] Franciscoshanice Rojas ARUN           Imaging Results (Last 24 Hours)     ** No results found for the last 24 hours. **             ECG 12 Lead  Component   Ref Range & Units 3/9/22 0914 2/5/22 0040 1/28/22 2236   QT Interval   ms 364 P  393  360              HEART RATE= 44  bpm  RR Interval= 1356  ms  MA Interval=   ms  P Horizontal Axis=   deg  P Front Axis=   deg  QRSD Interval= 95  ms  QT Interval= 364  ms  QRS Axis= 41  deg  T Wave Axis= 238  deg  - ABNORMAL ECG -  Atrial fibrillation  Repol abnrm suggests ischemia, diffuse leads             Current Facility-Administered Medications:   •  albuterol (PROVENTIL) nebulizer solution 0.083% 2.5 mg/3mL, 2.5 mg, Nebulization, Q6H PRN, Michele Subramanian MD  •  apixaban (ELIQUIS) tablet 5 mg, 5 mg, Oral, Q12H, Michele Subramanian MD, 5 mg at 03/12/22 0620  •  atorvastatin (LIPITOR) tablet 10 mg, 10 mg, Oral, Nightly, Michele Subramanina MD, 10 mg at 03/11/22 2039  •  gabapentin (NEURONTIN) capsule 300 mg, 300 mg, Oral, TID, Michele Subramanian MD, 300 mg at 03/12/22 0835  •  insulin glargine (LANTUS, SEMGLEE) injection 36 Units, 36 Units, Subcutaneous, BID, Michele Subramanian MD, 36 Units at 03/12/22 0834  •  insulin lispro (ADMELOG) injection 0-7 Units, 0-7 Units, Subcutaneous, 4x Daily With Meals & Nightly, Michele Subramanian MD, 2 Units at 03/12/22 1206  •  insulin lispro (ADMELOG) injection 12 Units, 12 Units, Subcutaneous, TID AC, Michele Subramanian MD, 12 Units at 03/12/22 1206  •  levothyroxine (SYNTHROID, LEVOTHROID) tablet 150 mcg, 150 mcg, Oral, Daily, Michele Subramanian MD, 150 mcg at 03/12/22 0835  •  losartan (COZAAR) tablet 50 mg, 50 mg, Oral, Q12H,  Luis Wilson MD  •  Magnesium Sulfate 2 gram Bolus, followed by 8 gram infusion (total Mg dose 10 grams)- Mg less than or equal to 1mg/dL, 2 g, Intravenous, PRN **OR** Magnesium Sulfate 2 gram / 50mL Infusion (GIVE X 3 BAGS TO EQUAL 6GM TOTAL DOSE) - Mg 1.1 - 1.5 mg/dl, 2 g, Intravenous, PRN **OR** Magnesium Sulfate 4 gram infusion- Mg 1.6-1.9 mg/dL, 4 g, Intravenous, PRN, Luis Wilson MD  •  magnesium sulfate 2g/50 mL (PREMIX) infusion, 2 g, Intravenous, Once, Luis Wilson MD  •  pantoprazole (PROTONIX) EC tablet 40 mg, 40 mg, Oral, BID AC, Heriberto Subramanian MD, 40 mg at 03/12/22 0621  •  potassium chloride (K-DUR,KLOR-CON) ER tablet 40 mEq, 40 mEq, Oral, Once, Luis Wilson MD  •  potassium chloride (K-DUR,KLOR-CON) ER tablet 40 mEq, 40 mEq, Oral, PRN, Luis Wilosn MD  •  potassium chloride (KLOR-CON) packet 40 mEq, 40 mEq, Oral, PRN, Luis Wilson MD  •  [COMPLETED] Insert peripheral IV, , , Once **AND** sodium chloride 0.9 % flush 10 mL, 10 mL, Intravenous, PRN, Naif Raphael MD  •  spironolactone (ALDACTONE) tablet 25 mg, 25 mg, Oral, Once, Luis Wilson MD  •  [START ON 3/13/2022] spironolactone (ALDACTONE) tablet 50 mg, 50 mg, Oral, Daily, Luis Wilson MD  •  torsemide (DEMADEX) tablet 20 mg, 20 mg, Oral, Daily, Luis Wilson MD, 20 mg at 03/12/22 0836     ASSESSMENT  S/p dig toxicity  Hypokalemia and hypomagnesemia   Chronic atrial fibrillation with bradycardia  Recent right MCA infarct with left hemiparesis  Diabetes mellitus  Hypertension  Hyperlipidemia  Hypothyroidism   Gastroesophageal reflux disease    PLAN  CPM  Replace potassium and magnesium  Continue to hold Lanoxin and atenolol  Adjust Synthroid dose  Cardiology consult appreciated  Adjust home medications   Stress ulcer DVT prophylaxis  Supportive care  PT/OT  Discussed with  and nursing staff  Discharge planning    HERIBERTO SUBRAMANIAN MD

## 2022-03-12 NOTE — PROGRESS NOTES
[facility]     Following progress through the weekend.  Noted Digoxin level within normal range today.      Patient progressing nicely in physical therapy.      Will follow up with spouse this weekend to ensure his availability and ability to care for patient once discharged.      Bed availability will be tight on rehab next week, but if bed available Monday and spouse in agreement with rehab plan, would require precert prior to admission.  [facility] made aware of this on Friday.    Thanks,   Jackie Torres RN  Rehab Admission Nurse   [phone]

## 2022-03-13 LAB
ANION GAP SERPL CALCULATED.3IONS-SCNC: 11 MMOL/L (ref 5–15)
BUN SERPL-MCNC: 8 MG/DL (ref 8–23)
BUN/CREAT SERPL: 9.5 (ref 7–25)
CALCIUM SPEC-SCNC: 10.2 MG/DL (ref 8.6–10.5)
CHLORIDE SERPL-SCNC: 101 MMOL/L (ref 98–107)
CO2 SERPL-SCNC: 27 MMOL/L (ref 22–29)
CREAT SERPL-MCNC: 0.84 MG/DL (ref 0.57–1)
DEPRECATED RDW RBC AUTO: 49.5 FL (ref 37–54)
DIGOXIN SERPL-MCNC: 0.7 NG/ML (ref 0.6–1.2)
EGFRCR SERPLBLD CKD-EPI 2021: 79.7 ML/MIN/1.73
ERYTHROCYTE [DISTWIDTH] IN BLOOD BY AUTOMATED COUNT: 15.9 % (ref 12.3–15.4)
GLUCOSE BLDC GLUCOMTR-MCNC: 135 MG/DL (ref 70–130)
GLUCOSE BLDC GLUCOMTR-MCNC: 148 MG/DL (ref 70–130)
GLUCOSE BLDC GLUCOMTR-MCNC: 182 MG/DL (ref 70–130)
GLUCOSE BLDC GLUCOMTR-MCNC: 216 MG/DL (ref 70–130)
GLUCOSE SERPL-MCNC: 217 MG/DL (ref 65–99)
HCT VFR BLD AUTO: 37.1 % (ref 34–46.6)
HGB BLD-MCNC: 11.8 G/DL (ref 12–15.9)
MAGNESIUM SERPL-MCNC: 2.1 MG/DL (ref 1.6–2.4)
MCH RBC QN AUTO: 27.5 PG (ref 26.6–33)
MCHC RBC AUTO-ENTMCNC: 31.8 G/DL (ref 31.5–35.7)
MCV RBC AUTO: 86.5 FL (ref 79–97)
PLATELET # BLD AUTO: 117 10*3/MM3 (ref 140–450)
PMV BLD AUTO: 11.3 FL (ref 6–12)
POTASSIUM SERPL-SCNC: 3.3 MMOL/L (ref 3.5–5.2)
POTASSIUM SERPL-SCNC: 4 MMOL/L (ref 3.5–5.2)
RBC # BLD AUTO: 4.29 10*6/MM3 (ref 3.77–5.28)
SODIUM SERPL-SCNC: 139 MMOL/L (ref 136–145)
WBC NRBC COR # BLD: 3.59 10*3/MM3 (ref 3.4–10.8)

## 2022-03-13 PROCEDURE — 96374 THER/PROPH/DIAG INJ IV PUSH: CPT

## 2022-03-13 PROCEDURE — 82962 GLUCOSE BLOOD TEST: CPT

## 2022-03-13 PROCEDURE — 84132 ASSAY OF SERUM POTASSIUM: CPT | Performed by: HOSPITALIST

## 2022-03-13 PROCEDURE — 80162 ASSAY OF DIGOXIN TOTAL: CPT | Performed by: INTERNAL MEDICINE

## 2022-03-13 PROCEDURE — 83735 ASSAY OF MAGNESIUM: CPT | Performed by: INTERNAL MEDICINE

## 2022-03-13 PROCEDURE — 85027 COMPLETE CBC AUTOMATED: CPT | Performed by: INTERNAL MEDICINE

## 2022-03-13 PROCEDURE — 99214 OFFICE O/P EST MOD 30 MIN: CPT | Performed by: INTERNAL MEDICINE

## 2022-03-13 PROCEDURE — G0378 HOSPITAL OBSERVATION PER HR: HCPCS

## 2022-03-13 PROCEDURE — 96375 TX/PRO/DX INJ NEW DRUG ADDON: CPT

## 2022-03-13 PROCEDURE — 96376 TX/PRO/DX INJ SAME DRUG ADON: CPT

## 2022-03-13 PROCEDURE — 25010000002 ONDANSETRON PER 1 MG: Performed by: HOSPITALIST

## 2022-03-13 PROCEDURE — 80048 BASIC METABOLIC PNL TOTAL CA: CPT | Performed by: INTERNAL MEDICINE

## 2022-03-13 PROCEDURE — 63710000001 INSULIN LISPRO (HUMAN) PER 5 UNITS: Performed by: HOSPITALIST

## 2022-03-13 RX ORDER — ONDANSETRON 2 MG/ML
4 INJECTION INTRAMUSCULAR; INTRAVENOUS EVERY 6 HOURS PRN
Status: DISCONTINUED | OUTPATIENT
Start: 2022-03-13 | End: 2022-03-14

## 2022-03-13 RX ORDER — ACETAMINOPHEN 325 MG/1
650 TABLET ORAL EVERY 6 HOURS PRN
Status: DISCONTINUED | OUTPATIENT
Start: 2022-03-13 | End: 2022-03-14

## 2022-03-13 RX ORDER — INSULIN LISPRO 100 [IU]/ML
13 INJECTION, SOLUTION INTRAVENOUS; SUBCUTANEOUS
Status: DISCONTINUED | OUTPATIENT
Start: 2022-03-13 | End: 2022-03-14

## 2022-03-13 RX ORDER — POTASSIUM CHLORIDE 750 MG/1
20 TABLET, FILM COATED, EXTENDED RELEASE ORAL 2 TIMES DAILY WITH MEALS
Status: DISCONTINUED | OUTPATIENT
Start: 2022-03-13 | End: 2022-03-16 | Stop reason: HOSPADM

## 2022-03-13 RX ORDER — POTASSIUM CHLORIDE 750 MG/1
10 TABLET, FILM COATED, EXTENDED RELEASE ORAL DAILY
Status: DISCONTINUED | OUTPATIENT
Start: 2022-03-13 | End: 2022-03-13

## 2022-03-13 RX ADMIN — APIXABAN 5 MG: 5 TABLET, FILM COATED ORAL at 06:41

## 2022-03-13 RX ADMIN — PANTOPRAZOLE SODIUM 40 MG: 40 TABLET, DELAYED RELEASE ORAL at 06:41

## 2022-03-13 RX ADMIN — INSULIN LISPRO 12 UNITS: 100 INJECTION, SOLUTION INTRAVENOUS; SUBCUTANEOUS at 08:16

## 2022-03-13 RX ADMIN — INSULIN GLARGINE-YFGN 40 UNITS: 100 INJECTION, SOLUTION SUBCUTANEOUS at 21:30

## 2022-03-13 RX ADMIN — ATORVASTATIN CALCIUM 10 MG: 10 TABLET, FILM COATED ORAL at 20:36

## 2022-03-13 RX ADMIN — ONDANSETRON 4 MG: 2 INJECTION INTRAMUSCULAR; INTRAVENOUS at 13:22

## 2022-03-13 RX ADMIN — ONDANSETRON 4 MG: 2 INJECTION INTRAMUSCULAR; INTRAVENOUS at 20:36

## 2022-03-13 RX ADMIN — LOSARTAN POTASSIUM 50 MG: 50 TABLET, FILM COATED ORAL at 08:17

## 2022-03-13 RX ADMIN — INSULIN LISPRO 2 UNITS: 100 INJECTION, SOLUTION INTRAVENOUS; SUBCUTANEOUS at 12:08

## 2022-03-13 RX ADMIN — LOSARTAN POTASSIUM 50 MG: 50 TABLET, FILM COATED ORAL at 20:36

## 2022-03-13 RX ADMIN — POTASSIUM CHLORIDE 40 MEQ: 10 TABLET, EXTENDED RELEASE ORAL at 08:16

## 2022-03-13 RX ADMIN — INSULIN LISPRO 3 UNITS: 100 INJECTION, SOLUTION INTRAVENOUS; SUBCUTANEOUS at 08:16

## 2022-03-13 RX ADMIN — GABAPENTIN 300 MG: 300 CAPSULE ORAL at 08:17

## 2022-03-13 RX ADMIN — APIXABAN 5 MG: 5 TABLET, FILM COATED ORAL at 17:21

## 2022-03-13 RX ADMIN — GABAPENTIN 300 MG: 300 CAPSULE ORAL at 17:21

## 2022-03-13 RX ADMIN — INSULIN GLARGINE-YFGN 36 UNITS: 100 INJECTION, SOLUTION SUBCUTANEOUS at 08:15

## 2022-03-13 RX ADMIN — GABAPENTIN 300 MG: 300 CAPSULE ORAL at 20:36

## 2022-03-13 RX ADMIN — INSULIN LISPRO 13 UNITS: 100 INJECTION, SOLUTION INTRAVENOUS; SUBCUTANEOUS at 17:22

## 2022-03-13 RX ADMIN — TORSEMIDE 20 MG: 20 TABLET ORAL at 08:17

## 2022-03-13 RX ADMIN — SPIRONOLACTONE 50 MG: 50 TABLET, FILM COATED ORAL at 08:17

## 2022-03-13 RX ADMIN — POTASSIUM CHLORIDE 20 MEQ: 10 TABLET, EXTENDED RELEASE ORAL at 17:21

## 2022-03-13 RX ADMIN — POTASSIUM CHLORIDE 40 MEQ: 10 TABLET, EXTENDED RELEASE ORAL at 12:07

## 2022-03-13 RX ADMIN — LEVOTHYROXINE SODIUM 150 MCG: 0.15 TABLET ORAL at 08:17

## 2022-03-13 RX ADMIN — ACETAMINOPHEN 650 MG: 325 TABLET, FILM COATED ORAL at 13:05

## 2022-03-13 RX ADMIN — PANTOPRAZOLE SODIUM 40 MG: 40 TABLET, DELAYED RELEASE ORAL at 17:21

## 2022-03-13 RX ADMIN — INSULIN LISPRO 12 UNITS: 100 INJECTION, SOLUTION INTRAVENOUS; SUBCUTANEOUS at 12:07

## 2022-03-13 NOTE — PLAN OF CARE
Goal Outcome Evaluation:  Plan of Care Reviewed With: patient        Progress: improving   Afib with rate controlled. Toxic digoxin levels resolved. Remains on room air. No acute changes this shift. Stable for rehab. No falls. Will CTM.

## 2022-03-13 NOTE — PLAN OF CARE
Goal Outcome Evaluation:Plan of Care Reviewed With: patient        Patient alert oriented / not in any distress / denied any pain / room air / a fib  HR in 60's /  VS stable / pending discharge /

## 2022-03-13 NOTE — PROGRESS NOTES
[facility]     Following progress.  Will discuss with CCP in the morning.      Thanks,   Jackie Torres RN  Rehab Admission Nurse   [phone]

## 2022-03-13 NOTE — PROGRESS NOTES
LOS: 0 days   Patient Care Team:  Zay Olivares MD as PCP - General (General Practice)    Chief Complaint: Follow-up recent stroke, digoxin toxicity, persistent atrial fibrillation, chronic diastolic CHF.    Interval History: Still with mild bradycardia at times, although markedly improved.  Several pauses less than 3 seconds, although no significant pauses.  Remains in atrial fibrillation.  No chest pain or significant shortness of breath.      Vital Signs:  Temp:  [97.5 °F (36.4 °C)-98 °F (36.7 °C)] 97.5 °F (36.4 °C)  Heart Rate:  [60-69] 60  Resp:  [16-17] 16  BP: (128-143)/(65-90) 131/90    Intake/Output Summary (Last 24 hours) at 3/13/2022 1500  Last data filed at 3/13/2022 0500  Gross per 24 hour   Intake 480 ml   Output 750 ml   Net -270 ml       Physical Exam:   General Appearance:    No acute distress, alert and oriented x4   Lungs:     Clear to auscultation bilaterally     Heart:   Irregularly irregular rhythm with a normal rate.  II/VI SM RUSB and LUSB.   Abdomen:     Soft, nontender, nondistended.    Extremities:   Trace edema lower extremities.     Results Review:    Results from last 7 days   Lab Units 03/13/22  0456   SODIUM mmol/L 139   POTASSIUM mmol/L 3.3*   CHLORIDE mmol/L 101   CO2 mmol/L 27.0   BUN mg/dL 8   CREATININE mg/dL 0.84   GLUCOSE mg/dL 217*   CALCIUM mg/dL 10.2         Results from last 7 days   Lab Units 03/13/22  0456   WBC 10*3/mm3 3.59   HEMOGLOBIN g/dL 11.8*   HEMATOCRIT % 37.1   PLATELETS 10*3/mm3 117*             Results from last 7 days   Lab Units 03/13/22  0456   MAGNESIUM mg/dL 2.1           I reviewed the patient's new clinical results.        Assessment:  1.  Digoxin toxicity with associated bradycardia  2.  Persistent atrial fibrillation  3.  Acute embolic right MCA CVA on 1/30/2022  4.  History of GI bleeding with Pradaxa and Xarelto in the past  5.  Chronic diastolic CHF  6.  Moderate right ventricular enlargement with moderately reduced function  7.  Severe  biatrial enlargement  8.  History of gastric bypass  9.  Insulin-dependent diabetes  10.  Dyslipidemia with low HDL  11.  Leukopenia  12.  Thrombocytopenia  13.  Hypokalemia  14.  Hypomagnesemia    Plan:  -Slowly reintroducing blood pressure meds,  Increased spironolactone to 50 mg per day yesterday, and increased losartan to 50 mg twice a day yesterday.  I am going to watch this for now and see how her blood pressure responds over the course of today.    -Digoxin should be contraindicated for her in the future.  Her level this morning is 0.70.  No need to check further levels at this point.  It had been as high as 3.    -Continue to hold atenolol.  Some occasional bradycardia with the atrial fibrillation, but much better.  No significant pauses over 3 seconds.    -I would avoid calcium channel blockers given her history of significant lower extremity edema.  I would not use amlodipine for this reason.    -I resumed her torsemide at 20 mg/day.  She had been on 40 mg/day.  She has minimal edema and would like to stay on this dose if possible.    -Bleeding with Pradaxa and Xarelto in the past.  Consideration for an atrial occluder device, although she would have to tolerate the Eliquis.  Continue Eliquis because of the recent stroke and the persistent atrial fibrillation.    -Electrolyte replacement protocol.    Luis Wilson MD  03/13/22  15:00 EDT

## 2022-03-13 NOTE — PROGRESS NOTES
"Daily progress note    Chief complaint  Doing same  No new complaints except nausea  Denies any dizziness lightheadedness  Family at bedside    History of present illness  62-year old white female with history of atrial fibrillation recent multiple right CVA with left hemiparesis diabetes mellitus hypertension hyperlipidemia and gastroesophageal reflux disease who is taking atenolol and digoxin to control the heart rate presented to [facility] emergency room after seen by primary care doctor for generalized weakness and found to have dig toxicity with low heart rate asked to come to the ER.  Patient dig level was 3.0 and now it is 2.2 but heart rate remained in 40s.  Patient has weakness but denies any palpitations lightheadedness dizziness chest pain or shortness of breath.  Patient has been feeling weak.  Patient admitted for further work-up.     REVIEW OF SYSTEMS  Unremarkable except nausea     PHYSICAL EXAM  Blood pressure 114/53, pulse 60, temperature 98.4 °F (36.9 °C), temperature source Oral, resp. rate 16, height 172.7 cm (68\"), weight 98.6 kg (217 lb 6 oz), SpO2 97 %.    Constitutional:       General: She is not in acute distress.  HENT:      Head: Normocephalic and atraumatic.   Eyes:      Pupils: Pupils are equal, round, and reactive to light.   Cardiovascular:      Rate and Rhythm: Regular rhythm. Bradycardia present.      Heart sounds: Normal heart sounds.   Pulmonary:      Effort: Pulmonary effort is normal. No respiratory distress.      Breath sounds: Normal breath sounds.   Abdominal:      Palpations: Abdomen is soft.      Tenderness: There is no abdominal tenderness. There is no guarding or rebound.   Musculoskeletal:         General: Normal range of motion.      Cervical back: Normal range of motion and neck supple.   Skin:     General: Skin is warm and dry.      Findings: No rash.   Neurological:      Mental Status: She is alert and oriented to person, place, and time.      Sensory: " Sensation is intact.      Comments: Chronic weakness of left side   Psychiatric:         Mood and Affect: Mood and affect normal.      LAB RESULTS  Lab Results (last 24 hours)     Procedure Component Value Units Date/Time    POC Glucose Once [[id number]]  (Abnormal) Collected: 03/13/22 1034    Specimen: Blood Updated: 03/13/22 1035     Glucose 182 mg/dL      Comment: Meter: [id number] : [id number] Levy DIAS       Magnesium [[id number]]  (Normal) Collected: 03/13/22 0456    Specimen: Blood Updated: 03/13/22 0638     Magnesium 2.1 mg/dL     POC Glucose Once [[id number]]  (Abnormal) Collected: 03/13/22 0630    Specimen: Blood Updated: 03/13/22 0631     Glucose 216 mg/dL      Comment: Meter: [id number] : [id number] Jose DIAS       Basic Metabolic Panel [[id number]]  (Abnormal) Collected: 03/13/22 0456    Specimen: Blood Updated: 03/13/22 0627     Glucose 217 mg/dL      BUN 8 mg/dL      Creatinine 0.84 mg/dL      Sodium 139 mmol/L      Potassium 3.3 mmol/L      Chloride 101 mmol/L      CO2 27.0 mmol/L      Calcium 10.2 mg/dL      BUN/Creatinine Ratio 9.5     Anion Gap 11.0 mmol/L      eGFR 79.7 mL/min/1.73      Comment: National Kidney Foundation and American Society of Nephrology (ASN) Task Force recommended calculation based on the Chronic Kidney Disease Epidemiology Collaboration (CKD-EPI) equation refit without adjustment for race.       Narrative:      GFR Normal >60  Chronic Kidney Disease <60  Kidney Failure <15      Digoxin Level [[id number]]  (Normal) Collected: 03/13/22 0456    Specimen: Blood Updated: 03/13/22 0627     Digoxin 0.70 ng/mL     CBC (No Diff) [[id number]]  (Abnormal) Collected: 03/13/22 0456    Specimen: Blood Updated: 03/13/22 0549     WBC 3.59 10*3/mm3      RBC 4.29 10*6/mm3      Hemoglobin 11.8 g/dL      Hematocrit 37.1 %      MCV 86.5 fL      MCH 27.5 pg      MCHC 31.8 g/dL      RDW 15.9 %      RDW-SD 49.5 fl      MPV 11.3 fL      Platelets 117 10*3/mm3     POC Glucose Once  [[id number]]  (Abnormal) Collected: 03/12/22 2123    Specimen: Blood Updated: 03/12/22 2124     Glucose 228 mg/dL      Comment: Meter: [id number] : [id number] Jose Sean ARUN       POC Glucose Once [[id number]]  (Abnormal) Collected: 03/12/22 1611    Specimen: Blood Updated: 03/12/22 1613     Glucose 156 mg/dL      Comment: Meter: [id number] : [id number] Jessee Annadaniela DIAS           Imaging Results (Last 24 Hours)     ** No results found for the last 24 hours. **             ECG 12 Lead  Component   Ref Range & Units 3/9/22 0914 2/5/22 0040 1/28/22 2236   QT Interval   ms 364 P  393  360              HEART RATE= 44  bpm  RR Interval= 1356  ms  W[place] Interval=   ms  P Horizontal Axis=   deg  P Front Axis=   deg  QRSD Interval= 95  ms  QT Interval= 364  ms  QRS Axis= 41  deg  T Wave Axis= 238  deg  - ABNORMAL ECG -  Atrial fibrillation  Repol abnrm suggests ischemia, diffuse leads             Current Facility-Administered Medications:   •  acetaminophen (TYLENOL) tablet 650 mg, 650 mg, Oral, Q6H PRN, Michele Subramanian MD, 650 mg at 03/13/22 1305  •  albuterol (PROVENTIL) nebulizer solution 0.083% 2.5 mg/3mL, 2.5 mg, Nebulization, Q6H PRN, Michele Subramanian MD  •  apixaban (ELIQUIS) tablet 5 mg, 5 mg, Oral, Q12H, Michele Subramanian MD, 5 mg at 03/13/22 0641  •  atorvastatin (LIPITOR) tablet 10 mg, 10 mg, Oral, Nightly, Michele Subramanian MD, 10 mg at 03/12/22 2144  •  gabapentin (NEURONTIN) capsule 300 mg, 300 mg, Oral, TID, Michele Subramanian MD, 300 mg at 03/13/22 0817  •  insulin glargine (LANTUS, SEMGLEE) injection 36 Units, 36 Units, Subcutaneous, BID, Michele Subramanian MD, 36 Units at 03/13/22 0815  •  insulin lispro (ADMELOG) injection 0-7 Units, 0-7 Units, Subcutaneous, 4x Daily With Meals & Nightly, Michele Subramanian MD, 2 Units at 03/13/22 1208  •  insulin lispro (ADMELOG) injection 12 Units, 12 Units, Subcutaneous, TID AC, Michele Subramanian MD, 12 Units at 03/13/22 1207  •  levothyroxine (SYNTHROID, LEVOTHROID) tablet 150 mcg, 150 mcg, Oral,  Daily, Heriberto Subramanian MD, 150 mcg at 03/13/22 0817  •  losartan (COZAAR) tablet 50 mg, 50 mg, Oral, Q12H, Luis Wilson MD, 50 mg at 03/13/22 0817  •  Magnesium Sulfate 2 gram Bolus, followed by 8 gram infusion (total Mg dose 10 grams)- Mg less than or equal to 1mg/dL, 2 g, Intravenous, PRN **OR** Magnesium Sulfate 2 gram / 50mL Infusion (GIVE X 3 BAGS TO EQUAL 6GM TOTAL DOSE) - Mg 1.1 - 1.5 mg/dl, 2 g, Intravenous, PRN **OR** Magnesium Sulfate 4 gram infusion- Mg 1.6-1.9 mg/dL, 4 g, Intravenous, PRN, Luis Wilson MD  •  ondansetron (ZOFRAN) injection 4 mg, 4 mg, Intravenous, Q6H PRN, Heriberto Subramanian MD, 4 mg at 03/13/22 1322  •  pantoprazole (PROTONIX) EC tablet 40 mg, 40 mg, Oral, BID AC, Heriberto Subramanian MD, 40 mg at 03/13/22 0641  •  potassium chloride (K-DUR,KLOR-CON) ER tablet 40 mEq, 40 mEq, Oral, PRN, Luis Wilson MD, 40 mEq at 03/13/22 1207  •  potassium chloride (KLOR-CON) packet 40 mEq, 40 mEq, Oral, PRN, Luis Wilson MD  •  [COMPLETED] Insert peripheral IV, , , Once **AND** sodium chloride 0.9 % flush 10 mL, 10 mL, Intravenous, PRN, Naif Raphael MD  •  spironolactone (ALDACTONE) tablet 50 mg, 50 mg, Oral, Daily, Luis Wilson MD, 50 mg at 03/13/22 0817  •  torsemide (DEMADEX) tablet 20 mg, 20 mg, Oral, Daily, Luis Wilson MD, 20 mg at 03/13/22 0817     ASSESSMENT  S/p dig toxicity  Hypokalemia and hypomagnesemia   Chronic atrial fibrillation with bradycardia  Recent right MCA infarct with left hemiparesis  Diabetes mellitus  Hypertension  Hyperlipidemia  Hypothyroidism   Gastroesophageal reflux disease    PLAN  CPM  Replace potassium and magnesium  Continue to hold Lanoxin and atenolol  Adjust Synthroid dose  Cardiology consult appreciated  Adjust home medications   Stress ulcer DVT prophylaxis  Supportive care  PT/OT  Discussed with  and nursing staff  Discharge planning    HERIBERTO SUBRAMANIAN MD

## 2022-03-14 ENCOUNTER — HOSPITAL ENCOUNTER (INPATIENT)
Facility: HOSPITAL | Age: 62
End: 2022-03-14
Attending: PHYSICAL MEDICINE & REHABILITATION | Admitting: PHYSICAL MEDICINE & REHABILITATION

## 2022-03-14 LAB
ANION GAP SERPL CALCULATED.3IONS-SCNC: 9.5 MMOL/L (ref 5–15)
BUN SERPL-MCNC: 7 MG/DL (ref 8–23)
BUN/CREAT SERPL: 7 (ref 7–25)
CALCIUM SPEC-SCNC: 10.8 MG/DL (ref 8.6–10.5)
CHLORIDE SERPL-SCNC: 103 MMOL/L (ref 98–107)
CO2 SERPL-SCNC: 28.5 MMOL/L (ref 22–29)
CREAT SERPL-MCNC: 1 MG/DL (ref 0.57–1)
EGFRCR SERPLBLD CKD-EPI 2021: 64.6 ML/MIN/1.73
GLUCOSE BLDC GLUCOMTR-MCNC: 146 MG/DL (ref 70–130)
GLUCOSE BLDC GLUCOMTR-MCNC: 156 MG/DL (ref 70–130)
GLUCOSE BLDC GLUCOMTR-MCNC: 273 MG/DL (ref 70–130)
GLUCOSE BLDC GLUCOMTR-MCNC: 363 MG/DL (ref 70–130)
GLUCOSE SERPL-MCNC: 181 MG/DL (ref 65–99)
POTASSIUM SERPL-SCNC: 4.1 MMOL/L (ref 3.5–5.2)
SODIUM SERPL-SCNC: 141 MMOL/L (ref 136–145)

## 2022-03-14 PROCEDURE — 80048 BASIC METABOLIC PNL TOTAL CA: CPT | Performed by: HOSPITALIST

## 2022-03-14 PROCEDURE — 82962 GLUCOSE BLOOD TEST: CPT

## 2022-03-14 PROCEDURE — G0378 HOSPITAL OBSERVATION PER HR: HCPCS

## 2022-03-14 PROCEDURE — 63710000001 INSULIN LISPRO (HUMAN) PER 5 UNITS: Performed by: HOSPITALIST

## 2022-03-14 PROCEDURE — 97535 SELF CARE MNGMENT TRAINING: CPT

## 2022-03-14 PROCEDURE — 97110 THERAPEUTIC EXERCISES: CPT

## 2022-03-14 PROCEDURE — 99213 OFFICE O/P EST LOW 20 MIN: CPT | Performed by: INTERNAL MEDICINE

## 2022-03-14 PROCEDURE — 97112 NEUROMUSCULAR REEDUCATION: CPT

## 2022-03-14 RX ORDER — INSULIN LISPRO 100 [IU]/ML
15 INJECTION, SOLUTION INTRAVENOUS; SUBCUTANEOUS
Status: DISCONTINUED | OUTPATIENT
Start: 2022-03-14 | End: 2022-03-16 | Stop reason: HOSPADM

## 2022-03-14 RX ORDER — INSULIN GLARGINE 100 [IU]/ML
42 INJECTION, SOLUTION SUBCUTANEOUS 2 TIMES DAILY
Qty: 30 ML | Refills: 0 | Status: SHIPPED | OUTPATIENT
Start: 2022-03-14 | End: 2022-03-16 | Stop reason: SDUPTHER

## 2022-03-14 RX ORDER — ATORVASTATIN CALCIUM 10 MG/1
10 TABLET, FILM COATED ORAL NIGHTLY
Qty: 30 TABLET | Refills: 0 | Status: SHIPPED | OUTPATIENT
Start: 2022-03-14 | End: 2022-04-15

## 2022-03-14 RX ORDER — LOSARTAN POTASSIUM 50 MG/1
50 TABLET ORAL EVERY 12 HOURS
Qty: 60 TABLET | Refills: 0 | Status: SHIPPED | OUTPATIENT
Start: 2022-03-14 | End: 2023-02-08 | Stop reason: DRUGHIGH

## 2022-03-14 RX ORDER — PANTOPRAZOLE SODIUM 40 MG/1
40 TABLET, DELAYED RELEASE ORAL
Qty: 60 TABLET | Refills: 0 | Status: SHIPPED | OUTPATIENT
Start: 2022-03-14 | End: 2022-04-15

## 2022-03-14 RX ORDER — SPIRONOLACTONE 50 MG/1
50 TABLET, FILM COATED ORAL DAILY
Qty: 30 TABLET | Refills: 0 | Status: SHIPPED | OUTPATIENT
Start: 2022-03-15 | End: 2023-03-07

## 2022-03-14 RX ORDER — POTASSIUM CHLORIDE 20 MEQ/1
20 TABLET, EXTENDED RELEASE ORAL 2 TIMES DAILY WITH MEALS
Qty: 60 TABLET | Refills: 0 | Status: SHIPPED | OUTPATIENT
Start: 2022-03-14 | End: 2022-04-15

## 2022-03-14 RX ORDER — TORSEMIDE 20 MG/1
20 TABLET ORAL DAILY
Qty: 30 TABLET | Refills: 0 | Status: SHIPPED | OUTPATIENT
Start: 2022-03-15 | End: 2022-10-31

## 2022-03-14 RX ORDER — INSULIN LISPRO 100 [IU]/ML
15 INJECTION, SOLUTION INTRAVENOUS; SUBCUTANEOUS
Qty: 15 ML | Refills: 0 | Status: SHIPPED | OUTPATIENT
Start: 2022-03-14 | End: 2022-03-16 | Stop reason: SDUPTHER

## 2022-03-14 RX ADMIN — INSULIN GLARGINE-YFGN 40 UNITS: 100 INJECTION, SOLUTION SUBCUTANEOUS at 08:52

## 2022-03-14 RX ADMIN — GABAPENTIN 300 MG: 300 CAPSULE ORAL at 21:27

## 2022-03-14 RX ADMIN — INSULIN LISPRO 13 UNITS: 100 INJECTION, SOLUTION INTRAVENOUS; SUBCUTANEOUS at 08:56

## 2022-03-14 RX ADMIN — PANTOPRAZOLE SODIUM 40 MG: 40 TABLET, DELAYED RELEASE ORAL at 18:42

## 2022-03-14 RX ADMIN — APIXABAN 5 MG: 5 TABLET, FILM COATED ORAL at 06:03

## 2022-03-14 RX ADMIN — POTASSIUM CHLORIDE 20 MEQ: 10 TABLET, EXTENDED RELEASE ORAL at 18:42

## 2022-03-14 RX ADMIN — SPIRONOLACTONE 50 MG: 50 TABLET, FILM COATED ORAL at 08:51

## 2022-03-14 RX ADMIN — INSULIN GLARGINE-YFGN 42 UNITS: 100 INJECTION, SOLUTION SUBCUTANEOUS at 21:27

## 2022-03-14 RX ADMIN — INSULIN LISPRO 2 UNITS: 100 INJECTION, SOLUTION INTRAVENOUS; SUBCUTANEOUS at 08:51

## 2022-03-14 RX ADMIN — INSULIN LISPRO 13 UNITS: 100 INJECTION, SOLUTION INTRAVENOUS; SUBCUTANEOUS at 13:52

## 2022-03-14 RX ADMIN — INSULIN LISPRO 6 UNITS: 100 INJECTION, SOLUTION INTRAVENOUS; SUBCUTANEOUS at 18:43

## 2022-03-14 RX ADMIN — LOSARTAN POTASSIUM 50 MG: 50 TABLET, FILM COATED ORAL at 21:27

## 2022-03-14 RX ADMIN — GABAPENTIN 300 MG: 300 CAPSULE ORAL at 18:42

## 2022-03-14 RX ADMIN — INSULIN LISPRO 4 UNITS: 100 INJECTION, SOLUTION INTRAVENOUS; SUBCUTANEOUS at 13:52

## 2022-03-14 RX ADMIN — INSULIN LISPRO 15 UNITS: 100 INJECTION, SOLUTION INTRAVENOUS; SUBCUTANEOUS at 18:41

## 2022-03-14 RX ADMIN — GABAPENTIN 300 MG: 300 CAPSULE ORAL at 08:51

## 2022-03-14 RX ADMIN — TORSEMIDE 20 MG: 20 TABLET ORAL at 08:51

## 2022-03-14 RX ADMIN — ATORVASTATIN CALCIUM 10 MG: 10 TABLET, FILM COATED ORAL at 21:27

## 2022-03-14 RX ADMIN — LEVOTHYROXINE SODIUM 150 MCG: 0.15 TABLET ORAL at 08:51

## 2022-03-14 RX ADMIN — LOSARTAN POTASSIUM 50 MG: 50 TABLET, FILM COATED ORAL at 08:51

## 2022-03-14 RX ADMIN — APIXABAN 5 MG: 5 TABLET, FILM COATED ORAL at 18:42

## 2022-03-14 RX ADMIN — PANTOPRAZOLE SODIUM 40 MG: 40 TABLET, DELAYED RELEASE ORAL at 06:04

## 2022-03-14 NOTE — PLAN OF CARE
Goal Outcome Evaluation:  Plan of Care Reviewed With: patient        Progress: no change   Patient has been improving but was noted to have some vomiting the previous shift. PRN zofran was given and tolerated well. No vomiting this shift. Continues to make needs known. She is noted to make false allegations about staff to her family on the phone. Her  called and stated that she called and said she was vomiting buckets and buckets of vomit and that no one did anything about it. Patient was noted to have vomited about 90cc once.  verbalized understanding. Patient did state that she thinks the K+ pill made her sick as she had a hard time swallowing it.

## 2022-03-14 NOTE — THERAPY TREATMENT NOTE
Patient Name: Jenelle Kasper  : 1960    MRN: [id number]                              Today's Date: 3/14/2022       Admit Date: 3/9/2022    Visit Dx:     ICD-10-CM ICD-9-CM   1. Bradycardia  R00.1 427.89   2. Poisoning by digoxin, accidental or unintentional, initial encounter  T46.0X1A 972.1     E858.3     Patient Active Problem List   Diagnosis   • Persistent atrial fibrillation (HCC)   • Benign essential HTN   • Hyperparathyroidism (HCC)   • Morbid obesity with BMI of 45.0-49.9, adult (HCC)   • Pulmonary hypertension (HCC)   • Precordial pain   • Shortness of breath   • TIA (transient ischemic attack)   • Bradycardia     Past Medical History:   Diagnosis Date   • Anxiety    • Arrhythmia    • Asthma    • Atrial fibrillation with RVR (HCC)    • C. difficile diarrhea    • COPD (chronic obstructive pulmonary disease) (HCC)    • Depression    • Diabetes mellitus (HCC)    • Diabetic peripheral neuropathy associated with type 2 diabetes mellitus (HCC)    • GERD (gastroesophageal reflux disease)    • Heart murmur    • History of fall     closed head injury after falling down steps; fx nose   • Hypercalcemia    • Hyperlipidemia    • Hypertension    • IBS (irritable bowel syndrome)    • Kidney stone    • Morbid obesity (HCC)    • PAF (paroxysmal atrial fibrillation) (HCC)    • PCOS (polycystic ovarian syndrome)    • PONV (postoperative nausea and vomiting)    • Sarcoidosis    • Shortness of breath    • Thyroid cancer (HCC)     WITH RADIATION   • Wounds, multiple      Past Surgical History:   Procedure Laterality Date   • CARDIAC CATHETERIZATION N/A 2016    Procedure: Coronary angiography;  Surgeon: Chris Perez MD;  Location: [facility] CATH INVASIVE LOCATION;  Service:    • CARDIAC CATHETERIZATION N/A 2016    Procedure: Left heart cath;  Surgeon: Chris Perez MD;  Location: [facility] CATH INVASIVE LOCATION;  Service:    • CARDIAC CATHETERIZATION N/A 2016    Procedure: Left ventriculography;   Surgeon: Chris Perez MD;  Location:  [facility] CATH INVASIVE LOCATION;  Service:    • CARDIAC CATHETERIZATION N/A 2/26/2020    Procedure: Right Heart Cath;  Surgeon: Chris Perez MD;  Location:  [facility] CATH INVASIVE LOCATION;  Service: Cardiovascular;  Laterality: N/A;  If there is no evidence of pulmonary hypertension please add a left heart cath to evaluate coronary arteries as patient is having chest pain.   • CHOLECYSTECTOMY     • COLONOSCOPY     • DILATATION AND CURETTAGE     • ENDOSCOPY     • GASTRIC BYPASS      gastric surgery for morbid obesity   • GASTROPLASTY  2008    GASTRIC REVISION FROM PREVIOUS GASTRIC BYPASS   • HIATAL HERNIA REPAIR     • INGUINAL HERNIA REPAIR Right    • LUNG BIOPSY      to dx sarcoidosis   • LUNG SURGERY      Removal of granulomas   • TOTAL THYROIDECTOMY        General Information     Row Name 03/14/22 1713          Physical Therapy Time and Intention    Document Type therapy note (daily note)  -     Mode of Treatment individual therapy;physical therapy  -     Row Name 03/14/22 1713          General Information    Patient Profile Reviewed yes  -     Existing Precautions/Restrictions fall  -     Row Name 03/14/22 1713          Cognition    Orientation Status (Cognition) oriented x 4  needs extra time  -     Row Name 03/14/22 1713          Safety Issues, Functional Mobility    Safety Issues Affecting Function (Mobility) insight into deficits/self-awareness;judgment;positioning of assistive device;problem-solving  -     Impairments Affecting Function (Mobility) balance;coordination;endurance/activity tolerance;strength  -     Cognitive Impairments, Mobility Safety/Performance insight into deficits/self-awareness;judgment;problem-solving/reasoning;safety precaution awareness  -           User Key  (r) = Recorded By, (t) = Taken By, (c) = Cosigned By    Initials Name Provider Type    Darby Ballard PTA Physical Therapy Assistant               Mobility     Row  Name 03/14/22 1715          Bed Mobility    Comment, (Bed Mobility) in chair  -     Row Name 03/14/22 1715          Sit-Stand Transfer    Sit-Stand [place] (Transfers) 2 person assist;minimum assist (75% patient effort);moderate assist (50% patient effort);verbal cues;nonverbal cues (demo/gesture)  up in chair a while  -     Assistive Device (Sit-Stand Transfers) walker, front-wheeled  -     Row Name 03/14/22 1715          Gait/Stairs (Locomotion)    [place] Level (Gait) 2 person assist;minimum assist (75% patient effort);verbal cues;nonverbal cues (demo/gesture)  -     Assistive Device (Gait) walker, front-wheeled  -     Distance in Feet (Gait) 25ft x2, standing rest only  -     Deviations/Abnormal Patterns (Gait) base of support, wide;kirsty decreased;festinating/shuffling  -     Bilateral Gait Deviations forward flexed posture  -           User Key  (r) = Recorded By, (t) = Taken By, (c) = Cosigned By    Initials Name Provider Type    Darby Ballard PTA Physical Therapy Assistant               Obj/Interventions     Row Name 03/14/22 1718          Motor Skills    Therapeutic Exercise --  QS, AP, hip abd/add, LAQS x15 reps  -           User Key  (r) = Recorded By, (t) = Taken By, (c) = Cosigned By    Initials Name Provider Type    Darby Ballard PTA Physical Therapy Assistant               Goals/Plan    No documentation.                Clinical Impression     Row Name 03/14/22 1718          Pain    Pretreatment Pain Rating 0/10 - no pain  -     Posttreatment Pain Rating 0/10 - no pain  -     Row Name 03/14/22 1718          Plan of Care Review    Plan of Care Reviewed With patient  -     Progress improving  -     Outcome Evaluation Pt very eager to do PT session, wants to get well; incr amb dist to 25ft x2, only req standing rest to complete; assist of 2 for safety, kept on task today, just needs extra time; plans  -     Row Name 03/14/22 1718          Therapy  Assessment/Plan (PT)    Rehab Potential (PT) good, to achieve stated therapy goals  -     Criteria for Skilled Interventions Met (PT) yes  -     Row Name 03/14/22 1718          Vital Signs    O2 Delivery Pre Treatment room air  -     Row Name 03/14/22 1718          Positioning and Restraints    Pre-Treatment Position sitting in chair/recliner  -     Post Treatment Position chair  -JM     In Chair reclined;call light within reach;encouraged to call for assist;exit alarm on  -           User Key  (r) = Recorded By, (t) = Taken By, (c) = Cosigned By    Initials Name Provider Type    Darby Ballard PTA Physical Therapy Assistant               Outcome Measures     Row Name 03/14/22 1725          How much help from another person do you currently need...    Turning from your back to your side while in flat bed without using bedrails? 3  -JM     Moving from lying on back to sitting on the side of a flat bed without bedrails? 2  -JM     Moving to and from a bed to a chair (including a wheelchair)? 3  -JM     Standing up from a chair using your arms (e.g., wheelchair, bedside chair)? 2  -JM     Climbing 3-5 steps with a railing? 1  -JM     To walk in hospital room? 3  -     AM-PAC 6 Clicks Score (PT) 14  -[facility] Name 03/14/22 1119          Modified Dom Scale    Modified [place] Scale 4 - Moderately severe disability.  Unable to walk without assistance, and unable to attend to own bodily needs without assistance.  -     Row Name 03/14/22 1119          Functional Assessment    Outcome Measure Options AM-PAC 6 Clicks Daily Activity (OT)  -           User Key  (r) = Recorded By, (t) = Taken By, (c) = Cosigned By    Initials Name Provider Type    Darby Ballard PTA Physical Therapy Assistant    Lexy Blue OT Occupational Therapist                             Physical Therapy Education                 Title: PT OT SLP Therapies (Done)     Topic: Physical Therapy (Done)     Point:  Mobility training (Done)     Learning Progress Summary           Patient Eager, E,TB,D, VU by MERISSA at 3/14/2022 1725    Acceptance, E, VU by JS at 3/14/2022 0246    Acceptance, E,TB,D, VU,NR by BH at 3/12/2022 1119    Acceptance, E,TB,D, VU,NR by CB at 3/11/2022 1544    Acceptance, E, VU by JS at 3/11/2022 0339    Acceptance, E, VU by JS at 3/11/2022 0337    Acceptance, E,D, NR by PC at 3/10/2022 1225                   Point: Home exercise program (Done)     Learning Progress Summary           Patient Eager, E,TB,D, VU by MERISSA at 3/14/2022 1725    Acceptance, E, VU by JS at 3/14/2022 0246    Acceptance, E,TB,D, VU,NR by BH at 3/12/2022 1119    Acceptance, E,TB,D, VU,NR by CB at 3/11/2022 1544    Acceptance, E, VU by JS at 3/11/2022 0339    Acceptance, E, VU by JS at 3/11/2022 0337    Acceptance, E,D, NR by PC at 3/10/2022 1225                   Point: Body mechanics (Done)     Learning Progress Summary           Patient Eager, E,TB,D, VU by MERISSA at 3/14/2022 1725    Acceptance, E, VU by JS at 3/14/2022 0246    Acceptance, E,TB,D, VU,NR by BH at 3/12/2022 1119    Acceptance, E,TB,D, VU,NR by CB at 3/11/2022 1544    Acceptance, E, VU by JS at 3/11/2022 0339    Acceptance, E, VU by JS at 3/11/2022 0337    Acceptance, E,D, NR by PC at 3/10/2022 1225                   Point: Precautions (Done)     Learning Progress Summary           Patient Eager, E,TB,D, VU by MERISSA at 3/14/2022 1725    Acceptance, E, VU by JS at 3/14/2022 0246    Acceptance, E,TB,D, VU,NR by BH at 3/12/2022 1119    Acceptance, E,TB,D, VU,NR by CB at 3/11/2022 1544    Acceptance, E, VU by JS at 3/11/2022 0339    Acceptance, E, VU by JS at 3/11/2022 0337    Acceptance, E,D, NR by PC at 3/10/2022 1225                               User Key     Initials Effective Dates Name Provider Type Discipline    PC 06/16/21 -  Abbie Hodge, PT Physical Therapist PT    JM 03/07/18 -  Darby Torres PTA Physical Therapy Assistant PT    CB 10/22/21 -  Violeta Corona, JESUS ALBERTO  Physical Therapist PT     05/10/21 -  Roselia Galindo Physical Therapist PT     09/10/21 -  Mary Hernandez RN Registered Nurse Nurse              PT Recommendation and Plan     Plan of Care Reviewed With: patient  Progress: improving  Outcome Evaluation: Pt very eager to do PT session, wants to get well; incr amb dist to 25ft x2, only req standing rest to complete; assist of 2 for safety, kept on task today, just needs extra time; plans     Time Calculation:    PT Charges     Row Name 03/14/22 1727             Time Calculation    Start Time 1558  -      Stop Time 1632  -      Time Calculation (min) 34 min  -MERISSA      PT Received On 03/14/22  -MERISSA      PT - Next Appointment 03/15/22  -MERISSA            User Key  (r) = Recorded By, (t) = Taken By, (c) = Cosigned By    Initials Name Provider Type    Darby Ballard PTA Physical Therapy Assistant              Therapy Charges for Today     Code Description Service Date Service Provider Modifiers Qty    89228951093 HC PT THER PROC EA 15 MIN 3/14/2022 Darby Torres PTA GP 2    46409378318 HC PT THER SUPP EA 15 MIN 3/14/2022 Darby Torres PTA GP 1          PT G-Codes  Outcome Measure Options: AM-PAC 6 Clicks Daily Activity (OT)  AM-PAC 6 Clicks Score (PT): 14  AM-PAC 6 Clicks Score (OT): 14  Modified Dom Scale: 4 - Moderately severe disability.  Unable to walk without assistance, and unable to attend to own bodily needs without assistance.    Darby Torres PTA  3/14/2022

## 2022-03-14 NOTE — PLAN OF CARE
Goal Outcome Evaluation:  Plan of Care Reviewed With: patient        Progress: improving  Outcome Evaluation: Pt very eager to do PT session, wants to get well; incr amb dist to 25ft x2, only req standing rest to complete; assist of 2 for safety, kept on task today, just needs extra time; plans acute rhb at DC; is post CVA from Jan and would benefit from RHB , unsure of fam support    Patient was wearing a face mask during this therapy encounter. Therapist used appropriate personal protective equipment including eye protection, mask, and gloves.  Mask used was standard procedure mask. Appropriate PPE was worn during the entire therapy session. Hand hygiene was completed before and after therapy session. Patient is not in enhanced droplet precautions.

## 2022-03-14 NOTE — PLAN OF CARE
Goal Outcome Evaluation:  Plan of Care Reviewed With: patient           Outcome Evaluation: Pt seen today by OT, she was able to stand pivot to bedside chair with mod AX1 with rwx and multiple cues for safe technique. Seated in chair OT session to focus on LUE coordination, UE exercises for LUE strengthening and ADLs. Pt becomes very emotional during encounter and at times difficult to follow with off topics such as flying to [place] to visit her sister, however she is oriented X4. Plans BAR at [place].    OT wore a mask, eye protection, gloves, and completed hand hygiene. Pt wore a mask.

## 2022-03-14 NOTE — PROGRESS NOTES
"Patient Name: Jenelle Kasper  :1960  62 y.o.      Patient Care Team:  Zay Olivares MD as PCP - General (General Practice)    Interval History:   Stable.  Heart rate and blood pressure are good    Subjective:   following for MARK olivera    Objective   Vital Signs  Temp:  [97.8 °F (36.6 °C)-98.6 °F (37 °C)] 97.8 °F (36.6 °C)  Heart Rate:  [61-66] 66  Resp:  [14-20] 14  BP: (114-137)/(53-71) 137/60    Intake/Output Summary (Last 24 hours) at 3/14/2022 1128  Last data filed at 3/14/2022 0500  Gross per 24 hour   Intake 240 ml   Output 1150 ml   Net -910 ml     Flowsheet Rows    Flowsheet Row First Filed Value   Admission Height 172.7 cm (68\") Documented at 2022 1433   Admission Weight 97.1 kg (214 lb 1.1 oz) Documented at 2022 1433          Physical Exam:   General Appearance:    Alert, cooperative, in no acute distress   Lungs:     Clear to auscultation.  Normal respiratory effort and rate.      Heart:   Irregularly irregular   Chest Wall:    No abnormalities observed   Abdomen:     Soft, nontender, positive bowel sounds.     Extremities:   no cyanosis, clubbing or edema.  No marked joint deformities.  Adequate musculoskeletal strength.       Results Review:    Results from last 7 days   Lab Units 22  0753   SODIUM mmol/L 141   POTASSIUM mmol/L 4.1   CHLORIDE mmol/L 103   CO2 mmol/L 28.5   BUN mg/dL 7*   CREATININE mg/dL 1.00   GLUCOSE mg/dL 181*   CALCIUM mg/dL 10.8*         Results from last 7 days   Lab Units 22  0456   WBC 10*3/mm3 3.59   HEMOGLOBIN g/dL 11.8*   HEMATOCRIT % 37.1   PLATELETS 10*3/mm3 117*             Results from last 7 days   Lab Units 22  0456   MAGNESIUM mg/dL 2.1             Medication Review:   apixaban, 5 mg, Oral, Q12H  atorvastatin, 10 mg, Oral, Nightly  gabapentin, 300 mg, Oral, TID  insulin glargine, 40 Units, Subcutaneous, BID  insulin lispro, 0-7 Units, Subcutaneous, 4x Daily With Meals & Nightly  insulin lispro, 13 Units, Subcutaneous, TID " AC  levothyroxine, 150 mcg, Oral, Daily  losartan, 50 mg, Oral, Q12H  pantoprazole, 40 mg, Oral, BID AC  potassium chloride, 20 mEq, Oral, BID With Meals  spironolactone, 50 mg, Oral, Daily  torsemide, 20 mg, Oral, Daily              Assessment/Plan     1.  Dig toxicity associated with bradycardia.  2.  Persistent atrial fibrillation  3.  Acute embolic right MCA stroke January 30 of 2022  4.  History of GI bleeding with Pradaxa and Xarelto in the past  5.  Chronic diastolic heart failure.  6.  Moderate right ventricular enlargement with moderately reduced function  7.  Severe biatrial enlargement  8.  Insulin-dependent diabetes  9.  Hyperlipidemia  10.  Thrombocytopenia.    -Continue Eliquis due to recent stroke and persistent A. fib.  She had bleeding problems with Pradaxa or Xarelto.  Considering atrial occluder device in the future.    -Electrolytes are stable on current medication.    -Avoid digoxin given history of dig toxicity.  Avoid calcium channel blockers due to her history of significant lower extremity edema.  No longer receiving atenolol due to occasional bradycardia.    -Blood pressure and heart rate are stable.  I am not going to make any further adjustments to her medications today.    -Follow-up with Dr. Winter or TRUDY Coffey in 1 to 2 weeks.    She is stable from a cardiac standpoint.  Noted that she has been evaluated for [facility] which I think sounds like a great idea.  I do not think she can take care of herself at home on her own.    Darby Hardy MD, [facility]  03/14/22  11:28 EDT

## 2022-03-14 NOTE — THERAPY TREATMENT NOTE
Patient Name: Jenelle Kasper  : 1960    MRN: [id number]                              Today's Date: 3/14/2022       Admit Date: 3/9/2022    Visit Dx:     ICD-10-CM ICD-9-CM   1. Bradycardia  R00.1 427.89   2. Poisoning by digoxin, accidental or unintentional, initial encounter  T46.0X1A 972.1     E858.3     Patient Active Problem List   Diagnosis   • Persistent atrial fibrillation (HCC)   • Benign essential HTN   • Hyperparathyroidism (HCC)   • Morbid obesity with BMI of 45.0-49.9, adult (HCC)   • Pulmonary hypertension (HCC)   • Precordial pain   • Shortness of breath   • TIA (transient ischemic attack)   • Bradycardia     Past Medical History:   Diagnosis Date   • Anxiety    • Arrhythmia    • Asthma    • Atrial fibrillation with RVR (HCC)    • C. difficile diarrhea    • COPD (chronic obstructive pulmonary disease) (HCC)    • Depression    • Diabetes mellitus (HCC)    • Diabetic peripheral neuropathy associated with type 2 diabetes mellitus (HCC)    • GERD (gastroesophageal reflux disease)    • Heart murmur    • History of fall     closed head injury after falling down steps; fx nose   • Hypercalcemia    • Hyperlipidemia    • Hypertension    • IBS (irritable bowel syndrome)    • Kidney stone    • Morbid obesity (HCC)    • PAF (paroxysmal atrial fibrillation) (HCC)    • PCOS (polycystic ovarian syndrome)    • PONV (postoperative nausea and vomiting)    • Sarcoidosis    • Shortness of breath    • Thyroid cancer (HCC)     WITH RADIATION   • Wounds, multiple      Past Surgical History:   Procedure Laterality Date   • CARDIAC CATHETERIZATION N/A 2016    Procedure: Coronary angiography;  Surgeon: Chris Perez MD;  Location: [facility] CATH INVASIVE LOCATION;  Service:    • CARDIAC CATHETERIZATION N/A 2016    Procedure: Left heart cath;  Surgeon: Chris Perez MD;  Location: [facility] CATH INVASIVE LOCATION;  Service:    • CARDIAC CATHETERIZATION N/A 2016    Procedure: Left ventriculography;   Surgeon: Chris Perez MD;  Location:  [facility] CATH INVASIVE LOCATION;  Service:    • CARDIAC CATHETERIZATION N/A 2/26/2020    Procedure: Right Heart Cath;  Surgeon: Chris Perez MD;  Location:  [facility] CATH INVASIVE LOCATION;  Service: Cardiovascular;  Laterality: N/A;  If there is no evidence of pulmonary hypertension please add a left heart cath to evaluate coronary arteries as patient is having chest pain.   • CHOLECYSTECTOMY     • COLONOSCOPY     • DILATATION AND CURETTAGE     • ENDOSCOPY     • GASTRIC BYPASS      gastric surgery for morbid obesity   • GASTROPLASTY  2008    GASTRIC REVISION FROM PREVIOUS GASTRIC BYPASS   • HIATAL HERNIA REPAIR     • INGUINAL HERNIA REPAIR Right    • LUNG BIOPSY      to dx sarcoidosis   • LUNG SURGERY      Removal of granulomas   • TOTAL THYROIDECTOMY        General Information     Row Name 03/14/22 1110          OT Time and Intention    Document Type therapy note (daily note)  -     Mode of Treatment occupational therapy;individual therapy  -     Row Name 03/14/22 1110          General Information    Patient Profile Reviewed yes  -SM     Existing Precautions/Restrictions fall  -     Row Name 03/14/22 1110          Cognition    Orientation Status (Cognition) oriented x 4  expresive aphasia, appears to wax and wane during encounter  -     Row Name 03/14/22 1110          Safety Issues, Functional Mobility    Safety Issues Affecting Function (Mobility) insight into deficits/self-awareness;awareness of need for assistance;judgment;positioning of assistive device;problem-solving  -     Impairments Affecting Function (Mobility) balance;cognition;coordination;endurance/activity tolerance;strength;grasp;motor control;pain;postural/trunk control;visual/perceptual  -           User Key  (r) = Recorded By, (t) = Taken By, (c) = Cosigned By    Initials Name Provider Type     Lexy Tarango OT Occupational Therapist                 Mobility/ADL's     Row Name 03/14/22  1111          Bed Mobility    Supine-Sit [place] (Bed Mobility) standby assist;verbal cues  -     Assistive Device (Bed Mobility) bed rails;head of bed elevated  -     Row Name 03/14/22 1111          Transfers    Comment, (Transfers) pivot transfer to chair, initally pushing rwx out forward, needs cues for hand placements. On second stand OT blocks rwx. Mod AX1 for pivot with multiple cues for safe technique.  -     Bed-Chair [place] (Transfers) moderate assist (50% patient effort);1 person assist  -     Sit-Stand [place] (Transfers) moderate assist (50% patient effort);1 person assist  -     Row Name 03/14/22 1111          Sit-Stand Transfer    Assistive Device (Sit-Stand Transfers) walker, front-wheeled  -     Row Name 03/14/22 1111          Lower Body Dressing Assessment/Training    [place] Level (Lower Body Dressing) lower body dressing skills;don;socks;maximum assist (25% patient effort)  -     Position (Lower Body Dressing) supported sitting  -     Row Name 03/14/22 1111          Grooming Assessment/Training    [place] Level (Grooming) grooming skills;oral care regimen;standby assist  -     Position (Grooming) supported sitting  -     Comment, (Grooming) increased time with LUE to open containers.  -           User Key  (r) = Recorded By, (t) = Taken By, (c) = Cosigned By    Initials Name Provider Type     Lexy Tarango, OT Occupational Therapist               Obj/Interventions     Row Name 03/14/22 1113          Shoulder (Therapeutic Exercise)    Shoulder (Therapeutic Exercise) AAROM (active assistive range of motion)  -     Shoulder AAROM (Therapeutic Exercise) left;flexion;extension;10 repetitions;sitting  forward press  -     Row Name 03/14/22 1113          Motor Skills    Motor Skills motor control/coordination interventions  -     Motor Control/Coordination Interventions occupation/activity based treatment;neuro-muscular re-education  worked on  BUE coordination skills- folding various clothening/towels and clothening fastener task with snaps. Pt using LUE with mild coordination deficits but increased difficulty with motor planning or general congtion with ability to fold items  -     Therapeutic Exercise shoulder  -     Row Name 03/14/22 1113          Balance    Static Sitting Balance contact guard;supervision  -     Position, Sitting Balance sitting edge of bed  -     Static Standing Balance minimal assist  -     Position/Device Used, Standing Balance walker, rolling  -           User Key  (r) = Recorded By, (t) = Taken By, (c) = Cosigned By    Initials Name Provider Type    Lexy Blue OT Occupational Therapist               Goals/Plan    No documentation.                Clinical Impression     Row Name 03/14/22 1115          Pain Assessment    Pretreatment Pain Rating 0/10 - no pain  -     Posttreatment Pain Rating 0/10 - no pain  -     Row Name 03/14/22 1115          Plan of Care Review    Plan of Care Reviewed With patient  -     Outcome Evaluation Pt seen today by OT, she was able to stand pivot to bedside chair with mod AX1 with rwx and multiple cues for safe technique. Seated in chair OT session to focus on LUE coordination, UE exercises for LUE strengthening and ADLs. Pt becomes very emotional during encounter and at times difficult to follow with off topics such as flying to [place] to visit her sister, however she is oriented X4. Plans BAR at [place].  -     Row Name 03/14/22 1115          Therapy Assessment/Plan (OT)    Rehab Potential (OT) good, to achieve stated therapy goals  -     Criteria for Skilled Therapeutic Interventions Met (OT) yes;skilled treatment is necessary  -     Therapy Frequency (OT) 5 times/wk  -     Row Name 03/14/22 1115          Therapy Plan Review/Discharge Plan (OT)    Anticipated Discharge Disposition (OT) inpatient rehabilitation facility  -     Row Name 03/14/22 1115          Positioning  and Restraints    Pre-Treatment Position in bed  -SM     Post Treatment Position chair  -SM     In Chair reclined;call light within reach;encouraged to call for assist;exit alarm on;notified nsg  -           User Key  (r) = Recorded By, (t) = Taken By, (c) = Cosigned By    Initials Name Provider Type    Lexy Blue OT Occupational Therapist               Outcome Measures     Row Name 03/14/22 1119          How much help from another is currently needed...    Putting on and taking off regular lower body clothing? 2  -SM     Bathing (including washing, rinsing, and drying) 2  -SM     Toileting (which includes using toilet bed pan or urinal) 1  -SM     Putting on and taking off regular upper body clothing 3  -SM     Taking care of personal grooming (such as brushing teeth) 3  -SM     Eating meals 3  -SM     AM-PAC 6 Clicks Score (OT) 14  -     Row Name 03/14/22 1119          Modified [place] Scale    Modified [place] Scale 4 - Moderately severe disability.  Unable to walk without assistance, and unable to attend to own bodily needs without assistance.  -     Row Name 03/14/22 1119          Functional Assessment    Outcome Measure Options AM-PAC 6 Clicks Daily Activity (OT)  -           User Key  (r) = Recorded By, (t) = Taken By, (c) = Cosigned By    Initials Name Provider Type    Lexy Blue OT Occupational Therapist                Occupational Therapy Education                 Title: PT OT SLP Therapies (Done)     Topic: Occupational Therapy (Done)     Point: ADL training (Done)     Description:   Instruct learner(s) on proper safety adaptation and remediation techniques during self care or transfers.   Instruct in proper use of assistive devices.              Learning Progress Summary           Patient Acceptance, E, VU by TJ at 3/14/2022 0246    Acceptance, E, VU by TJ at 3/11/2022 0339    Acceptance, E, VU by TJ at 3/11/2022 0337    Acceptance, E, VU by MARYJANE at 3/10/2022 1033    Comment:  Role of OT and POC/goals. Safety with falls precautions.                   Point: Home exercise program (Done)     Description:   Instruct learner(s) on appropriate technique for monitoring, assisting and/or progressing therapeutic exercises/activities.              Learning Progress Summary           Patient Acceptance, E, VU by JS at 3/14/2022 0246    Acceptance, E, VU by JS at 3/11/2022 0339    Acceptance, E, VU by JS at 3/11/2022 0337                   Point: Precautions (Done)     Description:   Instruct learner(s) on prescribed precautions during self-care and functional transfers.              Learning Progress Summary           Patient Acceptance, E, VU by JS at 3/14/2022 0246    Acceptance, E, VU by JS at 3/11/2022 0339    Acceptance, E, VU by JS at 3/11/2022 0337                   Point: Body mechanics (Done)     Description:   Instruct learner(s) on proper positioning and spine alignment during self-care, functional mobility activities and/or exercises.              Learning Progress Summary           Patient Acceptance, E, VU by  at 3/14/2022 0246    Acceptance, E, VU by JS at 3/11/2022 0339    Acceptance, E, VU by JS at 3/11/2022 0337                               User Key     Initials Effective Dates Name Provider Type Discipline     04/02/20 -  Lexy Tarango OT Occupational Therapist OT     09/10/21 -  Mary Hernandez RN Registered Nurse Nurse              OT Recommendation and Plan  Planned Therapy Interventions (OT): activity tolerance training, adaptive equipment training, BADL retraining, cognitive/visual perception retraining, functional balance retraining, IADL retraining, occupation/activity based interventions, patient/caregiver education/training, neuromuscular control/coordination retraining, ROM/therapeutic exercise, strengthening exercise, transfer/mobility retraining  Therapy Frequency (OT): 5 times/wk  Plan of Care Review  Plan of Care Reviewed With: patient  Outcome  Evaluation: Pt seen today by OT, she was able to stand pivot to bedside chair with mod AX1 with rwx and multiple cues for safe technique. Seated in chair OT session to focus on LUE coordination, UE exercises for LUE strengthening and ADLs. Pt becomes very emotional during encounter and at times difficult to follow with off topics such as flying to [place] to visit her sister, however she is oriented X4. Plans BAR at [place].     Time Calculation:    Time Calculation- OT     Row Name 03/14/22 1119             Time Calculation- OT    OT Start Time 1009  -SM      OT Stop Time 1037  -SM      OT Time Calculation (min) 28 min  -SM      Total Timed Code Minutes- OT 28 minute(s)  -SM      OT Received On 03/14/22  -      OT - Next Appointment 03/15/22  -              Timed Charges    60939 -  OT Neuromuscular Reeducation Minutes 14  -SM      15910 - OT Self Care/Mgmt Minutes 14  -SM              Total Minutes    Timed Charges Total Minutes 28  -SM       Total Minutes 28  -SM            User Key  (r) = Recorded By, (t) = Taken By, (c) = Cosigned By    Initials Name Provider Type     Lexy Tarango OT Occupational Therapist              Therapy Charges for Today     Code Description Service Date Service Provider Modifiers Qty    84415021509 HC OT SELF CARE/MGMT/TRAIN EA 15 MIN 3/14/2022 Lexy Tarango OT GO 1    51295191161 HC OT NEUROMUSC RE EDUCATION EA 15 MIN 3/14/2022 Lexy Tarango OT GO 1               Lexy Tarango OT  3/14/2022

## 2022-03-14 NOTE — PROGRESS NOTES
"Daily progress note    Chief complaint  Doing better  No new complaints   Family at bedside    History of present illness  62-year old white female with history of atrial fibrillation recent multiple right CVA with left hemiparesis diabetes mellitus hypertension hyperlipidemia and gastroesophageal reflux disease who is taking atenolol and digoxin to control the heart rate presented to [facility] emergency room after seen by primary care doctor for generalized weakness and found to have dig toxicity with low heart rate asked to come to the ER.  Patient dig level was 3.0 and now it is 2.2 but heart rate remained in 40s.  Patient has weakness but denies any palpitations lightheadedness dizziness chest pain or shortness of breath.  Patient has been feeling weak.  Patient admitted for further work-up.     REVIEW OF SYSTEMS  Unremarkable      PHYSICAL EXAM  Blood pressure 132/68, pulse 71, temperature 97.8 °F (36.6 °C), temperature source Oral, resp. rate 14, height 172.7 cm (68\"), weight 96.9 kg (213 lb 10 oz), SpO2 93 %.    Constitutional:       General: She is not in acute distress.  HENT:      Head: Normocephalic and atraumatic.   Eyes:      Pupils: Pupils are equal, round, and reactive to light.   Cardiovascular:      Rate and Rhythm: S1-S2     Heart sounds: Normal heart sounds.   Pulmonary:      Effort: Pulmonary effort is normal. No respiratory distress.      Breath sounds: Normal breath sounds.   Abdominal:      Palpations: Abdomen is soft.      Tenderness: There is no abdominal tenderness. There is no guarding or rebound.   Musculoskeletal:         General: Normal range of motion.      Cervical back: Normal range of motion and neck supple.   Skin:     General: Skin is warm and dry.      Findings: No rash.   Neurological:      Mental Status: She is alert and oriented to person, place, and time.      Sensory: Sensation is intact.      Comments: Chronic weakness of left side   Psychiatric:         Mood " and Affect: Mood and affect normal.      LAB RESULTS  Lab Results (last 24 hours)     Procedure Component Value Units Date/Time    POC Glucose Once [[id number]]  (Abnormal) Collected: 03/14/22 1612    Specimen: Blood Updated: 03/14/22 1615     Glucose 363 mg/dL      Comment: Meter: [id number] : [id number] Stasyszen Crystal NA       POC Glucose Once [[id number]]  (Abnormal) Collected: 03/14/22 1121    Specimen: Blood Updated: 03/14/22 1127     Glucose 273 mg/dL      Comment: Meter: [id number] : [id number] Stasyszen Crystal NA       Basic Metabolic Panel [[id number]]  (Abnormal) Collected: 03/14/22 0753    Specimen: Blood Updated: 03/14/22 0857     Glucose 181 mg/dL      BUN 7 mg/dL      Creatinine 1.00 mg/dL      Sodium 141 mmol/L      Potassium 4.1 mmol/L      Comment: Slight hemolysis detected by analyzer. Results may be affected.        Chloride 103 mmol/L      CO2 28.5 mmol/L      Calcium 10.8 mg/dL      BUN/Creatinine Ratio 7.0     Anion Gap 9.5 mmol/L      eGFR 64.6 mL/min/1.73      Comment: National Kidney Foundation and American Society of Nephrology (ASN) Task Force recommended calculation based on the Chronic Kidney Disease Epidemiology Collaboration (CKD-EPI) equation refit without adjustment for race.       Narrative:      GFR Normal >60  Chronic Kidney Disease <60  Kidney Failure <15      POC Glucose Once [[id number]]  (Abnormal) Collected: 03/14/22 0555    Specimen: Blood Updated: 03/14/22 0558     Glucose 156 mg/dL      Comment: Meter: [id number] : [id number] Siblagan Riza NA       POC Glucose Once [[id number]]  (Abnormal) Collected: 03/13/22 2013    Specimen: Blood Updated: 03/13/22 2014     Glucose 135 mg/dL      Comment: Meter: [id number] : [id number] Siblagan Riza NA       Potassium [[id number]]  (Normal) Collected: 03/13/22 1606    Specimen: Blood Updated: 03/13/22 1630     Potassium 4.0 mmol/L         Imaging Results (Last 24 Hours)     ** No results found for the last 24 hours. **              ECG 12 Lead  Component   Ref Range & Units 3/9/22 0914 2/5/22 0040 1/28/22 2236   QT Interval   ms 364 P  393  360              HEART RATE= 44  bpm  RR Interval= 1356  ms  TX Interval=   ms  P Horizontal Axis=   deg  P Front Axis=   deg  QRSD Interval= 95  ms  QT Interval= 364  ms  QRS Axis= 41  deg  T Wave Axis= 238  deg  - ABNORMAL ECG -  Atrial fibrillation  Repol abnrm suggests ischemia, diffuse leads             Current Facility-Administered Medications:   •  acetaminophen (TYLENOL) tablet 650 mg, 650 mg, Oral, Q6H PRN, Michele Subramanian MD, 650 mg at 03/13/22 1305  •  albuterol (PROVENTIL) nebulizer solution 0.083% 2.5 mg/3mL, 2.5 mg, Nebulization, Q6H PRN, Michele Subramanian MD  •  apixaban (ELIQUIS) tablet 5 mg, 5 mg, Oral, Q12H, Michele Subramanian MD, 5 mg at 03/14/22 0603  •  atorvastatin (LIPITOR) tablet 10 mg, 10 mg, Oral, Nightly, Michele Subramanian MD, 10 mg at 03/13/22 2036  •  gabapentin (NEURONTIN) capsule 300 mg, 300 mg, Oral, TID, Michele Subramanian MD, 300 mg at 03/14/22 0851  •  insulin glargine (LANTUS, SEMGLEE) injection 40 Units, 40 Units, Subcutaneous, BID, Michele Subrmaanian MD, 40 Units at 03/14/22 0852  •  insulin lispro (ADMELOG) injection 0-7 Units, 0-7 Units, Subcutaneous, 4x Daily With Meals & Nightly, Michele Subramanian MD, 4 Units at 03/14/22 1352  •  insulin lispro (ADMELOG) injection 13 Units, 13 Units, Subcutaneous, TID AC, Michele Subramanian MD, 13 Units at 03/14/22 1352  •  levothyroxine (SYNTHROID, LEVOTHROID) tablet 150 mcg, 150 mcg, Oral, Daily, Michele Subramanian MD, 150 mcg at 03/14/22 0851  •  losartan (COZAAR) tablet 50 mg, 50 mg, Oral, Q12H, Luis Wilson MD, 50 mg at 03/14/22 0851  •  Magnesium Sulfate 2 gram Bolus, followed by 8 gram infusion (total Mg dose 10 grams)- Mg less than or equal to 1mg/dL, 2 g, Intravenous, PRN **OR** Magnesium Sulfate 2 gram / 50mL Infusion (GIVE X 3 BAGS TO EQUAL 6GM TOTAL DOSE) - Mg 1.1 - 1.5 mg/dl, 2 g, Intravenous, PRN **OR** Magnesium Sulfate 4 gram infusion- Mg  1.6-1.9 mg/dL, 4 g, Intravenous, PRN, Luis Wilson MD  •  ondansetron (ZOFRAN) injection 4 mg, 4 mg, Intravenous, Q6H PRN, Heriberto Subramanian MD, 4 mg at 03/13/22 2036  •  pantoprazole (PROTONIX) EC tablet 40 mg, 40 mg, Oral, BID AC, Heriberto Subramanian MD, 40 mg at 03/14/22 0604  •  potassium chloride (K-DUR,KLOR-CON) ER tablet 20 mEq, 20 mEq, Oral, BID With Meals, Heriberto Subramanian MD, 20 mEq at 03/13/22 1721  •  potassium chloride (K-DUR,KLOR-CON) ER tablet 40 mEq, 40 mEq, Oral, PRN, Luis Wilson MD, 40 mEq at 03/13/22 1207  •  potassium chloride (KLOR-CON) packet 40 mEq, 40 mEq, Oral, PRN, Luis Wilson MD  •  [COMPLETED] Insert peripheral IV, , , Once **AND** sodium chloride 0.9 % flush 10 mL, 10 mL, Intravenous, PRN, Naif Raphael MD  •  spironolactone (ALDACTONE) tablet 50 mg, 50 mg, Oral, Daily, Luis Wilson MD, 50 mg at 03/14/22 0851  •  torsemide (DEMADEX) tablet 20 mg, 20 mg, Oral, Daily, Luis Wilson MD, 20 mg at 03/14/22 0851     ASSESSMENT  S/p dig toxicity  Hypokalemia and hypomagnesemia   Chronic atrial fibrillation with bradycardia  Recent right MCA infarct with left hemiparesis  Diabetes mellitus  Hypertension  Hyperlipidemia  Hypothyroidism   Gastroesophageal reflux disease    PLAN  CPM  Off Lanoxin and atenolol  Cardiology consult appreciated  Adjust home medications   Stress ulcer DVT prophylaxis  Supportive care  PT/OT  Discussed with  and nursing staff  Acute rehab once bed available and approved with insurance     HERIBERTO SUBRAMANIAN MD

## 2022-03-14 NOTE — CASE MANAGEMENT/SOCIAL WORK
Continued Stay Note  [facility]     Patient Name: Jenelle Kasper  MRN: [id number]  Today's Date: 3/14/2022    Admit Date: 3/9/2022     Discharge Plan     Row Name 03/14/22 0959       Plan    Plan [facility] pre-cert initiated with [facility]    Plan Comments Spoke with Patricia/[facility], bed available and they have submitted for pre-cert with [facility]. They will call CCP once determined. Karime BAJWA/CCP               Discharge Codes    No documentation.               Expected Discharge Date and Time     Expected Discharge Date Expected Discharge Time    Mar 15, 2022             Michelle Dennison RN

## 2022-03-14 NOTE — PROGRESS NOTES
[facility]  Precert initiated with [facility] [employer]. Requested to expedite review with insurance as they quote it taking 5-10 days to review clinicals.     Patricia Clarke RN  Acute Rehab Admission Nurse

## 2022-03-15 LAB
ANION GAP SERPL CALCULATED.3IONS-SCNC: 9 MMOL/L (ref 5–15)
BUN SERPL-MCNC: 9 MG/DL (ref 8–23)
BUN/CREAT SERPL: 8.6 (ref 7–25)
CALCIUM SPEC-SCNC: 10.7 MG/DL (ref 8.6–10.5)
CHLORIDE SERPL-SCNC: 98 MMOL/L (ref 98–107)
CO2 SERPL-SCNC: 33 MMOL/L (ref 22–29)
CREAT SERPL-MCNC: 1.05 MG/DL (ref 0.57–1)
EGFRCR SERPLBLD CKD-EPI 2021: 61 ML/MIN/1.73
GLUCOSE BLDC GLUCOMTR-MCNC: 110 MG/DL (ref 70–130)
GLUCOSE BLDC GLUCOMTR-MCNC: 116 MG/DL (ref 70–130)
GLUCOSE BLDC GLUCOMTR-MCNC: 188 MG/DL (ref 70–130)
GLUCOSE BLDC GLUCOMTR-MCNC: 197 MG/DL (ref 70–130)
GLUCOSE SERPL-MCNC: 140 MG/DL (ref 65–99)
POTASSIUM SERPL-SCNC: 3.8 MMOL/L (ref 3.5–5.2)
SODIUM SERPL-SCNC: 140 MMOL/L (ref 136–145)
T4 FREE SERPL-MCNC: 1.96 NG/DL (ref 0.93–1.7)

## 2022-03-15 PROCEDURE — 80048 BASIC METABOLIC PNL TOTAL CA: CPT | Performed by: HOSPITALIST

## 2022-03-15 PROCEDURE — 82962 GLUCOSE BLOOD TEST: CPT

## 2022-03-15 PROCEDURE — 63710000001 INSULIN LISPRO (HUMAN) PER 5 UNITS: Performed by: HOSPITALIST

## 2022-03-15 PROCEDURE — G0378 HOSPITAL OBSERVATION PER HR: HCPCS

## 2022-03-15 PROCEDURE — 97110 THERAPEUTIC EXERCISES: CPT

## 2022-03-15 PROCEDURE — 84439 ASSAY OF FREE THYROXINE: CPT | Performed by: HOSPITALIST

## 2022-03-15 RX ORDER — LEVOTHYROXINE SODIUM 0.1 MG/1
100 TABLET ORAL DAILY
Status: DISCONTINUED | OUTPATIENT
Start: 2022-03-16 | End: 2022-03-16 | Stop reason: HOSPADM

## 2022-03-15 RX ADMIN — LEVOTHYROXINE SODIUM 150 MCG: 0.15 TABLET ORAL at 10:24

## 2022-03-15 RX ADMIN — PANTOPRAZOLE SODIUM 40 MG: 40 TABLET, DELAYED RELEASE ORAL at 06:19

## 2022-03-15 RX ADMIN — GABAPENTIN 300 MG: 300 CAPSULE ORAL at 17:52

## 2022-03-15 RX ADMIN — GABAPENTIN 300 MG: 300 CAPSULE ORAL at 21:38

## 2022-03-15 RX ADMIN — INSULIN LISPRO 15 UNITS: 100 INJECTION, SOLUTION INTRAVENOUS; SUBCUTANEOUS at 12:56

## 2022-03-15 RX ADMIN — INSULIN LISPRO 2 UNITS: 100 INJECTION, SOLUTION INTRAVENOUS; SUBCUTANEOUS at 12:57

## 2022-03-15 RX ADMIN — GABAPENTIN 300 MG: 300 CAPSULE ORAL at 10:24

## 2022-03-15 RX ADMIN — SPIRONOLACTONE 50 MG: 50 TABLET, FILM COATED ORAL at 10:24

## 2022-03-15 RX ADMIN — TORSEMIDE 20 MG: 20 TABLET ORAL at 10:23

## 2022-03-15 RX ADMIN — LOSARTAN POTASSIUM 50 MG: 50 TABLET, FILM COATED ORAL at 21:38

## 2022-03-15 RX ADMIN — POTASSIUM CHLORIDE 20 MEQ: 10 TABLET, EXTENDED RELEASE ORAL at 17:52

## 2022-03-15 RX ADMIN — LOSARTAN POTASSIUM 50 MG: 50 TABLET, FILM COATED ORAL at 10:24

## 2022-03-15 RX ADMIN — INSULIN GLARGINE-YFGN 42 UNITS: 100 INJECTION, SOLUTION SUBCUTANEOUS at 10:25

## 2022-03-15 RX ADMIN — INSULIN LISPRO 15 UNITS: 100 INJECTION, SOLUTION INTRAVENOUS; SUBCUTANEOUS at 10:24

## 2022-03-15 RX ADMIN — ATORVASTATIN CALCIUM 10 MG: 10 TABLET, FILM COATED ORAL at 21:38

## 2022-03-15 RX ADMIN — POTASSIUM CHLORIDE 20 MEQ: 10 TABLET, EXTENDED RELEASE ORAL at 10:24

## 2022-03-15 RX ADMIN — INSULIN LISPRO 2 UNITS: 100 INJECTION, SOLUTION INTRAVENOUS; SUBCUTANEOUS at 21:38

## 2022-03-15 RX ADMIN — INSULIN GLARGINE-YFGN 42 UNITS: 100 INJECTION, SOLUTION SUBCUTANEOUS at 21:38

## 2022-03-15 RX ADMIN — APIXABAN 5 MG: 5 TABLET, FILM COATED ORAL at 06:17

## 2022-03-15 RX ADMIN — PANTOPRAZOLE SODIUM 40 MG: 40 TABLET, DELAYED RELEASE ORAL at 17:52

## 2022-03-15 RX ADMIN — APIXABAN 5 MG: 5 TABLET, FILM COATED ORAL at 17:52

## 2022-03-15 NOTE — PLAN OF CARE
Goal Outcome Evaluation:  Plan of Care Reviewed With: patient        Progress: no change  No n/v noted. States that she feels much better.

## 2022-03-15 NOTE — PROGRESS NOTES
[facility]  Continuing to await precert from [employer].    Patricia Clarke RN  Acute Rehab Admission Nurse

## 2022-03-15 NOTE — PROGRESS NOTES
[facility]  Call received from [employer] indicating they would like a peer to beer before a determination for Acute Rehab is made. This needs to be completed by 3/16/22 at 1030 am. Call [phone], option 5 to speak directly to the physician reviewer.   Case reference # [id number]    Msge left with BRITTANY Angulo.     Patricia Clarke RN  Acute Rehab Admission Nurse

## 2022-03-15 NOTE — PROGRESS NOTES
"Daily progress note    Chief complaint  Doing same  No new complaints   Denies chest pain shortness of breath or palpitation    History of present illness  62-year old white female with history of atrial fibrillation recent multiple right CVA with left hemiparesis diabetes mellitus hypertension hyperlipidemia and gastroesophageal reflux disease who is taking atenolol and digoxin to control the heart rate presented to [facility] emergency room after seen by primary care doctor for generalized weakness and found to have dig toxicity with low heart rate asked to come to the ER.  Patient dig level was 3.0 and now it is 2.2 but heart rate remained in 40s.  Patient has weakness but denies any palpitations lightheadedness dizziness chest pain or shortness of breath.  Patient has been feeling weak.  Patient admitted for further work-up.     REVIEW OF SYSTEMS  Unremarkable      PHYSICAL EXAM  Blood pressure 105/52, pulse 83, temperature 98.3 °F (36.8 °C), temperature source Oral, resp. rate 16, height 172.7 cm (68\"), weight 96.9 kg (213 lb 10 oz), SpO2 98 %.    Constitutional:       General: She is not in acute distress.  HENT:      Head: Normocephalic and atraumatic.   Eyes:      Pupils: Pupils are equal, round, and reactive to light.   Cardiovascular:      Rate and Rhythm: S1-S2     Heart sounds: Normal heart sounds.   Pulmonary:      Effort: Pulmonary effort is normal. No respiratory distress.      Breath sounds: Normal breath sounds.   Abdominal:      Palpations: Abdomen is soft.      Tenderness: There is no abdominal tenderness. There is no guarding or rebound.   Musculoskeletal:         General: Normal range of motion.      Cervical back: Normal range of motion and neck supple.   Skin:     General: Skin is warm and dry.      Findings: No rash.   Neurological:      Mental Status: She is alert and oriented to person, place, and time.      Sensory: Sensation is intact.      Comments: Chronic weakness of left " side   Psychiatric:         Mood and Affect: Mood and affect normal.      LAB RESULTS  Lab Results (last 24 hours)     Procedure Component Value Units Date/Time    POC Glucose Once [[id number]]  (Abnormal) Collected: 03/15/22 1111    Specimen: Blood Updated: 03/15/22 1114     Glucose 197 mg/dL      Comment: Meter: [id number] : [id number] Vishnu Alberto ARUN       Basic Metabolic Panel [[id number]]  (Abnormal) Collected: 03/15/22 0702    Specimen: Blood Updated: 03/15/22 0812     Glucose 140 mg/dL      BUN 9 mg/dL      Creatinine 1.05 mg/dL      Sodium 140 mmol/L      Potassium 3.8 mmol/L      Chloride 98 mmol/L      CO2 33.0 mmol/L      Calcium 10.7 mg/dL      BUN/Creatinine Ratio 8.6     Anion Gap 9.0 mmol/L      eGFR 61.0 mL/min/1.73      Comment: National Kidney Foundation and American Society of Nephrology (ASN) Task Force recommended calculation based on the Chronic Kidney Disease Epidemiology Collaboration (CKD-EPI) equation refit without adjustment for race.       Narrative:      GFR Normal >60  Chronic Kidney Disease <60  Kidney Failure <15      T4, Free [[id number]]  (Abnormal) Collected: 03/15/22 0702    Specimen: Blood Updated: 03/15/22 0809     Free T4 1.96 ng/dL     Narrative:      Results may be falsely increased if patient taking Biotin.      POC Glucose Once [[id number]]  (Normal) Collected: 03/15/22 0628    Specimen: Blood Updated: 03/15/22 0630     Glucose 110 mg/dL      Comment: Meter: [id number] : [id number] Santos Stein NA       POC Glucose Once [[id number]]  (Abnormal) Collected: 03/14/22 2108    Specimen: Blood Updated: 03/14/22 2110     Glucose 146 mg/dL      Comment: Meter: [id number] : [id number] Elliotkota Sanchez NA       POC Glucose Once [[id number]]  (Abnormal) Collected: 03/14/22 1612    Specimen: Blood Updated: 03/14/22 1615     Glucose 363 mg/dL      Comment: Meter: [id number] : [id number] Stasyszen Crystal NA           Imaging Results (Last 24 Hours)     ** No results found for  the last 24 hours. **             ECG 12 Lead  Component   Ref Range & Units 3/9/22 0914 2/5/22 0040 1/28/22 2236   QT Interval   ms 364 P  393  360              HEART RATE= 44  bpm  RR Interval= 1356  ms  KS Interval=   ms  P Horizontal Axis=   deg  P Front Axis=   deg  QRSD Interval= 95  ms  QT Interval= 364  ms  QRS Axis= 41  deg  T Wave Axis= 238  deg  - ABNORMAL ECG -  Atrial fibrillation  Repol abnrm suggests ischemia, diffuse leads             Current Facility-Administered Medications:   •  apixaban (ELIQUIS) tablet 5 mg, 5 mg, Oral, Q12H, Michele Subramanian MD, 5 mg at 03/15/22 0617  •  atorvastatin (LIPITOR) tablet 10 mg, 10 mg, Oral, Nightly, Michele Subramanian MD, 10 mg at 03/14/22 2127  •  gabapentin (NEURONTIN) capsule 300 mg, 300 mg, Oral, TID, Michele Subramanian MD, 300 mg at 03/15/22 1024  •  insulin glargine (LANTUS, SEMGLEE) injection 42 Units, 42 Units, Subcutaneous, BID, Michele Subramanian MD, 42 Units at 03/15/22 1025  •  insulin lispro (ADMELOG) injection 0-7 Units, 0-7 Units, Subcutaneous, 4x Daily With Meals & Nightly, Michele Subramanian MD, 2 Units at 03/15/22 1257  •  insulin lispro (ADMELOG) injection 15 Units, 15 Units, Subcutaneous, TID AC, Michele Subramanian MD, 15 Units at 03/15/22 1256  •  levothyroxine (SYNTHROID, LEVOTHROID) tablet 150 mcg, 150 mcg, Oral, Daily, Michele Subramanian MD, 150 mcg at 03/15/22 1024  •  losartan (COZAAR) tablet 50 mg, 50 mg, Oral, Q12H, Luis Wilson MD, 50 mg at 03/15/22 1024  •  pantoprazole (PROTONIX) EC tablet 40 mg, 40 mg, Oral, BID AC, Michele Subramanian MD, 40 mg at 03/15/22 0619  •  potassium chloride (K-DUR,KLOR-CON) ER tablet 20 mEq, 20 mEq, Oral, BID With Meals, Michele Subramanian MD, 20 mEq at 03/15/22 1024  •  [COMPLETED] Insert peripheral IV, , , Once **AND** sodium chloride 0.9 % flush 10 mL, 10 mL, Intravenous, PRN, Naif Raphael MD  •  spironolactone (ALDACTONE) tablet 50 mg, 50 mg, Oral, Daily, Luis Wilson MD, 50 mg at 03/15/22 1024  •  torsemide (DEMADEX)  tablet 20 mg, 20 mg, Oral, Daily, Luis Wilson MD, 20 mg at 03/15/22 1023     ASSESSMENT  S/p dig toxicity  Hypokalemia and hypomagnesemia   Chronic atrial fibrillation with bradycardia  Recent right MCA infarct with left hemiparesis  Diabetes mellitus  Hypertension  Hyperlipidemia  Hypothyroidism with increased T4  Gastroesophageal reflux disease    PLAN  CPM  Off Lanoxin and atenolol  Synthroid dose adjusted  Cardiology consult appreciated  Adjust home medications   Stress ulcer DVT prophylaxis  Supportive care  PT/OT  Discussed with  and nursing staff  Acute rehab once bed available and approved with insurance     HERIBERTO LYNN MD

## 2022-03-15 NOTE — PLAN OF CARE
Goal Outcome Evaluation:  Plan of Care Reviewed With: patient        Progress: improving  Outcome Evaluation: Pt with good participation with PT today, able to walk with walker 40 ft, needed frequent verbal cues for sequencing and step length. Pt is showing remarkable progress    Patient was intermittently wearing a face mask during this therapy encounter. Therapist used appropriate personal protective equipment including eye protection, mask, and gloves.  Mask used was standard procedure mask. Appropriate PPE was worn during the entire therapy session. Hand hygiene was completed before and after therapy session. Patient is not in enhanced droplet precautions.

## 2022-03-15 NOTE — CASE MANAGEMENT/SOCIAL WORK
Continued Stay Note  [facility]     Patient Name: Jenelle Kasper  MRN: [id number]  Today's Date: 3/15/2022    Admit Date: 3/9/2022     Discharge Plan     Row Name 03/15/22 1812       Plan    Plan Acute Rehab awaiting P2P response vs Home with     Patient/Family in Agreement with Plan yes    Plan Comments Spoke with Patricia/[facility] acute [facility], [facility] for [facility] requested Peer to peer to be completed before tomorrow at 1030 am. CCP notified Hospitalist Coordinator Khadra. Spoke with Dr. Sotelo who completed Peer to peer, but no outcome determined. CCP notified Patricia[facility], she will notify CCP once determination is known. CCP spoke with pt at bedside, answered her questions. If Acute denied, she states if plan is home her s.o. will drive her. CCP will leave note for PT to try stairs tomorrow prior to dc. Alice sullivan. Vel BAJWA/CCP               Discharge Codes    No documentation.               Expected Discharge Date and Time     Expected Discharge Date Expected Discharge Time    Mar 16, 2022             Michelle Dennison RN

## 2022-03-15 NOTE — THERAPY TREATMENT NOTE
Patient Name: Jenelle Kasper  : 1960    MRN: [id number]                              Today's Date: 3/15/2022       Admit Date: 3/9/2022    Visit Dx:     ICD-10-CM ICD-9-CM   1. Bradycardia  R00.1 427.89   2. Poisoning by digoxin, accidental or unintentional, initial encounter  T46.0X1A 972.1     E858.3     Patient Active Problem List   Diagnosis   • Persistent atrial fibrillation (HCC)   • Benign essential HTN   • Hyperparathyroidism (HCC)   • Morbid obesity with BMI of 45.0-49.9, adult (HCC)   • Pulmonary hypertension (HCC)   • Precordial pain   • Shortness of breath   • TIA (transient ischemic attack)   • Bradycardia     Past Medical History:   Diagnosis Date   • Anxiety    • Arrhythmia    • Asthma    • Atrial fibrillation with RVR (HCC)    • C. difficile diarrhea    • COPD (chronic obstructive pulmonary disease) (HCC)    • Depression    • Diabetes mellitus (HCC)    • Diabetic peripheral neuropathy associated with type 2 diabetes mellitus (HCC)    • GERD (gastroesophageal reflux disease)    • Heart murmur    • History of fall     closed head injury after falling down steps; fx nose   • Hypercalcemia    • Hyperlipidemia    • Hypertension    • IBS (irritable bowel syndrome)    • Kidney stone    • Morbid obesity (HCC)    • PAF (paroxysmal atrial fibrillation) (HCC)    • PCOS (polycystic ovarian syndrome)    • PONV (postoperative nausea and vomiting)    • Sarcoidosis    • Shortness of breath    • Thyroid cancer (HCC)     WITH RADIATION   • Wounds, multiple      Past Surgical History:   Procedure Laterality Date   • CARDIAC CATHETERIZATION N/A 2016    Procedure: Coronary angiography;  Surgeon: Chris Perez MD;  Location: [facility] CATH INVASIVE LOCATION;  Service:    • CARDIAC CATHETERIZATION N/A 2016    Procedure: Left heart cath;  Surgeon: Chris Perez MD;  Location: [facility] CATH INVASIVE LOCATION;  Service:    • CARDIAC CATHETERIZATION N/A 2016    Procedure: Left ventriculography;   Surgeon: Chris Perez MD;  Location:  [facility] CATH INVASIVE LOCATION;  Service:    • CARDIAC CATHETERIZATION N/A 2/26/2020    Procedure: Right Heart Cath;  Surgeon: Chris Perez MD;  Location:  [facility] CATH INVASIVE LOCATION;  Service: Cardiovascular;  Laterality: N/A;  If there is no evidence of pulmonary hypertension please add a left heart cath to evaluate coronary arteries as patient is having chest pain.   • CHOLECYSTECTOMY     • COLONOSCOPY     • DILATATION AND CURETTAGE     • ENDOSCOPY     • GASTRIC BYPASS      gastric surgery for morbid obesity   • GASTROPLASTY  2008    GASTRIC REVISION FROM PREVIOUS GASTRIC BYPASS   • HIATAL HERNIA REPAIR     • INGUINAL HERNIA REPAIR Right    • LUNG BIOPSY      to dx sarcoidosis   • LUNG SURGERY      Removal of granulomas   • TOTAL THYROIDECTOMY        General Information     Row Name 03/15/22 1151          Physical Therapy Time and Intention    Document Type therapy note (daily note)  -PC     Mode of Treatment physical therapy  -PC     Row Name 03/15/22 1151          General Information    Patient Profile Reviewed yes  -PC     Existing Precautions/Restrictions fall  -PC     Row Name 03/15/22 1151          Cognition    Orientation Status (Cognition) oriented x 4  needs extra time  -PC     Row Name 03/15/22 1151          Safety Issues, Functional Mobility    Impairments Affecting Function (Mobility) balance;coordination;endurance/activity tolerance;strength  -PC           User Key  (r) = Recorded By, (t) = Taken By, (c) = Cosigned By    Initials Name Provider Type    PC Abbie Hodge PT Physical Therapist               Mobility     Row Name 03/15/22 1151          Bed Mobility    Comment, (Bed Mobility) in chair  -PC     Row Name 03/15/22 1151          Transfers    Comment, (Transfers) lack of full knee flexion makes sit to stand difficult for pt  -PC     Row Name 03/15/22 1151          Sit-Stand Transfer    Sit-Stand [place] (Transfers) 2 person assist;minimum  assist (75% patient effort);moderate assist (50% patient effort);verbal cues;nonverbal cues (demo/gesture)  up in chair a while  -PC     Assistive Device (Sit-Stand Transfers) walker, front-wheeled  -PC     Comment, (Sit-Stand Transfer) pt transfered to [facility], then worked on some standing activites with toileting, getting pants down, wiping, getting pants back up while balancing on LEs with one UE support on [facility], then pivoted back to chair prior to ambulating  -PC     Row Name 03/15/22 1151          Gait/Stairs (Locomotion)    [place] Level (Gait) 2 person assist;minimum assist (75% patient effort);verbal cues;nonverbal cues (demo/gesture)  -PC     Assistive Device (Gait) walker, front-wheeled  -PC     Distance in Feet (Gait) 40 ft, working on gait quality with step length LLE, sequencing, pt did well with being able to grasp walker with L UE, pt needed frequent verbal cues  -PC     Deviations/Abnormal Patterns (Gait) base of support, wide;kirsty decreased;gait speed decreased;stride length decreased  -PC     Bilateral Gait Deviations forward flexed posture  -PC           User Key  (r) = Recorded By, (t) = Taken By, (c) = Cosigned By    Initials Name Provider Type    PC Abbie Hodge PT Physical Therapist               Obj/Interventions    No documentation.                Goals/Plan     Row Name 03/15/22 3151          Bed Mobility Goal 1 (PT)    Activity/Assistive Device (Bed Mobility Goal 1, PT) sit to supine/supine to sit  -PC     [place] Level/Cues Needed (Bed Mobility Goal 1, PT) contact guard required  -PC     Time Frame (Bed Mobility Goal 1, PT) 1 week  -PC     Row Name 03/15/22 1157          Transfer Goal 1 (PT)    Activity/Assistive Device (Transfer Goal 1, PT) sit-to-stand/stand-to-sit  -PC     [place] Level/Cues Needed (Transfer Goal 1, PT) contact guard required  -PC     Time Frame (Transfer Goal 1, PT) 1 week  -PC     Row Name 03/15/22 115          Gait Training Goal 1 (PT)     Activity/Assistive Device (Gait Training Goal 1, PT) gait (walking locomotion);assistive device use  -PC     [place] Level (Gait Training Goal 1, PT) minimum assist (75% or more patient effort)  -PC     Distance (Gait Training Goal 1, PT) 45 ft  -PC     Time Frame (Gait Training Goal 1, PT) 1 week  -PC           User Key  (r) = Recorded By, (t) = Taken By, (c) = Cosigned By    Initials Name Provider Type    PC Abbie Hodge, PT Physical Therapist               Clinical Impression     Row Name 03/15/22 1155          Pain    Pretreatment Pain Rating 0/10 - no pain  -PC     Row Name 03/15/22 1155          Plan of Care Review    Plan of Care Reviewed With patient  -PC     Progress improving  -PC     Outcome Evaluation Pt with good participation with PT today, able to walk with walker 40 ft, needed frequent verbal cues for sequencing and step length. Pt is showing remarkable progress  -PC     Row Name 03/15/22 1157          Positioning and Restraints    Pre-Treatment Position in bed  -PC     Post Treatment Position chair  -PC     In Chair reclined;call light within reach;encouraged to call for assist;exit alarm on  -PC           User Key  (r) = Recorded By, (t) = Taken By, (c) = Cosigned By    Initials Name Provider Type    PC Abbie Hodge, PT Physical Therapist               Outcome Measures     Row Name 03/15/22 1153          How much help from another person do you currently need...    Turning from your back to your side while in flat bed without using bedrails? 3  -PC     Moving from lying on back to sitting on the side of a flat bed without bedrails? 3  -PC     Moving to and from a bed to a chair (including a wheelchair)? 3  -PC     Standing up from a chair using your arms (e.g., wheelchair, bedside chair)? 2  -PC     Climbing 3-5 steps with a railing? 1  -PC     To walk in hospital room? 3  -PC     AM-PAC 6 Clicks Score (PT) 15  -PC           User Key  (r) = Recorded By, (t) = Taken By, (c) = Cosigned By     Initials Name Provider Type    Abbie Kirby PT Physical Therapist                             Physical Therapy Education                 Title: PT OT SLP Therapies (Resolved)     Topic: Physical Therapy (Resolved)     Point: Mobility training (Resolved)     Learning Progress Summary           Patient Acceptance, E, VU by TJ at 3/15/2022 0515    Eager, E,TB,D, VU by MERISSA at 3/14/2022 1725    Acceptance, E, VU by JS at 3/14/2022 0246    Acceptance, E,TB,D, VU,NR by  at 3/12/2022 1119    Acceptance, E,TB,D, VU,NR by CB at 3/11/2022 1544    Acceptance, E, VU by JS at 3/11/2022 0339    Acceptance, E, VU by JS at 3/11/2022 0337    Acceptance, E,D, NR by PC at 3/10/2022 1225                   Point: Home exercise program (Resolved)     Learning Progress Summary           Patient Acceptance, E, VU by TJ at 3/15/2022 0515    Eager, E,TB,D, VU by MERISSA at 3/14/2022 1725    Acceptance, E, VU by JS at 3/14/2022 0246    Acceptance, E,TB,D, VU,NR by  at 3/12/2022 1119    Acceptance, E,TB,D, VU,NR by CB at 3/11/2022 1544    Acceptance, E, VU by JS at 3/11/2022 0339    Acceptance, E, VU by JS at 3/11/2022 0337    Acceptance, E,D, NR by PC at 3/10/2022 1225                   Point: Body mechanics (Resolved)     Learning Progress Summary           Patient Acceptance, E, VU by TJ at 3/15/2022 0515    Eager, E,TB,D, VU by MERISSA at 3/14/2022 1725    Acceptance, E, VU by JS at 3/14/2022 0246    Acceptance, E,TB,D, VU,NR by  at 3/12/2022 1119    Acceptance, E,TB,D, VU,NR by CB at 3/11/2022 1544    Acceptance, E, VU by JS at 3/11/2022 0339    Acceptance, E, VU by JS at 3/11/2022 0337    Acceptance, E,D, NR by PC at 3/10/2022 1225                   Point: Precautions (Resolved)     Learning Progress Summary           Patient Acceptance, E, VU by JS at 3/15/2022 0515    Eager, ROWAN,TB,D, VU by MERISSA at 3/14/2022 1725    Acceptance, E, VU by JS at 3/14/2022 0246    Acceptance, E,TB,D, VU,NR by  at 3/12/2022 1119    Acceptance, E,TB,D,  VU,NR by CB at 3/11/2022 1544    Acceptance, E, VU by JS at 3/11/2022 0339    Acceptance, E, VU by JS at 3/11/2022 0337    Acceptance, E,D, NR by PC at 3/10/2022 1225                               User Key     Initials Effective Dates Name Provider Type Discipline    PC 06/16/21 -  Abbie Hodge, PT Physical Therapist PT    JM 03/07/18 -  Darby Torres PTA Physical Therapy Assistant PT    CB 10/22/21 -  Violeta Corona, PT Physical Therapist PT     05/10/21 -  Roselia Galindo Physical Therapist PT    JS 09/10/21 -  Mary Hernandez RN Registered Nurse Nurse              PT Recommendation and Plan  Planned Therapy Interventions (PT): bed mobility training, gait training, balance training, transfer training, neuromuscular re-education, strengthening  Plan of Care Reviewed With: patient  Progress: improving  Outcome Evaluation: Pt with good participation with PT today, able to walk with walker 40 ft, needed frequent verbal cues for sequencing and step length. Pt is showing remarkable progress     Time Calculation:    PT Charges     Row Name 03/15/22 1200             Time Calculation    Start Time 1053  -PC      Stop Time 1112  -PC      Time Calculation (min) 19 min  -PC      PT Received On 03/15/22  -PC      PT - Next Appointment 03/16/22  -PC            User Key  (r) = Recorded By, (t) = Taken By, (c) = Cosigned By    Initials Name Provider Type    PC Abbie Hodge, PT Physical Therapist              Therapy Charges for Today     Code Description Service Date Service Provider Modifiers Qty    11438366967 HC PT THER PROC EA 15 MIN 3/15/2022 Abbie Hodge, PT GP 1    63246701381 HC PT THER SUPP EA 15 MIN 3/15/2022 Abbie Hodge, PT GP 1          PT G-Codes  Outcome Measure Options: AM-PAC 6 Clicks Daily Activity (OT)  AM-PAC 6 Clicks Score (PT): 15  AM-PAC 6 Clicks Score (OT): 14  Modified Dom Scale: 4 - Moderately severe disability.  Unable to walk without assistance, and unable to attend to own  bodily needs without assistance.    Abbie Hodge, PT  3/15/2022

## 2022-03-16 ENCOUNTER — READMISSION MANAGEMENT (OUTPATIENT)
Dept: CALL CENTER | Facility: HOSPITAL | Age: 62
End: 2022-03-16

## 2022-03-16 VITALS
WEIGHT: 211.2 LBS | BODY MASS INDEX: 32.01 KG/M2 | DIASTOLIC BLOOD PRESSURE: 69 MMHG | OXYGEN SATURATION: 93 % | HEIGHT: 68 IN | RESPIRATION RATE: 16 BRPM | HEART RATE: 69 BPM | TEMPERATURE: 98 F | SYSTOLIC BLOOD PRESSURE: 124 MMHG

## 2022-03-16 LAB
ANION GAP SERPL CALCULATED.3IONS-SCNC: 10 MMOL/L (ref 5–15)
BUN SERPL-MCNC: 12 MG/DL (ref 8–23)
BUN/CREAT SERPL: 11.9 (ref 7–25)
CALCIUM SPEC-SCNC: 11 MG/DL (ref 8.6–10.5)
CHLORIDE SERPL-SCNC: 96 MMOL/L (ref 98–107)
CO2 SERPL-SCNC: 31 MMOL/L (ref 22–29)
CREAT SERPL-MCNC: 1.01 MG/DL (ref 0.57–1)
EGFRCR SERPLBLD CKD-EPI 2021: 63.9 ML/MIN/1.73
GLUCOSE BLDC GLUCOMTR-MCNC: 147 MG/DL (ref 70–130)
GLUCOSE BLDC GLUCOMTR-MCNC: 185 MG/DL (ref 70–130)
GLUCOSE BLDC GLUCOMTR-MCNC: 211 MG/DL (ref 70–130)
GLUCOSE SERPL-MCNC: 210 MG/DL (ref 65–99)
POTASSIUM SERPL-SCNC: 3.7 MMOL/L (ref 3.5–5.2)
SODIUM SERPL-SCNC: 137 MMOL/L (ref 136–145)

## 2022-03-16 PROCEDURE — G0378 HOSPITAL OBSERVATION PER HR: HCPCS

## 2022-03-16 PROCEDURE — 63710000001 INSULIN LISPRO (HUMAN) PER 5 UNITS: Performed by: HOSPITALIST

## 2022-03-16 PROCEDURE — 97535 SELF CARE MNGMENT TRAINING: CPT

## 2022-03-16 PROCEDURE — 97116 GAIT TRAINING THERAPY: CPT

## 2022-03-16 PROCEDURE — 82962 GLUCOSE BLOOD TEST: CPT

## 2022-03-16 PROCEDURE — 97110 THERAPEUTIC EXERCISES: CPT

## 2022-03-16 PROCEDURE — 80048 BASIC METABOLIC PNL TOTAL CA: CPT | Performed by: HOSPITALIST

## 2022-03-16 RX ORDER — LEVOTHYROXINE SODIUM 0.1 MG/1
100 TABLET ORAL DAILY
Qty: 30 TABLET | Refills: 0 | Status: SHIPPED | OUTPATIENT
Start: 2022-03-17 | End: 2022-04-16

## 2022-03-16 RX ORDER — INSULIN GLARGINE 100 [IU]/ML
45 INJECTION, SOLUTION SUBCUTANEOUS 2 TIMES DAILY
Qty: 30 ML | Refills: 0 | Status: SHIPPED | OUTPATIENT
Start: 2022-03-16 | End: 2022-04-15

## 2022-03-16 RX ORDER — INSULIN LISPRO 100 [IU]/ML
15 INJECTION, SOLUTION INTRAVENOUS; SUBCUTANEOUS
Qty: 15 ML | Refills: 0 | Status: SHIPPED | OUTPATIENT
Start: 2022-03-16 | End: 2022-05-05

## 2022-03-16 RX ADMIN — SPIRONOLACTONE 50 MG: 50 TABLET, FILM COATED ORAL at 10:15

## 2022-03-16 RX ADMIN — INSULIN GLARGINE-YFGN 42 UNITS: 100 INJECTION, SOLUTION SUBCUTANEOUS at 10:15

## 2022-03-16 RX ADMIN — TORSEMIDE 20 MG: 20 TABLET ORAL at 10:15

## 2022-03-16 RX ADMIN — APIXABAN 5 MG: 5 TABLET, FILM COATED ORAL at 04:00

## 2022-03-16 RX ADMIN — LOSARTAN POTASSIUM 50 MG: 50 TABLET, FILM COATED ORAL at 10:14

## 2022-03-16 RX ADMIN — LEVOTHYROXINE SODIUM 100 MCG: 0.1 TABLET ORAL at 10:15

## 2022-03-16 RX ADMIN — PANTOPRAZOLE SODIUM 40 MG: 40 TABLET, DELAYED RELEASE ORAL at 06:56

## 2022-03-16 RX ADMIN — GABAPENTIN 300 MG: 300 CAPSULE ORAL at 10:14

## 2022-03-16 RX ADMIN — INSULIN LISPRO 15 UNITS: 100 INJECTION, SOLUTION INTRAVENOUS; SUBCUTANEOUS at 10:15

## 2022-03-16 NOTE — PROGRESS NOTES
[facility] Acute Rehab  Msg received from [facility] pt has been denied for Acute Rehab. They are recommending SNU.   However, the family can appeal if they wish- call [phone] and fax # [phone]    Call placed to inform CCP- msg left.     Patricia Clarke RN  Acute Rehab Admission Nurse

## 2022-03-16 NOTE — PROGRESS NOTES
"Daily progress note    Chief complaint  Doing same  No new complaints   Denies chest pain shortness of breath or palpitation    History of present illness  62-year old white female with history of atrial fibrillation recent multiple right CVA with left hemiparesis diabetes mellitus hypertension hyperlipidemia and gastroesophageal reflux disease who is taking atenolol and digoxin to control the heart rate presented to [facility] emergency room after seen by primary care doctor for generalized weakness and found to have dig toxicity with low heart rate asked to come to the ER.  Patient dig level was 3.0 and now it is 2.2 but heart rate remained in 40s.  Patient has weakness but denies any palpitations lightheadedness dizziness chest pain or shortness of breath.  Patient has been feeling weak.  Patient admitted for further work-up.     REVIEW OF SYSTEMS  Unremarkable      PHYSICAL EXAM  Blood pressure 124/69, pulse 69, temperature 98 °F (36.7 °C), temperature source Oral, resp. rate 16, height 172.7 cm (68\"), weight 95.8 kg (211 lb 3.2 oz), SpO2 93 %.    Constitutional:       General: She is not in acute distress.  HENT:      Head: Normocephalic and atraumatic.   Eyes:      Pupils: Pupils are equal, round, and reactive to light.   Cardiovascular:      Rate and Rhythm: S1-S2     Heart sounds: Normal heart sounds.   Pulmonary:      Effort: Pulmonary effort is normal. No respiratory distress.      Breath sounds: Normal breath sounds.   Abdominal:      Palpations: Abdomen is soft.      Tenderness: There is no abdominal tenderness. There is no guarding or rebound.   Musculoskeletal:         General: Normal range of motion.      Cervical back: Normal range of motion and neck supple.   Skin:     General: Skin is warm and dry.      Findings: No rash.   Neurological:      Mental Status: She is alert and oriented to person, place, and time.      Sensory: Sensation is intact.      Comments: Chronic weakness of left " side   Psychiatric:         Mood and Affect: Mood and affect normal.      LAB RESULTS  Lab Results (last 24 hours)     Procedure Component Value Units Date/Time    Basic Metabolic Panel [[id number]]  (Abnormal) Collected: 03/16/22 0927    Specimen: Blood Updated: 03/16/22 1050     Glucose 210 mg/dL      BUN 12 mg/dL      Creatinine 1.01 mg/dL      Sodium 137 mmol/L      Potassium 3.7 mmol/L      Comment: Slight hemolysis detected by analyzer. Results may be affected.        Chloride 96 mmol/L      CO2 31.0 mmol/L      Calcium 11.0 mg/dL      BUN/Creatinine Ratio 11.9     Anion Gap 10.0 mmol/L      eGFR 63.9 mL/min/1.73      Comment: National Kidney Foundation and American Society of Nephrology (ASN) Task Force recommended calculation based on the Chronic Kidney Disease Epidemiology Collaboration (CKD-EPI) equation refit without adjustment for race.       Narrative:      GFR Normal >60  Chronic Kidney Disease <60  Kidney Failure <15      POC Glucose Once [[id number]]  (Abnormal) Collected: 03/16/22 1044    Specimen: Blood Updated: 03/16/22 1046     Glucose 211 mg/dL      Comment: Meter: [id number] : [id number] Mares Dolly NA       POC Glucose Once [[id number]]  (Abnormal) Collected: 03/16/22 0629    Specimen: Blood Updated: 03/16/22 0630     Glucose 147 mg/dL      Comment: Meter: [id number] : [id number] Kincaid Aditi NA       POC Glucose Once [[id number]]  (Abnormal) Collected: 03/15/22 2006    Specimen: Blood Updated: 03/15/22 2007     Glucose 188 mg/dL      Comment: Meter: [id number] : [id number] Kincaid Aditi NA       POC Glucose Once [[id number]]  (Normal) Collected: 03/15/22 1619    Specimen: Blood Updated: 03/15/22 1621     Glucose 116 mg/dL      Comment: Meter: [id number] : [id number] Mares Dolly NA           Imaging Results (Last 24 Hours)     ** No results found for the last 24 hours. **             ECG 12 Lead  Component   Ref Range & Units 3/9/22 0914 2/5/22 0040 1/28/22 2236   QT Interval    ms 364 P  393  360              HEART RATE= 44  bpm  RR Interval= 1356  ms  KS Interval=   ms  P Horizontal Axis=   deg  P Front Axis=   deg  QRSD Interval= 95  ms  QT Interval= 364  ms  QRS Axis= 41  deg  T Wave Axis= 238  deg  - ABNORMAL ECG -  Atrial fibrillation  Repol abnrm suggests ischemia, diffuse leads             Current Facility-Administered Medications:   •  apixaban (ELIQUIS) tablet 5 mg, 5 mg, Oral, Q12H, Michele Subramanian MD, 5 mg at 03/16/22 0400  •  atorvastatin (LIPITOR) tablet 10 mg, 10 mg, Oral, Nightly, Michele Subramanian MD, 10 mg at 03/15/22 2138  •  gabapentin (NEURONTIN) capsule 300 mg, 300 mg, Oral, TID, Michele Subramanian MD, 300 mg at 03/16/22 1014  •  insulin glargine (LANTUS, SEMGLEE) injection 42 Units, 42 Units, Subcutaneous, BID, Michele Subramanian MD, 42 Units at 03/16/22 1015  •  insulin lispro (ADMELOG) injection 0-7 Units, 0-7 Units, Subcutaneous, 4x Daily With Meals & Nightly, Michele Subramanian MD, 2 Units at 03/15/22 2138  •  insulin lispro (ADMELOG) injection 15 Units, 15 Units, Subcutaneous, TID AC, Michele Subramanian MD, 15 Units at 03/16/22 1015  •  levothyroxine (SYNTHROID, LEVOTHROID) tablet 100 mcg, 100 mcg, Oral, Daily, Michele Subramanian MD, 100 mcg at 03/16/22 1015  •  losartan (COZAAR) tablet 50 mg, 50 mg, Oral, Q12H, Luis Wilson MD, 50 mg at 03/16/22 1014  •  pantoprazole (PROTONIX) EC tablet 40 mg, 40 mg, Oral, BID AC, Michele Subramanian MD, 40 mg at 03/16/22 0656  •  potassium chloride (K-DUR,KLOR-CON) ER tablet 20 mEq, 20 mEq, Oral, BID With Meals, Michele Subramanian MD, 20 mEq at 03/15/22 1752  •  [COMPLETED] Insert peripheral IV, , , Once **AND** sodium chloride 0.9 % flush 10 mL, 10 mL, Intravenous, PRN, Naif Raphael MD  •  spironolactone (ALDACTONE) tablet 50 mg, 50 mg, Oral, Daily, Luis Wilson MD, 50 mg at 03/16/22 1015  •  torsemide (DEMADEX) tablet 20 mg, 20 mg, Oral, Daily, Luis Wilson MD, 20 mg at 03/16/22 1015     ASSESSMENT  S/p dig toxicity  Hypokalemia and  hypomagnesemia replaced  Chronic atrial fibrillation  Recent right MCA infarct with left hemiparesis  Diabetes mellitus  Hypertension  Hyperlipidemia  Hypothyroidism with increased T4  Gastroesophageal reflux disease    PLAN  Discharge home with family support and home health  Discharge summary dictated     HERIBERTO LYNN MD

## 2022-03-16 NOTE — PLAN OF CARE
Goal Outcome Evaluation:  Plan of Care Reviewed With: patient           Outcome Evaluation: Pt seen today by OT, up in chair prior to arrival. Pt worked with OT on safe technique for BSC transfer, safe technique for toileting tasks and LBD. Pt requires min/mod A for multiple sit to stands and pivot transfers this date as well as mod A for dressing and toileting. She completed LUE ROM ther ex and encouraged to continue to complete on her own. Unfortunately pt was not approved for IRF at [facility] and plans home with spouse. OT provided pt with LUE [facility] HEP and  strengthening exercises with foam block to continue to work on LUE function for ADLs. She has needed DME and w/c.    OT wore a mask, eye protection, gloves, and completed hand hygiene. Pt wore a mask.

## 2022-03-16 NOTE — PLAN OF CARE
Goal Outcome Evaluation:  Plan of Care Reviewed With: patient        Progress: improving  Outcome Evaluation:No change, anticipate DC tomorrow. Will CTM the rest of my shift.

## 2022-03-16 NOTE — CASE MANAGEMENT/SOCIAL WORK
Continued Stay Note  [facility]     Patient Name: Jenelle Kasper  MRN: [id number]  Today's Date: 3/16/2022    Admit Date: 3/9/2022     Discharge Plan     Row Name 03/16/22 1311       Plan    Plan Home with [facility] via s.o    Patient/Family in Agreement with Plan yes    Plan Comments CCP spoke with pt and s.o. at bedside, discussed that [facility] P2P was denied. They stated plan is home with [facility]. CCP provided [facility] Appeal # [phone] if pt wishes to complete appeal for acute rehab. Pt verbalized understanding. CCP called Shannon/[facility] and notified of dc, PT, ST, OT for rehab. Karime BAJWA/CCP    Final Discharge Disposition Code 06 - home with home health care    Final Note Home with [facility] via s.o               Discharge Codes    No documentation.               Expected Discharge Date and Time     Expected Discharge Date Expected Discharge Time    Mar 16, 2022             Michelle Dennison RN

## 2022-03-16 NOTE — NURSING NOTE
Went over patient discharge instructions in detail, educated both patient and  new medication instructions and any changes. All questions answered.

## 2022-03-16 NOTE — THERAPY TREATMENT NOTE
Patient Name: Jenelle Kasper  : 1960    MRN: [id number]                              Today's Date: 3/16/2022       Admit Date: 3/9/2022    Visit Dx:     ICD-10-CM ICD-9-CM   1. Bradycardia  R00.1 427.89   2. Poisoning by digoxin, accidental or unintentional, initial encounter  T46.0X1A 972.1     E858.3     Patient Active Problem List   Diagnosis   • Persistent atrial fibrillation (HCC)   • Benign essential HTN   • Hyperparathyroidism (HCC)   • Morbid obesity with BMI of 45.0-49.9, adult (HCC)   • Pulmonary hypertension (HCC)   • Precordial pain   • Shortness of breath   • TIA (transient ischemic attack)   • Bradycardia     Past Medical History:   Diagnosis Date   • Anxiety    • Arrhythmia    • Asthma    • Atrial fibrillation with RVR (HCC)    • C. difficile diarrhea    • COPD (chronic obstructive pulmonary disease) (HCC)    • Depression    • Diabetes mellitus (HCC)    • Diabetic peripheral neuropathy associated with type 2 diabetes mellitus (HCC)    • GERD (gastroesophageal reflux disease)    • Heart murmur    • History of fall     closed head injury after falling down steps; fx nose   • Hypercalcemia    • Hyperlipidemia    • Hypertension    • IBS (irritable bowel syndrome)    • Kidney stone    • Morbid obesity (HCC)    • PAF (paroxysmal atrial fibrillation) (HCC)    • PCOS (polycystic ovarian syndrome)    • PONV (postoperative nausea and vomiting)    • Sarcoidosis    • Shortness of breath    • Thyroid cancer (HCC)     WITH RADIATION   • Wounds, multiple      Past Surgical History:   Procedure Laterality Date   • CARDIAC CATHETERIZATION N/A 2016    Procedure: Coronary angiography;  Surgeon: Chris Perez MD;  Location: [facility] CATH INVASIVE LOCATION;  Service:    • CARDIAC CATHETERIZATION N/A 2016    Procedure: Left heart cath;  Surgeon: Chris Perez MD;  Location: [facility] CATH INVASIVE LOCATION;  Service:    • CARDIAC CATHETERIZATION N/A 2016    Procedure: Left ventriculography;   Surgeon: Chris Perez MD;  Location:  [facility] CATH INVASIVE LOCATION;  Service:    • CARDIAC CATHETERIZATION N/A 2/26/2020    Procedure: Right Heart Cath;  Surgeon: Chris Perez MD;  Location:  [facility] CATH INVASIVE LOCATION;  Service: Cardiovascular;  Laterality: N/A;  If there is no evidence of pulmonary hypertension please add a left heart cath to evaluate coronary arteries as patient is having chest pain.   • CHOLECYSTECTOMY     • COLONOSCOPY     • DILATATION AND CURETTAGE     • ENDOSCOPY     • GASTRIC BYPASS      gastric surgery for morbid obesity   • GASTROPLASTY  2008    GASTRIC REVISION FROM PREVIOUS GASTRIC BYPASS   • HIATAL HERNIA REPAIR     • INGUINAL HERNIA REPAIR Right    • LUNG BIOPSY      to dx sarcoidosis   • LUNG SURGERY      Removal of granulomas   • TOTAL THYROIDECTOMY        General Information     Row Name 03/16/22 1219          OT Time and Intention    Document Type therapy note (daily note)  -     Mode of Treatment occupational therapy;individual therapy  -     Row Name 03/16/22 1219          General Information    Patient Profile Reviewed yes  -     Existing Precautions/Restrictions fall  -     Row Name 03/16/22 1219          Cognition    Orientation Status (Cognition) oriented x 4  expressive aphasia, often waxes and wanes throughout encounter.  -     Row Name 03/16/22 1219          Safety Issues, Functional Mobility    Safety Issues Affecting Function (Mobility) insight into deficits/self-awareness;awareness of need for assistance  -     Impairments Affecting Function (Mobility) balance;coordination;endurance/activity tolerance;strength;cognition  -           User Key  (r) = Recorded By, (t) = Taken By, (c) = Cosigned By    Initials Name Provider Type     Lexy Tarango OT Occupational Therapist                 Mobility/ADL's     Row Name 03/16/22 1223          Transfers    Transfers toilet transfer  -     Bed-Chair [place] (Transfers) minimum assist (75%  patient effort);1 person assist;verbal cues  -SM     Sit-Stand [place] (Transfers) minimum assist (75% patient effort);moderate assist (50% patient effort);verbal cues  -SM     [place] Level (Toilet Transfer) minimum assist (75% patient effort);verbal cues  -SM     Assistive Device (Toilet Transfer) commode, 3-in-1;walker, front-wheeled  -     Row Name 03/16/22 1224          Sit-Stand Transfer    Assistive Device (Sit-Stand Transfers) walker, front-wheeled  -     Row Name 03/16/22 1224          Toilet Transfer    Type (Toilet Transfer) stand pivot/stand step  -     Row Name 03/16/22 1224          Functional Mobility    Functional Mobility- Comment further functional mobility not completed this date, requires X2 assist for safety.  -     Row Name 03/16/22 1224          Lower Body Dressing Assessment/Training    [place] Level (Lower Body Dressing) lower body dressing skills;don;pants/bottoms;moderate assist (50% patient effort);verbal cues  -     Position (Lower Body Dressing) supported sitting;supported standing  -     Row Name 03/16/22 1224          Toileting Assessment/Training    [place] Level (Toileting) toileting skills;moderate assist (50% patient effort);perform perineal hygiene;change pad/brief  -     Comment, (Toileting) dry run, did not void but practiced safe technique on BSC and using L hand for wiping.  -           User Key  (r) = Recorded By, (t) = Taken By, (c) = Cosigned By    Initials Name Provider Type     Lexy Tarango OT Occupational Therapist               Obj/Interventions     Row Name 03/16/22 1226          Shoulder (Therapeutic Exercise)    Shoulder AAROM (Therapeutic Exercise) left;flexion;extension;aBduction;aDduction;horizontal aBduction/aDduction;10 repetitions;sitting  OT only providing CGA to guide movements and cues for full ROM this date.  -     Row Name 03/16/22 1226          Elbow/Forearm (Therapeutic Exercise)    Elbow/Forearm  (Therapeutic Exercise) AAROM (active assistive range of motion)  -     Elbow/Forearm AAROM (Therapeutic Exercise) left;flexion;extension  OT only providing CGA to guide movements and cues for full ROM this date.  -     Row Name 03/16/22 1226          Wrist (Therapeutic Exercise)    Wrist (Therapeutic Exercise) AROM (active range of motion)  -     Wrist AROM (Therapeutic Exercise) left;flexion;extension  -     Row Name 03/16/22 1226          Hand (Therapeutic Exercise)    Hand (Therapeutic Exercise) strengthening exercise  -     Hand Strengthening (Therapeutic Exercise) left; strengthening;red  foam block  -     Row Name 03/16/22 1226          Motor Skills    Coordination fine motor deficit;left;minimal impairment  -     Muscle Tone left;upper extremity(s);WNL  -     Therapeutic Exercise elbow/forearm;wrist;hand  -     Row Name 03/16/22 1226          Balance    Static Standing Balance contact guard;minimal assist  -     Dynamic Standing Balance minimal assist  -     Position/Device Used, Standing Balance walker, rolling  -     Balance Interventions standing;occupation based/functional task  -           User Key  (r) = Recorded By, (t) = Taken By, (c) = Cosigned By    Initials Name Provider Type    SM Lexy Tarango OT Occupational Therapist               Goals/Plan    No documentation.                Clinical Impression     Row Name 03/16/22 1229          Pain Assessment    Pretreatment Pain Rating 0/10 - no pain  -     Posttreatment Pain Rating 0/10 - no pain  -     Row Name 03/16/22 1229          Plan of Care Review    Plan of Care Reviewed With patient  -     Outcome Evaluation Pt seen today by OT, up in chair prior to arrival. Pt worked with OT on safe technique for BSC transfer, safe technique for toileting tasks and LBD. Pt requires min/mod A for multiple sit to stands and pivot transfers this date as well as mod A for dressing and toileting. She completed LUE ROM ther ex  and encouraged to continue to complete on her own. Unfortunately pt was not approved for IRF at [place] and plans home with spouse. OT provided pt with LUE FMC HEP and  strengthening exercises with foam block to continue to work on LUE function for ADLs. She has needed DME and w/c.  -     Row Name 03/16/22 1229          Therapy Plan Review/Discharge Plan (OT)    Anticipated Discharge Disposition (OT) inpatient rehabilitation facility;home with home health  -     Row Name 03/16/22 1229          Positioning and Restraints    Pre-Treatment Position sitting in chair/recliner  -     Post Treatment Position chair  -SM     In Chair reclined;encouraged to call for assist;exit alarm on;with family/caregiver;notified nsg  -           User Key  (r) = Recorded By, (t) = Taken By, (c) = Cosigned By    Initials Name Provider Type    Lexy Blue OT Occupational Therapist               Outcome Measures     Row Name 03/16/22 1233          How much help from another is currently needed...    Putting on and taking off regular lower body clothing? 2  -SM     Bathing (including washing, rinsing, and drying) 2  -SM     Toileting (which includes using toilet bed pan or urinal) 2  -SM     Putting on and taking off regular upper body clothing 3  -SM     Taking care of personal grooming (such as brushing teeth) 3  -SM     Eating meals 3  -SM     AM-PAC 6 Clicks Score (OT) 15  -SM     Row Name 03/16/22 1233          Functional Assessment    Outcome Measure Options AM-PAC 6 Clicks Daily Activity (OT)  -SM           User Key  (r) = Recorded By, (t) = Taken By, (c) = Cosigned By    Initials Name Provider Type    Lexy Blue OT Occupational Therapist                Occupational Therapy Education                 Title: PT OT SLP Therapies (Resolved)     Topic: Occupational Therapy (Resolved)     Point: ADL training (Resolved)     Description:   Instruct learner(s) on proper safety adaptation and remediation techniques  during self care or transfers.   Instruct in proper use of assistive devices.              Learning Progress Summary           Patient Acceptance, E, VU by JS at 3/15/2022 0515    Acceptance, E, VU by JS at 3/14/2022 0246    Acceptance, E, VU by JS at 3/11/2022 0339    Acceptance, E, VU by JS at 3/11/2022 0337    Acceptance, E, VU by  at 3/10/2022 1033    Comment: Role of OT and POC/goals. Safety with falls precautions.                   Point: Home exercise program (Resolved)     Description:   Instruct learner(s) on appropriate technique for monitoring, assisting and/or progressing therapeutic exercises/activities.              Learning Progress Summary           Patient Acceptance, E, VU by JS at 3/15/2022 0515    Acceptance, E, VU by JS at 3/14/2022 0246    Acceptance, E, VU by JS at 3/11/2022 0339    Acceptance, E, VU by JS at 3/11/2022 0337                   Point: Precautions (Resolved)     Description:   Instruct learner(s) on prescribed precautions during self-care and functional transfers.              Learning Progress Summary           Patient Acceptance, E, VU by JS at 3/15/2022 0515    Acceptance, E, VU by JS at 3/14/2022 0246    Acceptance, E, VU by JS at 3/11/2022 0339    Acceptance, E, VU by JS at 3/11/2022 0337                   Point: Body mechanics (Resolved)     Description:   Instruct learner(s) on proper positioning and spine alignment during self-care, functional mobility activities and/or exercises.              Learning Progress Summary           Patient Acceptance, E, VU by JS at 3/15/2022 0515    Acceptance, E, VU by JS at 3/14/2022 0246    Acceptance, E, VU by JS at 3/11/2022 0339    Acceptance, E, VU by JS at 3/11/2022 0337                               User Key     Initials Effective Dates Name Provider Type Discipline     04/02/20 -  Lexy Tarango OT Occupational Therapist OT     09/10/21 -  Mary Hernandez RN Registered Nurse Nurse              OT Recommendation and  Plan  Planned Therapy Interventions (OT): activity tolerance training, adaptive equipment training, BADL retraining, cognitive/visual perception retraining, functional balance retraining, IADL retraining, occupation/activity based interventions, patient/caregiver education/training, neuromuscular control/coordination retraining, ROM/therapeutic exercise, strengthening exercise, transfer/mobility retraining  Therapy Frequency (OT): 5 times/wk  Plan of Care Review  Plan of Care Reviewed With: patient  Outcome Evaluation: Pt seen today by OT, up in chair prior to arrival. Pt worked with OT on safe technique for BSC transfer, safe technique for toileting tasks and LBD. Pt requires min/mod A for multiple sit to stands and pivot transfers this date as well as mod A for dressing and toileting. She completed LUE ROM ther ex and encouraged to continue to complete on her own. Unfortunately pt was not approved for IRF at [place] and plans home with spouse. OT provided pt with LUE FMC HEP and  strengthening exercises with foam block to continue to work on LUE function for ADLs. She has needed DME and w/c.     Time Calculation:    Time Calculation- OT     Row Name 03/16/22 1234             Time Calculation- OT    OT Start Time 0936  -      OT Stop Time 1001  -SM      OT Time Calculation (min) 25 min  -SM      Total Timed Code Minutes- OT 25 minute(s)  -SM      OT Received On 03/16/22  -      OT - Next Appointment 03/17/22  -              Timed Charges    53588 - OT Therapeutic Exercise Minutes 10  -SM      45417 - OT Self Care/Mgmt Minutes 15  -SM              Total Minutes    Timed Charges Total Minutes 25  -SM       Total Minutes 25  -SM            User Key  (r) = Recorded By, (t) = Taken By, (c) = Cosigned By    Initials Name Provider Type    Lexy Blue OT Occupational Therapist              Therapy Charges for Today     Code Description Service Date Service Provider Modifiers Qty    16177950749 HC OT SELF  CARE/MGMT/TRAIN EA 15 MIN 3/16/2022 Lexy Tarnago, OT GO 1    03485438433 HC OT THER PROC EA 15 MIN 3/16/2022 Lexy Tarango OT GO 1               Lexy Tarango OT  3/16/2022

## 2022-03-16 NOTE — PLAN OF CARE
Goal Outcome Evaluation:  Plan of Care Reviewed With: patient        Progress: improving  Outcome Evaluation: Pt worked on stair climbing today, able to ascend stairs with moderate assist with handrail on L, pt will have assist of spouse to get into home, pt has wheelchair also, pt will need home health PT.    Patient was intermittently wearing a face mask during this therapy encounter. Therapist used appropriate personal protective equipment including eye protection, mask, and gloves.  Mask used was standard procedure mask. Appropriate PPE was worn during the entire therapy session. Hand hygiene was completed before and after therapy session. Patient is not in enhanced droplet precautions.

## 2022-03-16 NOTE — THERAPY TREATMENT NOTE
Patient Name: Jenelle Kasper  : 1960    MRN: [id number]                              Today's Date: 3/16/2022       Admit Date: 3/9/2022    Visit Dx:     ICD-10-CM ICD-9-CM   1. Bradycardia  R00.1 427.89   2. Poisoning by digoxin, accidental or unintentional, initial encounter  T46.0X1A 972.1     E858.3     Patient Active Problem List   Diagnosis   • Persistent atrial fibrillation (HCC)   • Benign essential HTN   • Hyperparathyroidism (HCC)   • Morbid obesity with BMI of 45.0-49.9, adult (HCC)   • Pulmonary hypertension (HCC)   • Precordial pain   • Shortness of breath   • TIA (transient ischemic attack)   • Bradycardia     Past Medical History:   Diagnosis Date   • Anxiety    • Arrhythmia    • Asthma    • Atrial fibrillation with RVR (HCC)    • C. difficile diarrhea    • COPD (chronic obstructive pulmonary disease) (HCC)    • Depression    • Diabetes mellitus (HCC)    • Diabetic peripheral neuropathy associated with type 2 diabetes mellitus (HCC)    • GERD (gastroesophageal reflux disease)    • Heart murmur    • History of fall     closed head injury after falling down steps; fx nose   • Hypercalcemia    • Hyperlipidemia    • Hypertension    • IBS (irritable bowel syndrome)    • Kidney stone    • Morbid obesity (HCC)    • PAF (paroxysmal atrial fibrillation) (HCC)    • PCOS (polycystic ovarian syndrome)    • PONV (postoperative nausea and vomiting)    • Sarcoidosis    • Shortness of breath    • Thyroid cancer (HCC)     WITH RADIATION   • Wounds, multiple      Past Surgical History:   Procedure Laterality Date   • CARDIAC CATHETERIZATION N/A 2016    Procedure: Coronary angiography;  Surgeon: Chris Perez MD;  Location: [facility] CATH INVASIVE LOCATION;  Service:    • CARDIAC CATHETERIZATION N/A 2016    Procedure: Left heart cath;  Surgeon: Chris Perez MD;  Location: [facility] CATH INVASIVE LOCATION;  Service:    • CARDIAC CATHETERIZATION N/A 2016    Procedure: Left ventriculography;   Surgeon: Chris Perez MD;  Location:  [facility] CATH INVASIVE LOCATION;  Service:    • CARDIAC CATHETERIZATION N/A 2/26/2020    Procedure: Right Heart Cath;  Surgeon: Chris Perez MD;  Location:  [facility] CATH INVASIVE LOCATION;  Service: Cardiovascular;  Laterality: N/A;  If there is no evidence of pulmonary hypertension please add a left heart cath to evaluate coronary arteries as patient is having chest pain.   • CHOLECYSTECTOMY     • COLONOSCOPY     • DILATATION AND CURETTAGE     • ENDOSCOPY     • GASTRIC BYPASS      gastric surgery for morbid obesity   • GASTROPLASTY  2008    GASTRIC REVISION FROM PREVIOUS GASTRIC BYPASS   • HIATAL HERNIA REPAIR     • INGUINAL HERNIA REPAIR Right    • LUNG BIOPSY      to dx sarcoidosis   • LUNG SURGERY      Removal of granulomas   • TOTAL THYROIDECTOMY        General Information     Row Name 03/16/22 1356          Physical Therapy Time and Intention    Document Type therapy note (daily note)  -PC     Mode of Treatment physical therapy  -PC     Row Name 03/16/22 1357          General Information    Patient Profile Reviewed yes  -PC     Existing Precautions/Restrictions fall  -PC     Row Name 03/16/22 7659          Cognition    Orientation Status (Cognition) oriented x 4  needs extra time  -PC     Row Name 03/16/22 6116          Safety Issues, Functional Mobility    Impairments Affecting Function (Mobility) balance;coordination;endurance/activity tolerance;strength  -PC           User Key  (r) = Recorded By, (t) = Taken By, (c) = Cosigned By    Initials Name Provider Type    PC Abbie Hodge PT Physical Therapist               Mobility    No documentation.                Obj/Interventions    No documentation.                Goals/Plan    No documentation.                Clinical Impression     Row Name 03/16/22 4551          Pain    Pretreatment Pain Rating 0/10 - no pain  -PC     Row Name 03/16/22 7505          Plan of Care Review    Plan of Care Reviewed With patient  -PC      Outcome Evaluation Pt worked on stair climbing today, able to ascend stairs with moderate assist with handrail on L, pt will have assist of spouse to get into home, pt has wheelchair also, pt will need home health PT.  -PC     Row Name 03/16/22 1405          Positioning and Restraints    Pre-Treatment Position sitting in chair/recliner  -PC     Post Treatment Position chair  -PC     In Chair reclined;call light within reach;encouraged to call for assist;exit alarm on  -PC           User Key  (r) = Recorded By, (t) = Taken By, (c) = Cosigned By    Initials Name Provider Type    PC Abbie Hodge, PT Physical Therapist               Outcome Measures     Row Name 03/16/22 1408          How much help from another person do you currently need...    Turning from your back to your side while in flat bed without using bedrails? 3  -PC     Moving from lying on back to sitting on the side of a flat bed without bedrails? 3  -PC     Moving to and from a bed to a chair (including a wheelchair)? 3  -PC     Standing up from a chair using your arms (e.g., wheelchair, bedside chair)? 3  -PC     Climbing 3-5 steps with a railing? 2  -PC     To walk in hospital room? 3  -PC     AM-PAC 6 Clicks Score (PT) 17  -PC     Row Name 03/16/22 1233          Functional Assessment    Outcome Measure Options AM-PAC 6 Clicks Daily Activity (OT)  -           User Key  (r) = Recorded By, (t) = Taken By, (c) = Cosigned By    Initials Name Provider Type    PC Abbie Hodge, PT Physical Therapist    Lexy Blue, OT Occupational Therapist                             Physical Therapy Education                 Title: PT OT SLP Therapies (Resolved)     Topic: Physical Therapy (Resolved)     Point: Mobility training (Resolved)     Learning Progress Summary           Patient Acceptance, E, VU by TJ at 3/15/2022 0515    Eager, ROWAN,NICK,RIDDHI, VU by MERISSA at 3/14/2022 1725    Acceptance, E, VU by TJ at 3/14/2022 0246    Acceptance, E,TB,D, VU,NR by  at  3/12/2022 1119    Acceptance, E,TB,D, VU,NR by CB at 3/11/2022 1544    Acceptance, E, VU by JS at 3/11/2022 0339    Acceptance, E, VU by JS at 3/11/2022 0337    Acceptance, E,D, NR by PC at 3/10/2022 1225                   Point: Home exercise program (Resolved)     Learning Progress Summary           Patient Acceptance, E, VU by JS at 3/15/2022 0515    Eager, E,TB,D, VU by JM at 3/14/2022 1725    Acceptance, E, VU by JS at 3/14/2022 0246    Acceptance, E,TB,D, VU,NR by BH at 3/12/2022 1119    Acceptance, E,TB,D, VU,NR by CB at 3/11/2022 1544    Acceptance, E, VU by JS at 3/11/2022 0339    Acceptance, E, VU by JS at 3/11/2022 0337    Acceptance, E,D, NR by PC at 3/10/2022 1225                   Point: Body mechanics (Resolved)     Learning Progress Summary           Patient Acceptance, E, VU by JS at 3/15/2022 0515    Eager, E,TB,D, VU by JM at 3/14/2022 1725    Acceptance, E, VU by JS at 3/14/2022 0246    Acceptance, E,TB,D, VU,NR by BH at 3/12/2022 1119    Acceptance, E,TB,D, VU,NR by CB at 3/11/2022 1544    Acceptance, E, VU by JS at 3/11/2022 0339    Acceptance, E, VU by JS at 3/11/2022 0337    Acceptance, E,D, NR by PC at 3/10/2022 1225                   Point: Precautions (Resolved)     Learning Progress Summary           Patient Acceptance, E, VU by JS at 3/15/2022 0515    Eager, E,TB,D, VU by JM at 3/14/2022 1725    Acceptance, E, VU by JS at 3/14/2022 0246    Acceptance, E,TB,D, VU,NR by BH at 3/12/2022 1119    Acceptance, E,TB,D, VU,NR by CB at 3/11/2022 1544    Acceptance, E, VU by JS at 3/11/2022 0339    Acceptance, E, VU by JS at 3/11/2022 0337    Acceptance, E,D, NR by PC at 3/10/2022 1225                               User Key     Initials Effective Dates Name Provider Type Discipline    PC 06/16/21 -  Abbie Hodge, PT Physical Therapist PT    JM 03/07/18 -  Darby Torres PTA Physical Therapy Assistant PT    CB 10/22/21 -  Violeta Corona, PT Physical Therapist PT     05/10/21 -  Mateo  Roselia Physical Therapist PT    JS 09/10/21 -  Mary Hernandez RN Registered Nurse Nurse              PT Recommendation and Plan  Planned Therapy Interventions (PT): bed mobility training, gait training, balance training, transfer training, neuromuscular re-education, strengthening  Plan of Care Reviewed With: patient  Progress: improving  Outcome Evaluation: Pt worked on stair climbing today, able to ascend stairs with moderate assist with handrail on L, pt will have assist of spouse to get into home, pt has wheelchair also, pt will need home health PT.     Time Calculation:    PT Charges     Row Name 03/16/22 1408             Time Calculation    Start Time 1112  -PC      Stop Time 1130  -PC      Time Calculation (min) 18 min  -PC      PT Received On 03/16/22  -PC      PT - Next Appointment 03/17/22  -PC            User Key  (r) = Recorded By, (t) = Taken By, (c) = Cosigned By    Initials Name Provider Type    PC Abbie Hodge, PT Physical Therapist              Therapy Charges for Today     Code Description Service Date Service Provider Modifiers Qty    33990250934  PT THER PROC EA 15 MIN 3/15/2022 Abbie Hodge, PT GP 1    46443603387 HC PT THER SUPP EA 15 MIN 3/15/2022 Abbie Hodge, PT GP 1    [id number]  GAIT TRAINING EA 15 MIN 3/16/2022 Abbie Hodge, PT GP 1    [id number]  PT THER SUPP EA 15 MIN 3/16/2022 Abbie Hodge, PT GP 1          PT G-Codes  Outcome Measure Options: AM-PAC 6 Clicks Daily Activity (OT)  AM-PAC 6 Clicks Score (PT): 17  AM-PAC 6 Clicks Score (OT): 15  Modified Dom Scale: 4 - Moderately severe disability.  Unable to walk without assistance, and unable to attend to own bodily needs without assistance.    Abbie Hodge PT  3/16/2022

## 2022-03-16 NOTE — DISCHARGE SUMMARY
Discharge summary    Date of admission 3/9/2022  Date of discharge 3/16/2022    Final diagnosis  S/p dig toxicity  S/p bradycardia  Hypokalemia and hypomagnesemia replaced  Chronic atrial fibrillation  Recent right MCA infarct with left hemiparesis  Insulin-dependent diabetes mellitus  Hypertension  Hyperlipidemia  Hypothyroidism   Gastroesophageal reflux disease    Discharge medications    Current Facility-Administered Medications:   •  apixaban (ELIQUIS) tablet 5 mg, 5 mg, Oral, Q12H, Michele Subramanian MD, 5 mg at 03/16/22 0400  •  atorvastatin (LIPITOR) tablet 10 mg, 10 mg, Oral, Nightly, Michele Subramanian MD, 10 mg at 03/15/22 2138  •  gabapentin (NEURONTIN) capsule 300 mg, 300 mg, Oral, TID, Michele Subramanian MD, 300 mg at 03/16/22 1014  •  insulin glargine (LANTUS, SEMGLEE) injection 45 Units, 45 Units, Subcutaneous, BID, Michele Subramanian MD  •  insulin lispro (ADMELOG) injection 0-7 Units, 0-7 Units, Subcutaneous, 4x Daily With Meals & Nightly, Michele Subramanian MD, 2 Units at 03/15/22 2138  •  insulin lispro (ADMELOG) injection 15 Units, 15 Units, Subcutaneous, TID AC, Michele Subramanian MD, 15 Units at 03/16/22 1015  •  levothyroxine (SYNTHROID, LEVOTHROID) tablet 100 mcg, 100 mcg, Oral, Daily, Michele Subramanian MD, 100 mcg at 03/16/22 1015  •  losartan (COZAAR) tablet 50 mg, 50 mg, Oral, Q12H, Luis Wilson MD, 50 mg at 03/16/22 1014  •  pantoprazole (PROTONIX) EC tablet 40 mg, 40 mg, Oral, BID AC, Michele Subramanian MD, 40 mg at 03/16/22 0656  •  potassium chloride (K-DUR,KLOR-CON) ER tablet 20 mEq, 20 mEq, Oral, BID With Meals, Michele Subramanian MD, 20 mEq at 03/15/22 1752  •  [COMPLETED] Insert peripheral IV, , , Once **AND** sodium chloride 0.9 % flush 10 mL, 10 mL, Intravenous, PRN, Naif Raphael MD  •  spironolactone (ALDACTONE) tablet 50 mg, 50 mg, Oral, Daily, Luis Wilson MD, 50 mg at 03/16/22 1015  •  torsemide (DEMADEX) tablet 20 mg, 20 mg, Oral, Daily, Luis Wilson MD, 20 mg at 03/16/22 1015      Consults obtained  Cardiology  Diabetic education nurse  Nutrition  Physical medicine rehab  Spiritual care    Procedures  None    Hospital course  62 white female with history of atrial fibrillation hypertension hyperlipidemia hypothyroidism and insulin-dependent diabetes mellitus who recently treated for multiple right CVA with left hemiparesis brought to the emergency room with generalized weakness and work-up in ER revealed severe bradycardia and further work-up revealed dig toxicity admit for management.  Patient also found to have hypokalemia hypomagnesemia.  Patient admitted her digoxin and beta-blocker discontinued and her heart rate well controlled and followed by cardiology.  Patient insulin dose adjusted and her Synthroid dose adjusted during this hospitalization.  Patient is feeling better and was supposed to go to acute rehab but denied by her insurance and will go home with family support and home health.  Patient cleared from cardiology to be discharged.    Discharge diet regular    Activity as tolerated    Medication as above    Follow-up with primary doctor in 1 week and follow-up with cardiology per the instruction and take medication as directed.    HERIBERTO LYNN MD

## 2022-03-17 NOTE — OUTREACH NOTE
Prep Survey    Flowsheet Row Responses   [facility] patient discharged from? [facility]   Is LACE score < 7 ? No   Emergency Room discharge w/ pulse ox? No   Eligibility Readm Mgmt   Discharge diagnosis S/p dig toxicityS/p bradycardia   Does the patient have one of the following disease processes/diagnoses(primary or secondary)? Other   Does the patient have Home health ordered? Yes   What is the Home health agency?   [facility]    Is there a DME ordered? No   Prep survey completed? Yes          ANNABELLE FIORE - Registered Nurse

## 2022-03-22 ENCOUNTER — READMISSION MANAGEMENT (OUTPATIENT)
Dept: CALL CENTER | Facility: HOSPITAL | Age: 62
End: 2022-03-22

## 2022-03-22 NOTE — OUTREACH NOTE
Medical Week 1 Survey    Flowsheet Row Responses   [facility] patient discharged from? [place]   Does the patient have one of the following disease processes/diagnoses(primary or secondary)? Other   Week 1 attempt successful? No   Unsuccessful attempts Attempt 1          LYNNE HANNON - Registered Nurse

## 2022-03-28 ENCOUNTER — OFFICE VISIT (OUTPATIENT)
Dept: CARDIOLOGY | Facility: CLINIC | Age: 62
End: 2022-03-28

## 2022-03-28 VITALS
BODY MASS INDEX: 32.13 KG/M2 | DIASTOLIC BLOOD PRESSURE: 70 MMHG | HEART RATE: 72 BPM | WEIGHT: 212 LBS | SYSTOLIC BLOOD PRESSURE: 130 MMHG | HEIGHT: 68 IN

## 2022-03-28 DIAGNOSIS — N93.9 VAGINAL BLEEDING: ICD-10-CM

## 2022-03-28 DIAGNOSIS — I63.40 CEREBROVASCULAR ACCIDENT (CVA) DUE TO EMBOLISM OF CEREBRAL ARTERY: Primary | ICD-10-CM

## 2022-03-28 DIAGNOSIS — I10 BENIGN ESSENTIAL HTN: ICD-10-CM

## 2022-03-28 DIAGNOSIS — E66.01 MORBID OBESITY WITH BMI OF 45.0-49.9, ADULT: ICD-10-CM

## 2022-03-28 DIAGNOSIS — I48.19 PERSISTENT ATRIAL FIBRILLATION: ICD-10-CM

## 2022-03-28 PROCEDURE — 99215 OFFICE O/P EST HI 40 MIN: CPT | Performed by: NURSE PRACTITIONER

## 2022-03-28 NOTE — PROGRESS NOTES
CARDIOLOGY        Patient Name: Jenelle Kasper  :1960  Age: 62 y.o.  Primary Cardiologist: Baldev Winter MD  Encounter Provider:  TRUDY Robb    Date of Service: 21          CHIEF COMPLAINT / REASON FOR OFFICE VISIT     No chief complaint on file.      HISTORY OF PRESENT ILLNESS       Congestive Heart Failure  Pertinent negatives include no chest pain or shortness of breath.     Jenelle Kasper is a 62 y.o. female who presents today for hospital reevaluation.    Pt has a  history significant for persistent A. fib, hypertension, pulmonary hypertension.    Review of medical records reveals patient hospitalized -2022 where she was diagnosed with acute multiple right MCA ischemic infarction involving the parietal and occipital lobes, acute UTI.  Patient with known history of persistent atrial fibrillation who had not been on anticoagulation with the exception of aspirin due to history of gastrointestinal bleeding.  GI was consulted and recommended no evidence of GI bleeding and recommended starting anticoagulation.  Patient was started on apixaban and atorvastatin.      Review of medical records reveals patient hospitalized 3/9-3/16/2022.  Patient had recently been treated for right-sided CVA with left hemiparesis.  Patient presented to the emergency room with generalized weakness.  ED evaluation revealed bradycardia and dig toxicity.  Patient also found to have electrolyte imbalance.  Digoxin and beta-blocker were discontinued and heart rate remained well controlled.    Patient presents today for routine post hospital care.  She reports that she is now at home with home health services.  She notes that in 2017 she had gastrointestinal bleeding while on Xarelto and at that time it was determined to only place her on baby aspirin.  Patient has done well with baby aspirin until the most recent hospitalization where she was diagnosed with acute stroke.  She is now on apixaban 5 mg  "twice per day.  Patient notes that she went through menopause 8 years ago and has now experienced episodes of vaginal bleeding that are consistent with a normal menstrual period.  She states that she is unsure if the bleeding is from the vagina or the rectum but she also suspects that she is having 2 episodes of gastrointestinal bleeding over the past 3 days.  Patient has a follow-up appoint with her primary care physician tomorrow.  She does not follow with gynecology at the present time.  She states that she is very apprehensive about the status of her anticoagulation as this stroke has been \"life-changing\" patient states that she does not want to have another stroke and is very concerned about the state of her anticoagulation given her current active bleeding.      The following portions of the patient's history were reviewed and updated as appropriate: allergies, current medications, past family history, past medical history, past social history, past surgical history and problem list.      VITAL SIGNS     There were no vitals taken for this visit.      Wt Readings from Last 3 Encounters:   03/16/22 95.8 kg (211 lb 3.2 oz)   02/04/22 113 kg (248 lb 12.8 oz)   08/25/21 125 kg (275 lb)     There is no height or weight on file to calculate BMI.      REVIEW OF SYSTEMS   Review of Systems   Constitutional: Positive for malaise/fatigue. Negative for weight gain.   Cardiovascular: Negative for chest pain, dyspnea on exertion and leg swelling.   Respiratory: Negative for shortness of breath.    Gastrointestinal: Positive for melena.   Genitourinary: Positive for non-menstrual bleeding.   Neurological: Negative for light-headedness.   All other systems reviewed and are negative.          PHYSICAL EXAMINATION     Vitals and nursing note reviewed.   Constitutional:       Appearance: Normal appearance. Well-developed. Morbidly obese.   Eyes:      Conjunctiva/sclera: Conjunctivae normal.   Neck:      Vascular: No carotid " bruit.   Pulmonary:      Breath sounds: Normal breath sounds.   Cardiovascular:      Normal rate. Regular rhythm. Normal S1 with normal intensity. Normal S2 with normal intensity.      Murmurs: There is no murmur.      No gallop. No click. No rub.   Edema:     Peripheral edema absent.      Ankle: bilateral 2+ edema of the ankle.     Feet: bilateral 2+ edema of the feet.  Musculoskeletal: Normal range of motion. Skin:     General: Skin is warm and dry.   Neurological:      Mental Status: Alert and oriented to person, place, and time.      GCS: GCS eye subscore is 4. GCS verbal subscore is 5. GCS motor subscore is 6.      Motor: Weakness present.   Psychiatric:         Speech: Speech normal.         Behavior: Behavior normal.         Thought Content: Thought content normal.         Judgment: Judgment normal.           REVIEWED DATA     Procedures    Cardiac Procedures:  1. Myocardial perfusion PET test 6/28/2016.  Medium to large sized, moderately severe area of ischemia located in the inferior wall.  EF 67%.  Impressions consistent with intermediate study.  2. Cardiac catheterization 7/27/2016.  Normal left heart catheterization.  Likely false positive stress test.  3. Echocardiogram 1/15/2020.  Moderate to severe LVH.  Calcification of aortic valve.  EF 68%.  RA is moderately dilated.  Moderate pulmonary hypertension.  4. Right diagnostic cardiac catheterization 2/26/2020.  Severe pulmonary hypertension in the setting of markedly elevated pulmonary capillary wedge pressure.  Likely due to severe diastolic dysfunction or LVH.  Recommending increase diuretics as well as better blood pressure control.  5. Echocardiogram 1/30/2022.  LVEF 61-65%.  Mild to moderate LVH.  All LV wall segments contract normally.  Diastolic function indeterminate.  RV cavity is moderately dilated.  Moderately reduced RV systolic function.  LAE.  KHURRAM.       Lipid Panel    Lipid Panel 1/30/22 2/1/22   Total Cholesterol 79 71   Triglycerides  124 112   HDL Cholesterol 18 (A) 17 (A)   VLDL Cholesterol 23 21   LDL Cholesterol  38 33   LDL/HDL Ratio 2.01 1.86   (A) Abnormal value                ASSESSMENT & PLAN      Diagnosis Plan   1. Cerebrovascular accident (CVA) due to embolism of cerebral artery (HCC)     2. Benign essential HTN     3. Persistent atrial fibrillation (HCC)     4. Morbid obesity with BMI of 45.0-49.9, adult (HCC)     5. Vaginal bleeding           SUMMARY/DISCUSSION  1. CVA.  Patient with recent hospitalization where she was found to have multiple embolic strokes.  At that time patient was only anticoagulated with aspirin as she had unfortunately suffered a gastrointestinal bleeding in the past while on Xarelto.  She was cleared by gastroenterology to start anticoagulation and was placed on apixaban and atorvastatin.  She continues to have left-sided hemiparesis.  Patient also has noted vaginal bleeding as well as rectal bleeding x2 episodes over the past 2 days.  She was encouraged to follow-up with gynecology in regards to vaginal bleeding to see if she could potentially be a candidate for uterine ablation given her need for persistent anticoagulation.  She will get repeat CBC at her PCP office in follow-up tomorrow.  We also discussed possibility of transitioning patient to Coumadin.  Patient does not drive and could begin by getting INR checked with home health and then we would need to try to get approval for a home INR machine that her  could assist with.  We will await further evaluation by her primary care physician in regards to her blood count tomorrow as well as gastroenterology.  At this time patient and family would like to remain on apixaban and will notify the office for any worsening bleeding.  2. Hypertension.  Blood pressure controlled at 130/70.  Continue losartan 50 mg twice daily, spironolactone 50 mg/day, torsemide 20 mg/day.  3. Persistent atrial fibrillation.  Heart rate currently controlled.   Anticoagulation discussed in detail in #1 above.  4. Obesity.  Encouraged lifestyle and dietary changes.  Patient participating in therapy for her recent stroke.  5. Vaginal bleeding.  Refer to gynecology.  6. Follow-up to be arranged after CBC is obtained by PCP tomorrow.        MEDICATIONS         Discharge Medications          Accurate as of March 28, 2022  1:37 PM. If you have any questions, ask your nurse or doctor.            Changes to Medications      Instructions Start Date   gabapentin 300 MG capsule  Commonly known as: NEURONTIN  What changed: additional instructions   300 mg, Oral, 3 Times Daily, Take 3 tablets three times daily          Continue These Medications      Instructions Start Date   Admelog 100 UNIT/ML injection  Generic drug: insulin lispro   2-10 Units, Subcutaneous, See Admin Instructions, Per sliding scale      Insulin Lispro (1 Unit Dial) 100 UNIT/ML solution pen-injector  Commonly known as: HumaLOG KwikPen   15 Units, Subcutaneous, 3 Times Daily Before Meals      albuterol (2.5 MG/3ML) 0.083% nebulizer solution  Commonly known as: PROVENTIL   2.5 mg, Nebulization, Every 6 Hours PRN      apixaban 5 MG tablet tablet  Commonly known as: ELIQUIS   5 mg, Oral, Daily      atorvastatin 10 MG tablet  Commonly known as: LIPITOR   10 mg, Oral, Nightly      Lantus SoloStar 100 UNIT/ML injection pen  Generic drug: Insulin Glargine   45 Units, Subcutaneous, 2 Times Daily      levothyroxine 100 MCG tablet  Commonly known as: SYNTHROID, LEVOTHROID   100 mcg, Oral, Daily      losartan 50 MG tablet  Commonly known as: COZAAR   50 mg, Oral, Every 12 Hours      ondansetron 4 MG tablet  Commonly known as: ZOFRAN   4 mg, Oral, Every 6 Hours PRN      pantoprazole 40 MG EC tablet  Commonly known as: PROTONIX   40 mg, Oral, 2 Times Daily Before Meals      potassium chloride 20 MEQ CR tablet  Commonly known as: K-DUR,KLOR-CON   20 mEq, Oral, 2 Times Daily With Meals      spironolactone 50 MG tablet  Commonly  known as: ALDACTONE   50 mg, Oral, Daily      torsemide 20 MG tablet  Commonly known as: DEMADEX   20 mg, Oral, Daily      TRUEplus Pen Needles 31G X 5 MM misc  Generic drug: Insulin Pen Needle   Use 5 times per day for insulin.                 **Dragon Disclaimer:   Much of this encounter note is an electronic transcription/translation of spoken language to printed text. The electronic translation of spoken language may permit erroneous, or at times, nonsensical words or phrases to be inadvertently transcribed. Although I have reviewed the note for such errors, some may still exist.

## 2022-04-04 ENCOUNTER — READMISSION MANAGEMENT (OUTPATIENT)
Dept: CALL CENTER | Facility: HOSPITAL | Age: 62
End: 2022-04-04

## 2022-04-04 NOTE — OUTREACH NOTE
Medical Week 3 Survey    Flowsheet Row Responses   [facility] patient discharged from? [place]   Does the patient have one of the following disease processes/diagnoses(primary or secondary)? Other   Week 3 attempt successful? No   Unsuccessful attempts Attempt 1          DAYSI Norris Registered Nurse

## 2022-04-08 ENCOUNTER — READMISSION MANAGEMENT (OUTPATIENT)
Dept: CALL CENTER | Facility: HOSPITAL | Age: 62
End: 2022-04-08

## 2022-04-08 NOTE — OUTREACH NOTE
Medical Week 3 Survey    Flowsheet Row Responses   [facility] patient discharged from? [place]   Does the patient have one of the following disease processes/diagnoses(primary or secondary)? Other   Week 3 attempt successful? No   Unsuccessful attempts Attempt 2          VAN HANNON - Registered Nurse

## 2022-04-21 ENCOUNTER — TELEPHONE (OUTPATIENT)
Dept: CARDIOLOGY | Facility: CLINIC | Age: 62
End: 2022-04-21

## 2022-04-21 NOTE — TELEPHONE ENCOUNTER
"Angelica Link MA forwarded a call from patient's spouse (Gabino Owen, who follows with CS). He states that when he was in the office for his wife's appointment with Shelly Mack, Dr. Winter came into the room to discuss some bleeding issues patient had been having ( See Shelly Mack's office note dated 3/28/22.)  He thought patient was taking 2.5 mg of Eliquis. When he looked at the packet once home, patient is actually taking Eliquis 5mg bid.  He stated patient has consulted with her PCP and he wants patient to continue on 5 mg bid and he will monitor patient's lab results.  Mr. Owen asked if Eliquis 2.5 mg is once daily dose.  I informed him generally Eliquis is prescribed as a twice daily dosing.  He stated \" You have answered my question.\"      Dr. CAM,     Forwarding this to you as an FYI.  Please advise if there is anything additional I need to do. / VALENTIN       "

## 2022-04-29 ENCOUNTER — TELEPHONE (OUTPATIENT)
Dept: CARDIOLOGY | Facility: CLINIC | Age: 62
End: 2022-04-29

## 2022-05-10 ENCOUNTER — TELEPHONE (OUTPATIENT)
Dept: CARDIOLOGY | Facility: CLINIC | Age: 62
End: 2022-05-10

## 2022-05-10 NOTE — TELEPHONE ENCOUNTER
I called patient.  Episode was several days ago none further.  He has appointment Friday to see the GI physician.

## 2022-05-10 NOTE — TELEPHONE ENCOUNTER
Gabino Owen (on release form) called saying Jenelle is having tarry stools and is on Eliquis.  He would really like to speak with you.  They contacted PMD and are just told to go to the ER and he says they cannot continue to just run to the ER.  He would like to discuss it with you to see what needs to be done.  Please call him @ [phone]    Thanks,  Kinjal

## 2022-05-11 ENCOUNTER — OFFICE VISIT (OUTPATIENT)
Dept: NEUROLOGY | Facility: CLINIC | Age: 62
End: 2022-05-11

## 2022-05-11 ENCOUNTER — TELEPHONE (OUTPATIENT)
Dept: NEUROLOGY | Facility: CLINIC | Age: 62
End: 2022-05-11

## 2022-05-11 VITALS
HEIGHT: 68 IN | SYSTOLIC BLOOD PRESSURE: 110 MMHG | OXYGEN SATURATION: 100 % | HEART RATE: 69 BPM | DIASTOLIC BLOOD PRESSURE: 72 MMHG | WEIGHT: 212 LBS | BODY MASS INDEX: 32.13 KG/M2

## 2022-05-11 DIAGNOSIS — E11.42 TYPE 2 DIABETES MELLITUS WITH DIABETIC POLYNEUROPATHY, WITH LONG-TERM CURRENT USE OF INSULIN: ICD-10-CM

## 2022-05-11 DIAGNOSIS — E66.9 OBESITY (BMI 30.0-34.9): ICD-10-CM

## 2022-05-11 DIAGNOSIS — Z86.73 HISTORY OF CVA (CEREBROVASCULAR ACCIDENT): Primary | ICD-10-CM

## 2022-05-11 DIAGNOSIS — I48.19 PERSISTENT ATRIAL FIBRILLATION: ICD-10-CM

## 2022-05-11 DIAGNOSIS — Z79.4 TYPE 2 DIABETES MELLITUS WITH DIABETIC POLYNEUROPATHY, WITH LONG-TERM CURRENT USE OF INSULIN: ICD-10-CM

## 2022-05-11 DIAGNOSIS — I10 BENIGN ESSENTIAL HTN: ICD-10-CM

## 2022-05-11 DIAGNOSIS — H53.462 HOMONYMOUS HEMIANOPIA, LEFT: ICD-10-CM

## 2022-05-11 PROBLEM — E66.811 OBESITY (BMI 30.0-34.9): Status: ACTIVE | Noted: 2022-05-11

## 2022-05-11 PROBLEM — E11.9 TYPE 2 DIABETES MELLITUS, WITH LONG-TERM CURRENT USE OF INSULIN: Status: ACTIVE | Noted: 2022-05-11

## 2022-05-11 PROCEDURE — 99214 OFFICE O/P EST MOD 30 MIN: CPT | Performed by: NURSE PRACTITIONER

## 2022-05-11 RX ORDER — LEVOTHYROXINE SODIUM 0.1 MG/1
200 TABLET ORAL DAILY
COMMUNITY
Start: 2022-03-29 | End: 2023-03-29

## 2022-05-11 RX ORDER — SIMVASTATIN 20 MG
TABLET ORAL
COMMUNITY
Start: 2022-04-18 | End: 2022-08-26 | Stop reason: ALTCHOICE

## 2022-05-11 RX ORDER — LANCETS 33 GAUGE
EACH MISCELLANEOUS
COMMUNITY
Start: 2022-04-05

## 2022-05-11 RX ORDER — GABAPENTIN 600 MG/1
TABLET ORAL
COMMUNITY
Start: 2022-03-29 | End: 2023-03-08

## 2022-05-11 RX ORDER — ATORVASTATIN CALCIUM 20 MG/1
20 TABLET, FILM COATED ORAL DAILY
COMMUNITY
Start: 2022-04-14 | End: 2023-04-14

## 2022-05-11 RX ORDER — INSULIN GLARGINE 100 [IU]/ML
42 INJECTION, SOLUTION SUBCUTANEOUS 2 TIMES DAILY
COMMUNITY
Start: 2022-04-05 | End: 2023-04-05

## 2022-05-11 RX ORDER — BLOOD-GLUCOSE METER
EACH MISCELLANEOUS
COMMUNITY
Start: 2022-04-07

## 2022-05-11 RX ORDER — LOSARTAN POTASSIUM 100 MG/1
TABLET ORAL
COMMUNITY
Start: 2022-04-18 | End: 2022-09-07

## 2022-05-11 RX ORDER — BLOOD SUGAR DIAGNOSTIC
STRIP MISCELLANEOUS
COMMUNITY
Start: 2022-04-06

## 2022-05-11 NOTE — TELEPHONE ENCOUNTER
----- Message from TRUDY Youssef sent at 5/11/2022  1:04 PM EDT -----  Regarding: sleep study  Karen, can you please call this patient's  and ask him if she has had a sleep study done?  If not let him know that I recommend that she have 1 done due to high risk of recurrent stroke with untreated sleep apnea.  If they would like we can refer her to one of the sleep specialists or to Dr. Roselia Strickland in our office if she is excepting new patients.    Mary

## 2022-05-11 NOTE — PROGRESS NOTES
DOS: 2022  NAME: Jenelle Kasper   : 1960  PCP: Zay Olivares MD    CC: stroke   Referring MD: No ref. provider found    Neurological Problem:  62 y.o. right-handed female with a Hx of depression, diabetes, COPD who presents today for stroke follow-up.  She is accompanied by her .  History is provided by the patient and review of records as summarized below.    Interval History:  Ms. Kasper presented to [facility] on 2022 with complaints of left-sided weakness and facial droop.  She was found down in her bathroom by her  after she suffered a fall.  EMS was called and she was found to be severely hyperglycemic.  Upon arrival to the ER her noncontrast CT head did not reveal any evidence of acute intracranial abnormalities.  She had a BP of 160/83.  Her EKG revealed atrial fibrillation.  .  She was also found to have a UTI and was treated with Rocephin.  She was unable to undergo a CTA due to a reported anaphylactic allergy to the contrast dye therefore she underwent a MRI/MRA head and neck that revealed a right MCA infarct, no significant flow-limiting stenosis or occlusion within the great vessels of the head or neck.  Bilateral carotid duplex was obtained and revealed normal MOHIT and LICA flow.  2D echo also obtained and revealed a severely dilated LA cavity, EF 61 to 65%, no AV or MV stenosis or regurgitation present.  No mention of PFO.  On  she was noted to be more lethargic and had worsening of her speech and left-sided weakness therefore she underwent a repeat CT head that revealed extension of her infarcts.  She was placed on Eliquis 5 mg twice daily by cardiology.  They did not feel there was an indication for a MARIZA therefore this was not performed.  She did report a history of GI bleeding while being on Xarelto.  She states she was not on anticoagulation secondary to her history of GI bleeding.  GI evaluated her and cleared her to have  anticoagulation and recommended a repeat blood count post discharge.  We recommended she continue her Eliquis and monitor for signs symptoms of bleeding.  She was placed on Lipitor 40 mg, her LDL level at the time was 33.  We recommended WES evaluation with a sleep study in the outpatient and better control of her diabetes as well as weight loss.  She was to follow-up in 3 months in our clinic for stroke follow-up.    Today she presents and reports she has been maintained on Eliquis 5 mg twice daily and Lipitor 10 mg daily with no new stroke/TIA symptoms.  Her  reports she was hospitalized after her stroke due to a significantly elevated digoxin level.  She is still on the Eliquis however she did have episodes of tarry stools and is scheduled to see GI this Friday.   reports that she had bleeding and then it stopped and then it reoccurred again.  She continues with left-sided weakness, left homonymous hemianopia, and word finding difficulty.  She is currently doing home health PT/OT/ST.  She states she is now down to 1 visit per week.  Today she was in a wheelchair but states at home she usually uses a walker for ambulation.  She does have leg swelling bilaterally.  She denies any trouble with swallowing.  Her most recent A1c in March was 10.10.  During her hospitalization her A1c was 11.7.  She has an appointment with her endocrinologist on June 2.  Her  had a record of her blood sugars over the last several months.  Her blood sugars fluctuated anywhere from the 70s up to the 200s.  She has not had her lipid panel repeated since being discharged from the hospital.  She states her writing is not as good as it used to be prior to the stroke.  Her  helps her out a lot.  She has not suffered any falls since being discharged from the hospital.  She is frustrated and upset because she has not fully recovered from her stroke.    Review of Systems:        Review of Systems   Constitutional:  "Positive for activity change and fatigue. Negative for appetite change and unexpected weight change.   HENT: Negative for ear pain, facial swelling, nosebleeds, tinnitus, trouble swallowing and voice change.    Eyes: Positive for pain (stabbing left eye) and visual disturbance (left eye-lack of vision). Negative for photophobia.   Respiratory: Negative for choking, chest tightness, shortness of breath, wheezing and stridor.    Cardiovascular: Positive for leg swelling (bilateral). Negative for chest pain and palpitations.   Gastrointestinal: Negative for abdominal pain, constipation, diarrhea, nausea and vomiting.   Endocrine: Negative for cold intolerance and heat intolerance.   Genitourinary: Positive for frequency. Negative for decreased urine volume, difficulty urinating, dysuria and urgency.   Musculoskeletal: Negative for gait problem, joint swelling, neck pain and neck stiffness.   Skin: Negative for color change, pallor, rash and wound.   Allergic/Immunologic: Positive for food allergies (shrimp).   Neurological: Positive for speech difficulty and weakness. Negative for dizziness, facial asymmetry, light-headedness and headaches.   Hematological: Does not bruise/bleed easily.   Psychiatric/Behavioral: Positive for confusion, decreased concentration and sleep disturbance (sleeplessness). The patient is nervous/anxious.        \"The following portions of the patient's history were reviewed and updated as appropriate: allergies, current medications, past family history, past medical history, past social history, past surgical history and problem list.\"    Review and Interpretation of Imaging as described in interval history.    Laboratory Results:             Lab Results   Component Value Date    HGBA1C 10.10 (H) 03/10/2022     Lab Results   Component Value Date    CHOL 71 02/01/2022    CHOL 79 01/30/2022     Lab Results   Component Value Date    HDL 17 (L) 02/01/2022    HDL 18 (L) 01/30/2022    HDL 46 (L) " 08/13/2020     Lab Results   Component Value Date    LDL 33 02/01/2022    LDL 38 01/30/2022    LDL 27 08/13/2020     Lab Results   Component Value Date    TRIG 112 02/01/2022    TRIG 124 01/30/2022    TRIG 84 08/13/2020     No components found for: CHOLHDL  No results found for: RPR  Lab Results   Component Value Date    TSH 0.033 (L) 03/10/2022     Lab Results   Component Value Date    BYDYPZGC80 395 12/21/2020     Lab Results   Component Value Date    GLUCOSE 210 (H) 03/16/2022    BUN 14 03/29/2022    CREATININE 0.90 03/29/2022    EGFRIFNONA 38 (L) 02/05/2022    BCR 15.7 03/29/2022    K 4.6 03/29/2022    CO2 24 03/29/2022    CALCIUM 10.0 03/29/2022    ALBUMIN 3.20 (L) 03/10/2022    LABIL2 1.1 03/08/2022    AST 42 (H) 03/10/2022    ALT 35 (H) 03/10/2022     Lab Results   Component Value Date    WBC 4.33 (L) 03/29/2022    HGB 12.6 03/29/2022    HCT 39.5 03/29/2022    MCV 87.0 03/29/2022     03/29/2022     Lab Results   Component Value Date    INR 1.20 (H) 01/28/2022    INR 1.1 08/15/2018    PROTIME 15.0 (H) 01/28/2022    PROTIME 12.2 08/15/2018       Physical Examination:   mRS:   General Appearance:   Obese female, well developed, well nourished, well groomed, alert, and cooperative.  HEENT: Normocephalic. Atraumatic.  Extremities:    BLE edema including lower extremities and feet.  Respiratory:   Even and unlabored.    Neurological examination:  Higher Integrative  Function: Oriented to month and year, place, and person. Normal attention span and concentration.  Delayed speech with responses.  Had trouble with reading secondary to left peripheral visual field loss.  Normal naming and vocabulary.    CN II: Left homonymous hemianopia  CN III IV VI: Extraocular movements are full without nystagmus.   CN V: Normal facial sensation and strength of muscles of mastication.  CN VII: Facial movements are symmetric. No weakness.  CN IX & X:   Symmetric palatal movement.  CN XI: Sternocleidomastoid and trapezius are  normal.  No weakness.  CN XII:   The tongue is midline.  No atrophy or fasciculations.  Motor: 4/5 LUE, 3/5 LLE, 5/5 RUE/RLE.  Normal bulk and tone in upper and lower extremities.  No fasciculations, rigidity, spasticity, or abnormal movements.  Sensation: Normal to light touch in arms and legs.  Station and Gait: ADELAIDA, patient was in a wheelchair and did not bring her walker.  Coordination: Finger to nose test shows dysmetria on the left.  Abnormal finger to thumb tapping on the left.    Impression/Assessment:    Mrs. Kasper presented today for stroke follow-up.  She suffered acute right middle cerebral artery infarcts, cardioembolic, secondary to chronic atrial fibrillation.  She was not on anticoagulation at the time of her stroke secondary to a history of GI bleeding while on Xarelto.  She was on ASA 81 mg alone.  After discussion with the patient, cardiology, and obtaining clearance from GI she was placed on Eliquis 5 mg twice daily.  Unfortunately she has had recurrence of tarry stools.  Appears her last CBC was in March 2022 that revealed a hemoglobin level of 12.6 and a platelet count of 194.  Currently today she denies any blood in her stool or urine.  She has a follow-up with GI on Friday.  I discussed with her that from a stroke standpoint we would like for her to stay on anticoagulation as long as she can tolerate for secondary stroke prevention as well as continuing her Lipitor.  Her diabetes is still uncontrolled.  Her most recent A1c was 10.10 in March.  She also has a follow-up with endocrinology.  She does continue to have left homonymous hemianopia.  She did not follow-up with ophthalmology post discharge and is requesting a neuro-ophthalmologist.  I am going to place a referral today as she has had no improvement with her vision since discharge.  I have asked that she continue to do PT/OT/ST as long as she can as she still is dealing with left-sided hemiparesis as well as word finding  difficulties.  We discussed that even when she is done with her sessions that she should continue to do the exercises provided to her by all services after discharge daily.  She needs a sleep study as she has several risk factors for WES.  I have asked her to let me know if she would like for me to refer her to Dr. Roselia Strickland in our office for a sleep study evaluation.  Hopefully she can become more active which would help with her weight loss. We discussed at length her personal risk factors for stroke and importance of risk factor control for stroke prevention, including BP and cholesterol control.  She will monitor BP regularly and follow-up with PCP for continued cholesterol surveillance.  We discussed stroke/TIA symptoms and importance of calling 911 if she were to experience any of these.  Follow-up in 9 months, sooner if symptoms warrant.    Plan:     Neuro-ophthalmology referral for persistent left homonymous hemianopia poststroke.  Continue Eliquis 5 mg twice daily, report any new signs or symptoms of bleeding to her PCP and cardiologist.  Continue Lipitor 40 mg, repeat lipid panel with next visual lab draw with PCP.  Keep follow-up with GI and endocrinologist for recurrence of GI bleeding while on anticoagulation as well as her uncontrolled diabetes.  Monitor BP regularly  Keep well-hydrated  Encourage regular physical activity as tolerated.  Falls precautions discussed  Maintain well-balanced diet   Blood pressure control to <130/80   Goal LDL <70-recommend high dose statins-    Serum glucose < 140   Call 911 for stroke any stroke symptoms   Follow-up in 9 months, sooner symptoms warrant.  Diagnoses and all orders for this visit:    1. History of CVA (cerebrovascular accident) (Primary)    2. Persistent atrial fibrillation (HCC)    3. Obesity (BMI 30.0-34.9)    4. Type 2 diabetes mellitus with diabetic polyneuropathy, with long-term current use of insulin (HCC)    5. Benign essential  HTN        MDM  Reviewed: previous chart and vitals  Reviewed previous: labs, MRI, CT scan and ECG  Interpretation: labs, MRI and CT scan        Mary Reardon APRN

## 2022-05-12 ENCOUNTER — TELEPHONE (OUTPATIENT)
Dept: NEUROLOGY | Facility: CLINIC | Age: 62
End: 2022-05-12

## 2022-05-12 NOTE — TELEPHONE ENCOUNTER
I CALLED OTHER PHONE NUMBER FOR PT TO TRY, AGAIN TO REACH HER CAREGIVER AND PT ANSWERED.  I ASKED HER TO HAVE CAREGIVER GIVE OUR OFFICE A CALL.

## 2022-05-16 ENCOUNTER — TELEPHONE (OUTPATIENT)
Dept: NEUROLOGY | Facility: CLINIC | Age: 62
End: 2022-05-16

## 2022-05-16 NOTE — TELEPHONE ENCOUNTER
Patient's caregiver (Gabino Owen) is calling back in regards to Sleep Study question.  He states that patient does not want to schedule sleep study (message and reason relayed to caregiver).  He says pt refusing recommendations for scheduling sleep study.  Care giver asking who Mary referred pt to for Neuro Ophthalmology.  I spoke with Mary and she is referring pt to DR Raghu Watkins @ [phone] or if pt still does not want to go to this MD ([facility]), she is referring to DR Esqueda @ [phone].  LM with these suggestions @ caregiver's phone number (his and pt's req), and he will call our office and let us know where pt prefers.  At that time we will fax Mary's office visit note to referring MD.

## 2022-06-20 ENCOUNTER — TELEPHONE (OUTPATIENT)
Dept: NEUROLOGY | Facility: OTHER | Age: 62
End: 2022-06-20

## 2022-06-20 NOTE — TELEPHONE ENCOUNTER
Provider: Mary Reardon  Caller: Keshawn  Relationship to Patient: N/A  Pharmacy: N/A  Phone Number: [phone]  Reason for Call: Dr. Kim's Office called he has declined the referral however said the patient could see on of the general doctors. Please call back to discuss.  When was the patient last seen: 05/11/22

## 2022-06-22 NOTE — TELEPHONE ENCOUNTER
SPOKE WITH (SHARIF) PT'S CARE GIVER.  MESSAGE GIVEN FOR DR LON LOVELL IS FINE FOR PT TO SEE.  PT ALREADY HAS APPT SCHEDULED FOR 7/6/22.  I WILL FAX LOV FROM OUR OFFICE (PER DR FORREST OFFICE).

## 2022-06-22 NOTE — TELEPHONE ENCOUNTER
I spoke with pt and she put her caregiver (Gabino), on the phone.  He says that he has scheduled pt with Dr Esqueda's office (7/6/22 @ 10:45); appt veified by that office.  Please advise.  He says that he does not want her to see general optho md.  Please review.  Thank you

## 2022-08-24 ENCOUNTER — PROCEDURE VISIT (OUTPATIENT)
Dept: OBSTETRICS AND GYNECOLOGY | Facility: CLINIC | Age: 62
End: 2022-08-24

## 2022-08-24 ENCOUNTER — APPOINTMENT (OUTPATIENT)
Dept: WOMENS IMAGING | Facility: HOSPITAL | Age: 62
End: 2022-08-24

## 2022-08-24 ENCOUNTER — OFFICE VISIT (OUTPATIENT)
Dept: OBSTETRICS AND GYNECOLOGY | Facility: CLINIC | Age: 62
End: 2022-08-24

## 2022-08-24 VITALS
SYSTOLIC BLOOD PRESSURE: 118 MMHG | DIASTOLIC BLOOD PRESSURE: 70 MMHG | WEIGHT: 212 LBS | BODY MASS INDEX: 32.13 KG/M2 | HEIGHT: 68 IN

## 2022-08-24 DIAGNOSIS — L02.215 PERINEAL ABSCESS, SUPERFICIAL: Primary | ICD-10-CM

## 2022-08-24 DIAGNOSIS — Z12.31 VISIT FOR SCREENING MAMMOGRAM: Primary | ICD-10-CM

## 2022-08-24 PROCEDURE — 99203 OFFICE O/P NEW LOW 30 MIN: CPT | Performed by: OBSTETRICS & GYNECOLOGY

## 2022-08-24 PROCEDURE — 77063 BREAST TOMOSYNTHESIS BI: CPT | Performed by: OBSTETRICS & GYNECOLOGY

## 2022-08-24 PROCEDURE — 77063 BREAST TOMOSYNTHESIS BI: CPT | Performed by: RADIOLOGY

## 2022-08-24 PROCEDURE — 77067 SCR MAMMO BI INCL CAD: CPT | Performed by: RADIOLOGY

## 2022-08-24 PROCEDURE — 77067 SCR MAMMO BI INCL CAD: CPT | Performed by: OBSTETRICS & GYNECOLOGY

## 2022-08-24 RX ORDER — ONDANSETRON 4 MG/1
4 TABLET, ORALLY DISINTEGRATING ORAL
COMMUNITY
Start: 2022-03-29 | End: 2023-03-08

## 2022-08-24 RX ORDER — SIMVASTATIN 10 MG
10 TABLET ORAL DAILY
COMMUNITY
End: 2022-08-26 | Stop reason: ALTCHOICE

## 2022-08-24 RX ORDER — LEVOTHYROXINE SODIUM 300 UG/1
TABLET ORAL
COMMUNITY
Start: 2022-08-18 | End: 2023-03-08

## 2022-08-24 RX ORDER — MEPERIDINE HYDROCHLORIDE 50 MG/1
100 TABLET ORAL
COMMUNITY
Start: 2022-08-09

## 2022-08-24 NOTE — PROGRESS NOTES
"        REASON FOR FOLLOWUP/CHIEF COMPLAINT: \"Perineal abscess\"     HISTORY OF PRESENT ILLNESS: Patient is seen on referral from her family physician.  She had been seen about 2 weeks ago with a \"perineal abscess\" and was placed on antibiotics.  She denies fever or chills.  She states that before the visit and the antibiotic she had been having areas that would raise and then opened and drained.  She is a type II diabetic and obviously at risk for significant perineal disease.  She states she completed the antibiotic course and does not note any further drainage of any type in the area.      Past Medical History, Past Surgical History, Social History, Family History have been reviewed and are without significant changes except as mentioned.    Review of Systems   Review of Systems   Constitutional: Negative for chills and fever.   Genitourinary: Positive for genital sores. Negative for vaginal bleeding and vaginal pain.       Medications:  The current medication list was reviewed in the EMR    ALLERGIES:    Allergies   Allergen Reactions   • Clindamycin/Lincomycin Shortness Of Breath, Rash and GI Intolerance   • Codeine Sulfate Shortness Of Breath   • Contrast Dye Anaphylaxis   • Iodinated Diagnostic Agents Anaphylaxis     SOA, THROAT SWELLED, RASH   • Morphine Sulfate Hives and Shortness Of Breath   • Other Other (See Comments)     Blood thinners contraindicated due to micro perforations in the small intestine.   • Pradaxa [Dabigatran Etexilate Mesylate] GI Intolerance   • Latex Itching, Rash and Swelling   • Latex, Natural Rubber Rash   • Bee Venom Other (See Comments)     SWELLING   • Hydrocodone Nausea And Vomiting   • Propoxyphene Other (See Comments)     N/V,  ITCHING, RASH   • Sulfa Antibiotics Hives              Vitals:    08/24/22 1336   BP: 118/70   Weight: 96.2 kg (212 lb)   Height: 172.7 cm (68\")     Physical Exam    CONSTITUTIONAL:  Vital signs reviewed.  No distress.  The patient is largely immobile " due to history of CVA and bad knees.    GASTROINTESTINAL: Abdomen appears unremarkable.  Nontender.  No hepatomegaly.  No splenomegaly.    PELVIC: External genitalia is completely normal.  There is 1 area that looks like it may have been a folliculitis or subcu abscess that has drained and is healed with very very minimal residual induration.  I did a thorough exam in the perianal region and in the vaginal introitus and palpate along the vaginal sidewalls to make sure there was no deeper lesion.    PSYCHIATRIC:  Normal judgment and insight.  Normal mood and affect.       RECENT LABS:  WBC   Date Value Ref Range Status   05/13/2022 6.25 4.5 - 11.0 10*3/uL Final   03/29/2022 4.33 (L) 4.5 - 11.0 10*3/uL Final   03/13/2022 3.59 3.40 - 10.80 10*3/mm3 Final   03/12/2022 3.52 3.40 - 10.80 10*3/mm3 Final   03/11/2022 3.54 3.40 - 10.80 10*3/mm3 Final   03/10/2022 3.25 (L) 3.40 - 10.80 10*3/mm3 Final   03/09/2022 3.30 (L) 3.40 - 10.80 10*3/mm3 Final   03/08/2022 4.14 (L) 4.5 - 11.0 10*3/uL Final   02/04/2022 7.72 3.40 - 10.80 10*3/mm3 Final   02/03/2022 7.08 3.40 - 10.80 10*3/mm3 Final   02/02/2022 7.20 3.40 - 10.80 10*3/mm3 Final   02/01/2022 5.08 3.40 - 10.80 10*3/mm3 Final   01/31/2022 6.87 3.40 - 10.80 10*3/mm3 Final   01/30/2022 7.61 3.40 - 10.80 10*3/mm3 Final   01/29/2022 6.28 3.40 - 10.80 10*3/mm3 Final   01/28/2022 8.58 3.40 - 10.80 10*3/mm3 Final   02/26/2021 5.31 3.40 - 10.80 10*3/mm3 Final   02/05/2021 6.80 4.5 - 11.0 10*3/uL Final   08/13/2020 6.76 4.5 - 11.0 10*3/uL Final   07/31/2020 6.13 4.5 - 11.0 10*3/uL Final   02/19/2020 5.75 3.40 - 10.80 10*3/mm3 Final   01/29/2020 5.22 3.40 - 10.80 10*3/mm3 Final   12/20/2019 6.87 4.5 - 11.0 10*3/uL Final   11/22/2019 7.50 4.5 - 11.0 10*3/uL Final   06/21/2019 5.90 4.5 - 11.0 10*3/uL Final   12/14/2018 7.33 4.5 - 11.0 10*3/uL Final   09/07/2018 6.89 4.5 - 11.0 10*3/uL Final   09/06/2018 5.66 4.5 - 11.0 10*3/uL Final   08/19/2018 6.72 4.5 - 11.0 10*3/uL Final   08/18/2018  7.86 4.5 - 11.0 10*3/uL Final   08/17/2018 7.96 4.5 - 11.0 10*3/uL Final   08/16/2018 6.99 4.5 - 11.0 10*3/uL Final   08/15/2018 6.40 4.5 - 11.0 10*3/uL Final   06/22/2018 8.12 4.5 - 11.0 10*3/uL Final   03/16/2018 7.71 4.5 - 11.0 10*3/uL Final   01/30/2018 7.71 4.5 - 11.0 10*3/uL Final   01/08/2018 7.78 4.5 - 11.0 10*3/uL Final   07/21/2016 9.53 4.50 - 10.70 10*3/mm3 Final     Hemoglobin   Date Value Ref Range Status   05/13/2022 11.3 (L) 12.0 - 16.0 g/dL Final   03/29/2022 12.6 12.0 - 16.0 g/dL Final   03/13/2022 11.8 (L) 12.0 - 15.9 g/dL Final   03/12/2022 12.1 12.0 - 15.9 g/dL Final   03/11/2022 11.8 (L) 12.0 - 15.9 g/dL Final   03/10/2022 12.3 12.0 - 15.9 g/dL Final   03/09/2022 13.2 12.0 - 15.9 g/dL Final   03/08/2022 13.9 12.0 - 16.0 g/dL Final   02/04/2022 12.0 12.0 - 15.9 g/dL Final   02/03/2022 11.9 (L) 12.0 - 15.9 g/dL Final   02/02/2022 11.5 (L) 12.0 - 15.9 g/dL Final   02/01/2022 10.3 (L) 12.0 - 15.9 g/dL Final   01/31/2022 10.8 (L) 12.0 - 15.9 g/dL Final   01/30/2022 11.0 (L) 12.0 - 15.9 g/dL Final   01/29/2022 10.5 (L) 12.0 - 15.9 g/dL Final   01/28/2022 12.7 12.0 - 15.9 g/dL Final   02/26/2021 12.8 12.0 - 15.9 g/dL Final   02/05/2021 12.3 12.0 - 16.0 g/dL Final   08/13/2020 11.2 (L) 12.0 - 16.0 g/dL Final   07/31/2020 10.9 (L) 12.0 - 16.0 g/dL Final   02/26/2020 11.2 (L) 12.0 - 17.0 g/dL Final   02/26/2020 11.9 (L) 12.0 - 17.0 g/dL Final   02/19/2020 12.5 12.0 - 15.9 g/dL Final   01/29/2020 12.9 12.0 - 15.9 g/dL Final   12/20/2019 12.2 12.0 - 16.0 g/dL Final   11/22/2019 12.5 12.0 - 16.0 g/dL Final   06/21/2019 11.1 (L) 12.0 - 16.0 g/dL Final   12/14/2018 13.5 12.0 - 16.0 g/dL Final   09/07/2018 11.5 (L) 12.0 - 16.0 g/dL Final   09/06/2018 12.0 12.0 - 16.0 g/dL Final   08/19/2018 11.4 (L) 12.0 - 16.0 g/dL Final   08/18/2018 13.1 12.0 - 16.0 g/dL Final   08/17/2018 13.0 12.0 - 16.0 g/dL Final   08/16/2018 11.9 (L) 12.0 - 16.0 g/dL Final   08/15/2018 12.5 12.0 - 16.0 g/dL Final   06/22/2018 13.0 12.0 -  16.0 g/dL Final   03/16/2018 11.9 (L) 12.0 - 16.0 g/dL Final   01/30/2018 11.5 (L) 12.0 - 16.0 g/dL Final   01/08/2018 12.0 12.0 - 16.0 g/dL Final   07/21/2016 10.5 (L) 11.9 - 15.5 g/dL Final     Platelets   Date Value Ref Range Status   05/13/2022 203 140 - 440 10*3/uL Final   03/29/2022 184 140 - 440 10*3/uL Final   03/13/2022 117 (L) 140 - 450 10*3/mm3 Final   03/12/2022 118 (L) 140 - 450 10*3/mm3 Final   03/11/2022 107 (L) 140 - 450 10*3/mm3 Final   03/10/2022 115 (L) 140 - 450 10*3/mm3 Final   03/09/2022 123 (L) 140 - 450 10*3/mm3 Final   03/08/2022 148 140 - 440 10*3/uL Final   02/04/2022 295 140 - 450 10*3/mm3 Final   02/03/2022 312 140 - 450 10*3/mm3 Final   02/02/2022 309 140 - 450 10*3/mm3 Final   02/01/2022 255 140 - 450 10*3/mm3 Final   01/31/2022 281 140 - 450 10*3/mm3 Final   01/30/2022 259 140 - 450 10*3/mm3 Final   01/29/2022 239 140 - 450 10*3/mm3 Final   01/28/2022 262 140 - 450 10*3/mm3 Final   02/26/2021 165 140 - 450 10*3/mm3 Final   02/05/2021 191 140 - 440 10*3/uL Final   08/13/2020 205 140 - 440 10*3/uL Final   07/31/2020 217 140 - 440 10*3/uL Final   02/19/2020 222 140 - 450 10*3/mm3 Final   01/29/2020 198 140 - 450 10*3/mm3 Final   12/20/2019 201 140 - 440 10*3/uL Final   11/22/2019 210 140 - 440 10*3/uL Final   06/21/2019 194 140 - 440 10*3/uL Final   12/14/2018 237 140 - 440 10*3/uL Final   09/07/2018 189 140 - 440 10*3/uL Final   09/06/2018 209 140 - 440 10*3/uL Final   08/19/2018 209 140 - 440 10*3/uL Final   08/18/2018 212 140 - 440 10*3/uL Final   08/17/2018 205 140 - 440 10*3/uL Final   08/16/2018 212 140 - 440 10*3/uL Final   08/15/2018 190 140 - 440 10*3/uL Final   06/22/2018 213 140 - 440 10*3/uL Final   03/16/2018 235 140 - 440 10*3/uL Final   01/30/2018 219 140 - 440 10*3/uL Final   01/08/2018 264 140 - 440 10*3/uL Final   07/21/2016 342 140 - 500 10*3/mm3 Final       ASSESSMENT/PLAN:  Jenelle Kasper Room/bed info not found     1.  Perineal abscess, resolved after oral  antibiotic course.  I instructed the patient to come here at any point when she feels that this area may recur and I will do an exam and culture at the time of the presentation.  Did discuss with her that she is at risk given her obesity and diabetes for perineal infection and even significant gangrenous type infections.  She understands and will come here with any further lesions.

## 2022-08-26 RX ORDER — SIMVASTATIN 20 MG
20 TABLET ORAL NIGHTLY
Qty: 90 TABLET | Refills: 3 | Status: SHIPPED | OUTPATIENT
Start: 2022-08-26

## 2022-08-29 ENCOUNTER — PATIENT MESSAGE (OUTPATIENT)
Dept: OBSTETRICS AND GYNECOLOGY | Facility: CLINIC | Age: 62
End: 2022-08-29

## 2022-08-29 ENCOUNTER — PATIENT ROUNDING (BHMG ONLY) (OUTPATIENT)
Dept: OBSTETRICS AND GYNECOLOGY | Facility: CLINIC | Age: 62
End: 2022-08-29

## 2022-08-29 NOTE — PROGRESS NOTES
My chart message has been sent to the patient for PATIENT ROUNDING with [facility].

## 2022-09-08 RX ORDER — LOSARTAN POTASSIUM 100 MG/1
TABLET ORAL
Qty: 90 TABLET | Refills: 3 | Status: SHIPPED | OUTPATIENT
Start: 2022-09-08 | End: 2023-02-08 | Stop reason: DRUGHIGH

## 2022-10-18 NOTE — PROGRESS NOTES
CARDIOLOGY        Patient Name: Jenelle Kasper  :1960  Age: 61 y.o.  Primary Cardiologist: Baldev Winter MD  Encounter Provider:  TRUDY Robb    Date of Service: 21          CHIEF COMPLAINT / REASON FOR OFFICE VISIT     Congestive Heart Failure (2 month f/u )      HISTORY OF PRESENT ILLNESS       Congestive Heart Failure  Associated symptoms include shortness of breath. Pertinent negatives include no chest pain.     Jenelle Kasper is a 61 y.o. female who presents today for re-evaluation lower extremity edema.    Pt has a  history significant for persistent A. fib, hypertension, pulmonary hypertension.    Patient was evaluated by me approximately 2 months ago where she was experiencing worsening lower extremity edema.  At that time patient was transitioned from furosemide to torsemide.  At her 2-week reevaluation she felt the torsemide was more effective.  Patient presents today stating that her edema is not better.  She states that it is no worse but it is definitely not improving.  Patient reports that in 2020 she weighed 265 pounds and now she is up to 296 pounds.  She said that she feels that all of her weight gain is from fluid and water weight.  She reports that she recently had all of her teeth extracted and she is currently on a soft/liquid diet only consuming 600 radha/day.  She reports that she feels her swelling is worse to her midsection and her lower extremities.  She states that she feels that she is still volume overloaded that she is unable to clean herself appropriately secondary to swelling.  She was recently evaluated by the lymphedema clinic and they stated that she would require daily treatments and they were not sure that they would be able to assist with her swelling.  She states that this is not possible for her to get daily treatments.  She has had prior gastric bypass surgeries with complications.  She is a type II diabetic and last hemoglobin A1c a  NEUROLOGY PROGRESS NOTE:                             SUBJECTIVE:  No acute events overnight. Feeling well this morning.     OBJECTIVE:    Medications:  Current Facility-Administered Medications   Medication Dose Route Frequency Provider Last Rate Last Admin   • sodium chloride 0.9 % flush bag 25 mL  25 mL Intravenous PRN Rocky Mcguire MD       • sodium chloride (PF) 0.9 % injection 2 mL  2 mL Intracatheter 2 times per day Rocky Mcguire MD   2 mL at 10/17/22 2032   • enoxaparin (LOVENOX) injection 40 mg  40 mg Subcutaneous QHS Rocky Mcguire MD   40 mg at 10/17/22 2032   • acetaminophen (TYLENOL) tablet 650 mg  650 mg Oral Q4H PRN Rocky Mcguire MD       • calcium carbonate (TUMS) chewable tablet 500 mg  500 mg Oral Q4H PRN Rocky Mcguire MD       • docusate sodium (COLACE) capsule 100 mg  100 mg Oral BID PRN Rocky Mcguire MD       • melatonin tablet 3 mg  3 mg Oral Nightly PRN Rocky Mcguire MD       • LORazepam (ATIVAN) tablet 0.5 mg  0.5 mg Oral Q4H PRN Rocky Mcguire MD       • metoPROLOL (LOPRESSOR) injection 5 mg  5 mg Intravenous Q6H PRN Rocky Mcguire MD       • thyroid (ARMOUR) Tab 60 mg  60 mg Oral Daily Rocky Mcguire MD   60 mg at 10/18/22 0855        PHYSICAL EXAM  Vitals:  /79 (BP Location: RUE - Right upper extremity, Patient Position: Sitting)   Pulse 72   Temp 98.4 °F (36.9 °C) (Oral)   Resp 18   Ht 5' 5.5\" (1.664 m)   Wt 74.2 kg (163 lb 9.3 oz)   BMI 26.81 kg/m²   BSA 1.83 m²   General:  In no acute distress.    Head & Neck:  Normocephalic and atraumatic.      EENT: Normal appearance to eyes and nose.    Extremities:  No amputations.  Musculoskeletal:  No deformities.   Skin:  Warm and dry.    Psychiatric:  Affect was appropriate to situation and mood was normal.    Neurological Exam  Mentation: Alert and awake. Oriented X3. Speech is fluent without any anomia or dysarthria. Intact naming of high and low frequency objects, repetition of complex  "few months ago was 10.9.  She has an appointment with endocrinology this week for reevaluation.  She denies any worsening shortness of breath or dyspnea with exertion.  She does have dyspnea with exertion secondary to obesity.  Patient reports that she used to swim as her form of exercise but she has been able to do this for 7 years as she has been battling with wounds to her lower extremities.  She reports that her feet are so painful from healing wounds that she is unable to ambulate or ride a stationary bicycle to help with exercise.    The following portions of the patient's history were reviewed and updated as appropriate: allergies, current medications, past family history, past medical history, past social history, past surgical history and problem list.      VITAL SIGNS     Visit Vitals  /84 (BP Location: Right arm, Patient Position: Sitting, Cuff Size: Large Adult)   Pulse 75   Ht 170.2 cm (67\")   SpO2 99%   BMI 46.36 kg/m²         Wt Readings from Last 3 Encounters:   02/09/21 134 kg (296 lb)   02/02/21 134 kg (296 lb)   08/19/20 120 kg (265 lb)     Body mass index is 46.36 kg/m².      REVIEW OF SYSTEMS   Review of Systems   Constitutional: Positive for malaise/fatigue and weight gain.   Cardiovascular: Positive for dyspnea on exertion and leg swelling. Negative for chest pain.   Respiratory: Positive for shortness of breath.    Neurological: Negative for light-headedness.   All other systems reviewed and are negative.          PHYSICAL EXAMINATION     Vitals and nursing note reviewed.   Constitutional:       Appearance: Normal appearance. Well-developed. Morbidly obese.   Eyes:      Conjunctiva/sclera: Conjunctivae normal.   Neck:      Vascular: No carotid bruit.   Pulmonary:      Breath sounds: Normal breath sounds.   Cardiovascular:      Normal rate. Regular rhythm. Normal S1 with normal intensity. Normal S2 with normal intensity.      Murmurs: There is no murmur.      No gallop. No click. No rub. " phrases, and can follow 3-step commands.  Patient unable to recall events that brought her to the ED. Otherwise, short and long term memory was within normal range for age.  No evidence of obvious cognitive impairment.      Cranial Nerves:  PERRL. VFF. EOMI. No nystagmus. Facial sensation intact to light touch bilaterally.  Sensation intact over bilateral face.  Hearing grossly intact.  SCM strength 5/5 bilaterally.  Tongue protrudes midline with no atrophy or fasciculations     Motor: No pronator drift.  Finger and foot taps fast bilaterally.  Antigravity movements of bilateral legs.  No spasticity appreciated in arms or legs.     Sensation: Intact to light touch throughout.     Reflexes: 2+ and symmetric bilateral brachioradialis, biceps, triceps, patella, Achilles; toes downgoing bilaterally.  No ankle clonus.     Cerebellar: Normal finger-nose-finger and heel-knee-shin.     Gait: Declined in setting of recent seizure.    LABORATORY  I have reviewed the pertinent laboratory tests:    Recent Labs   Lab 10/18/22  0457   WBC 7.4   RBC 3.82*   HGB 12.4   HCT 36.5        Recent Labs   Lab 10/18/22  0457 10/17/22  0628 10/17/22  0619   SODIUM 141  --  138   CHLORIDE 111*  --  108   CO2 24  --  23   BUN 9  --  23*   CREATININE 0.58 0.80 0.78   CALCIUM 8.7  --  9.4   ALBUMIN 3.1*  --  3.6   BILIRUBIN 0.6  --  0.7   ALKPT 55  --  70   GPT 28  --  41   AST 18  --  23   GLUCOSE 114*  --  137*     No results found  No results found for: HGBA1C]    IMAGING/OTHER TESTING:  I have reviewed the pertinent imaging study reports.      MRI BRAIN W WO CONTRAST   Final Result   1.  There are no acute abnormalities.  Supratentorial white matter ischemic   demyelination is noted.  Pre- and postcontrast MRI examination of the brain   is otherwise unremarkable, as detailed above.      Electronically Signed by: TRU JUAREZ M.D.    Signed on: 10/18/2022 8:12 AM          XR CHEST PA OR AP 1 VIEW   Final Result   Normal exam.        Comments: Bilateral lower extremities in compression wraps  Edema:     Peripheral edema absent.      Ankle: bilateral 2+ edema of the ankle.     Feet: bilateral 2+ edema of the feet.  Musculoskeletal: Normal range of motion. Skin:     General: Skin is warm and dry.   Neurological:      Mental Status: Alert and oriented to person, place, and time.      GCS: GCS eye subscore is 4. GCS verbal subscore is 5. GCS motor subscore is 6.   Psychiatric:         Speech: Speech normal.         Behavior: Behavior normal.         Thought Content: Thought content normal.         Judgment: Judgment normal.           REVIEWED DATA     Procedures      Cardiac Procedures:  1. Myocardial perfusion PET test 6/28/2016.  Medium to large sized, moderately severe area of ischemia located in the inferior wall.  EF 67%.  Impressions consistent with intermediate study.  2. Cardiac catheterization 7/27/2016.  Normal left heart catheterization.  Likely false positive stress test.  3. Echocardiogram 1/15/2020.  Moderate to severe LVH.  Calcification of aortic valve.  EF 68%.  RA is moderately dilated.  Moderate pulmonary hypertension.  4. Right diagnostic cardiac catheterization 2/26/2020.  Severe pulmonary hypertension in the setting of markedly elevated pulmonary capillary wedge pressure.  Likely due to severe diastolic dysfunction or LVH.  Recommending increase diuretics as well as better blood pressure control.       Lipid Panel    Lipid Panel 8/13/20   Total Cholesterol 90   Triglycerides 84   HDL Cholesterol 46 (A)   VLDL Cholesterol 17   LDL Cholesterol  27   (A) Abnormal value                ASSESSMENT & PLAN      Diagnosis Plan   1. Pedal edema     2. Pulmonary hypertension (CMS/HCC)     3. Morbid obesity with BMI of 45.0-49.9, adult (CMS/[facility])     4. Persistent atrial fibrillation (CMS/[facility])           SUMMARY/DISCUSSION  1. Pedal edema.  Patient's largest complaint is volume overload.  Patient's lower extremity edema is       Electronically Signed by: GINNY MELISSA M.D.    Signed on: 10/17/2022 10:01 AM          CT HEAD WO CONTRAST   Final Result      Normal CT scan of the brain.                  Electronically Signed by: JACOBY XAVIER M.D.    Signed on: 10/17/2022 6:45 AM          CT CERVICAL SPINE WO CONTRAST   Final Result      No evidence of fracture or malalignment.      Electronically Signed by: JACOBY XAVIER M.D.    Signed on: 10/17/2022 6:58 AM          CTA HEAD AND NECK W CONTRAST   Final Result   1.  12% stenosis as per NASCET criteria within proximal cervical left   internal carotid artery.   2.  Widely patent cervical right carotid artery and cervical vertebral   arteries.   3.  Normal CTA head.       Electronically Signed by: GLORY WASHBURN M.D.    Signed on: 10/17/2022 7:20 AM                ASSESSMENT/PLAN  Ms. Cuevas is a  73 year old year old,female with a significant past medical history of hypothyroidism, sleep apnea, and atrial fibrillation not on anticoagulation who is being seen in neurologic consultation on 10/17/2022 for loss of consciousness event.     Semiology of the event is potentially concerning for seizure with ictal scream at onset. No provoking factors identified. EEG without focal discharges, no epileptiform activity. MRI revealed no acute abnormalities; supratentorial white matter ischemic demyelination is noted, which is likely of no significant to the current episode. This would be a first-time unprovoked GTC - would not recommend starting seizure medication at this time. I reviewed that more than half of patients in this situation will never have a recurrent episode, and therefore medication is generally not recommended.      I think prolonged syncopal episode is also possible though less likely.  Shaking observed was intermittent, arms were in a relaxed position over the body, and her tongue bite is anterior, not lateral.  Furthermore, her spouse notes frequently low blood pressure readings  and frequent orthostatic intolerance when standing; she also follows a somewhat restrictive diet and has lost 70 pounds intentionally over the last year with her spouse.     Plan  -I do not recommend seizure medication   -Syncope workup as warranted per primary medicine team  -Education provided to patient and spouse on seizure precautions; specifically, to avoid driving ideally for 3-6 months and any other activities where harm could occur to the patient or others should she suffer a recurrent event.   -Recommended follow-up with neurology after discharge in 2-3 months - they will seek an appointment through [facility] where they get most of their health care  -Asked to return if there are any recurrent events; in this situation would likely need seizure medication.  -I see no neurological barriers to discharge    Note written with assistance of Latrice Jones, MS3    Alexandru Hahn MD  [facility]  Diplomate to the ABPN, Clinical Neurophysiology     multifactorial.  She does have history of lower extremity lymphedema.  Patient is also very morbidly obese.  I am unable to determine if she is actually swollen or if it is true weight gain.  Patient feels that it is fluid weight as she reports that she has been on a soft/liquid diet and only consuming 600 radha/day secondary to recent dental extractions.  I think that patient also probably has some venous insufficiency.  She is not exercising and this is complicating factors.  She is currently on torsemide 20 mg twice daily.  She has not had any improvement of her edema, however she is diuresing well.  I recommended that patient try to exercise and move is much as possible to assist with venous return.  I have also recommended that she discuss possibility of SGLT2 inhibitor with her endocrinologist at upcoming appointment.  2. Pulmonary hypertension  3. Morbid obesity  4. Persistent atrial fibrillation.  Rate currently controlled in the upper 50s.  Not anticoagulated secondary to history of GI bleeding.  5. Follow-up with Dr. Winter in 4 months.  Sooner for problems or complications        MEDICATIONS         Discharge Medications          Accurate as of April 13, 2021  2:47 PM. If you have any questions, ask your nurse or doctor.            Changes to Medications      Instructions Start Date   metFORMIN 500 MG tablet  Commonly known as: GLUCOPHAGE  What changed: how much to take   500 mg, Oral, 2 Times Daily With Meals         Continue These Medications      Instructions Start Date   albuterol sulfate  (90 Base) MCG/ACT inhaler  Commonly known as: PROVENTIL HFA;VENTOLIN HFA;PROAIR HFA   2 puffs, Inhalation      Antivert 25 MG tablet  Generic drug: meclizine   1 tablet, Oral, Daily PRN      aspirin 81 MG tablet   81 mg, Oral, Daily      atenolol 25 MG tablet  Commonly known as: TENORMIN   TAKE THREE TABLETS BY MOUTH TWICE A DAY      Claritin 10 MG tablet  Generic drug: loratadine   1 tablet, Oral, Daily       cyclobenzaprine 10 MG tablet  Commonly known as: FLEXERIL   10 mg, Oral, 3 Times Daily PRN      digoxin 250 MCG tablet  Commonly known as: LANOXIN   TAKE ONE TABLET BY MOUTH DAILY      diphenoxylate-atropine 2.5-0.025 MG per tablet  Commonly known as: LOMOTIL   1 tablet, Oral, 4 Times Daily PRN      fluconazole 150 MG tablet  Commonly known as: DIFLUCAN   150 mg, Oral, Once, On tab once weekly for two weeks       gabapentin 300 MG capsule  Commonly known as: NEURONTIN   300 mg, Oral, 3 Times Daily, Take 3 tablets three times daily      glipizide 10 MG tablet  Commonly known as: GLUCOTROL   10 mg, Oral, 2 Times Daily Before Meals      hydrALAZINE 100 MG tablet  Commonly known as: APRESOLINE   100 mg, Oral, 2 times daily      hydrOXYzine 25 MG tablet  Commonly known as: ATARAX   25 mg, Oral, Every 8 Hours PRN      insulin NPH-insulin regular (70-30) 100 UNIT/ML injection  Commonly known as: humuLIN 70/30,novoLIN 70/30   60 Units, Subcutaneous, 2 Times Daily Before Meals      LORazepam 1 MG tablet  Commonly known as: ATIVAN   1 mg, Oral, Every 6 Hours PRN      losartan 100 MG tablet  Commonly known as: COZAAR   TAKE ONE TABLET BY MOUTH DAILY      meperidine 50 MG tablet  Commonly known as: DEMEROL   100 mg, Oral      ondansetron 8 MG tablet  Commonly known as: ZOFRAN   8 mg, Oral, Daily      potassium chloride 10 MEQ CR tablet   1 tablet, Oral, Daily      simvastatin 20 MG tablet  Commonly known as: ZOCOR   20 mg, Oral, Nightly      Synthroid 200 MCG tablet  Generic drug: levothyroxine   600 mcg, Oral, Daily, Except on sat and sun take4 tabs      levothyroxine 300 MCG tablet  Commonly known as: SYNTHROID, LEVOTHROID   No dose, route, or frequency recorded.      torsemide 20 MG tablet  Commonly known as: Demadex   40 mg, Oral, Daily      vitamin D 1.25 MG (41866 UT) capsule capsule  Commonly known as: ERGOCALCIFEROL   1 capsule, Oral, 2 Times Weekly, SUN, THURS                 **Dragon Disclaimer:   Much of this  encounter note is an electronic transcription/translation of spoken language to printed text. The electronic translation of spoken language may permit erroneous, or at times, nonsensical words or phrases to be inadvertently transcribed. Although I have reviewed the note for such errors, some may still exist.

## 2022-10-31 RX ORDER — TORSEMIDE 20 MG/1
TABLET ORAL
Qty: 180 TABLET | Refills: 1 | Status: SHIPPED | OUTPATIENT
Start: 2022-10-31

## 2023-01-23 ENCOUNTER — TELEPHONE (OUTPATIENT)
Dept: NEUROLOGY | Facility: CLINIC | Age: 63
End: 2023-01-23
Payer: MEDICARE

## 2023-02-03 ENCOUNTER — TELEPHONE (OUTPATIENT)
Dept: CARDIOLOGY | Facility: CLINIC | Age: 63
End: 2023-02-03
Payer: MEDICARE

## 2023-02-03 NOTE — TELEPHONE ENCOUNTER
Pt left msg asking for a new rx to be sent to [facility] on [address]-however she didn't say which med.. I left a msg for her to call bk and let me know which medication and I'll take care of it./prt

## 2023-02-08 RX ORDER — LOSARTAN POTASSIUM 50 MG/1
TABLET ORAL
Qty: 180 TABLET | Refills: 1 | Status: SHIPPED | OUTPATIENT
Start: 2023-02-08 | End: 2023-03-08

## 2023-02-08 NOTE — TELEPHONE ENCOUNTER
Pt returned call and needs her Losartan reviewed at [facility]     I have sen this to Dr. Winter to sign off on

## 2023-03-07 ENCOUNTER — OFFICE VISIT (OUTPATIENT)
Dept: CARDIOLOGY | Facility: CLINIC | Age: 63
End: 2023-03-07
Payer: MEDICARE

## 2023-03-07 VITALS
DIASTOLIC BLOOD PRESSURE: 84 MMHG | WEIGHT: 293 LBS | BODY MASS INDEX: 44.41 KG/M2 | HEART RATE: 109 BPM | HEIGHT: 68 IN | SYSTOLIC BLOOD PRESSURE: 138 MMHG

## 2023-03-07 DIAGNOSIS — I48.19 PERSISTENT ATRIAL FIBRILLATION: ICD-10-CM

## 2023-03-07 DIAGNOSIS — I10 BENIGN ESSENTIAL HTN: Primary | ICD-10-CM

## 2023-03-07 PROCEDURE — 93000 ELECTROCARDIOGRAM COMPLETE: CPT | Performed by: INTERNAL MEDICINE

## 2023-03-07 PROCEDURE — 3079F DIAST BP 80-89 MM HG: CPT | Performed by: INTERNAL MEDICINE

## 2023-03-07 PROCEDURE — 3075F SYST BP GE 130 - 139MM HG: CPT | Performed by: INTERNAL MEDICINE

## 2023-03-07 PROCEDURE — 99214 OFFICE O/P EST MOD 30 MIN: CPT | Performed by: INTERNAL MEDICINE

## 2023-03-07 RX ORDER — LOSARTAN POTASSIUM 100 MG/1
1 TABLET ORAL DAILY
COMMUNITY
Start: 2023-02-08

## 2023-03-08 DIAGNOSIS — I10 BENIGN ESSENTIAL HTN: Primary | ICD-10-CM

## 2023-03-08 NOTE — PROGRESS NOTES
"      CARDIOLOGY    Baldev Winter MD    ENCOUNTER DATE:  03/07/2023    Jenelle Kasper / 63 y.o. / female        CHIEF COMPLAINT / REASON FOR OFFICE VISIT     Hypertension  Atrial fibrillation  Lower extremity edema    HISTORY OF PRESENT ILLNESS       HPI  Jenelle Kasper is a 63 y.o. female who presents today for reevaluation.  Patient has had significant swelling.  Patient complains of increasing shortness of breath palpitations fatigue.  She says she is also getting some tightness with a gain of significant fluid.      The following portions of the patient's history were reviewed and updated as appropriate: allergies, current medications, past family history, past medical history, past social history, past surgical history and problem list.      VITAL SIGNS     Visit Vitals  /84 (BP Location: Left arm)   Pulse 109   Ht 172.7 cm (68\")   Wt 133 kg (293 lb)   LMP  (LMP Unknown)   BMI 44.55 kg/m²         Wt Readings from Last 3 Encounters:   03/07/23 133 kg (293 lb)   08/24/22 96.2 kg (212 lb)   05/11/22 96.2 kg (212 lb)     Body mass index is 44.55 kg/m².      REVIEW OF SYSTEMS   ROS        PHYSICAL EXAMINATION     Vitals reviewed.   Constitutional:       Appearance: Healthy appearance.   Pulmonary:      Effort: Pulmonary effort is normal.   Cardiovascular:      Normal rate. Regular rhythm. Normal S1. Normal S2.      Murmurs: There is no murmur.      No gallop. No click. No rub.   Pulses:     Intact distal pulses.   Edema:     Peripheral edema present.     Pretibial: bilateral 3+ edema of the pretibial area.     Ankle: bilateral 3+ edema of the ankle.     Feet: bilateral 3+ edema of the feet.  Neurological:      Mental Status: Alert and oriented to person, place and time.           REVIEWED DATA       ECG 12 Lead    Date/Time: 3/7/2023 9:59 AM  Performed by: Baldev Winter MD  Authorized by: Baldev Winter MD   Comparison: compared with previous ECG from 3/9/2022  Rhythm: atrial " fibrillation  Other findings: non-specific ST-T wave changes    Clinical impression: abnormal EKG            Cardiac Procedures:  1.           ASSESSMENT & PLAN      Diagnosis Plan   1. Benign essential HTN        2. Persistent atrial fibrillation (HCC)              SUMMARY/DISCUSSION  1. Hypertension blood pressures good  2. Persistent atrial fibrillation heart rates a little bit elevated.  3. Marked lower extremity edema.  Probably multifactorial.  Organ to increase her torsemide.  She is been taking it twice a day I want her to take all 40 mg in the morning increase it to 60 mg.  She has an appointment to see lymphedema clinic on Friday which I also think is a contributing factor.  If she is not significantly improved by next Monday she was told to contact me we may need to give her IV diuretics in the office.  We will see how she responds and determine follow-up at that point.        MEDICATIONS         Discharge Medications          Accurate as of March 7, 2023 11:59 PM. If you have any questions, ask your nurse or doctor.            Changes to Medications      Instructions Start Date   levothyroxine 100 MCG tablet  Commonly known as: SYNTHROID, LEVOTHROID  What changed: Another medication with the same name was removed. Continue taking this medication, and follow the directions you see here.  Changed by: Baldev Winter MD   100 mcg, Oral, Daily      levothyroxine 100 MCG tablet  Commonly known as: SYNTHROID, LEVOTHROID  What changed: Another medication with the same name was removed. Continue taking this medication, and follow the directions you see here.  Changed by: Baldev Winter MD   200 mcg, Oral, Daily      losartan 100 MG tablet  Commonly known as: COZAAR  What changed: Another medication with the same name was removed. Continue taking this medication, and follow the directions you see here.  Changed by: Baldev Winter MD   1 tablet, Oral, Daily         Continue These Medications       Instructions Start Date   Accu-Chek Guide test strip  Generic drug: glucose blood   No dose, route, or frequency recorded.      Accu-Chek Guide w/Device kit   No dose, route, or frequency recorded.      Admelog 100 UNIT/ML injection  Generic drug: insulin lispro   2-10 Units, Subcutaneous, See Admin Instructions, Per sliding scale      HumaLOG KwikPen 100 UNIT/ML solution pen-injector  Generic drug: Insulin Lispro (1 Unit Dial)   15 Units, Subcutaneous, 3 Times Daily Before Meals      albuterol (2.5 MG/3ML) 0.083% nebulizer solution  Commonly known as: PROVENTIL   2.5 mg, Nebulization, Every 6 Hours PRN      apixaban 5 MG tablet tablet  Commonly known as: ELIQUIS   5 mg, Oral, Every 12 Hours Scheduled      atorvastatin 20 MG tablet  Commonly known as: LIPITOR   20 mg, Oral, Daily      Lantus SoloStar 100 UNIT/ML injection pen  Generic drug: Insulin Glargine   45 Units, Subcutaneous, 2 Times Daily      Lantus SoloStar 100 UNIT/ML injection pen  Generic drug: Insulin Glargine   42 Units, Subcutaneous, 2 Times Daily      meperidine 50 MG tablet  Commonly known as: DEMEROL   100 mg, Oral      ondansetron 4 MG tablet  Commonly known as: ZOFRAN   4 mg, Oral, Every 6 Hours PRN      OneTouch Delica Plus [id number] misc   No dose, route, or frequency recorded.      simvastatin 20 MG tablet  Commonly known as: ZOCOR   20 mg, Oral, Nightly      spironolactone 50 MG tablet  Commonly known as: ALDACTONE   50 mg, Oral, Daily      torsemide 20 MG tablet  Commonly known as: DEMADEX   TAKE TWO TABLETS BY MOUTH DAILY      TRUEplus Pen Needles 31G X 5 MM misc  Generic drug: Insulin Pen Needle   Use 5 times per day for insulin.         Stop These Medications    gabapentin 300 MG capsule  Commonly known as: NEURONTIN  Stopped by: Baldev Winter MD     gabapentin 600 MG tablet  Commonly known as: NEURONTIN  Stopped by: Baldev Winter MD     ondansetron ODT 4 MG disintegrating tablet  Commonly known as: ZOFRAN-ODT  Stopped by:  Baldev Winter MD                **Dragon Disclaimer:   Much of this encounter note is an electronic transcription/translation of spoken language to printed text. The electronic translation of spoken language may permit erroneous, or at times, nonsensical words or phrases to be inadvertently transcribed. Although I have reviewed the note for such errors, some may still exist.

## 2023-04-04 ENCOUNTER — TRANSCRIBE ORDERS (OUTPATIENT)
Dept: PHYSICAL THERAPY | Facility: HOSPITAL | Age: 63
End: 2023-04-04
Payer: MEDICARE

## 2023-04-04 DIAGNOSIS — I89.0 LYMPHEDEMA: Primary | ICD-10-CM

## 2023-04-12 ENCOUNTER — TELEPHONE (OUTPATIENT)
Dept: NEUROLOGY | Facility: CLINIC | Age: 63
End: 2023-04-12
Payer: MEDICARE

## 2023-04-12 NOTE — TELEPHONE ENCOUNTER
Spoke with patient and  and was very upset about rescheduling .  Rescheduled and sent reminder letter.  Will discuss rescheduling with Mary

## 2023-05-04 ENCOUNTER — OFFICE VISIT (OUTPATIENT)
Dept: CARDIOLOGY | Facility: CLINIC | Age: 63
End: 2023-05-04
Payer: MEDICARE

## 2023-05-04 VITALS
WEIGHT: 293 LBS | BODY MASS INDEX: 44.41 KG/M2 | SYSTOLIC BLOOD PRESSURE: 158 MMHG | HEART RATE: 60 BPM | OXYGEN SATURATION: 95 % | DIASTOLIC BLOOD PRESSURE: 90 MMHG | HEIGHT: 68 IN

## 2023-05-04 DIAGNOSIS — I27.20 PULMONARY HYPERTENSION: ICD-10-CM

## 2023-05-04 DIAGNOSIS — I10 BENIGN ESSENTIAL HTN: ICD-10-CM

## 2023-05-04 DIAGNOSIS — I48.19 PERSISTENT ATRIAL FIBRILLATION: Primary | ICD-10-CM

## 2023-05-04 PROCEDURE — 3080F DIAST BP >= 90 MM HG: CPT | Performed by: INTERNAL MEDICINE

## 2023-05-04 PROCEDURE — 3077F SYST BP >= 140 MM HG: CPT | Performed by: INTERNAL MEDICINE

## 2023-05-04 PROCEDURE — 99213 OFFICE O/P EST LOW 20 MIN: CPT | Performed by: INTERNAL MEDICINE

## 2023-05-04 RX ORDER — TORSEMIDE 20 MG/1
40 TABLET ORAL DAILY
Qty: 180 TABLET | Refills: 1 | Status: SHIPPED | OUTPATIENT
Start: 2023-05-04

## 2023-05-05 NOTE — PROGRESS NOTES
"      CARDIOLOGY    Baldev Winter MD    ENCOUNTER DATE:  05/04/2023    Jenelle Kasper / 63 y.o. / female        CHIEF COMPLAINT / REASON FOR OFFICE VISIT     Hypertension (1 month f/u)      HISTORY OF PRESENT ILLNESS       HPI  Jenelle Kasper is a 63 y.o. female who presents today for reevaluation.  Patient blood pressure is elevated today.  She also complains of some increasing shortness of breath.  When I inquired more about what she been eating she said she has been eating significantly better.  She said the only thing she has been eating is broth.  Patient said that her throats been a little sore so she decided to just eat different types of broths.      The following portions of the patient's history were reviewed and updated as appropriate: allergies, current medications, past family history, past medical history, past social history, past surgical history and problem list.      VITAL SIGNS     Visit Vitals  /90 (BP Location: Right arm, Patient Position: Sitting, Cuff Size: Adult)   Pulse 60   Ht 172.7 cm (68\")   Wt (!) 139 kg (305 lb 9.6 oz)   LMP  (LMP Unknown)   SpO2 95%   BMI 46.47 kg/m²         Wt Readings from Last 3 Encounters:   05/04/23 (!) 139 kg (305 lb 9.6 oz)   03/07/23 133 kg (293 lb)   08/24/22 96.2 kg (212 lb)     Body mass index is 46.47 kg/m².      REVIEW OF SYSTEMS   ROS        PHYSICAL EXAMINATION     Vitals reviewed.   Constitutional:       Appearance: Healthy appearance.   Pulmonary:      Effort: Pulmonary effort is normal.   Cardiovascular:      Normal rate. Irregularly irregular rhythm. Normal S1. Normal S2.      Murmurs: There is no murmur.      No gallop. No click. No rub.   Pulses:     Intact distal pulses.   Edema:     Peripheral edema present.     Pretibial: bilateral 2+ edema of the pretibial area.     Ankle: bilateral 2+ edema of the ankle.  Neurological:      Mental Status: Alert and oriented to person, place and time.           REVIEWED DATA "     Procedures    Cardiac Procedures:  1.           ASSESSMENT & PLAN      Diagnosis Plan   1. Persistent atrial fibrillation        2. Benign essential HTN        3. Pulmonary hypertension              SUMMARY/DISCUSSION  1. Hypertension.  Blood pressure is actually elevated today.  Unfortunately patient thought she was doing better by eating various broths.  There is enormous amount of salt in this and she is actually increased her salt intake and not decreased it.  We had a long discussion hopefully she can get back on track.  At this point I will reevaluate her in 4 to 6 weeks and see what evolves.  2.  Atrial fibrillation heart rate is controlled.  3.  Lower extremity edema.  This has worsened a little bit since she saw me last but this is probably because of her massive salt intake.  She can try to do better we will reassess in 4 to 6 weeks.      MEDICATIONS         Discharge Medications          Accurate as of May 4, 2023 11:59 PM. If you have any questions, ask your nurse or doctor.            Continue These Medications      Instructions Start Date   Accu-Chek Guide test strip  Generic drug: glucose blood   No dose, route, or frequency recorded.      Accu-Chek Guide w/Device kit   No dose, route, or frequency recorded.      Admelog 100 UNIT/ML injection  Generic drug: insulin lispro   2-10 Units, Subcutaneous, See Admin Instructions, Per sliding scale      HumaLOG KwikPen 100 UNIT/ML solution pen-injector  Generic drug: Insulin Lispro (1 Unit Dial)   15 Units, Subcutaneous, 3 Times Daily Before Meals      albuterol (2.5 MG/3ML) 0.083% nebulizer solution  Commonly known as: PROVENTIL   2.5 mg, Nebulization, Every 6 Hours PRN      apixaban 5 MG tablet tablet  Commonly known as: ELIQUIS   5 mg, Oral, Every 12 Hours Scheduled      Lantus SoloStar 100 UNIT/ML injection pen  Generic drug: Insulin Glargine   45 Units, Subcutaneous, 2 Times Daily      Lantus SoloStar 100 UNIT/ML injection pen  Generic drug: Insulin  Glargine   42 Units, Subcutaneous, 2 Times Daily      levothyroxine 100 MCG tablet  Commonly known as: SYNTHROID, LEVOTHROID   100 mcg, Oral, Daily      levothyroxine 100 MCG tablet  Commonly known as: SYNTHROID, LEVOTHROID   200 mcg, Oral, Daily      losartan 100 MG tablet  Commonly known as: COZAAR   1 tablet, Oral, Daily      meperidine 50 MG tablet  Commonly known as: DEMEROL   100 mg, Oral      ondansetron 4 MG tablet  Commonly known as: ZOFRAN   4 mg, Oral, Every 6 Hours PRN      OneTouch Delica Plus [id number] misc   No dose, route, or frequency recorded.      simvastatin 20 MG tablet  Commonly known as: ZOCOR   20 mg, Oral, Nightly      spironolactone 50 MG tablet  Commonly known as: ALDACTONE   50 mg, Oral, Daily      torsemide 20 MG tablet  Commonly known as: DEMADEX   40 mg, Oral, Daily      TRUEplus Pen Needles 31G X 5 MM misc  Generic drug: Insulin Pen Needle   Use 5 times per day for insulin.                 **Dragon Disclaimer:   Much of this encounter note is an electronic transcription/translation of spoken language to printed text. The electronic translation of spoken language may permit erroneous, or at times, nonsensical words or phrases to be inadvertently transcribed. Although I have reviewed the note for such errors, some may still exist.

## 2023-05-17 NOTE — PROGRESS NOTES
DOS: 2023  NAME: Jenelle Kasper   : 1960  PCP: Zay Olivares MD    CC: stroke   Referring MD: No ref. provider found    Neurological Problem:  63 y.o. right-handed female with a Hx of anxiety and depression, neuropathy, hypothyroidism who presents today for stroke follow-up.  She is accompanied by her .  History provided by the patient, , and review of records as summarized below.    Interval History:  Mrs. Kasper was previously seen by me in the office on 2022 in follow-up for stroke.  She had suffered an acute right MCA territory infarct in 2022 secondary to her chronic atrial fibrillation which she was not anticoagulated for at the time due to a history of GI bleeding while on Xarelto.  She was placed on Eliquis 5 mg twice daily.  At the time of her last visit she had had some report of tarry stools and was being followed by GI but her hemoglobin remained stable therefore she was continued on her Eliquis.  She was continued to deal with persistent left homonymous hemianopia post her stroke therefore I referred her to neuro-ophthalmology.  I recommended that she also undergo a sleep study due to her having several risk factors for sleep apnea however her and her  wanted to hold off at the time.  We discussed the importance of controlling all of her risk factors including her uncontrolled diabetes and working on weight loss and exercise.  She was to follow-up in 9 months.    Today she presents with her  and reports she has had no new stroke/TIA symptoms since our last office visit.  She remains on Eliquis and is now on Zocor for secondary stroke prevention.  She did see neuro optometrist Dr. Esqueda but her and her  report they had a terrible experience with that office and have now transferred to [facility].  She states she still has some vision loss of the left eye and recently got over a right eye infection but this has improved.  She  "states she has been unable to do any physical therapy as she had just not been able to make it to outpatient appointments and also has been dealing with lymphedema of both of her lower extremities.  She states she supposed to be seeing a lymphedema specialist but has not yet.  She was referred to a wound clinic but due to her not having any active wounds she was unable to be seen.  She would like to do home health PT.  She states she has been having headaches.  She has a history of migraine headaches and had been followed by Dr. Nesbitt with [facility] but has not seen him in several years.   reports that she was previously also diagnosed with occipital neuralgia and was recommended to undergo occipital nerve blocks but the patient declined.   reports that she has been having trouble with short-term memory loss since the stroke.   reports that they do not really get out much to do anything so the patient does not really socially interactive.  He states she used to read all the time but does not do that anymore.  Patient states she is limited due to her vision but does do fairly well on her phone.  She states she does stare at her phone a lot which also worsens her headaches.  She is not physically active.  She feels weak in her legs.  She does use her rolling walker.   helps her with her ADLs.  He states he tries to encourage her to do board games and puzzles and read with the patient declines.  Her most recent A1c level in March was 7.0.  I do not see a recent lipid panel, last LDL in February 2022 was 33.  States her BP has been fairly normalized.  Today it was 140/80.    Review of Systems:        Review of Systems    \"The following portions of the patient's history were reviewed and updated as appropriate: allergies, current medications, past family history, past medical history, past social history, past surgical history and problem list.\"    Review and " Interpretation of Imaging as described in interval history.    Laboratory Results:             Lab Results   Component Value Date    HGBA1C 7.0 (H) 03/30/2023     Lab Results   Component Value Date    CHOL 71 02/01/2022    CHOL 79 01/30/2022     Lab Results   Component Value Date    HDL 17 (L) 02/01/2022    HDL 18 (L) 01/30/2022    HDL 46 (L) 08/13/2020     Lab Results   Component Value Date    LDL 33 02/01/2022    LDL 38 01/30/2022    LDL 27 08/13/2020     Lab Results   Component Value Date    TRIG 112 02/01/2022    TRIG 124 01/30/2022    TRIG 84 08/13/2020     No components found for: CHOLHDL  No results found for: RPR  Lab Results   Component Value Date    TSH 0.033 (L) 03/10/2022     Lab Results   Component Value Date    UHBJIWIY42 395 12/21/2020     Lab Results   Component Value Date    GLUCOSE 210 (H) 03/16/2022    BUN 14 03/29/2022    CREATININE 0.90 03/29/2022    EGFRIFNONA 38 (L) 02/05/2022    BCR 15.7 03/29/2022    K 4.6 03/29/2022    CO2 24 03/29/2022    CALCIUM 10.0 03/29/2022    ALBUMIN 3.20 (L) 03/10/2022    LABIL2 1.1 03/08/2022    AST 42 (H) 03/10/2022    ALT 35 (H) 03/10/2022     Lab Results   Component Value Date    WBC 6.83 03/30/2023    HGB 12.4 03/30/2023    HCT 40.6 03/30/2023    MCV 89.0 03/30/2023     03/30/2023     Lab Results   Component Value Date    INR 1.20 (H) 01/28/2022    INR 1.1 08/15/2018    PROTIME 15.0 (H) 01/28/2022    PROTIME 12.2 08/15/2018       Physical Examination:   mRS:   General Appearance:   Obese female, alert, and cooperative.  HEENT: Normocephalic. Atraumatic.   Extremities:    Bilateral lower extremity edema and redness  Respiratory:   Even and unlabored.    Neurological examination:  Higher Integrative  Function: Oriented to year and month, place, and person. Normal registration, abnormal recall, normal attention span and concentration. Normal language including comprehension, spontaneous speech, repetition, reading-mild limitation secondary to vision loss,  writing, naming, and vocabulary.   CN II: Left homonymous superior quadrantanopia.  CN III IV VI: Extraocular movements are full without nystagmus.   CN V: Normal facial sensation and strength of muscles of mastication.  CN VII: Facial movements are symmetric. No weakness.  CN VIII:   Auditory acuity is normal.  CN IX & X:   Symmetric palatal movement.  CN XII:   The tongue is midline.    Motor: Normal muscle strength, no drifting of lower extremities, mild weakness of LLE against resistance.  Normal bulk and tone in upper and lower extremities.    Sensation: Normal to light touch in arms and legs.  Diminished cold temperature sensation of lower extremities.    Station and Gait: Needed standby assist from  to get to walker, slow cautious steps.  Coordination: Finger to nose test shows no dysmetria.  Rapid alternating movements are normal.    Impression/Assessment:    Mrs. Kasper presents today for stroke follow-up.  She suffered acute right MCA territory infarct secondary to cardioembolism from chronic atrial fibrillation not on anticoagulation at the time.  She is currently on Eliquis 5 mg twice daily and Zocor for secondary stroke prevention and has had no new stroke/TIA symptoms since her last office visit.  She is requesting home health physical therapy due to complaints of bilateral lower extremity weakness which I think is also being affected by her significant lymphedema.  She was referred to a lymphedema specialist but has not yet seen them.  I will place an order for home health PT today.  She is also complaining of migraine headaches which she does have a history for and was actually being followed by [facility] at their headache clinic.  I am going to start her on riboflavin 400 mg daily as well as provide her with samples of Nurtec 75 mg ODT today.  I instructed her to take the Nurtec for acute relief only and she is not to exceed more than 1 tablet within 24 hours.  Her and her   "will call me back if she notes improvement of headache with the Nurtec and riboflavin.  We could consider adding magnesium oxide 400 mg however with her being diuresed and increased urine output per the  I will hold off on doing this.  I encouraged her to follow back up with Dr. Nesbitt at [facility] for her headache treatment as she had been followed by him for several years.  As far as her complaints of memory loss this is very common poststroke.  I encouraged her to do more cognitive exercises and becoming more socially interactive and physically active.  We can plan on performing a [place] at the next visit. We discussed at length her personal risk factors for stroke and importance of risk factor control for stroke prevention, including BP and cholesterol control.  She will monitor BP regularly and follow-up with PCP for continued cholesterol surveillance.  We discussed stroke/TIA symptoms and importance of calling 911 if she were to experience any of these.  Follow-up in 1 year, sooner if symptoms warrant.    Plan:     Continue Eliquis 5 mg twice daily, monitor for signs symptoms of bleeding  Continue Zocor  Start riboflavin 400 mg daily  Samples of Nurtec 75 mg ODT as needed were provided to the patient (2 pills total), she will call me back if she notes relief with migraine headache and we can then send in a prescription.  Encouraged her to follow back up with Dr. Nesbitt with [facility] for migraine headache treatment.  Referral to home health PT  Encouraged her to do more cognitive exercises including puzzles, board games, reading and becoming more socially interactive.   Perform MOCA testing at next appointment  Consider sleep study- pt and  state she had a sleep study several years ago and was told she \"didn't have it\".  Monitor BP regularly  Keep well-hydrated  Encourage regular physical activity as tolerated  Falls precautions discussed  Maintain well-balanced diet    Blood pressure " control to <130/80   Goal LDL <100    Goal A1c 6.5 or <   Call 911 for stroke any stroke symptoms   Follow-up in 1 year, sooner symptoms warrant.    Diagnoses and all orders for this visit:    1. History of CVA with residual deficit    2. Persistent atrial fibrillation    3. Migraine with aura and without status migrainosus, not intractable    4. Benign essential HTN    5. Morbid obesity with BMI of 45.0-49.9, adult    6. Memory loss        MDM  Reviewed: previous chart and vitals  Reviewed previous: labs  Interpretation: labs      I spent 41 minutes caring for patient on todays date of service. This time includes time spent by me in the following activities: obtaining and reviewing a separately obtained history, performing a medically appropriate examination and evaluation, counseling and educating the patient and  on diagnosis and treatment, ordering medications, referring to other health care professionals and documenting information in the medical record.        Mary Reardon, APRN

## 2023-05-23 ENCOUNTER — OFFICE VISIT (OUTPATIENT)
Dept: NEUROLOGY | Facility: CLINIC | Age: 63
End: 2023-05-23
Payer: MEDICARE

## 2023-05-23 VITALS
SYSTOLIC BLOOD PRESSURE: 140 MMHG | HEIGHT: 68 IN | WEIGHT: 293 LBS | HEART RATE: 84 BPM | DIASTOLIC BLOOD PRESSURE: 80 MMHG | OXYGEN SATURATION: 94 % | BODY MASS INDEX: 44.41 KG/M2

## 2023-05-23 DIAGNOSIS — I48.19 PERSISTENT ATRIAL FIBRILLATION: ICD-10-CM

## 2023-05-23 DIAGNOSIS — I10 BENIGN ESSENTIAL HTN: ICD-10-CM

## 2023-05-23 DIAGNOSIS — I69.30 HISTORY OF CVA WITH RESIDUAL DEFICIT: ICD-10-CM

## 2023-05-23 DIAGNOSIS — E66.01 MORBID OBESITY WITH BMI OF 45.0-49.9, ADULT: ICD-10-CM

## 2023-05-23 DIAGNOSIS — R41.3 MEMORY LOSS: ICD-10-CM

## 2023-05-23 DIAGNOSIS — G43.109 MIGRAINE WITH AURA AND WITHOUT STATUS MIGRAINOSUS, NOT INTRACTABLE: ICD-10-CM

## 2023-05-23 RX ORDER — DEXTROSE 15 G/37.5G
GEL ORAL
COMMUNITY
Start: 2023-05-03

## 2023-05-23 NOTE — LETTER
May 23, 2023       No Recipients    Patient: Jenelle Kasper   YOB: 1960   Date of Visit: 2023       Dear Dr. Alexander Recipients:    Thank you for referring Jenelle Kasper to me for evaluation. Below are the relevant portions of my assessment and plan of care.    If you have questions, please do not hesitate to call me. I look forward to following Jenelle along with you.         Sincerely,        TRUDY Youssef        CC:   No Recipients    Mary Reardon APRN  [id number]  Signed  DOS: 2023  NAME: Jenelle Kasper   : 1960  PCP: Zay Olivares MD    CC: stroke   Referring MD: No ref. provider found    Neurological Problem:  63 y.o. right-handed female with a Hx of anxiety and depression, neuropathy, hypothyroidism who presents today for stroke follow-up.  She is accompanied by her .  History provided by the patient, , and review of records as summarized below.    Interval History:  Mrs. Kasper was previously seen by me in the office on 2022 in follow-up for stroke.  She had suffered an acute right MCA territory infarct in 2022 secondary to her chronic atrial fibrillation which she was not anticoagulated for at the time due to a history of GI bleeding while on Xarelto.  She was placed on Eliquis 5 mg twice daily.  At the time of her last visit she had had some report of tarry stools and was being followed by GI but her hemoglobin remained stable therefore she was continued on her Eliquis.  She was continued to deal with persistent left homonymous hemianopia post her stroke therefore I referred her to neuro-ophthalmology.  I recommended that she also undergo a sleep study due to her having several risk factors for sleep apnea however her and her  wanted to hold off at the time.  We discussed the importance of controlling all of her risk factors including her uncontrolled diabetes and working on weight loss and exercise.  She was to  follow-up in 9 months.    Today she presents with her  and reports she has had no new stroke/TIA symptoms since our last office visit.  She remains on Eliquis and is now on Zocor for secondary stroke prevention.  She did see neuro optometrist Dr. Esqueda but her and her  report they had a terrible experience with that office and have now transferred to [facility].  She states she still has some vision loss of the left eye and recently got over a right eye infection but this has improved.  She states she has been unable to do any physical therapy as she had just not been able to make it to outpatient appointments and also has been dealing with lymphedema of both of her lower extremities.  She states she supposed to be seeing a lymphedema specialist but has not yet.  She was referred to a wound clinic but due to her not having any active wounds she was unable to be seen.  She would like to do home health PT.  She states she has been having headaches.  She has a history of migraine headaches and had been followed by Dr. Nesbitt with [facility] but has not seen him in several years.   reports that she was previously also diagnosed with occipital neuralgia and was recommended to undergo occipital nerve blocks but the patient declined.   reports that she has been having trouble with short-term memory loss since the stroke.   reports that they do not really get out much to do anything so the patient does not really socially interactive.  He states she used to read all the time but does not do that anymore.  Patient states she is limited due to her vision but does do fairly well on her phone.  She states she does stare at her phone a lot which also worsens her headaches.  She is not physically active.  She feels weak in her legs.  She does use her rolling walker.   helps her with her ADLs.  He states he tries to encourage her to do board games and puzzles  "and read with the patient declines.  Her most recent A1c level in March was 7.0.  I do not see a recent lipid panel, last LDL in February 2022 was 33.  States her BP has been fairly normalized.  Today it was 140/80.    Review of Systems:        Review of Systems    \"The following portions of the patient's history were reviewed and updated as appropriate: allergies, current medications, past family history, past medical history, past social history, past surgical history and problem list.\"    Review and Interpretation of Imaging as described in interval history.    Laboratory Results:             Lab Results   Component Value Date    HGBA1C 7.0 (H) 03/30/2023     Lab Results   Component Value Date    CHOL 71 02/01/2022    CHOL 79 01/30/2022     Lab Results   Component Value Date    HDL 17 (L) 02/01/2022    HDL 18 (L) 01/30/2022    HDL 46 (L) 08/13/2020     Lab Results   Component Value Date    LDL 33 02/01/2022    LDL 38 01/30/2022    LDL 27 08/13/2020     Lab Results   Component Value Date    TRIG 112 02/01/2022    TRIG 124 01/30/2022    TRIG 84 08/13/2020     No components found for: CHOLHDL  No results found for: RPR  Lab Results   Component Value Date    TSH 0.033 (L) 03/10/2022     Lab Results   Component Value Date    SPYDLQMX45 395 12/21/2020     Lab Results   Component Value Date    GLUCOSE 210 (H) 03/16/2022    BUN 14 03/29/2022    CREATININE 0.90 03/29/2022    EGFRIFNONA 38 (L) 02/05/2022    BCR 15.7 03/29/2022    K 4.6 03/29/2022    CO2 24 03/29/2022    CALCIUM 10.0 03/29/2022    ALBUMIN 3.20 (L) 03/10/2022    LABIL2 1.1 03/08/2022    AST 42 (H) 03/10/2022    ALT 35 (H) 03/10/2022     Lab Results   Component Value Date    WBC 6.83 03/30/2023    HGB 12.4 03/30/2023    HCT 40.6 03/30/2023    MCV 89.0 03/30/2023     03/30/2023     Lab Results   Component Value Date    INR 1.20 (H) 01/28/2022    INR 1.1 08/15/2018    PROTIME 15.0 (H) 01/28/2022    PROTIME 12.2 08/15/2018       Physical Examination:  "  mRS:   General Appearance:   Obese female, alert, and cooperative.  HEENT: Normocephalic. Atraumatic.   Extremities:    Bilateral lower extremity edema and redness  Respiratory:   Even and unlabored.    Neurological examination:  Higher Integrative  Function: Oriented to year and month, place, and person. Normal registration, abnormal recall, normal attention span and concentration. Normal language including comprehension, spontaneous speech, repetition, reading-mild limitation secondary to vision loss, writing, naming, and vocabulary.   CN II: Left homonymous superior quadrantanopia.  CN III IV VI: Extraocular movements are full without nystagmus.   CN V: Normal facial sensation and strength of muscles of mastication.  CN VII: Facial movements are symmetric. No weakness.  CN VIII:   Auditory acuity is normal.  CN IX & X:   Symmetric palatal movement.  CN XII:   The tongue is midline.    Motor: Normal muscle strength, no drifting of lower extremities, mild weakness of LLE against resistance.  Normal bulk and tone in upper and lower extremities.    Sensation: Normal to light touch in arms and legs.  Diminished cold temperature sensation of lower extremities.    Station and Gait: Needed standby assist from  to get to walker, slow cautious steps.  Coordination: Finger to nose test shows no dysmetria.  Rapid alternating movements are normal.    Impression/Assessment:    Mrs. Kasper presents today for stroke follow-up.  She suffered acute right MCA territory infarct secondary to cardioembolism from chronic atrial fibrillation not on anticoagulation at the time.  She is currently on Eliquis 5 mg twice daily and Zocor for secondary stroke prevention and has had no new stroke/TIA symptoms since her last office visit.  She is requesting home health physical therapy due to complaints of bilateral lower extremity weakness which I think is also being affected by her significant lymphedema.  She was referred to a  lymphedema specialist but has not yet seen them.  I will place an order for home health PT today.  She is also complaining of migraine headaches which she does have a history for and was actually being followed by [facility] at their headache clinic.  I am going to start her on riboflavin 400 mg daily as well as provide her with samples of Nurtec 75 mg ODT today.  I instructed her to take the Nurtec for acute relief only and she is not to exceed more than 1 tablet within 24 hours.  Her and her  will call me back if she notes improvement of headache with the Nurtec and riboflavin.  We could consider adding magnesium oxide 400 mg however with her being diuresed and increased urine output per the  I will hold off on doing this.  I encouraged her to follow back up with Dr. Nesbitt at [facility] for her headache treatment as she had been followed by him for several years.  As far as her complaints of memory loss this is very common poststroke.  I encouraged her to do more cognitive exercises and becoming more socially interactive and physically active.  We can plan on performing a [place] at the next visit. We discussed at length her personal risk factors for stroke and importance of risk factor control for stroke prevention, including BP and cholesterol control.  She will monitor BP regularly and follow-up with PCP for continued cholesterol surveillance.  We discussed stroke/TIA symptoms and importance of calling 911 if she were to experience any of these.  Follow-up in 1 year, sooner if symptoms warrant.    Plan:     Continue Eliquis 5 mg twice daily, monitor for signs symptoms of bleeding  Continue Zocor  Start riboflavin 400 mg daily  Samples of Nurtec 75 mg ODT as needed were provided to the patient (2 pills total), she will call me back if she notes relief with migraine headache and we can then send in a prescription.  Encouraged her to follow back up with Dr. Nesbitt with [facility] for  migraine headache treatment.  Referral to home health PT  Encouraged her to do more cognitive exercises including puzzles, board games, reading and becoming more socially interactive.  Monitor BP regularly  Keep well-hydrated  Encourage regular physical activity as tolerated  Falls precautions discussed  Maintain well-balanced diet    Blood pressure control to <130/80   Goal LDL <100    Goal A1c 6.5 or <   Call 911 for stroke any stroke symptoms   Follow-up in 1 year, sooner symptoms warrant.    Diagnoses and all orders for this visit:    1. History of CVA with residual deficit    2. Persistent atrial fibrillation    3. Migraine with aura and without status migrainosus, not intractable    4. Benign essential HTN    5. Morbid obesity with BMI of 45.0-49.9, adult    6. Memory loss        MDM  Reviewed: previous chart and vitals  Reviewed previous: labs  Interpretation: labs      I spent 41 minutes caring for patient on todays date of service. This time includes time spent by me in the following activities: obtaining and reviewing a separately obtained history, performing a medically appropriate examination and evaluation, counseling and educating the patient and  on diagnosis and treatment, ordering medications, referring to other health care professionals and documenting information in the medical record.        TRUDY Youssef

## 2023-05-24 PROBLEM — I69.30 HISTORY OF CVA WITH RESIDUAL DEFICIT: Status: ACTIVE | Noted: 2022-05-11

## 2023-05-24 PROBLEM — G43.109 MIGRAINE WITH AURA AND WITHOUT STATUS MIGRAINOSUS, NOT INTRACTABLE: Status: ACTIVE | Noted: 2023-05-24

## 2023-05-24 PROBLEM — R41.3 MEMORY LOSS: Status: ACTIVE | Noted: 2023-05-24

## 2023-05-24 RX ORDER — RIBOFLAVIN (VITAMIN B2) 400 MG
400 TABLET ORAL DAILY
Qty: 30 TABLET | Refills: 0 | Status: SHIPPED | OUTPATIENT
Start: 2023-05-24

## 2023-06-14 ENCOUNTER — TELEPHONE (OUTPATIENT)
Dept: NEUROLOGY | Facility: CLINIC | Age: 63
End: 2023-06-14

## 2023-06-14 NOTE — TELEPHONE ENCOUNTER
Provider: LILIYA MARQUES  Caller: PATIENT  Relationship to Patient: SELF  Phone Number: [phone]  Reason for Call: PATIENT WOULD LIKE TO GET SOME HELP GETTING HOME HEALTH PHYSICAL THERAPY. SHE IS HAVING TROUBLE GETTING IT SET UP. SHE GOT A CALL FROM AN OFFICE TO SET SOMETHING UP BUT SHE CAN'T LEAVE HER HOUSE FOR AN APPT. PLEASE ADVISE, THANK YOU.    IF WE CAN'T REACH HER WE CAN CONTACT HER CAREGIVER SHARIF [phone].

## 2023-06-15 ENCOUNTER — OFFICE VISIT (OUTPATIENT)
Dept: CARDIOLOGY | Facility: CLINIC | Age: 63
End: 2023-06-15
Payer: MEDICARE

## 2023-06-15 VITALS
BODY MASS INDEX: 46.38 KG/M2 | OXYGEN SATURATION: 97 % | HEART RATE: 95 BPM | HEIGHT: 68 IN | SYSTOLIC BLOOD PRESSURE: 138 MMHG | DIASTOLIC BLOOD PRESSURE: 84 MMHG

## 2023-06-15 DIAGNOSIS — I48.19 PERSISTENT ATRIAL FIBRILLATION: Primary | ICD-10-CM

## 2023-06-15 DIAGNOSIS — I10 BENIGN ESSENTIAL HTN: ICD-10-CM

## 2023-06-15 DIAGNOSIS — G45.9 TIA (TRANSIENT ISCHEMIC ATTACK): ICD-10-CM

## 2023-06-15 DIAGNOSIS — I27.20 PULMONARY HYPERTENSION: ICD-10-CM

## 2023-06-15 RX ORDER — INSULIN GLARGINE 300 U/ML
45 INJECTION, SOLUTION SUBCUTANEOUS
COMMUNITY
Start: 2023-06-01

## 2023-06-15 NOTE — PROGRESS NOTES
"      CARDIOLOGY    Baldev Wintre MD    ENCOUNTER DATE:  06/15/2023    Jenelle Kasper / 63 y.o. / female        CHIEF COMPLAINT / REASON FOR OFFICE VISIT     Persistent atrial fibrillation (05/04/2023 Follow up)  Hypertension  Pulmonary hypertension  Right-sided heart failure    HISTORY OF PRESENT ILLNESS       HPI  Jenelle Kasper is a 63 y.o. female who presents today for evaluation.  Patient is better.  She said 2 days ago she had no swelling at all but here recently it has worsened the last day or so.  Unfortunately she lost her air conditioning which probably is a contributing factor but overall she is much better.  She denies chest discomforts shortness of breath.      The following portions of the patient's history were reviewed and updated as appropriate: allergies, current medications, past family history, past medical history, past social history, past surgical history and problem list.      VITAL SIGNS     Visit Vitals  /84 (BP Location: Left arm)   Pulse 95   Ht 172.7 cm (68\")   LMP  (LMP Unknown)   SpO2 97%   BMI 46.38 kg/m²         Wt Readings from Last 3 Encounters:   05/23/23 (!) 138 kg (305 lb)   05/04/23 (!) 139 kg (305 lb 9.6 oz)   03/07/23 133 kg (293 lb)     Body mass index is 46.38 kg/m².      REVIEW OF SYSTEMS   ROS        PHYSICAL EXAMINATION     Constitutional:       Appearance: Chronically ill-appearing.   Pulmonary:      Breath sounds: Normal breath sounds.   Cardiovascular:      PMI at left midclavicular line. Irregularly irregular rhythm. Normal S1. Normal S2.       Murmurs: There is no murmur.      No gallop.  No click. No rub.   Pulses:     Intact distal pulses.   Edema:     Peripheral edema present.     Pretibial: bilateral 1+ edema of the pretibial area.     Ankle: bilateral 1+ edema of the ankle.     Feet: bilateral 1+ edema of the feet.  Neurological:      Mental Status: Alert and oriented to person, place and time.         REVIEWED DATA     Procedures    Cardiac " Procedures:            ASSESSMENT & PLAN      Diagnosis Plan   1. Persistent atrial fibrillation        2. Benign essential HTN        3. TIA (transient ischemic attack)              SUMMARY/DISCUSSION  Chronic A-fib.  Patient with history of TIA remains anticoagulated.  Hypertension blood pressures good  Right-sided heart failure.  Patient overall is doing significantly better.  At this point I continue same we will see her back in 3 to 6 months sooner if issues occur.        MEDICATIONS         Discharge Medications            Accurate as of Loretta 15, 2023  2:45 PM. If you have any questions, ask your nurse or doctor.                Continue These Medications        Instructions Start Date   Accu-Chek Guide test strip  Generic drug: glucose blood   No dose, route, or frequency recorded.      Accu-Chek Guide w/Device kit   No dose, route, or frequency recorded.      Admelog 100 UNIT/ML injection  Generic drug: insulin lispro   2-10 Units, Subcutaneous, See Admin Instructions, Per sliding scale      HumaLOG KwikPen 100 UNIT/ML solution pen-injector  Generic drug: Insulin Lispro (1 Unit Dial)   15 Units, Subcutaneous, 3 Times Daily Before Meals      albuterol (2.5 MG/3ML) 0.083% nebulizer solution  Commonly known as: PROVENTIL   2.5 mg, Nebulization, Every 6 Hours PRN      apixaban 5 MG tablet tablet  Commonly known as: ELIQUIS   5 mg, Oral, Every 12 Hours Scheduled      Glutose 15 40 % gel  Generic drug: dextrose   No dose, route, or frequency recorded.      levothyroxine 100 MCG tablet  Commonly known as: SYNTHROID, LEVOTHROID   100 mcg, Oral, Daily      levothyroxine 100 MCG tablet  Commonly known as: SYNTHROID, LEVOTHROID   200 mcg, Oral, Daily      losartan 100 MG tablet  Commonly known as: COZAAR   1 tablet, Oral, Daily      meperidine 50 MG tablet  Commonly known as: DEMEROL   100 mg, Oral      ondansetron 4 MG tablet  Commonly known as: ZOFRAN   4 mg, Oral, Every 6 Hours PRN      OneTouch Delica Plus [id number]  misc   No dose, route, or frequency recorded.      Riboflavin 400 MG tablet   400 mg, Oral, Daily      simvastatin 20 MG tablet  Commonly known as: ZOCOR   20 mg, Oral, Nightly      spironolactone 50 MG tablet  Commonly known as: ALDACTONE   50 mg, Oral, Daily      torsemide 20 MG tablet  Commonly known as: DEMADEX   40 mg, Oral, Daily      Toujeo Max SoloStar 300 UNIT/ML solution pen-injector injection  Generic drug: Insulin Glargine (2 Unit Dial)   45 Units.      TRUEplus Pen Needles 31G X 5 MM misc  Generic drug: Insulin Pen Needle   Use 5 times per day for insulin.                   **Dragon Disclaimer:   Much of this encounter note is an electronic transcription/translation of spoken language to printed text. The electronic translation of spoken language may permit erroneous, or at times, nonsensical words or phrases to be inadvertently transcribed. Although I have reviewed the note for such errors, some may still exist.

## 2023-06-15 NOTE — TELEPHONE ENCOUNTER
I called the pt's caregiver to let them know that our office is changing the pt's referral for Home Health PT.  MB is full

## 2023-06-16 ENCOUNTER — TELEPHONE (OUTPATIENT)
Dept: NEUROLOGY | Facility: CLINIC | Age: 63
End: 2023-06-16

## 2023-06-16 NOTE — TELEPHONE ENCOUNTER
Provider: LILIYA MARQUES  Caller: PATIENT  Relationship to Patient: SELF  Phone Number: [phone] OR CALL SHARIF RESENDIZ [phone]    Reason for Call: PATIENT WOULD LIKE TO GET HOME HEALTH PHYSICAL THERAPY TO HELP HER WALK AGAIN. SHE WAS TOLD BY LACY THAT SHE WAS DISCHARGED FROM OUR OFFICE. PT'S POA SPOKE W/FRANCES AND WAS TOLD BY FRANCES THAT SHE DIDN'T SEE WHERE THE PT HAD BEEN DISCHARGED FROM NEUROLOGY.    PT SAW LILIYA ON 5-23-23 AND WAS TOLD AN ORDER WOULD BE SENT FOR HOME HEALTH PHYSICAL THERAPY.    THE PT WAS TOLD THE REFERRAL JUST NEEDS TO BE CODED FOR HOME HEALTH THROUGH [facility].    PLEASE CALL THE PT BACK TO DISCUSS HER OPTIONS. PT'S PCP IS GRETTA AND SHE DOESN'T WANT TO BE SEEN BY YASMINE.

## 2023-06-19 NOTE — TELEPHONE ENCOUNTER
"Caller: Jenelle Kasper \"BUSHRA\"    Relationship: Self    Best call back number: [phone]    What was the call regarding: PT CALLED TO CHECK STATUS OF REQUEST FOR REFERRAL TO HOME HEALTH.    Do you require a callback: YES, PLEASE.    PLEASE REVIEW AND ADVISE.  "

## 2023-06-21 NOTE — TELEPHONE ENCOUNTER
"Caller: Jenelle Kasper \"BUSHRA\"     Relationship: Self     Best call back number: [phone] PLEASE LVM IF NO ANSWER BECAUSE PT HAS ISSUES GETTING TO HER PHONE DUE TO THE STROKE.     What was the call regarding: HOME HEALTH REFERRAL    PT CALLED BACK TO TALK BRIGIDA AND LILIYA MARQUES.    PT IS NEEDING TO GET THE HOME HEALTH PHYSICAL THERAPY REFERRAL SENT ASAP.     A REFERRAL WAS SENT BUT IT WAS NOT FOR HOME HEALTH. PT CANNOT TRAVEL. PT SAID A CODE WAS ENTERED INCORRECTLY ON THE REFERRAL FORM. PLEASE RESEND REFERRAL FOR HOME HEALTH.  "

## 2023-07-17 ENCOUNTER — TELEPHONE (OUTPATIENT)
Dept: CARDIOLOGY | Facility: CLINIC | Age: 63
End: 2023-07-17

## 2023-07-17 NOTE — TELEPHONE ENCOUNTER
"  Caller: Jenelle Kasper \"BUSHRA\"    Relationship: Self    Best call back number: [phone]    What is the best time to reach you: ANYTIME    Who are you requesting to speak with (clinical staff, provider,  specific staff member): DR. HARRIS'S MA OR NURSE    What was the call regarding: PT IS CALLING BECAUSE SHE IS HAVING ORAL SURGERY 7.19.23 AND WANTED TO KNOW HOW SHE SHOULD START OR STOP THE BLOOD THINNERS.    Is it okay if the provider responds through MyChart: NO      "

## 2023-07-25 ENCOUNTER — TELEPHONE (OUTPATIENT)
Dept: CARDIOLOGY | Facility: CLINIC | Age: 63
End: 2023-07-25
Payer: MEDICARE

## 2023-07-25 NOTE — TELEPHONE ENCOUNTER
"  Caller: Jenelle Kasper \"BUSHRA\"    Relationship to patient: Self    Best call back number: [phone]    Patient is needing: PT LOOKING FOR SAMPLES OF ELIQUIS 5MG. PLEASE CALL WHEN READY.         "

## 2023-09-11 RX ORDER — LOSARTAN POTASSIUM 100 MG/1
TABLET ORAL
Qty: 90 TABLET | Refills: 3 | Status: SHIPPED | OUTPATIENT
Start: 2023-09-11

## 2023-11-07 NOTE — TELEPHONE ENCOUNTER
LM WITH CAREGIVER TO CALL X2, NO RESPONSE @ THIS TIME.( SHARIF) TO CALL OUR OFFICE.  (REGARDING SLEEP STUDY SCHEDULING).     I have personally seen and examined the patient. I have collaborated with and supervised the

## 2023-12-14 ENCOUNTER — OFFICE VISIT (OUTPATIENT)
Dept: CARDIOLOGY | Facility: CLINIC | Age: 63
End: 2023-12-14
Payer: MEDICARE

## 2023-12-14 VITALS
HEIGHT: 68 IN | HEART RATE: 68 BPM | DIASTOLIC BLOOD PRESSURE: 82 MMHG | OXYGEN SATURATION: 96 % | SYSTOLIC BLOOD PRESSURE: 148 MMHG | BODY MASS INDEX: 43.65 KG/M2 | WEIGHT: 288 LBS

## 2023-12-14 DIAGNOSIS — I27.20 PULMONARY HYPERTENSION: ICD-10-CM

## 2023-12-14 DIAGNOSIS — I10 BENIGN ESSENTIAL HTN: ICD-10-CM

## 2023-12-14 DIAGNOSIS — I48.19 PERSISTENT ATRIAL FIBRILLATION: Primary | ICD-10-CM

## 2023-12-14 PROCEDURE — 3079F DIAST BP 80-89 MM HG: CPT | Performed by: INTERNAL MEDICINE

## 2023-12-14 PROCEDURE — 99214 OFFICE O/P EST MOD 30 MIN: CPT | Performed by: INTERNAL MEDICINE

## 2023-12-14 PROCEDURE — 3077F SYST BP >= 140 MM HG: CPT | Performed by: INTERNAL MEDICINE

## 2023-12-14 NOTE — PROGRESS NOTES
"      CARDIOLOGY    Baldev Winter MD    ENCOUNTER DATE:  12/14/2023    Jenelle Kasper / 63 y.o. / female        CHIEF COMPLAINT / REASON FOR OFFICE VISIT     Persistent atrial fibrillation (06/15/2023 Follow up)  Hypertension  History of CVA    HISTORY OF PRESENT ILLNESS       HPI  Jenelle Kasper is a 63 y.o. female who presents today for reevaluation.  Overall she looks good says she is doing relatively well.  Patient still has the bilateral swelling.  She has been fitted for a compression device but has not received it yet.  She has been in contact with the company and they said it would probably be after Turner for further medical device to be delivered.  Overall she says her swelling is better.  She thinks the swelling overall is better clinically she looks much better.      The following portions of the patient's history were reviewed and updated as appropriate: allergies, current medications, past family history, past medical history, past social history, past surgical history and problem list.      VITAL SIGNS     Visit Vitals  /82 (BP Location: Left arm)   Pulse 68   Ht 172.7 cm (68\")   Wt 131 kg (288 lb)   LMP  (LMP Unknown)   SpO2 96%   BMI 43.79 kg/m²         Wt Readings from Last 3 Encounters:   12/14/23 131 kg (288 lb)   05/23/23 (!) 138 kg (305 lb)   05/04/23 (!) 139 kg (305 lb 9.6 oz)     Body mass index is 43.79 kg/m².      REVIEW OF SYSTEMS   ROS        PHYSICAL EXAMINATION     Constitutional:       Appearance: Healthy appearance.   Pulmonary:      Effort: Pulmonary effort is normal.   Cardiovascular:      PMI at left midclavicular line. Normal rate. Irregularly irregular rhythm. Normal S1. Normal S2.       Murmurs: There is no murmur.      No gallop.  No click. No rub.   Pulses:     Intact distal pulses.   Edema:     Pretibial: bilateral 2+ edema of the pretibial area.     Ankle: bilateral 2+ edema of the ankle.     Feet: bilateral 2+ edema of the feet.  Neurological:      Mental " Surgery at bedside.       Linh Silva RN  02/09/18 6199 Status: Alert.           REVIEWED DATA     Procedures    Cardiac Procedures:            ASSESSMENT & PLAN      Diagnosis Plan   1. Persistent atrial fibrillation        2. Benign essential HTN        3. Pulmonary hypertension              SUMMARY/DISCUSSION  Chronic atrial fibrillation.  Heart rate stable.  Patient remains on Eliquis  Lower extremity edema.  Large component of lymphedema.  She unfortunately hit her  got quite a gash in her leg but it is healing nicely.  She had someone coming up from home health.  She also has a component of lymphedema.  She is in the works of getting device to squeeze her legs to help decrease the fluid.  Patient's overall weight is down 7 kg.  Hypertension blood pressure little bit elevated today she says it usually runs in the 137/80 range with the nurses taking at home.  Follow-up 6 months sooner if issues occur.        MEDICATIONS         Discharge Medications            Accurate as of December 14, 2023  3:10 PM. If you have any questions, ask your nurse or doctor.                Continue These Medications        Instructions Start Date   Accu-Chek Guide test strip  Generic drug: glucose blood   No dose, route, or frequency recorded.      Accu-Chek Guide w/Device kit   No dose, route, or frequency recorded.      Admelog 100 UNIT/ML injection  Generic drug: insulin lispro   2-10 Units, Subcutaneous, See Admin Instructions, Per sliding scale      HumaLOG KwikPen 100 UNIT/ML solution pen-injector  Generic drug: Insulin Lispro (1 Unit Dial)   15 Units, Subcutaneous, 3 Times Daily Before Meals      albuterol (2.5 MG/3ML) 0.083% nebulizer solution  Commonly known as: PROVENTIL   2.5 mg, Nebulization, Every 6 Hours PRN      apixaban 5 MG tablet tablet  Commonly known as: ELIQUIS   5 mg, Oral, Every 12 Hours Scheduled      Glutose 15 40 % gel  Generic drug: dextrose   No dose, route, or frequency recorded.      levothyroxine 100 MCG tablet  Commonly known as: SYNTHROID,  LEVOTHROID   100 mcg, Oral, Daily      levothyroxine 100 MCG tablet  Commonly known as: SYNTHROID, LEVOTHROID   200 mcg, Oral, Daily      losartan 100 MG tablet  Commonly known as: COZAAR   TAKE 1 TABLET BY MOUTH ONCE DAILY      meperidine 50 MG tablet  Commonly known as: DEMEROL   100 mg, Oral      ondansetron 4 MG tablet  Commonly known as: ZOFRAN   4 mg, Oral, Every 6 Hours PRN      OneTouch Delica Plus [id number] misc   No dose, route, or frequency recorded.      Riboflavin 400 MG tablet   400 mg, Oral, Daily      simvastatin 20 MG tablet  Commonly known as: ZOCOR   20 mg, Oral, Nightly      spironolactone 50 MG tablet  Commonly known as: ALDACTONE   50 mg, Oral, Daily      torsemide 20 MG tablet  Commonly known as: DEMADEX   40 mg, Oral, Daily      Toujeo Max SoloStar 300 UNIT/ML solution pen-injector injection  Generic drug: Insulin Glargine (2 Unit Dial)   45 Units.      TRUEplus Pen Needles 31G X 5 MM misc  Generic drug: Insulin Pen Needle   Use 5 times per day for insulin.                   **Dragon Disclaimer:   Much of this encounter note is an electronic transcription/translation of spoken language to printed text. The electronic translation of spoken language may permit erroneous, or at times, nonsensical words or phrases to be inadvertently transcribed. Although I have reviewed the note for such errors, some may still exist.

## 2024-05-20 RX ORDER — TORSEMIDE 20 MG/1
40 TABLET ORAL DAILY
Qty: 180 TABLET | Refills: 1 | Status: SHIPPED | OUTPATIENT
Start: 2024-05-20

## 2024-06-05 ENCOUNTER — TELEPHONE (OUTPATIENT)
Dept: CARDIOLOGY | Facility: CLINIC | Age: 64
End: 2024-06-05

## 2024-06-05 NOTE — TELEPHONE ENCOUNTER
The [facility] received a fax that requires your attention. The document has been indexed to the patient’s chart for your review.      Reason for sending: EXTERNAL MEDICAL RECORD NOTIFICATION     Documents Description: [facility]-6.4.24    Name of Sender: [facility]     Date Indexed: 6.4.24

## 2024-06-06 ENCOUNTER — TELEPHONE (OUTPATIENT)
Dept: CARDIOLOGY | Facility: CLINIC | Age: 64
End: 2024-06-06

## 2024-06-06 NOTE — TELEPHONE ENCOUNTER
The [facility] received a fax that requires your attention. The document has been indexed to the patient’s chart for your review.      Reason for sending: EXTERNAL MEDICAL RECORD NOTIFICATION     Documents Description: EXT MED REC; MEDICAL RECORD REQ-[facility]-6.6.24    Name of Sender: [facility]     Date Indexed: 6.6.24

## 2025-03-05 RX ORDER — TORSEMIDE 20 MG/1
40 TABLET ORAL DAILY
Qty: 180 TABLET | Refills: 0 | Status: SHIPPED | OUTPATIENT
Start: 2025-03-05

## 2025-03-05 NOTE — TELEPHONE ENCOUNTER
Rx Refill Note  Requested Prescriptions     Pending Prescriptions Disp Refills    torsemide (DEMADEX) 20 MG tablet [Pharmacy Med Name: torsemide 20 mg tablet] 180 tablet 0     Sig: TAKE TWO TABLETS BY MOUTH EVERY DAY      Last office visit with prescribing clinician: 12/14/2023   Last telemedicine visit with prescribing clinician: Visit date not found   Next office visit with prescribing clinician: Visit date not found                         Would you like a call back once the refill request has been completed: [] Yes [] No    If the office needs to give you a call back, can they leave a voicemail: [] Yes [] No    Nathaly Capps CMA  03/05/25, 11:25 EST

## (undated) DEVICE — BALN PRESS WEDGE 5F 110CM

## (undated) DEVICE — GLIDESHEATH BASIC HYDROPHILIC COATED INTRODUCER SHEATH: Brand: GLIDESHEATH

## (undated) DEVICE — GW EMR FIX EXCHG J STD .035 3MM 260CM

## (undated) DEVICE — PK CATH CARD 40

## (undated) DEVICE — KT MANIFLD CARDIAC

## (undated) DEVICE — GW INQWIRE FC PTFE J/3MM .021 180